# Patient Record
Sex: FEMALE | Race: WHITE | NOT HISPANIC OR LATINO | Employment: OTHER | URBAN - METROPOLITAN AREA
[De-identification: names, ages, dates, MRNs, and addresses within clinical notes are randomized per-mention and may not be internally consistent; named-entity substitution may affect disease eponyms.]

---

## 2017-01-09 ENCOUNTER — COMMUNICATION - HEALTHEAST (OUTPATIENT)
Dept: INTERNAL MEDICINE | Facility: CLINIC | Age: 67
End: 2017-01-09

## 2017-01-09 DIAGNOSIS — K21.9 GERD (GASTROESOPHAGEAL REFLUX DISEASE): ICD-10-CM

## 2017-03-24 ENCOUNTER — COMMUNICATION - HEALTHEAST (OUTPATIENT)
Dept: INTERNAL MEDICINE | Facility: CLINIC | Age: 67
End: 2017-03-24

## 2017-04-23 ENCOUNTER — COMMUNICATION - HEALTHEAST (OUTPATIENT)
Dept: INTERNAL MEDICINE | Facility: CLINIC | Age: 67
End: 2017-04-23

## 2017-04-23 DIAGNOSIS — K21.9 GERD (GASTROESOPHAGEAL REFLUX DISEASE): ICD-10-CM

## 2017-04-26 ENCOUNTER — RECORDS - HEALTHEAST (OUTPATIENT)
Dept: ADMINISTRATIVE | Facility: OTHER | Age: 67
End: 2017-04-26

## 2017-05-16 ENCOUNTER — COMMUNICATION - HEALTHEAST (OUTPATIENT)
Dept: INTERNAL MEDICINE | Facility: CLINIC | Age: 67
End: 2017-05-16

## 2017-05-16 DIAGNOSIS — R30.0 DYSURIA: ICD-10-CM

## 2017-05-17 ENCOUNTER — COMMUNICATION - HEALTHEAST (OUTPATIENT)
Dept: INTERNAL MEDICINE | Facility: CLINIC | Age: 67
End: 2017-05-17

## 2017-05-17 ENCOUNTER — AMBULATORY - HEALTHEAST (OUTPATIENT)
Dept: INTERNAL MEDICINE | Facility: CLINIC | Age: 67
End: 2017-05-17

## 2017-05-17 ENCOUNTER — AMBULATORY - HEALTHEAST (OUTPATIENT)
Dept: LAB | Facility: CLINIC | Age: 67
End: 2017-05-17

## 2017-05-17 DIAGNOSIS — R30.0 DYSURIA: ICD-10-CM

## 2017-08-11 ENCOUNTER — OFFICE VISIT - HEALTHEAST (OUTPATIENT)
Dept: INTERNAL MEDICINE | Facility: CLINIC | Age: 67
End: 2017-08-11

## 2017-08-11 DIAGNOSIS — Z00.00 HEALTH CARE MAINTENANCE: ICD-10-CM

## 2017-08-11 DIAGNOSIS — I25.10 CORONARY ATHEROSCLEROSIS: ICD-10-CM

## 2017-08-11 DIAGNOSIS — Z12.31 VISIT FOR SCREENING MAMMOGRAM: ICD-10-CM

## 2017-08-11 DIAGNOSIS — E78.5 HYPERLIPEMIA: ICD-10-CM

## 2017-08-11 DIAGNOSIS — Z12.11 COLON CANCER SCREENING: ICD-10-CM

## 2017-08-11 DIAGNOSIS — M85.80 OSTEOPENIA: ICD-10-CM

## 2017-08-11 LAB
CHOLEST SERPL-MCNC: 142 MG/DL
FASTING STATUS PATIENT QL REPORTED: YES
HDLC SERPL-MCNC: 46 MG/DL
LDLC SERPL CALC-MCNC: 76 MG/DL
TRIGL SERPL-MCNC: 99 MG/DL

## 2017-08-11 ASSESSMENT — MIFFLIN-ST. JEOR: SCORE: 1264.98

## 2017-08-14 ENCOUNTER — COMMUNICATION - HEALTHEAST (OUTPATIENT)
Dept: INTERNAL MEDICINE | Facility: CLINIC | Age: 67
End: 2017-08-14

## 2017-08-15 ENCOUNTER — RECORDS - HEALTHEAST (OUTPATIENT)
Dept: BONE DENSITY | Facility: CLINIC | Age: 67
End: 2017-08-15

## 2017-08-15 ENCOUNTER — RECORDS - HEALTHEAST (OUTPATIENT)
Dept: ADMINISTRATIVE | Facility: OTHER | Age: 67
End: 2017-08-15

## 2017-08-15 ENCOUNTER — RECORDS - HEALTHEAST (OUTPATIENT)
Dept: MAMMOGRAPHY | Facility: CLINIC | Age: 67
End: 2017-08-15

## 2017-08-15 DIAGNOSIS — Z12.31 ENCOUNTER FOR SCREENING MAMMOGRAM FOR MALIGNANT NEOPLASM OF BREAST: ICD-10-CM

## 2017-08-15 DIAGNOSIS — M85.80 OTHER SPECIFIED DISORDERS OF BONE DENSITY AND STRUCTURE, UNSPECIFIED SITE: ICD-10-CM

## 2017-08-16 ENCOUNTER — COMMUNICATION - HEALTHEAST (OUTPATIENT)
Dept: INTERNAL MEDICINE | Facility: CLINIC | Age: 67
End: 2017-08-16

## 2017-08-22 ENCOUNTER — AMBULATORY - HEALTHEAST (OUTPATIENT)
Dept: INTERNAL MEDICINE | Facility: CLINIC | Age: 67
End: 2017-08-22

## 2017-08-22 ENCOUNTER — COMMUNICATION - HEALTHEAST (OUTPATIENT)
Dept: INTERNAL MEDICINE | Facility: CLINIC | Age: 67
End: 2017-08-22

## 2017-08-22 ENCOUNTER — HOSPITAL ENCOUNTER (OUTPATIENT)
Dept: MAMMOGRAPHY | Facility: CLINIC | Age: 67
Discharge: HOME | End: 2017-08-22
Attending: INTERNAL MEDICINE

## 2017-08-22 DIAGNOSIS — R92.0 MICROCALCIFICATIONS OF THE BREAST: ICD-10-CM

## 2017-08-22 DIAGNOSIS — R92.1 BREAST CALCIFICATION SEEN ON MAMMOGRAM: ICD-10-CM

## 2017-09-07 ENCOUNTER — HOSPITAL ENCOUNTER (OUTPATIENT)
Dept: MAMMOGRAPHY | Facility: HOSPITAL | Age: 67
Discharge: HOME | End: 2017-09-07
Attending: INTERNAL MEDICINE

## 2017-09-07 DIAGNOSIS — R92.1 BREAST CALCIFICATION SEEN ON MAMMOGRAM: ICD-10-CM

## 2017-09-08 ENCOUNTER — COMMUNICATION - HEALTHEAST (OUTPATIENT)
Dept: ONCOLOGY | Facility: HOSPITAL | Age: 67
End: 2017-09-08

## 2017-09-11 ENCOUNTER — COMMUNICATION - HEALTHEAST (OUTPATIENT)
Dept: INTERNAL MEDICINE | Facility: CLINIC | Age: 67
End: 2017-09-11

## 2017-09-14 ENCOUNTER — COMMUNICATION - HEALTHEAST (OUTPATIENT)
Dept: INTERNAL MEDICINE | Facility: CLINIC | Age: 67
End: 2017-09-14

## 2017-09-14 DIAGNOSIS — I25.10 CORONARY ATHEROSCLEROSIS: ICD-10-CM

## 2017-09-20 ENCOUNTER — COMMUNICATION - HEALTHEAST (OUTPATIENT)
Dept: ONCOLOGY | Facility: HOSPITAL | Age: 67
End: 2017-09-20

## 2017-09-22 ENCOUNTER — RECORDS - HEALTHEAST (OUTPATIENT)
Dept: ADMINISTRATIVE | Facility: OTHER | Age: 67
End: 2017-09-22

## 2017-09-25 ENCOUNTER — RECORDS - HEALTHEAST (OUTPATIENT)
Dept: ADMINISTRATIVE | Facility: OTHER | Age: 67
End: 2017-09-25

## 2017-09-26 ENCOUNTER — RECORDS - HEALTHEAST (OUTPATIENT)
Dept: ADMINISTRATIVE | Facility: OTHER | Age: 67
End: 2017-09-26

## 2017-10-09 ENCOUNTER — RECORDS - HEALTHEAST (OUTPATIENT)
Dept: ADMINISTRATIVE | Facility: OTHER | Age: 67
End: 2017-10-09

## 2017-10-09 ENCOUNTER — OFFICE VISIT - HEALTHEAST (OUTPATIENT)
Dept: INTERNAL MEDICINE | Facility: CLINIC | Age: 67
End: 2017-10-09

## 2017-10-09 DIAGNOSIS — Z95.5 S/P CORONARY ARTERY STENT PLACEMENT: ICD-10-CM

## 2017-10-09 DIAGNOSIS — R92.0 MICROCALCIFICATIONS OF THE BREAST: ICD-10-CM

## 2017-10-09 DIAGNOSIS — D05.10 DCIS (DUCTAL CARCINOMA IN SITU): ICD-10-CM

## 2017-10-09 DIAGNOSIS — I25.10 CORONARY ATHEROSCLEROSIS: ICD-10-CM

## 2017-10-09 DIAGNOSIS — Z01.818 PRE-OPERATIVE EXAMINATION FOR INTERNAL MEDICINE: ICD-10-CM

## 2017-10-09 ASSESSMENT — MIFFLIN-ST. JEOR: SCORE: 1230.74

## 2017-10-10 ENCOUNTER — COMMUNICATION - HEALTHEAST (OUTPATIENT)
Dept: INTERNAL MEDICINE | Facility: CLINIC | Age: 67
End: 2017-10-10

## 2017-10-11 LAB
ATRIAL RATE - MUSE: 69 BPM
DIASTOLIC BLOOD PRESSURE - MUSE: NORMAL MMHG
INTERPRETATION ECG - MUSE: NORMAL
P AXIS - MUSE: 65 DEGREES
PR INTERVAL - MUSE: 144 MS
QRS DURATION - MUSE: 76 MS
QT - MUSE: 444 MS
QTC - MUSE: 475 MS
R AXIS - MUSE: 2 DEGREES
SYSTOLIC BLOOD PRESSURE - MUSE: NORMAL MMHG
T AXIS - MUSE: 30 DEGREES
VENTRICULAR RATE- MUSE: 69 BPM

## 2017-10-25 ENCOUNTER — RECORDS - HEALTHEAST (OUTPATIENT)
Dept: ADMINISTRATIVE | Facility: OTHER | Age: 67
End: 2017-10-25

## 2017-10-27 ENCOUNTER — RECORDS - HEALTHEAST (OUTPATIENT)
Dept: ADMINISTRATIVE | Facility: OTHER | Age: 67
End: 2017-10-27

## 2017-11-02 ENCOUNTER — RECORDS - HEALTHEAST (OUTPATIENT)
Dept: ADMINISTRATIVE | Facility: OTHER | Age: 67
End: 2017-11-02

## 2017-11-03 ENCOUNTER — RECORDS - HEALTHEAST (OUTPATIENT)
Dept: ADMINISTRATIVE | Facility: OTHER | Age: 67
End: 2017-11-03

## 2017-11-16 ENCOUNTER — COMMUNICATION - HEALTHEAST (OUTPATIENT)
Dept: INTERNAL MEDICINE | Facility: CLINIC | Age: 67
End: 2017-11-16

## 2017-11-21 LAB
LAB AP CHARGES (HE HISTORICAL CONVERSION): ABNORMAL
PATH REPORT.ADDENDUM SPEC: ABNORMAL
PATH REPORT.COMMENTS IMP SPEC: ABNORMAL
PATH REPORT.COMMENTS IMP SPEC: ABNORMAL
PATH REPORT.FINAL DX SPEC: ABNORMAL
PATH REPORT.GROSS SPEC: ABNORMAL
PATH REPORT.MICROSCOPIC SPEC OTHER STN: ABNORMAL
PATH REPORT.RELEVANT HX SPEC: ABNORMAL
RESULT FLAG (HE HISTORICAL CONVERSION): ABNORMAL

## 2017-11-27 ENCOUNTER — RECORDS - HEALTHEAST (OUTPATIENT)
Dept: ADMINISTRATIVE | Facility: OTHER | Age: 67
End: 2017-11-27

## 2017-12-27 ENCOUNTER — COMMUNICATION - HEALTHEAST (OUTPATIENT)
Dept: INTERNAL MEDICINE | Facility: CLINIC | Age: 67
End: 2017-12-27

## 2017-12-27 DIAGNOSIS — E78.5 HYPERLIPIDEMIA: ICD-10-CM

## 2018-01-02 ENCOUNTER — COMMUNICATION - HEALTHEAST (OUTPATIENT)
Dept: INTERNAL MEDICINE | Facility: CLINIC | Age: 68
End: 2018-01-02

## 2018-01-02 ENCOUNTER — OFFICE VISIT - HEALTHEAST (OUTPATIENT)
Dept: INTERNAL MEDICINE | Facility: CLINIC | Age: 68
End: 2018-01-02

## 2018-01-02 DIAGNOSIS — J32.9 SINUSITIS: ICD-10-CM

## 2018-02-09 ENCOUNTER — COMMUNICATION - HEALTHEAST (OUTPATIENT)
Dept: INTERNAL MEDICINE | Facility: CLINIC | Age: 68
End: 2018-02-09

## 2018-02-09 DIAGNOSIS — K21.9 GERD (GASTROESOPHAGEAL REFLUX DISEASE): ICD-10-CM

## 2018-03-26 ENCOUNTER — COMMUNICATION - HEALTHEAST (OUTPATIENT)
Dept: INTERNAL MEDICINE | Facility: CLINIC | Age: 68
End: 2018-03-26

## 2018-03-26 DIAGNOSIS — E78.5 HYPERLIPIDEMIA: ICD-10-CM

## 2018-05-21 ENCOUNTER — OFFICE VISIT - HEALTHEAST (OUTPATIENT)
Dept: INTERNAL MEDICINE | Facility: CLINIC | Age: 68
End: 2018-05-21

## 2018-05-21 DIAGNOSIS — G47.00 INSOMNIA: ICD-10-CM

## 2018-05-21 DIAGNOSIS — Z01.818 PRE-OP EXAM: ICD-10-CM

## 2018-05-21 DIAGNOSIS — D05.11 DUCTAL CARCINOMA IN SITU (DCIS) OF RIGHT BREAST: ICD-10-CM

## 2018-05-21 DIAGNOSIS — Z90.13 HISTORY OF BILATERAL MASTECTOMY: ICD-10-CM

## 2018-05-21 LAB
ALBUMIN SERPL-MCNC: 3.8 G/DL (ref 3.5–5)
ALP SERPL-CCNC: 134 U/L (ref 45–120)
ALT SERPL W P-5'-P-CCNC: 14 U/L (ref 0–45)
ANION GAP SERPL CALCULATED.3IONS-SCNC: 11 MMOL/L (ref 5–18)
AST SERPL W P-5'-P-CCNC: 16 U/L (ref 0–40)
BILIRUB SERPL-MCNC: 0.4 MG/DL (ref 0–1)
BUN SERPL-MCNC: 19 MG/DL (ref 8–22)
CALCIUM SERPL-MCNC: 9.4 MG/DL (ref 8.5–10.5)
CHLORIDE BLD-SCNC: 106 MMOL/L (ref 98–107)
CO2 SERPL-SCNC: 24 MMOL/L (ref 22–31)
CREAT SERPL-MCNC: 0.74 MG/DL (ref 0.6–1.1)
ERYTHROCYTE [DISTWIDTH] IN BLOOD BY AUTOMATED COUNT: 14.1 % (ref 11–14.5)
GFR SERPL CREATININE-BSD FRML MDRD: >60 ML/MIN/1.73M2
GLUCOSE BLD-MCNC: 96 MG/DL (ref 70–125)
HCT VFR BLD AUTO: 38 % (ref 35–47)
HGB BLD-MCNC: 12.5 G/DL (ref 12–16)
LDLC SERPL CALC-MCNC: 55 MG/DL
MCH RBC QN AUTO: 26.6 PG (ref 27–34)
MCHC RBC AUTO-ENTMCNC: 33 G/DL (ref 32–36)
MCV RBC AUTO: 81 FL (ref 80–100)
PLATELET # BLD AUTO: 305 THOU/UL (ref 140–440)
PMV BLD AUTO: 6.9 FL (ref 7–10)
POTASSIUM BLD-SCNC: 5.1 MMOL/L (ref 3.5–5)
PROT SERPL-MCNC: 6.8 G/DL (ref 6–8)
RBC # BLD AUTO: 4.71 MILL/UL (ref 3.8–5.4)
SODIUM SERPL-SCNC: 141 MMOL/L (ref 136–145)
WBC: 6.8 THOU/UL (ref 4–11)

## 2018-05-21 RX ORDER — BACLOFEN 20 MG
500 TABLET ORAL AT BEDTIME
Status: ON HOLD | COMMUNITY
Start: 2018-05-21 | End: 2022-02-04

## 2018-05-21 ASSESSMENT — MIFFLIN-ST. JEOR: SCORE: 1202.33

## 2018-05-22 ENCOUNTER — COMMUNICATION - HEALTHEAST (OUTPATIENT)
Dept: INTERNAL MEDICINE | Facility: CLINIC | Age: 68
End: 2018-05-22

## 2018-05-22 LAB
ATRIAL RATE - MUSE: 60 BPM
DIASTOLIC BLOOD PRESSURE - MUSE: NORMAL MMHG
INTERPRETATION ECG - MUSE: NORMAL
P AXIS - MUSE: 64 DEGREES
PR INTERVAL - MUSE: 154 MS
QRS DURATION - MUSE: 72 MS
QT - MUSE: 454 MS
QTC - MUSE: 454 MS
R AXIS - MUSE: 2 DEGREES
SYSTOLIC BLOOD PRESSURE - MUSE: NORMAL MMHG
T AXIS - MUSE: 35 DEGREES
VENTRICULAR RATE- MUSE: 60 BPM

## 2018-05-29 ENCOUNTER — COMMUNICATION - HEALTHEAST (OUTPATIENT)
Dept: INTERNAL MEDICINE | Facility: CLINIC | Age: 68
End: 2018-05-29

## 2018-05-29 DIAGNOSIS — B99.9 INFECTION: ICD-10-CM

## 2018-06-14 ENCOUNTER — RECORDS - HEALTHEAST (OUTPATIENT)
Dept: ADMINISTRATIVE | Facility: OTHER | Age: 68
End: 2018-06-14
Payer: MEDICARE

## 2018-09-24 ENCOUNTER — OFFICE VISIT - HEALTHEAST (OUTPATIENT)
Dept: INTERNAL MEDICINE | Facility: CLINIC | Age: 68
End: 2018-09-24

## 2018-09-24 DIAGNOSIS — Z01.818 PREOP GENERAL PHYSICAL EXAM: ICD-10-CM

## 2018-09-24 DIAGNOSIS — E78.5 HYPERLIPIDEMIA: ICD-10-CM

## 2018-09-24 DIAGNOSIS — D05.11 DUCTAL CARCINOMA IN SITU (DCIS) OF RIGHT BREAST: ICD-10-CM

## 2018-09-24 DIAGNOSIS — Z98.82 S/P BILATERAL BREAST IMPLANTS: ICD-10-CM

## 2018-09-24 DIAGNOSIS — Z90.13 S/P BILATERAL MASTECTOMY: ICD-10-CM

## 2018-09-24 DIAGNOSIS — G47.00 INSOMNIA: ICD-10-CM

## 2018-09-24 DIAGNOSIS — I25.10 CORONARY ATHEROSCLEROSIS: ICD-10-CM

## 2018-09-24 LAB
ANION GAP SERPL CALCULATED.3IONS-SCNC: 11 MMOL/L (ref 5–18)
BUN SERPL-MCNC: 22 MG/DL (ref 8–22)
CALCIUM SERPL-MCNC: 9.8 MG/DL (ref 8.5–10.5)
CHLORIDE BLD-SCNC: 107 MMOL/L (ref 98–107)
CO2 SERPL-SCNC: 27 MMOL/L (ref 22–31)
CREAT SERPL-MCNC: 0.76 MG/DL (ref 0.6–1.1)
ERYTHROCYTE [DISTWIDTH] IN BLOOD BY AUTOMATED COUNT: 14.8 % (ref 11–14.5)
GFR SERPL CREATININE-BSD FRML MDRD: >60 ML/MIN/1.73M2
GLUCOSE BLD-MCNC: 117 MG/DL (ref 70–125)
HCT VFR BLD AUTO: 40.4 % (ref 35–47)
HGB BLD-MCNC: 13.3 G/DL (ref 12–16)
MCH RBC QN AUTO: 26.8 PG (ref 27–34)
MCHC RBC AUTO-ENTMCNC: 32.9 G/DL (ref 32–36)
MCV RBC AUTO: 81 FL (ref 80–100)
PLATELET # BLD AUTO: 221 THOU/UL (ref 140–440)
PMV BLD AUTO: 7.3 FL (ref 7–10)
POTASSIUM BLD-SCNC: 4.9 MMOL/L (ref 3.5–5)
RBC # BLD AUTO: 4.96 MILL/UL (ref 3.8–5.4)
SODIUM SERPL-SCNC: 145 MMOL/L (ref 136–145)
WBC: 5.6 THOU/UL (ref 4–11)

## 2018-09-24 ASSESSMENT — MIFFLIN-ST. JEOR: SCORE: 1211.68

## 2018-09-25 ENCOUNTER — COMMUNICATION - HEALTHEAST (OUTPATIENT)
Dept: INTERNAL MEDICINE | Facility: CLINIC | Age: 68
End: 2018-09-25

## 2018-10-18 ENCOUNTER — RECORDS - HEALTHEAST (OUTPATIENT)
Dept: ADMINISTRATIVE | Facility: OTHER | Age: 68
End: 2018-10-18

## 2018-12-10 ENCOUNTER — COMMUNICATION - HEALTHEAST (OUTPATIENT)
Dept: INTERNAL MEDICINE | Facility: CLINIC | Age: 68
End: 2018-12-10

## 2019-01-02 ENCOUNTER — RECORDS - HEALTHEAST (OUTPATIENT)
Dept: ADMINISTRATIVE | Facility: OTHER | Age: 69
End: 2019-01-02

## 2019-01-08 ENCOUNTER — COMMUNICATION - HEALTHEAST (OUTPATIENT)
Dept: INTERNAL MEDICINE | Facility: CLINIC | Age: 69
End: 2019-01-08

## 2019-02-19 ENCOUNTER — OFFICE VISIT - HEALTHEAST (OUTPATIENT)
Dept: INTERNAL MEDICINE | Facility: CLINIC | Age: 69
End: 2019-02-19

## 2019-02-19 DIAGNOSIS — Z00.00 HEALTHCARE MAINTENANCE: ICD-10-CM

## 2019-02-19 DIAGNOSIS — I25.10 CORONARY ARTERY DISEASE INVOLVING NATIVE HEART WITHOUT ANGINA PECTORIS, UNSPECIFIED VESSEL OR LESION TYPE: ICD-10-CM

## 2019-02-19 DIAGNOSIS — G47.30 SLEEP APNEA, UNSPECIFIED TYPE: ICD-10-CM

## 2019-02-19 DIAGNOSIS — G47.00 INSOMNIA: ICD-10-CM

## 2019-02-19 DIAGNOSIS — Z23 NEED FOR VACCINE FOR TD (TETANUS-DIPHTHERIA): ICD-10-CM

## 2019-02-19 DIAGNOSIS — M77.8 SHOULDER TENDONITIS, RIGHT: ICD-10-CM

## 2019-02-19 DIAGNOSIS — N39.0 URINARY TRACT INFECTION WITHOUT HEMATURIA, SITE UNSPECIFIED: ICD-10-CM

## 2019-02-19 ASSESSMENT — MIFFLIN-ST. JEOR: SCORE: 1205.56

## 2019-02-25 ENCOUNTER — OFFICE VISIT - HEALTHEAST (OUTPATIENT)
Dept: PHYSICAL THERAPY | Facility: REHABILITATION | Age: 69
End: 2019-02-25

## 2019-02-25 DIAGNOSIS — M62.81 GENERALIZED MUSCLE WEAKNESS: ICD-10-CM

## 2019-02-25 DIAGNOSIS — R29.3 ABNORMAL POSTURE: ICD-10-CM

## 2019-02-25 DIAGNOSIS — M75.41 IMPINGEMENT SYNDROME OF RIGHT SHOULDER: ICD-10-CM

## 2019-02-27 ENCOUNTER — OFFICE VISIT - HEALTHEAST (OUTPATIENT)
Dept: PHYSICAL THERAPY | Facility: REHABILITATION | Age: 69
End: 2019-02-27

## 2019-02-27 DIAGNOSIS — R29.3 ABNORMAL POSTURE: ICD-10-CM

## 2019-02-27 DIAGNOSIS — M75.41 IMPINGEMENT SYNDROME OF RIGHT SHOULDER: ICD-10-CM

## 2019-02-27 DIAGNOSIS — M62.81 GENERALIZED MUSCLE WEAKNESS: ICD-10-CM

## 2019-03-04 ENCOUNTER — AMBULATORY - HEALTHEAST (OUTPATIENT)
Dept: CARDIOLOGY | Facility: CLINIC | Age: 69
End: 2019-03-04

## 2019-03-04 ENCOUNTER — OFFICE VISIT - HEALTHEAST (OUTPATIENT)
Dept: PHYSICAL THERAPY | Facility: REHABILITATION | Age: 69
End: 2019-03-04

## 2019-03-04 ENCOUNTER — RECORDS - HEALTHEAST (OUTPATIENT)
Dept: ADMINISTRATIVE | Facility: OTHER | Age: 69
End: 2019-03-04

## 2019-03-04 DIAGNOSIS — M62.81 GENERALIZED MUSCLE WEAKNESS: ICD-10-CM

## 2019-03-04 DIAGNOSIS — M75.41 IMPINGEMENT SYNDROME OF RIGHT SHOULDER: ICD-10-CM

## 2019-03-04 DIAGNOSIS — R29.3 ABNORMAL POSTURE: ICD-10-CM

## 2019-03-07 ENCOUNTER — OFFICE VISIT - HEALTHEAST (OUTPATIENT)
Dept: PHYSICAL THERAPY | Facility: REHABILITATION | Age: 69
End: 2019-03-07

## 2019-03-07 DIAGNOSIS — R29.3 ABNORMAL POSTURE: ICD-10-CM

## 2019-03-07 DIAGNOSIS — M62.81 GENERALIZED MUSCLE WEAKNESS: ICD-10-CM

## 2019-03-07 DIAGNOSIS — M75.41 IMPINGEMENT SYNDROME OF RIGHT SHOULDER: ICD-10-CM

## 2019-03-10 ENCOUNTER — COMMUNICATION - HEALTHEAST (OUTPATIENT)
Dept: INTERNAL MEDICINE | Facility: CLINIC | Age: 69
End: 2019-03-10

## 2019-03-10 DIAGNOSIS — K21.9 GERD (GASTROESOPHAGEAL REFLUX DISEASE): ICD-10-CM

## 2019-03-11 ENCOUNTER — OFFICE VISIT - HEALTHEAST (OUTPATIENT)
Dept: PHYSICAL THERAPY | Facility: REHABILITATION | Age: 69
End: 2019-03-11

## 2019-03-11 DIAGNOSIS — M62.81 GENERALIZED MUSCLE WEAKNESS: ICD-10-CM

## 2019-03-11 DIAGNOSIS — M75.41 IMPINGEMENT SYNDROME OF RIGHT SHOULDER: ICD-10-CM

## 2019-03-11 DIAGNOSIS — R29.3 ABNORMAL POSTURE: ICD-10-CM

## 2019-03-13 ENCOUNTER — AMBULATORY - HEALTHEAST (OUTPATIENT)
Dept: CARDIOLOGY | Facility: CLINIC | Age: 69
End: 2019-03-13

## 2019-03-13 ENCOUNTER — COMMUNICATION - HEALTHEAST (OUTPATIENT)
Dept: CARDIOLOGY | Facility: CLINIC | Age: 69
End: 2019-03-13

## 2019-03-13 ENCOUNTER — SURGERY - HEALTHEAST (OUTPATIENT)
Dept: CARDIOLOGY | Facility: CLINIC | Age: 69
End: 2019-03-13

## 2019-03-13 ENCOUNTER — OFFICE VISIT - HEALTHEAST (OUTPATIENT)
Dept: CARDIOLOGY | Facility: CLINIC | Age: 69
End: 2019-03-13

## 2019-03-13 DIAGNOSIS — E78.5 HYPERLIPIDEMIA: ICD-10-CM

## 2019-03-13 DIAGNOSIS — I20.89 STABLE ANGINA (H): ICD-10-CM

## 2019-03-13 DIAGNOSIS — I25.728 ATHEROSCLEROSIS OF AUTOLOGOUS ARTERY CORONARY ARTERY BYPASS GRAFT WITH STABLE ANGINA (H): ICD-10-CM

## 2019-03-13 ASSESSMENT — MIFFLIN-ST. JEOR: SCORE: 1209.64

## 2019-03-14 ENCOUNTER — OFFICE VISIT - HEALTHEAST (OUTPATIENT)
Dept: PHYSICAL THERAPY | Facility: REHABILITATION | Age: 69
End: 2019-03-14

## 2019-03-14 DIAGNOSIS — M75.41 IMPINGEMENT SYNDROME OF RIGHT SHOULDER: ICD-10-CM

## 2019-03-14 DIAGNOSIS — R29.3 ABNORMAL POSTURE: ICD-10-CM

## 2019-03-14 DIAGNOSIS — M62.81 GENERALIZED MUSCLE WEAKNESS: ICD-10-CM

## 2019-03-21 ENCOUNTER — OFFICE VISIT - HEALTHEAST (OUTPATIENT)
Dept: PHYSICAL THERAPY | Facility: REHABILITATION | Age: 69
End: 2019-03-21

## 2019-03-21 DIAGNOSIS — M62.81 GENERALIZED MUSCLE WEAKNESS: ICD-10-CM

## 2019-03-21 DIAGNOSIS — M75.41 IMPINGEMENT SYNDROME OF RIGHT SHOULDER: ICD-10-CM

## 2019-03-21 DIAGNOSIS — R29.3 ABNORMAL POSTURE: ICD-10-CM

## 2019-03-26 ENCOUNTER — AMBULATORY - HEALTHEAST (OUTPATIENT)
Dept: CARDIOLOGY | Facility: CLINIC | Age: 69
End: 2019-03-26

## 2019-03-26 ENCOUNTER — SURGERY - HEALTHEAST (OUTPATIENT)
Dept: CARDIOLOGY | Facility: CLINIC | Age: 69
End: 2019-03-26

## 2019-03-26 DIAGNOSIS — I25.10 ATHEROSCLEROSIS OF NATIVE CORONARY ARTERY OF NATIVE HEART, ANGINA PRESENCE UNSPECIFIED: ICD-10-CM

## 2019-03-26 ASSESSMENT — MIFFLIN-ST. JEOR: SCORE: 1231.81

## 2019-03-27 ENCOUNTER — ANESTHESIA - HEALTHEAST (OUTPATIENT)
Dept: SURGERY | Facility: CLINIC | Age: 69
End: 2019-03-27

## 2019-03-27 ENCOUNTER — HOSPITAL ENCOUNTER (OUTPATIENT)
Dept: SURGERY | Facility: CLINIC | Age: 69
Discharge: HOME | End: 2019-03-27
Attending: INTERNAL MEDICINE | Admitting: INTERNAL MEDICINE

## 2019-03-27 DIAGNOSIS — I25.10 ATHEROSCLEROSIS OF NATIVE CORONARY ARTERY OF NATIVE HEART, ANGINA PRESENCE UNSPECIFIED: ICD-10-CM

## 2019-03-27 LAB
ABO/RH(D): NORMAL
ALBUMIN UR-MCNC: NEGATIVE MG/DL
ANION GAP SERPL CALCULATED.3IONS-SCNC: 11 MMOL/L (ref 5–18)
ANTIBODY SCREEN: NEGATIVE
APPEARANCE UR: CLEAR
BILIRUB UR QL STRIP: NEGATIVE
BUN SERPL-MCNC: 18 MG/DL (ref 8–22)
CALCIUM SERPL-MCNC: 9.2 MG/DL (ref 8.5–10.5)
CHLORIDE BLD-SCNC: 107 MMOL/L (ref 98–107)
CO2 SERPL-SCNC: 23 MMOL/L (ref 22–31)
COLOR UR AUTO: YELLOW
CREAT SERPL-MCNC: 0.74 MG/DL (ref 0.6–1.1)
ERYTHROCYTE [DISTWIDTH] IN BLOOD BY AUTOMATED COUNT: 14.4 % (ref 11–14.5)
GFR SERPL CREATININE-BSD FRML MDRD: >60 ML/MIN/1.73M2
GLUCOSE BLD-MCNC: 114 MG/DL (ref 70–125)
GLUCOSE UR STRIP-MCNC: NEGATIVE MG/DL
HBA1C MFR BLD: 6.2 % (ref 4.2–6.1)
HCT VFR BLD AUTO: 39.4 % (ref 35–47)
HGB BLD-MCNC: 12.2 G/DL (ref 12–16)
HGB UR QL STRIP: NEGATIVE
INR PPP: 1.01 (ref 0.9–1.1)
KETONES UR STRIP-MCNC: NEGATIVE MG/DL
LEUKOCYTE ESTERASE UR QL STRIP: NEGATIVE
MAGNESIUM SERPL-MCNC: 2 MG/DL (ref 1.8–2.6)
MCH RBC QN AUTO: 26.3 PG (ref 27–34)
MCHC RBC AUTO-ENTMCNC: 31 G/DL (ref 32–36)
MCV RBC AUTO: 85 FL (ref 80–100)
NITRATE UR QL: NEGATIVE
PH UR STRIP: 6.5 [PH] (ref 4.5–8)
PLATELET # BLD AUTO: 230 THOU/UL (ref 140–440)
PMV BLD AUTO: 9.6 FL (ref 8.5–12.5)
POTASSIUM BLD-SCNC: 3.9 MMOL/L (ref 3.5–5)
PREALB SERPL-MCNC: 32.8 MG/DL (ref 19–38)
RBC # BLD AUTO: 4.63 MILL/UL (ref 3.8–5.4)
SODIUM SERPL-SCNC: 141 MMOL/L (ref 136–145)
SP GR UR STRIP: 1.02 (ref 1–1.03)
UROBILINOGEN UR STRIP-ACNC: NORMAL
WBC: 6.4 THOU/UL (ref 4–11)

## 2019-03-27 ASSESSMENT — MIFFLIN-ST. JEOR: SCORE: 1223.02

## 2019-03-28 ENCOUNTER — SURGERY - HEALTHEAST (OUTPATIENT)
Dept: SURGERY | Facility: CLINIC | Age: 69
End: 2019-03-28

## 2019-03-28 LAB — BACTERIA SPEC CULT: NORMAL

## 2019-03-28 ASSESSMENT — MIFFLIN-ST. JEOR: SCORE: 1204.88

## 2019-03-29 ASSESSMENT — MIFFLIN-ST. JEOR
SCORE: 1230.28
SCORE: 1230.28

## 2019-03-30 ASSESSMENT — MIFFLIN-ST. JEOR: SCORE: 1230.28

## 2019-03-31 ASSESSMENT — MIFFLIN-ST. JEOR: SCORE: 1212.14

## 2019-04-01 ASSESSMENT — MIFFLIN-ST. JEOR: SCORE: 1202.61

## 2019-04-02 ASSESSMENT — MIFFLIN-ST. JEOR: SCORE: 1196.71

## 2019-04-03 ENCOUNTER — AMBULATORY - HEALTHEAST (OUTPATIENT)
Dept: OTHER | Facility: CLINIC | Age: 69
End: 2019-04-03

## 2019-04-03 ENCOUNTER — COMMUNICATION - HEALTHEAST (OUTPATIENT)
Dept: CARDIOLOGY | Facility: CLINIC | Age: 69
End: 2019-04-03

## 2019-04-03 DIAGNOSIS — Z95.1 S/P CABG (CORONARY ARTERY BYPASS GRAFT): ICD-10-CM

## 2019-04-03 ASSESSMENT — MIFFLIN-ST. JEOR: SCORE: 1192.18

## 2019-04-04 ENCOUNTER — COMMUNICATION - HEALTHEAST (OUTPATIENT)
Dept: CARE COORDINATION | Facility: CLINIC | Age: 69
End: 2019-04-04

## 2019-04-09 ENCOUNTER — AMBULATORY - HEALTHEAST (OUTPATIENT)
Dept: CARDIAC REHAB | Facility: CLINIC | Age: 69
End: 2019-04-09

## 2019-04-09 DIAGNOSIS — Z95.1 S/P CABG (CORONARY ARTERY BYPASS GRAFT): ICD-10-CM

## 2019-04-11 ENCOUNTER — AMBULATORY - HEALTHEAST (OUTPATIENT)
Dept: CARDIAC REHAB | Facility: CLINIC | Age: 69
End: 2019-04-11

## 2019-04-11 ENCOUNTER — OFFICE VISIT - HEALTHEAST (OUTPATIENT)
Dept: INTERNAL MEDICINE | Facility: CLINIC | Age: 69
End: 2019-04-11

## 2019-04-11 DIAGNOSIS — Z95.1 S/P CABG (CORONARY ARTERY BYPASS GRAFT): ICD-10-CM

## 2019-04-11 DIAGNOSIS — R79.81 LOW OXYGEN SATURATION: ICD-10-CM

## 2019-04-11 DIAGNOSIS — F34.1 DYSTHYMIC DISORDER: ICD-10-CM

## 2019-04-11 DIAGNOSIS — G47.00 INSOMNIA: ICD-10-CM

## 2019-04-11 DIAGNOSIS — Z95.5 S/P CORONARY ARTERY STENT PLACEMENT: ICD-10-CM

## 2019-04-11 DIAGNOSIS — L71.9 ROSACEA: ICD-10-CM

## 2019-04-11 DIAGNOSIS — D05.10 DUCTAL CARCINOMA IN SITU (DCIS) OF BREAST, UNSPECIFIED LATERALITY: ICD-10-CM

## 2019-04-11 DIAGNOSIS — R73.03 PREDIABETES: ICD-10-CM

## 2019-04-11 DIAGNOSIS — Z00.00 HEALTHCARE MAINTENANCE: ICD-10-CM

## 2019-04-11 DIAGNOSIS — Z00.00 HEALTH MAINTENANCE EXAMINATION: ICD-10-CM

## 2019-04-11 LAB
ANION GAP SERPL CALCULATED.3IONS-SCNC: 9 MMOL/L (ref 5–18)
BUN SERPL-MCNC: 18 MG/DL (ref 8–22)
CALCIUM SERPL-MCNC: 9.8 MG/DL (ref 8.5–10.5)
CHLORIDE BLD-SCNC: 104 MMOL/L (ref 98–107)
CO2 SERPL-SCNC: 27 MMOL/L (ref 22–31)
CREAT SERPL-MCNC: 0.73 MG/DL (ref 0.6–1.1)
ERYTHROCYTE [DISTWIDTH] IN BLOOD BY AUTOMATED COUNT: 15.1 % (ref 11–14.5)
GFR SERPL CREATININE-BSD FRML MDRD: >60 ML/MIN/1.73M2
GLUCOSE BLD-MCNC: 94 MG/DL (ref 70–125)
HCT VFR BLD AUTO: 34.1 % (ref 35–47)
HGB BLD-MCNC: 11.4 G/DL (ref 12–16)
MAGNESIUM SERPL-MCNC: 2.1 MG/DL (ref 1.8–2.6)
MCH RBC QN AUTO: 28.2 PG (ref 27–34)
MCHC RBC AUTO-ENTMCNC: 33.3 G/DL (ref 32–36)
MCV RBC AUTO: 85 FL (ref 80–100)
PLATELET # BLD AUTO: 436 THOU/UL (ref 140–440)
PMV BLD AUTO: 7.6 FL (ref 7–10)
POTASSIUM BLD-SCNC: 4.7 MMOL/L (ref 3.5–5)
RBC # BLD AUTO: 4.02 MILL/UL (ref 3.8–5.4)
SODIUM SERPL-SCNC: 140 MMOL/L (ref 136–145)
WBC: 9.2 THOU/UL (ref 4–11)

## 2019-04-11 ASSESSMENT — MIFFLIN-ST. JEOR: SCORE: 1220.76

## 2019-04-16 ENCOUNTER — AMBULATORY - HEALTHEAST (OUTPATIENT)
Dept: CARDIAC REHAB | Facility: CLINIC | Age: 69
End: 2019-04-16

## 2019-04-16 DIAGNOSIS — Z95.1 S/P CABG (CORONARY ARTERY BYPASS GRAFT): ICD-10-CM

## 2019-04-18 ENCOUNTER — AMBULATORY - HEALTHEAST (OUTPATIENT)
Dept: CARDIAC REHAB | Facility: CLINIC | Age: 69
End: 2019-04-18

## 2019-04-18 DIAGNOSIS — Z95.1 S/P CABG (CORONARY ARTERY BYPASS GRAFT): ICD-10-CM

## 2019-04-23 ENCOUNTER — OFFICE VISIT - HEALTHEAST (OUTPATIENT)
Dept: CARDIOLOGY | Facility: CLINIC | Age: 69
End: 2019-04-23

## 2019-04-23 ENCOUNTER — AMBULATORY - HEALTHEAST (OUTPATIENT)
Dept: CARDIAC REHAB | Facility: CLINIC | Age: 69
End: 2019-04-23

## 2019-04-23 DIAGNOSIS — Z95.1 S/P CABG (CORONARY ARTERY BYPASS GRAFT): ICD-10-CM

## 2019-04-23 ASSESSMENT — MIFFLIN-ST. JEOR: SCORE: 1207.83

## 2019-04-24 ENCOUNTER — OFFICE VISIT - HEALTHEAST (OUTPATIENT)
Dept: SLEEP MEDICINE | Facility: CLINIC | Age: 69
End: 2019-04-24

## 2019-04-24 DIAGNOSIS — R06.83 SNORING: ICD-10-CM

## 2019-04-24 DIAGNOSIS — Z95.1 S/P CABG (CORONARY ARTERY BYPASS GRAFT): ICD-10-CM

## 2019-04-24 DIAGNOSIS — R09.02 HYPOXIA: ICD-10-CM

## 2019-04-24 ASSESSMENT — MIFFLIN-ST. JEOR: SCORE: 1197.85

## 2019-04-25 ENCOUNTER — AMBULATORY - HEALTHEAST (OUTPATIENT)
Dept: CARDIAC REHAB | Facility: CLINIC | Age: 69
End: 2019-04-25

## 2019-04-25 DIAGNOSIS — Z95.1 S/P CABG (CORONARY ARTERY BYPASS GRAFT): ICD-10-CM

## 2019-04-25 DIAGNOSIS — Z95.5 S/P CORONARY ARTERY STENT PLACEMENT: ICD-10-CM

## 2019-04-29 ENCOUNTER — COMMUNICATION - HEALTHEAST (OUTPATIENT)
Dept: INTERNAL MEDICINE | Facility: CLINIC | Age: 69
End: 2019-04-29

## 2019-04-29 ENCOUNTER — AMBULATORY - HEALTHEAST (OUTPATIENT)
Dept: CARDIAC REHAB | Facility: CLINIC | Age: 69
End: 2019-04-29

## 2019-04-29 DIAGNOSIS — Z95.1 S/P CABG (CORONARY ARTERY BYPASS GRAFT): ICD-10-CM

## 2019-04-29 DIAGNOSIS — K21.9 GERD (GASTROESOPHAGEAL REFLUX DISEASE): ICD-10-CM

## 2019-05-03 ENCOUNTER — AMBULATORY - HEALTHEAST (OUTPATIENT)
Dept: CARDIAC REHAB | Facility: CLINIC | Age: 69
End: 2019-05-03

## 2019-05-03 DIAGNOSIS — Z95.1 S/P CABG (CORONARY ARTERY BYPASS GRAFT): ICD-10-CM

## 2019-05-06 ENCOUNTER — AMBULATORY - HEALTHEAST (OUTPATIENT)
Dept: CARDIAC REHAB | Facility: CLINIC | Age: 69
End: 2019-05-06

## 2019-05-06 ENCOUNTER — OFFICE VISIT - HEALTHEAST (OUTPATIENT)
Dept: VASCULAR SURGERY | Facility: CLINIC | Age: 69
End: 2019-05-06

## 2019-05-06 DIAGNOSIS — I25.10 ATHEROSCLEROSIS OF NATIVE CORONARY ARTERY OF NATIVE HEART, ANGINA PRESENCE UNSPECIFIED: ICD-10-CM

## 2019-05-06 DIAGNOSIS — Z95.1 S/P CABG (CORONARY ARTERY BYPASS GRAFT): ICD-10-CM

## 2019-05-08 ENCOUNTER — AMBULATORY - HEALTHEAST (OUTPATIENT)
Dept: CARDIAC REHAB | Facility: CLINIC | Age: 69
End: 2019-05-08

## 2019-05-08 DIAGNOSIS — Z95.1 S/P CABG (CORONARY ARTERY BYPASS GRAFT): ICD-10-CM

## 2019-05-10 ENCOUNTER — AMBULATORY - HEALTHEAST (OUTPATIENT)
Dept: CARDIAC REHAB | Facility: CLINIC | Age: 69
End: 2019-05-10

## 2019-05-10 DIAGNOSIS — Z95.1 S/P CABG (CORONARY ARTERY BYPASS GRAFT): ICD-10-CM

## 2019-05-13 ENCOUNTER — AMBULATORY - HEALTHEAST (OUTPATIENT)
Dept: CARDIAC REHAB | Facility: CLINIC | Age: 69
End: 2019-05-13

## 2019-05-13 DIAGNOSIS — Z95.1 S/P CABG (CORONARY ARTERY BYPASS GRAFT): ICD-10-CM

## 2019-05-14 ENCOUNTER — OFFICE VISIT - HEALTHEAST (OUTPATIENT)
Dept: CARDIOLOGY | Facility: CLINIC | Age: 69
End: 2019-05-14

## 2019-05-14 DIAGNOSIS — I20.9 ANGINA PECTORIS (H): ICD-10-CM

## 2019-05-14 DIAGNOSIS — Z95.1 S/P CABG (CORONARY ARTERY BYPASS GRAFT): ICD-10-CM

## 2019-05-14 RX ORDER — CALCIUM CARBONATE 500 MG/1
2 TABLET, CHEWABLE ORAL DAILY
Status: SHIPPED | COMMUNITY
Start: 2019-04-10

## 2019-05-14 ASSESSMENT — MIFFLIN-ST. JEOR: SCORE: 1199.66

## 2019-05-15 ENCOUNTER — AMBULATORY - HEALTHEAST (OUTPATIENT)
Dept: CARDIAC REHAB | Facility: CLINIC | Age: 69
End: 2019-05-15

## 2019-05-15 DIAGNOSIS — Z95.1 S/P CABG (CORONARY ARTERY BYPASS GRAFT): ICD-10-CM

## 2019-05-20 ENCOUNTER — RECORDS - HEALTHEAST (OUTPATIENT)
Dept: ADMINISTRATIVE | Facility: OTHER | Age: 69
End: 2019-05-20

## 2019-05-20 ENCOUNTER — AMBULATORY - HEALTHEAST (OUTPATIENT)
Dept: CARDIAC REHAB | Facility: CLINIC | Age: 69
End: 2019-05-20

## 2019-05-20 ENCOUNTER — RECORDS - HEALTHEAST (OUTPATIENT)
Dept: SLEEP MEDICINE | Age: 69
End: 2019-05-20

## 2019-05-20 DIAGNOSIS — R09.02 HYPOXEMIA: ICD-10-CM

## 2019-05-20 DIAGNOSIS — Z95.1 S/P CABG (CORONARY ARTERY BYPASS GRAFT): ICD-10-CM

## 2019-05-20 DIAGNOSIS — Z95.1 PRESENCE OF AORTOCORONARY BYPASS GRAFT: ICD-10-CM

## 2019-05-20 DIAGNOSIS — R06.83 SNORING: ICD-10-CM

## 2019-05-22 ENCOUNTER — AMBULATORY - HEALTHEAST (OUTPATIENT)
Dept: CARDIAC REHAB | Facility: CLINIC | Age: 69
End: 2019-05-22

## 2019-05-22 DIAGNOSIS — Z95.1 S/P CABG (CORONARY ARTERY BYPASS GRAFT): ICD-10-CM

## 2019-05-23 ENCOUNTER — COMMUNICATION - HEALTHEAST (OUTPATIENT)
Dept: INTERNAL MEDICINE | Facility: CLINIC | Age: 69
End: 2019-05-23

## 2019-05-23 ENCOUNTER — AMBULATORY - HEALTHEAST (OUTPATIENT)
Dept: INTERNAL MEDICINE | Facility: CLINIC | Age: 69
End: 2019-05-23

## 2019-05-28 ENCOUNTER — COMMUNICATION - HEALTHEAST (OUTPATIENT)
Dept: CARDIOLOGY | Facility: CLINIC | Age: 69
End: 2019-05-28

## 2019-05-29 ENCOUNTER — AMBULATORY - HEALTHEAST (OUTPATIENT)
Dept: CARDIAC REHAB | Facility: CLINIC | Age: 69
End: 2019-05-29

## 2019-05-29 DIAGNOSIS — Z95.1 S/P CABG (CORONARY ARTERY BYPASS GRAFT): ICD-10-CM

## 2019-05-31 ENCOUNTER — AMBULATORY - HEALTHEAST (OUTPATIENT)
Dept: CARDIAC REHAB | Facility: CLINIC | Age: 69
End: 2019-05-31

## 2019-05-31 DIAGNOSIS — Z95.1 S/P CABG (CORONARY ARTERY BYPASS GRAFT): ICD-10-CM

## 2019-06-03 ENCOUNTER — AMBULATORY - HEALTHEAST (OUTPATIENT)
Dept: CARDIAC REHAB | Facility: CLINIC | Age: 69
End: 2019-06-03

## 2019-06-03 DIAGNOSIS — Z95.1 S/P CABG (CORONARY ARTERY BYPASS GRAFT): ICD-10-CM

## 2019-06-05 ENCOUNTER — AMBULATORY - HEALTHEAST (OUTPATIENT)
Dept: SLEEP MEDICINE | Facility: CLINIC | Age: 69
End: 2019-06-05

## 2019-06-05 ENCOUNTER — AMBULATORY - HEALTHEAST (OUTPATIENT)
Dept: CARDIAC REHAB | Facility: CLINIC | Age: 69
End: 2019-06-05

## 2019-06-05 ENCOUNTER — OFFICE VISIT - HEALTHEAST (OUTPATIENT)
Dept: SLEEP MEDICINE | Facility: CLINIC | Age: 69
End: 2019-06-05

## 2019-06-05 DIAGNOSIS — Z95.1 S/P CABG (CORONARY ARTERY BYPASS GRAFT): ICD-10-CM

## 2019-06-05 DIAGNOSIS — G47.33 OSA (OBSTRUCTIVE SLEEP APNEA): ICD-10-CM

## 2019-06-05 DIAGNOSIS — G47.61 PERIODIC LIMB MOVEMENT DISORDER: ICD-10-CM

## 2019-06-05 ASSESSMENT — MIFFLIN-ST. JEOR: SCORE: 1203.74

## 2019-06-07 ENCOUNTER — AMBULATORY - HEALTHEAST (OUTPATIENT)
Dept: CARDIAC REHAB | Facility: CLINIC | Age: 69
End: 2019-06-07

## 2019-06-07 DIAGNOSIS — Z95.1 S/P CABG (CORONARY ARTERY BYPASS GRAFT): ICD-10-CM

## 2019-06-10 ENCOUNTER — AMBULATORY - HEALTHEAST (OUTPATIENT)
Dept: CARDIAC REHAB | Facility: CLINIC | Age: 69
End: 2019-06-10

## 2019-06-10 DIAGNOSIS — Z95.1 S/P CABG (CORONARY ARTERY BYPASS GRAFT): ICD-10-CM

## 2019-06-12 ENCOUNTER — AMBULATORY - HEALTHEAST (OUTPATIENT)
Dept: CARDIAC REHAB | Facility: CLINIC | Age: 69
End: 2019-06-12

## 2019-06-12 DIAGNOSIS — Z95.1 S/P CABG (CORONARY ARTERY BYPASS GRAFT): ICD-10-CM

## 2019-06-14 ENCOUNTER — AMBULATORY - HEALTHEAST (OUTPATIENT)
Dept: CARDIAC REHAB | Facility: CLINIC | Age: 69
End: 2019-06-14

## 2019-06-14 DIAGNOSIS — Z95.1 S/P CABG (CORONARY ARTERY BYPASS GRAFT): ICD-10-CM

## 2019-06-17 ENCOUNTER — AMBULATORY - HEALTHEAST (OUTPATIENT)
Dept: CARDIAC REHAB | Facility: CLINIC | Age: 69
End: 2019-06-17

## 2019-06-17 DIAGNOSIS — Z95.1 S/P CABG (CORONARY ARTERY BYPASS GRAFT): ICD-10-CM

## 2019-06-18 ENCOUNTER — AMBULATORY - HEALTHEAST (OUTPATIENT)
Dept: SLEEP MEDICINE | Facility: CLINIC | Age: 69
End: 2019-06-18

## 2019-06-19 ENCOUNTER — AMBULATORY - HEALTHEAST (OUTPATIENT)
Dept: CARDIAC REHAB | Facility: CLINIC | Age: 69
End: 2019-06-19

## 2019-06-19 DIAGNOSIS — Z95.1 S/P CABG (CORONARY ARTERY BYPASS GRAFT): ICD-10-CM

## 2019-06-21 ENCOUNTER — AMBULATORY - HEALTHEAST (OUTPATIENT)
Dept: CARDIAC REHAB | Facility: CLINIC | Age: 69
End: 2019-06-21

## 2019-06-21 DIAGNOSIS — Z95.1 S/P CABG (CORONARY ARTERY BYPASS GRAFT): ICD-10-CM

## 2019-06-24 ENCOUNTER — AMBULATORY - HEALTHEAST (OUTPATIENT)
Dept: CARDIAC REHAB | Facility: CLINIC | Age: 69
End: 2019-06-24

## 2019-06-24 DIAGNOSIS — Z95.1 S/P CABG (CORONARY ARTERY BYPASS GRAFT): ICD-10-CM

## 2019-06-26 ENCOUNTER — AMBULATORY - HEALTHEAST (OUTPATIENT)
Dept: CARDIAC REHAB | Facility: CLINIC | Age: 69
End: 2019-06-26

## 2019-06-26 DIAGNOSIS — Z95.1 S/P CABG (CORONARY ARTERY BYPASS GRAFT): ICD-10-CM

## 2019-07-19 ENCOUNTER — AMBULATORY - HEALTHEAST (OUTPATIENT)
Dept: CARDIAC REHAB | Facility: CLINIC | Age: 69
End: 2019-07-19

## 2019-07-19 DIAGNOSIS — Z95.1 S/P CABG (CORONARY ARTERY BYPASS GRAFT): ICD-10-CM

## 2019-07-22 ENCOUNTER — AMBULATORY - HEALTHEAST (OUTPATIENT)
Dept: CARDIAC REHAB | Facility: CLINIC | Age: 69
End: 2019-07-22

## 2019-07-22 DIAGNOSIS — Z95.1 S/P CABG (CORONARY ARTERY BYPASS GRAFT): ICD-10-CM

## 2019-07-23 ENCOUNTER — COMMUNICATION - HEALTHEAST (OUTPATIENT)
Dept: CARDIOLOGY | Facility: CLINIC | Age: 69
End: 2019-07-23

## 2019-07-24 ENCOUNTER — AMBULATORY - HEALTHEAST (OUTPATIENT)
Dept: CARDIAC REHAB | Facility: CLINIC | Age: 69
End: 2019-07-24

## 2019-07-24 DIAGNOSIS — Z95.1 S/P CABG (CORONARY ARTERY BYPASS GRAFT): ICD-10-CM

## 2019-07-25 ENCOUNTER — COMMUNICATION - HEALTHEAST (OUTPATIENT)
Dept: CARDIOLOGY | Facility: CLINIC | Age: 69
End: 2019-07-25

## 2019-07-25 DIAGNOSIS — Z95.5 S/P CORONARY ARTERY STENT PLACEMENT: ICD-10-CM

## 2019-07-25 DIAGNOSIS — I25.10 CORONARY ATHEROSCLEROSIS: ICD-10-CM

## 2019-07-25 DIAGNOSIS — Z95.1 S/P CABG (CORONARY ARTERY BYPASS GRAFT): ICD-10-CM

## 2019-07-26 ENCOUNTER — AMBULATORY - HEALTHEAST (OUTPATIENT)
Dept: CARDIAC REHAB | Facility: CLINIC | Age: 69
End: 2019-07-26

## 2019-07-26 DIAGNOSIS — Z95.1 S/P CABG (CORONARY ARTERY BYPASS GRAFT): ICD-10-CM

## 2019-07-29 ENCOUNTER — AMBULATORY - HEALTHEAST (OUTPATIENT)
Dept: CARDIAC REHAB | Facility: CLINIC | Age: 69
End: 2019-07-29

## 2019-07-29 DIAGNOSIS — Z95.1 S/P CABG (CORONARY ARTERY BYPASS GRAFT): ICD-10-CM

## 2019-07-30 ENCOUNTER — HOSPITAL ENCOUNTER (OUTPATIENT)
Dept: NUCLEAR MEDICINE | Facility: CLINIC | Age: 69
Discharge: HOME | End: 2019-07-30
Attending: INTERNAL MEDICINE

## 2019-07-30 ENCOUNTER — HOSPITAL ENCOUNTER (OUTPATIENT)
Dept: CARDIOLOGY | Facility: CLINIC | Age: 69
Discharge: HOME | End: 2019-07-30
Attending: INTERNAL MEDICINE

## 2019-07-30 DIAGNOSIS — Z95.5 S/P CORONARY ARTERY STENT PLACEMENT: ICD-10-CM

## 2019-07-30 DIAGNOSIS — I25.10 CORONARY ATHEROSCLEROSIS: ICD-10-CM

## 2019-07-30 DIAGNOSIS — Z95.1 S/P CABG (CORONARY ARTERY BYPASS GRAFT): ICD-10-CM

## 2019-07-30 LAB
CV STRESS CURRENT BP HE: NORMAL
CV STRESS CURRENT HR HE: 57
CV STRESS CURRENT HR HE: 60
CV STRESS CURRENT HR HE: 61
CV STRESS CURRENT HR HE: 70
CV STRESS CURRENT HR HE: 71
CV STRESS CURRENT HR HE: 73
CV STRESS CURRENT HR HE: 74
CV STRESS CURRENT HR HE: 75
CV STRESS CURRENT HR HE: 79
CV STRESS CURRENT HR HE: 79
CV STRESS CURRENT HR HE: 81
CV STRESS CURRENT HR HE: 81
CV STRESS CURRENT HR HE: 88
CV STRESS CURRENT HR HE: 89
CV STRESS DEVIATION TIME HE: NORMAL
CV STRESS ECHO PERCENT HR HE: NORMAL
CV STRESS EXERCISE STAGE HE: NORMAL
CV STRESS FINAL RESTING BP HE: NORMAL
CV STRESS FINAL RESTING HR HE: 73
CV STRESS MAX HR HE: 91
CV STRESS MAX TREADMILL GRADE HE: 0
CV STRESS MAX TREADMILL SPEED HE: 0
CV STRESS PEAK DIA BP HE: NORMAL
CV STRESS PEAK SYS BP HE: NORMAL
CV STRESS PHASE HE: NORMAL
CV STRESS PROTOCOL HE: NORMAL
CV STRESS RESTING PT POSITION HE: NORMAL
CV STRESS ST DEVIATION AMOUNT HE: NORMAL
CV STRESS ST DEVIATION ELEVATION HE: NORMAL
CV STRESS ST EVELATION AMOUNT HE: NORMAL
CV STRESS TEST TYPE HE: NORMAL
CV STRESS TOTAL STAGE TIME MIN 1 HE: NORMAL
NUC STRESS EJECTION FRACTION: >75 %
STRESS ECHO BASELINE BP: NORMAL
STRESS ECHO BASELINE HR: 57
STRESS ECHO CALCULATED PERCENT HR: 61 %
STRESS ECHO LAST STRESS BP: NORMAL
STRESS ECHO LAST STRESS HR: 81

## 2019-07-31 ENCOUNTER — COMMUNICATION - HEALTHEAST (OUTPATIENT)
Dept: CARDIOLOGY | Facility: CLINIC | Age: 69
End: 2019-07-31

## 2019-07-31 DIAGNOSIS — R06.02 SHORTNESS OF BREATH: ICD-10-CM

## 2019-07-31 DIAGNOSIS — I20.89 STABLE ANGINA (H): ICD-10-CM

## 2019-08-01 ENCOUNTER — HOSPITAL ENCOUNTER (OUTPATIENT)
Dept: RADIOLOGY | Facility: CLINIC | Age: 69
Discharge: HOME | End: 2019-08-01
Attending: INTERNAL MEDICINE

## 2019-08-01 DIAGNOSIS — R06.02 SHORTNESS OF BREATH: ICD-10-CM

## 2019-08-01 DIAGNOSIS — I20.89 STABLE ANGINA (H): ICD-10-CM

## 2019-08-06 ENCOUNTER — AMBULATORY - HEALTHEAST (OUTPATIENT)
Dept: CARDIOLOGY | Facility: CLINIC | Age: 69
End: 2019-08-06

## 2019-08-06 ENCOUNTER — HOSPITAL ENCOUNTER (OUTPATIENT)
Dept: CARDIOLOGY | Facility: CLINIC | Age: 69
Discharge: HOME | End: 2019-08-06
Attending: INTERNAL MEDICINE

## 2019-08-06 DIAGNOSIS — I20.89 STABLE ANGINA (H): ICD-10-CM

## 2019-08-06 DIAGNOSIS — I20.9 ANGINA PECTORIS (H): ICD-10-CM

## 2019-08-06 DIAGNOSIS — Z95.1 S/P CABG (CORONARY ARTERY BYPASS GRAFT): ICD-10-CM

## 2019-08-06 LAB
BSA FOR ECHO PROCEDURE: 1.8 M2
CHOLEST SERPL-MCNC: 128 MG/DL
CV BLOOD PRESSURE: NORMAL MMHG
CV ECHO HEIGHT: 63.5 IN
CV ECHO WEIGHT: 159 LBS
EJECTION FRACTION: 79 % (ref 55–75)
FASTING STATUS PATIENT QL REPORTED: YES
HDLC SERPL-MCNC: 48 MG/DL
LDLC SERPL CALC-MCNC: 56 MG/DL
LEFT VENTRICLE DIASTOLIC VOLUME INDEX: 43.3 CM3/M2 (ref 29–61)
LEFT VENTRICLE DIASTOLIC VOLUME: 78 CM3 (ref 46–106)
LEFT VENTRICLE HEART RATE: 63 BPM
LEFT VENTRICLE SYSTOLIC VOLUME INDEX: 8.9 CM3/M2 (ref 8–24)
LEFT VENTRICLE SYSTOLIC VOLUME: 16 CM3 (ref 14–42)
NUC REST DIASTOLIC VOLUME INDEX: 2544 LBS
NUC REST SYSTOLIC VOLUME INDEX: 63.5 IN
TRICUSPID REGURGITATION PEAK PRESSURE GRADIENT: 23.6 MMHG
TRICUSPID VALVE ANULAR PLANE SYSTOLIC EXCURSION: 1.1 CM
TRICUSPID VALVE PEAK REGURGITANT VELOCITY: 243 CM/S
TRIGL SERPL-MCNC: 121 MG/DL

## 2019-08-06 ASSESSMENT — MIFFLIN-ST. JEOR: SCORE: 1208.28

## 2019-08-09 ENCOUNTER — COMMUNICATION - HEALTHEAST (OUTPATIENT)
Dept: INTERNAL MEDICINE | Facility: CLINIC | Age: 69
End: 2019-08-09

## 2019-08-14 ENCOUNTER — AMBULATORY - HEALTHEAST (OUTPATIENT)
Dept: INTERNAL MEDICINE | Facility: CLINIC | Age: 69
End: 2019-08-14

## 2019-08-14 ENCOUNTER — COMMUNICATION - HEALTHEAST (OUTPATIENT)
Dept: INTERNAL MEDICINE | Facility: CLINIC | Age: 69
End: 2019-08-14

## 2019-08-14 DIAGNOSIS — R06.02 SHORTNESS OF BREATH: ICD-10-CM

## 2019-08-20 ENCOUNTER — HOSPITAL ENCOUNTER (OUTPATIENT)
Dept: RESPIRATORY THERAPY | Facility: CLINIC | Age: 69
Discharge: HOME | End: 2019-08-20
Attending: INTERNAL MEDICINE

## 2019-08-20 DIAGNOSIS — R06.02 SHORTNESS OF BREATH: ICD-10-CM

## 2019-08-20 LAB — HGB BLD-MCNC: 12 G/DL (ref 12–16)

## 2019-08-22 ENCOUNTER — COMMUNICATION - HEALTHEAST (OUTPATIENT)
Dept: INTERNAL MEDICINE | Facility: CLINIC | Age: 69
End: 2019-08-22

## 2019-08-23 ENCOUNTER — COMMUNICATION - HEALTHEAST (OUTPATIENT)
Dept: INTERNAL MEDICINE | Facility: CLINIC | Age: 69
End: 2019-08-23

## 2019-08-23 DIAGNOSIS — J43.9 PULMONARY EMPHYSEMA, UNSPECIFIED EMPHYSEMA TYPE (H): ICD-10-CM

## 2019-08-26 ENCOUNTER — COMMUNICATION - HEALTHEAST (OUTPATIENT)
Dept: INTERNAL MEDICINE | Facility: CLINIC | Age: 69
End: 2019-08-26

## 2019-08-26 DIAGNOSIS — J43.9 PULMONARY EMPHYSEMA, UNSPECIFIED EMPHYSEMA TYPE (H): ICD-10-CM

## 2019-08-28 ENCOUNTER — COMMUNICATION - HEALTHEAST (OUTPATIENT)
Dept: SLEEP MEDICINE | Facility: CLINIC | Age: 69
End: 2019-08-28

## 2019-08-28 ENCOUNTER — OFFICE VISIT - HEALTHEAST (OUTPATIENT)
Dept: SLEEP MEDICINE | Facility: CLINIC | Age: 69
End: 2019-08-28

## 2019-08-28 DIAGNOSIS — G47.61 PERIODIC LIMB MOVEMENT DISORDER: ICD-10-CM

## 2019-08-28 DIAGNOSIS — G47.33 OSA (OBSTRUCTIVE SLEEP APNEA): ICD-10-CM

## 2019-08-28 ASSESSMENT — MIFFLIN-ST. JEOR: SCORE: 1217.35

## 2019-09-11 ENCOUNTER — COMMUNICATION - HEALTHEAST (OUTPATIENT)
Dept: SLEEP MEDICINE | Facility: CLINIC | Age: 69
End: 2019-09-11

## 2019-09-11 ENCOUNTER — OFFICE VISIT (OUTPATIENT)
Dept: PLASTIC SURGERY | Facility: CLINIC | Age: 69
End: 2019-09-11

## 2019-09-11 DIAGNOSIS — Z41.1 ENCOUNTER FOR COSMETIC PROCEDURE: Primary | ICD-10-CM

## 2019-09-11 NOTE — LETTER
September 11, 2019  Re: Lisa Ray  1950    Dear Dr. Mcnamara,    Thank you so much for referring Lisa Ray to the [facility]. I had the pleasure of visiting with Lisa today.     Attached you will find a copy of my note. Please feel free to reach out to me with any questions, [phone].     This office note has been dictated.       Your trust in our practice and care is much appreciated.    Sincerely,  SHYANN JOHNSON MD

## 2019-09-11 NOTE — LETTER
9/11/2019      RE: Lisa Ray  [address] [facility] [id number]       This office note has been dictated.     SHAYNN JOHNSON MD

## 2019-09-11 NOTE — LETTER
2019       RE: Nenita Ray  [address] [facility] [address]     Dear Colleague,    Thank you for referring your patient, Nenita Ray, to the [facility]. Please see a copy of my visit note below.    Service Date: 2019      REASON FOR VISIT:  Rosa and Johny are old friends.        PROCEDURE:  We carried out some filler augmentation to vertical lip rhytids and along the vermillion border and the lower portions of the nasal labial creases as a teaching session.  2 cc were placed with a good contour improvement.        ASSESSMENT AND PLAN:  She will let us know how that works out.         SHYANN JOHNSON MD             D: 2019   T: 2019   MT: ms      Name:     NENITA RAY   MRN:      [id number]        Account:      [id number]   :      1950           Service Date: 2019      Document: [id number]

## 2019-09-13 NOTE — PROGRESS NOTES
Service Date: 2019      REASON FOR VISIT:  Rosa and Johny are old friends.        PROCEDURE:  We carried out some filler augmentation to vertical lip rhytids and along the vermillion border and the lower portions of the nasal labial creases as a teaching session.  2 cc were placed with a good contour improvement.        ASSESSMENT AND PLAN:  She will let us know how that works out.         SHYANN JOHNSON MD             D: 2019   T: 2019   MT: ms      Name:     NENITA DUKE   MRN:      [id number]        Account:      [id number]   :      1950           Service Date: 2019      Document: [id number]

## 2019-10-11 ENCOUNTER — OFFICE VISIT - HEALTHEAST (OUTPATIENT)
Dept: INTERNAL MEDICINE | Facility: CLINIC | Age: 69
End: 2019-10-11

## 2019-10-11 ENCOUNTER — COMMUNICATION - HEALTHEAST (OUTPATIENT)
Dept: INTERNAL MEDICINE | Facility: CLINIC | Age: 69
End: 2019-10-11

## 2019-10-11 DIAGNOSIS — M62.9 DISORDER OF MUSCLE, UNSPECIFIED: ICD-10-CM

## 2019-10-11 DIAGNOSIS — F43.21 ADJUSTMENT DISORDER WITH DEPRESSED MOOD: ICD-10-CM

## 2019-10-11 DIAGNOSIS — R53.82 CHRONIC FATIGUE: ICD-10-CM

## 2019-10-11 DIAGNOSIS — Z95.1 S/P CABG (CORONARY ARTERY BYPASS GRAFT): ICD-10-CM

## 2019-10-11 DIAGNOSIS — R73.9 ELEVATED BLOOD SUGAR: ICD-10-CM

## 2019-10-11 DIAGNOSIS — M25.50 MULTIPLE JOINT PAIN: ICD-10-CM

## 2019-10-11 LAB
ERYTHROCYTE [DISTWIDTH] IN BLOOD BY AUTOMATED COUNT: 14.4 % (ref 11–14.5)
HBA1C MFR BLD: 6.1 % (ref 3.5–6)
HCT VFR BLD AUTO: 39.6 % (ref 35–47)
HGB BLD-MCNC: 12.7 G/DL (ref 12–16)
MCH RBC QN AUTO: 25.6 PG (ref 27–34)
MCHC RBC AUTO-ENTMCNC: 32 G/DL (ref 32–36)
MCV RBC AUTO: 80 FL (ref 80–100)
PLATELET # BLD AUTO: 276 THOU/UL (ref 140–440)
PMV BLD AUTO: 7.9 FL (ref 7–10)
RBC # BLD AUTO: 4.96 MILL/UL (ref 3.8–5.4)
RHEUMATOID FACT SERPL-ACNC: <15 IU/ML (ref 0–30)
TSH SERPL DL<=0.005 MIU/L-ACNC: 1.23 UIU/ML (ref 0.3–5)
WBC: 7.2 THOU/UL (ref 4–11)

## 2019-10-11 RX ORDER — ASPIRIN 81 MG/1
162 TABLET, CHEWABLE ORAL DAILY
Refills: 0 | Status: ON HOLD | COMMUNITY
Start: 2019-10-11 | End: 2022-02-05

## 2019-10-11 RX ORDER — METHOCARBAMOL 750 MG/1
750 TABLET, FILM COATED ORAL AT BEDTIME
Qty: 90 TABLET | Refills: 1 | Status: SHIPPED | OUTPATIENT
Start: 2019-10-11 | End: 2021-08-05

## 2019-10-11 ASSESSMENT — MIFFLIN-ST. JEOR: SCORE: 1230.96

## 2019-10-11 ASSESSMENT — PATIENT HEALTH QUESTIONNAIRE - PHQ9: SUM OF ALL RESPONSES TO PHQ QUESTIONS 1-9: 6

## 2019-10-15 LAB — CCP AB SER IA-ACNC: <0.5 U/ML

## 2019-10-22 ENCOUNTER — COMMUNICATION - HEALTHEAST (OUTPATIENT)
Dept: INTERNAL MEDICINE | Facility: CLINIC | Age: 69
End: 2019-10-22

## 2019-11-02 ENCOUNTER — COMMUNICATION - HEALTHEAST (OUTPATIENT)
Dept: INTERNAL MEDICINE | Facility: CLINIC | Age: 69
End: 2019-11-02

## 2019-11-21 ENCOUNTER — AMBULATORY - HEALTHEAST (OUTPATIENT)
Dept: OTHER | Facility: CLINIC | Age: 69
End: 2019-11-21

## 2019-11-21 ENCOUNTER — DOCUMENTATION ONLY (OUTPATIENT)
Dept: OTHER | Facility: CLINIC | Age: 69
End: 2019-11-21

## 2020-01-09 ENCOUNTER — COMMUNICATION - HEALTHEAST (OUTPATIENT)
Dept: CARDIOLOGY | Facility: CLINIC | Age: 70
End: 2020-01-09

## 2020-01-09 DIAGNOSIS — Z95.1 S/P CABG (CORONARY ARTERY BYPASS GRAFT): ICD-10-CM

## 2020-02-12 ENCOUNTER — OFFICE VISIT - HEALTHEAST (OUTPATIENT)
Dept: INTERNAL MEDICINE | Facility: CLINIC | Age: 70
End: 2020-02-12

## 2020-02-12 ENCOUNTER — AMBULATORY - HEALTHEAST (OUTPATIENT)
Dept: INTERNAL MEDICINE | Facility: CLINIC | Age: 70
End: 2020-02-12

## 2020-02-12 DIAGNOSIS — I89.0 ACQUIRED LYMPHEDEMA: ICD-10-CM

## 2020-02-12 DIAGNOSIS — E61.1 IRON DEFICIENCY: ICD-10-CM

## 2020-02-12 DIAGNOSIS — I10 BENIGN ESSENTIAL HYPERTENSION: ICD-10-CM

## 2020-02-12 DIAGNOSIS — M79.89 REDNESS AND SWELLING OF HAND: ICD-10-CM

## 2020-02-12 DIAGNOSIS — I25.10 ATHEROSCLEROSIS OF CORONARY ARTERY OF NATIVE HEART WITHOUT ANGINA PECTORIS, UNSPECIFIED VESSEL OR LESION TYPE: ICD-10-CM

## 2020-02-12 DIAGNOSIS — R23.8 REDNESS AND SWELLING OF HAND: ICD-10-CM

## 2020-02-12 DIAGNOSIS — F34.1 DYSTHYMIC DISORDER: ICD-10-CM

## 2020-02-12 DIAGNOSIS — L65.9 HAIR LOSS: ICD-10-CM

## 2020-02-12 DIAGNOSIS — G47.33 OSA (OBSTRUCTIVE SLEEP APNEA): ICD-10-CM

## 2020-02-12 DIAGNOSIS — M16.11 PRIMARY OSTEOARTHRITIS OF RIGHT HIP: ICD-10-CM

## 2020-02-12 DIAGNOSIS — Z00.00 HEALTH MAINTENANCE EXAMINATION: ICD-10-CM

## 2020-02-12 DIAGNOSIS — R73.03 PREDIABETES: ICD-10-CM

## 2020-02-12 DIAGNOSIS — M25.511 ACUTE PAIN OF RIGHT SHOULDER: ICD-10-CM

## 2020-02-12 LAB
ALBUMIN SERPL-MCNC: 3.8 G/DL (ref 3.5–5)
ALP SERPL-CCNC: 104 U/L (ref 45–120)
ALT SERPL W P-5'-P-CCNC: 17 U/L (ref 0–45)
ANION GAP SERPL CALCULATED.3IONS-SCNC: 10 MMOL/L (ref 5–18)
AST SERPL W P-5'-P-CCNC: 17 U/L (ref 0–40)
BASOPHILS # BLD AUTO: 0 THOU/UL (ref 0–0.2)
BASOPHILS NFR BLD AUTO: 1 % (ref 0–2)
BILIRUB SERPL-MCNC: 0.7 MG/DL (ref 0–1)
BUN SERPL-MCNC: 19 MG/DL (ref 8–28)
CALCIUM SERPL-MCNC: 9 MG/DL (ref 8.5–10.5)
CHLORIDE BLD-SCNC: 107 MMOL/L (ref 98–107)
CHOLEST SERPL-MCNC: 116 MG/DL
CO2 SERPL-SCNC: 26 MMOL/L (ref 22–31)
CREAT SERPL-MCNC: 0.75 MG/DL (ref 0.6–1.1)
EOSINOPHIL # BLD AUTO: 0.2 THOU/UL (ref 0–0.4)
EOSINOPHIL NFR BLD AUTO: 3 % (ref 0–6)
ERYTHROCYTE [DISTWIDTH] IN BLOOD BY AUTOMATED COUNT: 15.9 % (ref 11–14.5)
FASTING STATUS PATIENT QL REPORTED: YES
GFR SERPL CREATININE-BSD FRML MDRD: >60 ML/MIN/1.73M2
GLUCOSE BLD-MCNC: 97 MG/DL (ref 70–125)
HBA1C MFR BLD: 6 % (ref 3.5–6)
HCT VFR BLD AUTO: 40.6 % (ref 35–47)
HDLC SERPL-MCNC: 53 MG/DL
HGB BLD-MCNC: 12 G/DL (ref 12–16)
IRON SATN MFR SERPL: 8 % (ref 20–50)
IRON SERPL-MCNC: 34 UG/DL (ref 42–175)
LDLC SERPL CALC-MCNC: 42 MG/DL
LYMPHOCYTES # BLD AUTO: 1.7 THOU/UL (ref 0.8–4.4)
LYMPHOCYTES NFR BLD AUTO: 26 % (ref 20–40)
MCH RBC QN AUTO: 25.1 PG (ref 27–34)
MCHC RBC AUTO-ENTMCNC: 29.6 G/DL (ref 32–36)
MCV RBC AUTO: 85 FL (ref 80–100)
MONOCYTES # BLD AUTO: 0.5 THOU/UL (ref 0–0.9)
MONOCYTES NFR BLD AUTO: 7 % (ref 2–10)
NEUTROPHILS # BLD AUTO: 4.1 THOU/UL (ref 2–7.7)
NEUTROPHILS NFR BLD AUTO: 63 % (ref 50–70)
PLATELET # BLD AUTO: 228 THOU/UL (ref 140–440)
PMV BLD AUTO: 10.6 FL (ref 8.5–12.5)
POTASSIUM BLD-SCNC: 4.4 MMOL/L (ref 3.5–5)
PROT SERPL-MCNC: 6.6 G/DL (ref 6–8)
RBC # BLD AUTO: 4.79 MILL/UL (ref 3.8–5.4)
SODIUM SERPL-SCNC: 143 MMOL/L (ref 136–145)
TIBC SERPL-MCNC: 410 UG/DL (ref 313–563)
TRANSFERRIN SERPL-MCNC: 328 MG/DL (ref 212–360)
TRIGL SERPL-MCNC: 105 MG/DL
TSH SERPL DL<=0.005 MIU/L-ACNC: 1.56 UIU/ML (ref 0.3–5)
WBC: 6.5 THOU/UL (ref 4–11)

## 2020-02-12 ASSESSMENT — MIFFLIN-ST. JEOR: SCORE: 1263.28

## 2020-02-13 LAB
DHEA-S SERPL-MCNC: 20 UG/DL (ref 10–90)
HCV AB SERPL QL IA: NEGATIVE

## 2020-02-15 LAB
SHBG SERPL-SCNC: 33 NMOL/L (ref 30–135)
TESTOST FREE SERPL-MCNC: 0.05 NG/DL (ref 0.06–0.38)
TESTOST SERPL-MCNC: 3 NG/DL (ref 8–60)

## 2020-02-27 ENCOUNTER — COMMUNICATION - HEALTHEAST (OUTPATIENT)
Dept: INTERNAL MEDICINE | Facility: CLINIC | Age: 70
End: 2020-02-27

## 2020-03-12 ENCOUNTER — OFFICE VISIT - HEALTHEAST (OUTPATIENT)
Dept: INTERNAL MEDICINE | Facility: CLINIC | Age: 70
End: 2020-03-12

## 2020-03-12 DIAGNOSIS — J42 CHRONIC BRONCHITIS, UNSPECIFIED CHRONIC BRONCHITIS TYPE (H): ICD-10-CM

## 2020-03-12 DIAGNOSIS — R06.02 SOB (SHORTNESS OF BREATH): ICD-10-CM

## 2020-03-12 DIAGNOSIS — I10 ESSENTIAL HYPERTENSION, BENIGN: ICD-10-CM

## 2020-03-12 DIAGNOSIS — G89.29 OTHER CHRONIC PAIN: ICD-10-CM

## 2020-03-12 DIAGNOSIS — I20.89 STABLE ANGINA (H): ICD-10-CM

## 2020-03-12 ASSESSMENT — PATIENT HEALTH QUESTIONNAIRE - PHQ9: SUM OF ALL RESPONSES TO PHQ QUESTIONS 1-9: 6

## 2020-03-12 ASSESSMENT — MIFFLIN-ST. JEOR: SCORE: 1258.74

## 2020-04-14 ENCOUNTER — COMMUNICATION - HEALTHEAST (OUTPATIENT)
Dept: INTERNAL MEDICINE | Facility: CLINIC | Age: 70
End: 2020-04-14

## 2020-04-15 ENCOUNTER — AMBULATORY - HEALTHEAST (OUTPATIENT)
Dept: INTERNAL MEDICINE | Facility: CLINIC | Age: 70
End: 2020-04-15

## 2020-04-15 ENCOUNTER — COMMUNICATION - HEALTHEAST (OUTPATIENT)
Dept: INTERNAL MEDICINE | Facility: CLINIC | Age: 70
End: 2020-04-15

## 2020-04-15 DIAGNOSIS — G47.00 INSOMNIA, UNSPECIFIED TYPE: ICD-10-CM

## 2020-04-15 DIAGNOSIS — I10 ESSENTIAL HYPERTENSION, BENIGN: ICD-10-CM

## 2020-04-16 ENCOUNTER — AMBULATORY - HEALTHEAST (OUTPATIENT)
Dept: INTERNAL MEDICINE | Facility: CLINIC | Age: 70
End: 2020-04-16

## 2020-05-04 ENCOUNTER — RECORDS - HEALTHEAST (OUTPATIENT)
Dept: ADMINISTRATIVE | Facility: OTHER | Age: 70
End: 2020-05-04

## 2020-05-05 ENCOUNTER — OFFICE VISIT - HEALTHEAST (OUTPATIENT)
Dept: CARDIOLOGY | Facility: CLINIC | Age: 70
End: 2020-05-05

## 2020-05-05 DIAGNOSIS — Z95.1 S/P CABG (CORONARY ARTERY BYPASS GRAFT): ICD-10-CM

## 2020-05-05 DIAGNOSIS — I10 HTN (HYPERTENSION): ICD-10-CM

## 2020-05-05 RX ORDER — METOPROLOL SUCCINATE 100 MG/1
100 TABLET, EXTENDED RELEASE ORAL AT BEDTIME
Qty: 90 TABLET | Refills: 11 | Status: SHIPPED | OUTPATIENT
Start: 2020-05-05 | End: 2021-09-22

## 2020-06-10 ENCOUNTER — COMMUNICATION - HEALTHEAST (OUTPATIENT)
Dept: INTERNAL MEDICINE | Facility: CLINIC | Age: 70
End: 2020-06-10

## 2020-06-15 ENCOUNTER — COMMUNICATION - HEALTHEAST (OUTPATIENT)
Dept: INTERNAL MEDICINE | Facility: CLINIC | Age: 70
End: 2020-06-15

## 2020-06-15 DIAGNOSIS — K21.9 GERD (GASTROESOPHAGEAL REFLUX DISEASE): ICD-10-CM

## 2020-06-19 ENCOUNTER — COMMUNICATION - HEALTHEAST (OUTPATIENT)
Dept: SLEEP MEDICINE | Facility: CLINIC | Age: 70
End: 2020-06-19

## 2020-06-22 RX ORDER — TIZANIDINE 2 MG/1
2 TABLET ORAL AT BEDTIME
Status: SHIPPED | COMMUNITY
Start: 2020-05-12 | End: 2023-12-20

## 2020-06-23 ENCOUNTER — OFFICE VISIT - HEALTHEAST (OUTPATIENT)
Dept: SLEEP MEDICINE | Facility: CLINIC | Age: 70
End: 2020-06-23

## 2020-06-23 ENCOUNTER — COMMUNICATION - HEALTHEAST (OUTPATIENT)
Dept: INTERNAL MEDICINE | Facility: CLINIC | Age: 70
End: 2020-06-23

## 2020-06-23 DIAGNOSIS — J43.9 PULMONARY EMPHYSEMA, UNSPECIFIED EMPHYSEMA TYPE (H): ICD-10-CM

## 2020-06-23 DIAGNOSIS — G47.33 OBSTRUCTIVE SLEEP APNEA: ICD-10-CM

## 2020-06-23 DIAGNOSIS — G47.61 PERIODIC LIMB MOVEMENT DISORDER: ICD-10-CM

## 2020-06-23 RX ORDER — PRAMIPEXOLE DIHYDROCHLORIDE 0.25 MG/1
TABLET ORAL
Qty: 90 TABLET | Refills: 5 | Status: SHIPPED | OUTPATIENT
Start: 2020-06-23 | End: 2021-08-05

## 2020-06-25 ENCOUNTER — COMMUNICATION - HEALTHEAST (OUTPATIENT)
Dept: INTERNAL MEDICINE | Facility: CLINIC | Age: 70
End: 2020-06-25

## 2020-06-29 ENCOUNTER — AMBULATORY - HEALTHEAST (OUTPATIENT)
Dept: VASCULAR SURGERY | Facility: CLINIC | Age: 70
End: 2020-06-29

## 2020-06-29 DIAGNOSIS — I65.23 BILATERAL CAROTID ARTERY STENOSIS: ICD-10-CM

## 2020-07-02 ENCOUNTER — COMMUNICATION - HEALTHEAST (OUTPATIENT)
Dept: CARDIOLOGY | Facility: CLINIC | Age: 70
End: 2020-07-02

## 2020-07-02 DIAGNOSIS — Z95.1 S/P CABG (CORONARY ARTERY BYPASS GRAFT): ICD-10-CM

## 2020-08-06 ENCOUNTER — OFFICE VISIT - HEALTHEAST (OUTPATIENT)
Dept: VASCULAR SURGERY | Facility: CLINIC | Age: 70
End: 2020-08-06

## 2020-08-06 ENCOUNTER — RECORDS - HEALTHEAST (OUTPATIENT)
Dept: VASCULAR ULTRASOUND | Facility: CLINIC | Age: 70
End: 2020-08-06

## 2020-08-06 DIAGNOSIS — I65.23 BILATERAL CAROTID ARTERY STENOSIS: ICD-10-CM

## 2020-08-06 DIAGNOSIS — I65.23 OCCLUSION AND STENOSIS OF BILATERAL CAROTID ARTERIES: ICD-10-CM

## 2020-08-06 ASSESSMENT — MIFFLIN-ST. JEOR: SCORE: 1267.25

## 2020-08-14 ENCOUNTER — COMMUNICATION - HEALTHEAST (OUTPATIENT)
Dept: INTERNAL MEDICINE | Facility: CLINIC | Age: 70
End: 2020-08-14

## 2020-08-14 DIAGNOSIS — G89.29 OTHER CHRONIC PAIN: ICD-10-CM

## 2020-09-09 ENCOUNTER — COMMUNICATION - HEALTHEAST (OUTPATIENT)
Dept: INTERNAL MEDICINE | Facility: CLINIC | Age: 70
End: 2020-09-09

## 2020-09-09 DIAGNOSIS — F43.21 ADJUSTMENT DISORDER WITH DEPRESSED MOOD: ICD-10-CM

## 2020-10-24 ENCOUNTER — COMMUNICATION - HEALTHEAST (OUTPATIENT)
Dept: INTERNAL MEDICINE | Facility: CLINIC | Age: 70
End: 2020-10-24

## 2020-10-24 DIAGNOSIS — Z95.1 S/P CABG (CORONARY ARTERY BYPASS GRAFT): ICD-10-CM

## 2020-12-30 ENCOUNTER — COMMUNICATION - HEALTHEAST (OUTPATIENT)
Dept: INTERNAL MEDICINE | Facility: CLINIC | Age: 70
End: 2020-12-30

## 2020-12-30 DIAGNOSIS — G47.00 INSOMNIA, UNSPECIFIED TYPE: ICD-10-CM

## 2020-12-30 DIAGNOSIS — G89.29 OTHER CHRONIC PAIN: ICD-10-CM

## 2020-12-30 RX ORDER — ZOLPIDEM TARTRATE 10 MG/1
10 TABLET ORAL
Qty: 30 TABLET | Refills: 2 | Status: SHIPPED | OUTPATIENT
Start: 2020-12-30 | End: 2022-02-22

## 2021-02-15 ENCOUNTER — COMMUNICATION - HEALTHEAST (OUTPATIENT)
Dept: INTERNAL MEDICINE | Facility: CLINIC | Age: 71
End: 2021-02-15

## 2021-03-06 ENCOUNTER — COMMUNICATION - HEALTHEAST (OUTPATIENT)
Dept: INTERNAL MEDICINE | Facility: CLINIC | Age: 71
End: 2021-03-06

## 2021-03-27 ENCOUNTER — COMMUNICATION - HEALTHEAST (OUTPATIENT)
Dept: INTERNAL MEDICINE | Facility: CLINIC | Age: 71
End: 2021-03-27

## 2021-03-30 ENCOUNTER — COMMUNICATION - HEALTHEAST (OUTPATIENT)
Dept: INTERNAL MEDICINE | Facility: CLINIC | Age: 71
End: 2021-03-30

## 2021-04-10 ENCOUNTER — COMMUNICATION - HEALTHEAST (OUTPATIENT)
Dept: INTERNAL MEDICINE | Facility: CLINIC | Age: 71
End: 2021-04-10

## 2021-04-10 DIAGNOSIS — I10 ESSENTIAL HYPERTENSION, BENIGN: ICD-10-CM

## 2021-04-12 RX ORDER — LISINOPRIL 10 MG/1
TABLET ORAL
Qty: 90 TABLET | Refills: 3 | Status: SHIPPED | OUTPATIENT
Start: 2021-04-12 | End: 2022-01-31

## 2021-04-19 ENCOUNTER — COMMUNICATION - HEALTHEAST (OUTPATIENT)
Dept: INTERNAL MEDICINE | Facility: CLINIC | Age: 71
End: 2021-04-19

## 2021-04-19 DIAGNOSIS — Z95.1 S/P CABG (CORONARY ARTERY BYPASS GRAFT): ICD-10-CM

## 2021-04-19 RX ORDER — ROSUVASTATIN CALCIUM 20 MG/1
TABLET, COATED ORAL
Qty: 90 TABLET | Refills: 1 | Status: SHIPPED | OUTPATIENT
Start: 2021-04-19 | End: 2021-10-27

## 2021-04-20 ENCOUNTER — OFFICE VISIT - HEALTHEAST (OUTPATIENT)
Dept: INTERNAL MEDICINE | Facility: CLINIC | Age: 71
End: 2021-04-20

## 2021-04-20 DIAGNOSIS — M81.0 AGE-RELATED OSTEOPOROSIS WITHOUT CURRENT PATHOLOGICAL FRACTURE: ICD-10-CM

## 2021-04-20 DIAGNOSIS — Z01.818 PREOP EXAM FOR INTERNAL MEDICINE: ICD-10-CM

## 2021-04-20 DIAGNOSIS — R06.02 SOB (SHORTNESS OF BREATH): ICD-10-CM

## 2021-04-20 DIAGNOSIS — E78.2 MIXED HYPERLIPIDEMIA: ICD-10-CM

## 2021-04-20 DIAGNOSIS — Z00.00 HEALTH MAINTENANCE EXAMINATION: ICD-10-CM

## 2021-04-20 DIAGNOSIS — R73.03 PREDIABETES: ICD-10-CM

## 2021-04-20 LAB
ANION GAP SERPL CALCULATED.3IONS-SCNC: 12 MMOL/L (ref 5–18)
ATRIAL RATE - MUSE: 58 BPM
BUN SERPL-MCNC: 19 MG/DL (ref 8–28)
CALCIUM SERPL-MCNC: 9.4 MG/DL (ref 8.5–10.5)
CHLORIDE BLD-SCNC: 102 MMOL/L (ref 98–107)
CHOLEST SERPL-MCNC: 116 MG/DL
CO2 SERPL-SCNC: 27 MMOL/L (ref 22–31)
CREAT SERPL-MCNC: 0.85 MG/DL (ref 0.6–1.1)
DIASTOLIC BLOOD PRESSURE - MUSE: NORMAL
ERYTHROCYTE [DISTWIDTH] IN BLOOD BY AUTOMATED COUNT: 12.2 % (ref 11–14.5)
FASTING STATUS PATIENT QL REPORTED: YES
GFR SERPL CREATININE-BSD FRML MDRD: >60 ML/MIN/1.73M2
GLUCOSE BLD-MCNC: 111 MG/DL (ref 70–125)
HBA1C MFR BLD: 6 %
HCT VFR BLD AUTO: 49.2 % (ref 35–47)
HDLC SERPL-MCNC: 45 MG/DL
HGB BLD-MCNC: 15.7 G/DL (ref 12–16)
INTERPRETATION ECG - MUSE: NORMAL
LDLC SERPL CALC-MCNC: 43 MG/DL
MCH RBC QN AUTO: 30.8 PG (ref 27–34)
MCHC RBC AUTO-ENTMCNC: 31.9 G/DL (ref 32–36)
MCV RBC AUTO: 97 FL (ref 80–100)
P AXIS - MUSE: 36 DEGREES
PLATELET # BLD AUTO: 215 THOU/UL (ref 140–440)
PMV BLD AUTO: 9.6 FL (ref 7–10)
POTASSIUM BLD-SCNC: 4.9 MMOL/L (ref 3.5–5)
PR INTERVAL - MUSE: 132 MS
QRS DURATION - MUSE: 78 MS
QT - MUSE: 482 MS
QTC - MUSE: 473 MS
R AXIS - MUSE: 23 DEGREES
RBC # BLD AUTO: 5.1 MILL/UL (ref 3.8–5.4)
SODIUM SERPL-SCNC: 141 MMOL/L (ref 136–145)
SYSTOLIC BLOOD PRESSURE - MUSE: NORMAL
T AXIS - MUSE: 56 DEGREES
TRIGL SERPL-MCNC: 142 MG/DL
TSH SERPL DL<=0.005 MIU/L-ACNC: 2.21 UIU/ML (ref 0.3–5)
VENTRICULAR RATE- MUSE: 58 BPM
WBC: 7.6 THOU/UL (ref 4–11)

## 2021-04-20 ASSESSMENT — MIFFLIN-ST. JEOR: SCORE: 1290.5

## 2021-04-20 ASSESSMENT — PATIENT HEALTH QUESTIONNAIRE - PHQ9: SUM OF ALL RESPONSES TO PHQ QUESTIONS 1-9: 6

## 2021-04-21 ENCOUNTER — RECORDS - HEALTHEAST (OUTPATIENT)
Dept: ADMINISTRATIVE | Facility: OTHER | Age: 71
End: 2021-04-21

## 2021-04-21 ENCOUNTER — RECORDS - HEALTHEAST (OUTPATIENT)
Dept: BONE DENSITY | Facility: CLINIC | Age: 71
End: 2021-04-21

## 2021-04-21 DIAGNOSIS — M81.0 AGE-RELATED OSTEOPOROSIS WITHOUT CURRENT PATHOLOGICAL FRACTURE: ICD-10-CM

## 2021-04-23 ENCOUNTER — COMMUNICATION - HEALTHEAST (OUTPATIENT)
Dept: INTERNAL MEDICINE | Facility: CLINIC | Age: 71
End: 2021-04-23

## 2021-04-28 ENCOUNTER — COMMUNICATION - HEALTHEAST (OUTPATIENT)
Dept: INTERNAL MEDICINE | Facility: CLINIC | Age: 71
End: 2021-04-28

## 2021-04-28 DIAGNOSIS — K21.9 GERD (GASTROESOPHAGEAL REFLUX DISEASE): ICD-10-CM

## 2021-05-05 ENCOUNTER — HOSPITAL ENCOUNTER (OUTPATIENT)
Dept: CT IMAGING | Facility: CLINIC | Age: 71
Discharge: HOME | End: 2021-05-05
Attending: INTERNAL MEDICINE
Payer: MEDICARE

## 2021-05-05 DIAGNOSIS — R06.02 SOB (SHORTNESS OF BREATH): ICD-10-CM

## 2021-05-12 ENCOUNTER — COMMUNICATION - HEALTHEAST (OUTPATIENT)
Dept: INTERNAL MEDICINE | Facility: CLINIC | Age: 71
End: 2021-05-12

## 2021-05-12 DIAGNOSIS — M81.0 AGE-RELATED OSTEOPOROSIS WITHOUT CURRENT PATHOLOGICAL FRACTURE: ICD-10-CM

## 2021-05-13 ENCOUNTER — COMMUNICATION - HEALTHEAST (OUTPATIENT)
Dept: INTERNAL MEDICINE | Facility: CLINIC | Age: 71
End: 2021-05-13

## 2021-05-13 DIAGNOSIS — J47.9 BRONCHIECTASIS WITHOUT ACUTE EXACERBATION (H): ICD-10-CM

## 2021-05-13 RX ORDER — ALENDRONATE SODIUM 70 MG/1
70 TABLET ORAL
Qty: 12 TABLET | Refills: 11 | Status: SHIPPED | OUTPATIENT
Start: 2021-05-13 | End: 2022-01-31

## 2021-05-21 ENCOUNTER — COMMUNICATION - HEALTHEAST (OUTPATIENT)
Dept: INTERNAL MEDICINE | Facility: CLINIC | Age: 71
End: 2021-05-21

## 2021-05-21 DIAGNOSIS — G89.29 OTHER CHRONIC PAIN: ICD-10-CM

## 2021-05-21 RX ORDER — ACETAMINOPHEN AND CODEINE PHOSPHATE 300; 30 MG/1; MG/1
1-2 TABLET ORAL EVERY 4 HOURS PRN
Qty: 90 TABLET | Refills: 0 | Status: SHIPPED | OUTPATIENT
Start: 2021-05-21 | End: 2021-08-25

## 2021-05-25 ENCOUNTER — RECORDS - HEALTHEAST (OUTPATIENT)
Dept: ADMINISTRATIVE | Facility: CLINIC | Age: 71
End: 2021-05-25

## 2021-05-26 ENCOUNTER — RECORDS - HEALTHEAST (OUTPATIENT)
Dept: ADMINISTRATIVE | Facility: CLINIC | Age: 71
End: 2021-05-26

## 2021-05-26 ASSESSMENT — PATIENT HEALTH QUESTIONNAIRE - PHQ9
SUM OF ALL RESPONSES TO PHQ QUESTIONS 1-9: 6
SUM OF ALL RESPONSES TO PHQ QUESTIONS 1-9: 6

## 2021-05-27 ENCOUNTER — RECORDS - HEALTHEAST (OUTPATIENT)
Dept: ADMINISTRATIVE | Facility: CLINIC | Age: 71
End: 2021-05-27

## 2021-05-27 ASSESSMENT — PATIENT HEALTH QUESTIONNAIRE - PHQ9: SUM OF ALL RESPONSES TO PHQ QUESTIONS 1-9: 6

## 2021-05-27 NOTE — PATIENT INSTRUCTIONS - HE
Check with your insurance or pharmacy about the coverage of Shingrix vaccine for Shingles if you wish to get this.  They may want you to receive it at the pharmacy instead of in our clinic.  You would need to get 2 shots, the second one is 2-6 months after the first one. This is not a live vaccine and is much more potent than the prior vaccine .    The most common side effect is pain with mild redness and swelling around the shot site that should resolve in 2-3 days.  Total intake should be 1200mg of calcium including diet    TUMs two a day as a calcium supplement .   Repeat colonoscopy is 2022   repeat dexa is 2020

## 2021-05-27 NOTE — PROGRESS NOTES
Office Visit - hospital follow up/establish care    Lisa Ray   68 y.o.  female    Date of visit: 4/11/2019  Physician: Amber Alberto MD     Assessment and Plan   1. S/P CABG (coronary artery bypass graft)  Very pleasant patient of Dr. Cole here as a hospital follow-up and to establish care.  She recently was feeling tired.  When she went for physical and given her prior history of ischemic heart disease she was referred to her cardiologist.  They had noticed EKG changes when she was in the emergency room for fever.  She went straight for an angiogram after seeing her cardiologist and they showed multivessel disease and she was admitted for coronary artery bypass.  She did very well postoperatively but some overnight desaturations were noted and she was discharged on O2 at night at 2 L a minute.  But she still is a bit tired and feels a bit overwhelmed but emotionally is handling this very well is back to normal with her diet, urinary tract digestive system in's and is using a little bit more Ambien for sleep.  She has no chest pain shortness of breath on exam the thoracotomy wound is healing well .  The chest tube drain sites are still slightly open and with some granulation tissue at the base however they are not infected there is no purulent discharge or surrounding erythema.  She can cover them with Band-Aids temporarily limited amount of tramadol was refilled for her postoperative pain.  - traMADol (ULTRAM) 50 mg tablet; Take 0.5-1 tablets (25-50 mg total) by mouth every 6 (six) hours as needed.  Dispense: 15 tablet; Refill: 0    2. Insomnia  She uses Ambien sparingly for trips mostly but has needed more and is planning to go on a cruise the summer so will refill her Ambien.  - zolpidem (AMBIEN) 5 MG tablet; Take 1 tablet (5 mg total) by mouth at bedtime as needed for sleep.  Dispense: 30 tablet; Refill: 0    3. S/P coronary artery stent placement      4. Dysthymic disorder  She did score  high on her PHQ  However she said that is because she is mostly tired and because of all the things that have happened to her but she is doing fine now she is not anxious she is not depressed she is coping well    5. Ductal carcinoma in situ (DCIS) of breast, unspecified laterality  Right breast . H/o bilaterla mastectomy . Saline implnts .   No concerns  6. Rosacea      7. Healthcare maintenance  THANH/BSO   Up-to-date and colonoscopy she had an incomplete one in 2017 due to diverticulosis however they did a CT colography  feet which was normal and they recommended a 5-year surveillance  DEXA was done in 2017 and she will get repeat one in 2020.  She is up-to-date in her vaccinations except shingles and education was given  Of note her magnesium and calcium were low in the hospital and drop her hemoglobin is  as expected and was given blood transfusion.  We will recheck hemogram calcium and magnesium today  8. Low oxygen saturation  Has oxygen at home 2 ltel with concentrator. Sleep study scheduled . Used to smoke . Quit 91 , smoked for 15 years I pack a day .  She is scheduled for a sleep study  Of note A1c was elevated the hospital and she did require insulin IV however she has no preceding history will recheck her A1c at her next visit .     Total time spent over 40 minutes, half of which was spent in counseling and coordinating care      Return in about 6 months (around 10/11/2019).     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 3 grown sons Aries in - , Josse SAMUELS, -PS DEPT./[employer], ; Gregory Lu- paramedic [employer]...... Non smoker rare ETOH.         Past Medical History   Past Medical History:   Diagnosis Date     Anemia 2012    microcytic from Celebrex. GI w/u neg     Back pain       Benign Adenomatous Polyp Of The Large Intestine     Created by Conversion      CAD (coronary artery disease) 2008    RCA- stent     Cancer (H)     breast cancer     DJD (degenerative joint disease)      Dysthymia      Fatigue     recurrent/variable - no serious pathology     Fibromyalgia      GERD (gastroesophageal reflux disease)      Hypertension      Iritis     past Hx.-left eye     Raynaud's disease      Rosacea      Shortness of breath     with exertion     Shoulder tendonitis     right     Ulcer of intestine     small bowel- 10 years ago     UTI (urinary tract infection)     Jan. 2019     PROBLEM LIST  Patient Active Problem List   Diagnosis     Ptosis Of Eyelid     Rosacea     Dysthymic disorder     Coronary Artery Disease     Osteoarthritis     Raynaud disease     Sicca syndrome (H)     Paresthesias/numbness     Primary osteoarthritis of right hip     DCIS (ductal carcinoma in situ)     S/P coronary artery stent placement     History of bilateral mastectomy     S/P bilateral breast implants     Stable angina (H)     S/P CABG (coronary artery bypass graft)     On mechanically assisted ventilation (H)         Past Surgical History  She has a past surgical history that includes pr appendectomy; Hysterectomy; Oophorectomy; Coronary stent placement (Right, 2008); Total hip arthroplasty (Right, 12/7/2015); Blepharoptosis repair (Bilateral, 2013); Breast surgery (Bilateral, 11/2017); Breast surgery (2018); and Coronary Angiogram (N/A, 3/26/2019).     History of Present Illness   This 68 y.o. old very pleasant patient here to establish care and hospital follow-up after recent elective CABG.    Review of Systems: A comprehensive review of systems was negative except as noted.     Medications and Allergies   Current Outpatient Medications   Medication Sig Dispense Refill     acetaminophen (TYLENOL) 500 MG tablet Take 500 mg by mouth every 6 (six) hours as needed for pain (arthritis).       aspirin 81 mg chewable  tablet Chew 1 tablet (81 mg total) daily.  0     b complex vitamins tablet Take 1 tablet by mouth daily.       cholecalciferol, vitamin D3, 1,000 unit tablet Take 1,000 Units by mouth daily.       coenzyme Q10 400 mg cap Take 400 mg by mouth daily.       docusate sodium (COLACE) 100 MG capsule Take 1 capsule (100 mg total) by mouth 2 (two) times a day as needed for constipation.  0     isosorbide dinitrate (ISORDIL) 30 MG tablet Take 30 mg by mouth 4 (four) times a day.       magnesium oxide 500 mg Tab Take 500 mg by mouth at bedtime.              melatonin 1 mg Tab tablet Take 1 mg by mouth at bedtime.              metoprolol tartrate (LOPRESSOR) 50 MG tablet TAKE 1 TABLET(50 MG) BY MOUTH TWICE DAILY 180 tablet 1     omeprazole (PRILOSEC) 20 MG capsule TAKE 1 CAPSULE BY MOUTH TWICE DAILY 180 capsule 0     peg 400-propylene glycol PF (SYSTANE) 0.4-0.3 % Dpet Administer 1 drop to both eyes 2 (two) times a day as needed.       simvastatin (ZOCOR) 40 MG tablet Take 1 tablet (40 mg total) by mouth daily. (Patient taking differently: Take 40 mg by mouth at bedtime.       ) 90 tablet 5     traMADol (ULTRAM) 50 mg tablet Take 0.5-1 tablets (25-50 mg total) by mouth every 6 (six) hours as needed. 15 tablet 0     triamcinolone (NASACORT) 55 mcg nasal inhaler Apply 2 sprays into each nostril daily as needed.       zolpidem (AMBIEN) 5 MG tablet Take 1 tablet (5 mg total) by mouth at bedtime as needed for sleep. (Patient taking differently: Take 2.5-5 mg by mouth at bedtime as needed for sleep.       ) 30 tablet 0     No current facility-administered medications for this visit.      Allergies   Allergen Reactions     Latex      Added based on information entered during log entry, please review and add reactions, type, and severity as needed     Latex Rash and Other (See Comments)     Patient states skin sensitivity     Nsaids (Non-Steroidal Anti-Inflammatory Drug)      Other reaction(s): GI Bleeding, GI Upset  Sensitivity.   "Ulceration w/ Celebrex        Family and Social History   Family History   Problem Relation Age of Onset     BRCA 1/2 Sister      BRCA 1/2 Paternal Grandfather      BRCA 1/2 Cousin      Alcoholism Father          GI bleed age 67     Colon cancer Other      Breast cancer Sister         metastatic age 49, remission with Rx     Coronary artery disease Brother         CABG     Breast cancer Paternal Grandmother      Breast cancer Cousin         Social History     Tobacco Use     Smoking status: Former Smoker     Packs/day: 1.00     Years: 20.00     Pack years: 20.00     Last attempt to quit: 3/27/1990     Years since quittin.0     Smokeless tobacco: Never Used   Substance Use Topics     Alcohol use: Yes     Alcohol/week: 2.0 oz     Types: 4 Standard drinks or equivalent per week     Comment: 1-2 glasses of wine/week      Drug use: No     Social History     Social History Narrative    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 3 grown sons Aries in - , Josse SAMUELS, -microbrewer/beer, ; Gregory Lu- paramedic [employer]...... Non smoker rare ETOH.         Physical Exam   General Appearance:      /74 (Patient Site: Left Arm, Patient Position: Sitting, Cuff Size: Adult Regular)   Pulse 69   Ht 5' 4\" (1.626 m)   Wt 160 lb (72.6 kg)   SpO2 94%   BMI 27.46 kg/m        NECK: Neck appearance was normal. There were no neck masses and the thyroid was not enlarged.  RESPIRATORY: Breathing pattern was normal and the chest moved symmetrically.  Percussion/auscultatory percussion was normal.  Lung sounds were normal and there were no abnormal sounds.  CARDIOVASCULAR: Heart rate and rhythm were normal.  S1 and S2 were normal and there were no extra sounds or murmurs. Peripheral pulses in arms and legs were normal.  Jugular venous pressure was normal.  There was no peripheral edema.  GASTROINTESTINAL: The abdomen was normal in contour.  Bowel sounds were present.  " Percussion detected no organ enlargement or tenderness.  Palpation detected no tenderness, mass, or enlarged organs.   MUSCULOSKELETAL: Skeletal configuration was normal and muscle mass was normal for age. Joint appearance was overall normal.  LYMPHATIC: There were no enlarged nodes.  SKIN/HAIR/NAILS: Skin color was normal.  There were no skin lesions.  Hair and nails were normal.  NEUROLOGIC: The patient was alert and oriented to person, place, time, and circumstance. Speech was normal. Cranial nerves were normal. Motor strength was normal for age. The patient was normally coordinated.  PSYCHIATRIC:  Mood and affect were normal and the patient had normal recent and remote memory. The patient's judgment and insight were normal.  Chest wall shows healed thoracotomy scar with still some glue on it she has 2 drainage points that are oval-shaped that have granulation tissue at the base but still the superficial skin is open is no surrounding erythema or purulent discharge     Additional Information        Amber Alberto MD  Internal Medicine  Contact me at [phone]

## 2021-05-27 NOTE — PROGRESS NOTES
TCM DISCHARGE FOLLOW UP CALL    Discharge Date:  4/3/2019  Reason for hospital stay (discharge diagnosis)::  CABG x3  Are you feeling better, the same or worse since your discharge?:  Patient is feeling better (Denies CP, fever. Can feel her heart pounding when tired with mild dyspnea tthat resolves quickly. O2 on at 2L @HS.Rests between activities.Sternal incis is red along surgical line, C/D/I. (L) leg stab sites C/D/I)  Do you feel like you have a plan in the event of a health emergency?: Yes (She calls her doctor.)    As part of your discharge plan, were  home care services ordered for you?: No    Did you receive any new medications, or was there a change to your medications?: Yes    Are you taking those medications, or do you have any established regiment?:  Pain controlled with scheduled Tylenol and tramadol at HS  Do you have any follow up visits scheduled with your PCP or Specialist?:  Yes, with PCP (4/11)  (RN) Is PCP appt scheduled soon enough (within 14 days of discharge date)?: Yes

## 2021-05-27 NOTE — PRE-PROCEDURE INSTRUCTIONS
PRE-OP OPEN HEART EDUCATION    DIAGNOSIS: severe CAD  PROCEDURE: 3/28 CABG- Dr. Akbar  EF 67%  ASA at night  BB in am      PLANNED DISCHARGE DISPOSITION: TCU vs home    Lab Results   Component Value Date    WBC 6.4 03/27/2019    HGB 12.2 03/27/2019    HCT 39.4 03/27/2019    MCV 85 03/27/2019     03/27/2019     Lab Results   Component Value Date    CREATININE 0.74 03/27/2019    BUN 18 03/27/2019     03/27/2019    K 3.9 03/27/2019     03/27/2019    CO2 23 03/27/2019     EKG: Sinus bradycardia   Normal ECG   When compared with ECG of 07-JAN-2019 17:39,   Vent. rate has decreased BY  51 BPM   ST no longer depressed in Anterolateral leads     Saw pt and family in [facility] pre-op and discussed the following:  Procedure purpose and course, including anesthesia induction, invasive lines and use, heart-lung bypass,  intubation, sternal closing, pacing wires-use and indication- and chest tubes- use and indications    ICU course: Wake up in ICU, possibly intubated, planned extubation within 4 hours post-op, monitoring of vital signs, titration of gtts pressors and insulin. Discussed  measurement of I&Os, lao catheter, CT. Restraints- use. Discussed VS monitor and that the nurse will be with the patient as a 1:1 immediately post-operatively.  The hospital stay, uncomplicated, is an expected 5-6 days.     Importance of pain control- use of narcotics to maintain a tolerable level of pain. Instructed pt on use of pain scale of 0-10 and instructed to not let pain become a 10 and the importance of notifying RN when pain level is increasing. Discussed that there will be an increase in sternal pain with deep breaths and activity.    Pulmonary toilet: Discussed the importance of the use of IS, deep breathing, and strong coughing in the prevention of PNA and to aid in decreasing respiratory complications, such as atelectasis.      Activity: dangle at bedside night of surgery, up in chair POD#1 and subsequent days TID  for meals. Discussed increasing activity with cardiac rehab.  Discussed purpose of activity including increased lung compliance, decreased post-op complications such as atelectasis, PNA, DVT.    Also discussed post-operative home course and instructions including: No lifting >10lb for 6 wks, no driving 3-4wks, no overhead reaching, post-op appointments including f/u w/primary, surgeon, and cardiologist, and cardiac rehab 2-3 days/week x 4-6 weeks. Questions and concerns answered.                    3/27/2019 10:40 AM

## 2021-05-27 NOTE — ANESTHESIA CARE TRANSFER NOTE
Last vitals:   Vitals:    03/28/19 1732   BP: 112/68   Pulse: 90   Resp: 18   Temp:    SpO2: 99%     Patient's level of consciousness is unresponsive  Spontaneous respirations: no: on vent  Maintains airway independently: no: on vent  Dentition unchanged: yes  Oropharynx: oropharynx clear of all foreign objects and endotracheal tube in place    QCDR Measures:  ASA# 20 - Surgical Safety Checklist: WHO surgical safety checklist completed prior to induction    PQRS# 430 - Adult PONV Prevention: NA - Not adult patient, not GA or 3 or more risk factors NOT present  ASA# 8 - Peds PONV Prevention: NA - Not pediatric patient, not GA or 2 or more risk factors NOT present  PQRS# 424 - Sol-op Temp Management: 4559F - At least one body temp DOCUMENTED => 35.5C or 95.9F within required timeframe  PQRS# 426 - PACU Transfer Protocol: - Transfer of care checklist used  ASA# 14 - Acute Post-op Pain: ASA14B - Patient did NOT experience pain >= 7 out of 10 to icu with all monitors on. 100 % O2 via ambu/ETT . PLaced on vent per icu parameters by RT

## 2021-05-27 NOTE — ANESTHESIA PROCEDURE NOTES
MARCIA    Patient location during procedure: OR  Start time: 3/28/2019 1:34 PM  Staffing:  Performing  Anesthesiologist: Laureen Noguera MD  MARCIA:  Type/Reason: Monitoring MARCIA  Technique: blind insertion  Difficulty: easy  Anesthesia Monitoring: see additional note

## 2021-05-27 NOTE — ANESTHESIA POSTPROCEDURE EVALUATION
Patient: Lisa Ray  CORONARY ARTERY BYPASS X3, ENDOSCOPIC VEIN HARVEST INTERNAL MAMMARY ARTERY, ANESTHESIA TRANSESOPHAGEAL, EPIAORTIC ULTRASOUND ECHOCARDIOGRAM  Anesthesia type: general    Patient location: ICU  Last vitals:   Vitals:    03/28/19 2130   BP:    Pulse: 84   Resp:    Temp:    SpO2: 100%     Post vital signs: stable  Level of consciousness: awake and responds to simple questions  Post-anesthesia pain: pain controlled  Post-anesthesia nausea and vomiting: no  Pulmonary: unassisted  Cardiovascular: stable  Hydration: adequate  Anesthetic events: no    QCDR Measures:  ASA# 11 - Sol-op Cardiac Arrest: ASA11X - Exclusion: Planned cardiac arrest case, emergent case, or OR organ donor  ASA# 12 - Sol-op Mortality Rate: ASA12B - Patient did NOT die  ASA# 13 - PACU Re-Intubation Rate: ASA13X - Exclusion: organ donor or direct ICU transfer  ASA# 10 - Composite Anes Safety: ASA10A - No serious adverse event    Additional Notes:

## 2021-05-27 NOTE — ANESTHESIA PROCEDURE NOTES
Central line    Start time: 3/28/2019 1:08 PM  End time: 3/28/2019 1:16 PM  Patient location: OR Post-induction  Indications: central pressure monitoring and vascular access  Performing Anesthesiologist: Laureen Noguera MD  Pre-procedure Checklist  Completed: patient identified, site marked, risks, benefits, and alternatives discussed, timeout performed, consent obtained, hand hygiene performed, all elements of maximal sterile barriers used including cap, mask, gown, sterile gloves, and large sheet and skin prep agent completely dried prior to procedure    Procedure Details:  Preparation: 2% chlorhexidine  Location details: right internal jugular  Site selection rationale: access  Catheter type: Introducer with [place]  Introducer type: MAC  Lumens:double lumen  Withdrawn and locked at: 48Number of attempts: 1  Ultrasound evaluation of access site: yes  Vessel patent by US exam  Concurrent real time visualization of needle entryTransduced for venous waveform  manometry confirmation of venous access    Post-procedure:   line sutured and Antimicrobial disks with CHG applied  Assessment: blood return through all ports and free fluid flow  Complications: none

## 2021-05-27 NOTE — ANESTHESIA PREPROCEDURE EVALUATION
Anesthesia Evaluation      Patient summary reviewed   No history of anesthetic complications     Airway   Mallampati: II  Neck ROM: full   Pulmonary - negative ROS and normal exam   (-) shortness of breath                         Cardiovascular - normal exam  (+) hypertension, CAD, , hypercholesterolemia,     (-) angina  ECG reviewed        Neuro/Psych    (+) neuromuscular disease,      Endo/Other - negative ROS      GI/Hepatic/Renal    (+) GERD well controlled,        Other findings: MARCIA:   When compared to the previous study dated 3/31/2008, no significant change.    Left ventricle ejection fraction is normal. The calculated left ventricular ejection fraction is 67%.    Normal left ventricular size and systolic function.    Normal right ventricular size and systolic function.    No hemodynamically significant valvular heart abnormalities.    Cath:  1. Severe 3 vessel dz (if include small Circ) - will discuss with CT surgery re CABG given ostial Circ/LAD and  of RCA.       Dental - normal exam                        Anesthesia Plan  Planned anesthetic: general endotracheal  Methadone 20mg  MARCIA  ASA 3   Induction: intravenous   Anesthetic plan and risks discussed with: patient and spouse  Anesthesia plan special considerations: CVP line, arterial catheterization, pulmonary artery catheterization, IV therapy two IVs, dexmedetomidine  Post-op plan: extended intubation/vent support

## 2021-05-27 NOTE — PROGRESS NOTES
Received intake call for home oxygen at 1PM. Reviewed patient's chart; Patient qualifies under Medicare guidelines and all documentation is in the chart including a good order.   1:30/PM- Called to offer choice and patient is okay with [facility] setting them up. Discussed equipment with patient and informed them that we would be to bedside with oxygen in the next 2 hours.   1:25PM- Spoke with RT Real, confirmed we received the order, and let her know since patient is only needing O2 while sleeping we will deliver concentrator to patient's home.

## 2021-05-27 NOTE — ANESTHESIA PROCEDURE NOTES
Arterial Line  Reason for Procedure: hemodynamic monitoring  Patient location during procedure: OR pre-induction  Start time: 3/28/2019 12:48 PM  End time: 3/28/2019 12:50 PM  Staffing:  Performing  Anesthesiologist: Laureen Noguera MD  Sterile Precautions:  sterile barriers used during insertion: cap, mask, sterile gloves, large sheet, and hand hygiene used.  Arterial Line:     Laterality: left  Location: brachial  Prepped with: ChloroPrep    Needle gauge: 20 G  Number of Attempts: 1  Secured with: tape, transparent dressing and pressure dressing  Flushed with: saline  1% lidocaine local anesthesia used for skin prep.   See MAR for additional medications given.  Ultrasound evaluation of access site: yes  Vessel patent by US exam    Concurrent real time visualization of needle entry

## 2021-05-27 NOTE — PROGRESS NOTES
Lisa Ray has participated in 1sessions of Phase II Cardiac Rehab.    Progress Report:   Cardiac Rehab Treatment Progress Report 3/31/2019 4/1/2019 4/2/2019 4/3/2019 4/9/2019   Weight 158 lbs 2 oz 156 lbs 154 lbs 11 oz 153 lbs 11 oz 158 lbs   Pre Exercise  HR - - - - 59   Pre Exercise BP - - - - 119/64   Heart Rate - - - - 58   Post Exercise BP - - - - 133/71   ECG - - - - SB-SR   Total Exercise Minutes - - - - 6         Current Status:  Therapists Comments: Patient has started Phase 2 (Outpatient) Cardiac Rehab, and has attended 1 session.    If Physician recommends change in treatment plan, please place orders.        __________________________________________________      _____________  Signature                                                                                                  Date.

## 2021-05-27 NOTE — PROGRESS NOTES
Cardiac Rehab  Phase II Assessment    Assessment Date: 4/9/19    Diagnosis: CAD Date of Onset: 3/29/19  Procedure: CABG x 3 Date of Onset: 3/29/19  ICD/Pacemaker: No Parameters: NA  Post-op Complications:   decreased  Respiratory status  ECG History: SR EF%:67  Past Medical History:   Patient Active Problem List   Diagnosis     Ptosis Of Eyelid     Rosacea     Dysthymic disorder     Coronary Artery Disease     Osteoarthritis     Raynaud disease     Sicca syndrome (H)     Paresthesias/numbness     Primary osteoarthritis of right hip     DCIS (ductal carcinoma in situ)     S/P coronary artery stent placement     History of bilateral mastectomy     S/P bilateral breast implants     Stable angina (H)     S/P CABG (coronary artery bypass graft)     On mechanically assisted ventilation (H)     Past Medical History:   Diagnosis Date     Anemia 2012    microcytic from Celebrex. GI w/u neg     Back pain      Benign Adenomatous Polyp Of The Large Intestine     Created by Conversion      CAD (coronary artery disease) 2008    RCA- stent     Cancer (H)     breast cancer     DJD (degenerative joint disease)      Dysthymia      Fatigue     recurrent/variable - no serious pathology     Fibromyalgia      GERD (gastroesophageal reflux disease)      Hypertension      Iritis     past Hx.-left eye     Raynaud's disease      Rosacea      Shortness of breath     with exertion     Shoulder tendonitis     right     Ulcer of intestine     small bowel- 10 years ago     UTI (urinary tract infection)     Jan. 2019       Physical Assessment  Precautions/ Physical Limitations: Sternal/surgical  Oxygen: Yes(at night)  O2 Sats: 99% Lung Sounds: very slightly diminished in lower left lobe  Edema: Left ankle  Incisions: healing well  Sleeping Pattern: good   Appetite: fair   Nutrition Risk Screen: completed    Pain  Location:chest incisional area  Characteristics:soreness  Intensity: (0-10 scale) 3  Current Pain Management:  Tylenol,Tramadol  Intervention:   Response: relief    Psychosocial/ Emotional Health  1. In the past 12 months, have you been in a relationship where you have been abused physically, emotionally, sexually or financially? No  notified: NA  2. Who do you turn to for emotional support?:  and Family  3. Do you have cultural or spiritual needs? Yes   4. Have there been any major life changes in the past 12 months? Yes. Health issues.    Referral Information  Primary Physician: Amber Alberto MD  Cardiologist: Ba Foley  Surgeon: Yazmin Akbar    [place] exercise/Equipment: TM. Belongs to [facility] [employer]    Patient's long-term goal(s): [place] cruise,Travel to [place]     1. Living Accommodations: Home Steps: Yes      Support people at home: yes   2. Marital Status:   3. Family is able to assist with cares      Confucianism/Community involvement:   4. Recreation/Hobbies: Gardening, traveling.        .

## 2021-05-27 NOTE — PROGRESS NOTES
ITP ASSESSMENT   Assessment Day: Initial    Session Number: 1& 2  Precautions: sternal, surgical    Diagnosis: CAB    Risk Stratification: High    Referring Provider: Yazmin Akbar MD  EXERCISE  Exercise Assessment: Initial       6 Minute Walk Test   Pre   Pre Exercise HR: 59                    Pre Exercise BP: 119/64      Peak  Peak HR: 65                   Peak BP: 139/69    Peak feet: 1000    Peak O2 SAT: 99    Peak RPE: 11    Peak MPH: 1.89      Symptoms:  Peak Symptoms: none      5 mins. Post  5 Min Post HR: 58    5 Min Post BP: 133/71                           Exercise Plan  Goals Next 30 days  ADL'S: Resume all meal preparation    Leisure: Resume light yard work and gardening within sternal precautions/limitations.    Work: Resume driving      .  Education Goals: All goals in this section met    Education Goals Met: Patient can state cardiac s/s and appropriate emergency response.;Has system for taking medication.;Medication review.      Exercise Prescription  Exercise Mode: Nustep;Bike;Treadmill    Frequency: 2-3 x/week    Duration: 30-40 minutes    Intensity / THR: 20-30 beats above resting heart rate    RPE 11-14  Progression / Met level: 3-4    Resistive Training?: No      Current Exercise (mins/week): 140      Interventions  Home Exercise:  Mode: walking    Frequency: daily    Duration: 5-20 minutes      Education Material : Educational videos;Provide written material;Individual education and counseling;Offer educational classes      Education Completed  Exercise Education Completed: Signs and Symptoms;Medication review;RPE;Emergency Plan;Home Exercise              Exercise Follow-up/Discharge  Follow up/Discharge: Patient is motivated to increase exercise time and intensity   NUTRITION  Nutrition Assessment: Initial      Nutrition Risk Factors:  Nutrition Risk Factors: Dyslipidemia  Cholesterol: 136  LDL: 63  HDL: 48  Triglycerides: 127      Nutrition Plan  Interventions  No data recorded  Other  "Nutrition Intervention: Therapist/Pt Discussion;Diet Class;Educational Videos;Provide with Written Material        Education Completed  Nutrition Education Completed: Risk factor overview      Goals  Nutrition Goals (Next 30 days): Patient knows appropriate portion size;Patient will follow a low saturated fat diet;Review Dietitian schedule;Patient able to demonstrate carbohydrate counting;Patient will follow a low sodium diet      Goals Met  Nutrition Goals Met: Patient can identify their risk factors for CAD;Provided Rate your Plate Survey;Completed Nutritional Risk Screen      Height, Weight, and  BMI  Weight: 158 lb (71.7 kg)  Height: 5' 4\" (1.626 m)  BMI: 27.11      Nutrition Follow-up  Follow-up/Discharge: Patient will met with dietitian         Other Risk Factors  Other Risk Factor Assessment: Initial      HTN Risk Factor: Hypertension      Pre Exercise BP: 119/64  Post Exercise BP: 133/71      Hypertension Plan  Goals  HTN Goals: Patient demonstrates understanding of HTN, no goals identified for the next 30 days      Goals Met  HTN Goals Met: Follow low sodium diet;Take medication as prescribed;Exercises regularly      HTN Interventions  HTN Interventions: Diet consult;Therapist/patient discussion;Provide written material;Offer educational videos;Offer educational classes      HTN Education Completed  HTN Education Completed: Medication review;Risk factor overview      Tobacco Risk Factor: NA         PSYCHOSOCIAL  Psychosocial Assessment: Initial       [facility] Q of L Summary Score: 26      LEA-D Score: 23      Psychosocial Risk Factor: Stress      Psychosocial Plan  Interventions  If LEA-D > 15 send letter to MD  Interventions: Offer educational videos and classes;Provide written material;Individual education and counseling      Education Completed      Goals  Goals (Next 30 days): Oriented to stress management classes;Identify stressors;Practicing stress management skills      Goals Met  Goals Met: " Identified Support system      Psychosocial Follow-up  Follow-up/Discharge: Patient generally doesn't feel stressed             Patient involved in Goal setting?: Yes      Signature: _____________________________________________________________    Date: __________________    Time: __________________

## 2021-05-28 ENCOUNTER — RECORDS - HEALTHEAST (OUTPATIENT)
Dept: ADMINISTRATIVE | Facility: CLINIC | Age: 71
End: 2021-05-28

## 2021-05-28 NOTE — PROGRESS NOTES
ITP ASSESSMENT   Assessment Day: 30 Day    Session Number: 11  Precautions: Sternal, Surgical    Diagnosis: CAB    Risk Stratification: High    Referring Provider: Yazmin Akbar MD  EXERCISE  Exercise Assessment: Reassessment       6 Minute Walk Test   Pre   Pre Exercise HR: 59                    Pre Exercise BP: 119/64      Peak  Peak HR: 65                   Peak BP: 139/69    Peak feet: 1000    Peak O2 SAT: 99    Peak RPE: 11    Peak MPH: 1.89      Symptoms:  Peak Symptoms: none      5 mins. Post  5 Min Post HR: 58    5 Min Post BP: 133/71                           Exercise Plan  Goals Next 30 days  ADL'S: Patient will resume light yardwork and gardening.  Patient will resume vacuuming.    Leisure: Resume light yard work and gardening within sternal precautions/limitations.    Work: Resume driving      Education Goals: All goals in this section met    Education Goals Met: Patient can state cardiac s/s and appropriate emergency response.;Has system for taking medication.;Medication review.                          Goals Met  Initial ADL's goals met: Patient has resumed all meal preparation.    No data recorded  Intial Work goals met: Patient has resumed driving short trips.    Initial Progression: Patient has not started light yardwork due to sternal precautions.      Exercise Prescription  Exercise Mode: Treadmill;Bike;Nustep    Frequency: 3x week    Duration: 45-55 minutes    Intensity / THR: 20-30 beats above resting heart rate    RPE 11-14  Progression / Met level: 3-4    Resistive Training?: No      Current Exercise (mins/week): 210      Interventions  Home Exercise:  Mode: walking    Frequency: daily    Duration: 30-40      Education Material : Educational videos      Education Completed  Exercise Education Completed: Signs and Symptoms;Medication review;RPE;Emergency Plan;Home Exercise;Warm up/cool down;BP/HR Reponse to exercise;End point of exercise              Exercise Follow-up/Discharge  Follow  "up/Discharge: Patient has steadily increased exercise time and intensity           NUTRITION  Nutrition Assessment: Reassessment      Nutrition Risk Factors:  Nutrition Risk Factors: Dyslipidemia  Cholesterol: 136  LDL: 63  HDL: 48  Triglycerides: 127      Nutrition Plan  Interventions  Diet Consult: Completed    Other Nutrition Intervention: Diet Class;Therapist/Pt Discussion;Educational Videos;Provide with Written Material    Initial Rate Your Plate Score: 59        Education Completed  Nutrition Education Completed: Low Saturated fat diet;Low sodium diet      Goals  Nutrition Goals (Next 30 days): Patient demonstrated understanding of cardiac nutrition, no goals identified for the next 30 days      Goals Met  Nutrition Goals Met: Patient follows a low sodium diet;Patient states following a low saturated fat diet      Height, Weight, and  BMI  Weight: 156 lb 12.8 oz (71.1 kg)  Height: 5' 4\" (1.626 m)  BMI: 26.9      Nutrition Follow-up  Follow-up/Discharge: Patient follows a heart healthy diet majority of the time.        Other Risk Factors  Other Risk Factor Assessment: Reassessment      HTN Risk Factor: Hypertension      Pre Exercise BP: 126/78  Post Exercise BP: 118/62      Hypertension Plan  Goals  HTN Goals: Patient demonstrates understanding of HTN, no goals identified for the next 30 days      Goals Met  HTN Goals Met: Follow low sodium diet;Take medication as prescribed;Exercises regularly      HTN Interventions  HTN Interventions: Diet consult;Therapist/patient discussion;Provide written material;Offer educational videos;Offer educational classes      HTN Education Completed  HTN Education Completed: Medication review;Risk factor overview;Low sodium diet      Tobacco Risk Factor: NA            Risk Factor Follow-up   Follow-up/Discharge: Patient's BP is stable         PSYCHOSOCIAL  Psychosocial Assessment: Reassessment       [facility] Q of L Summary Score: 26      LEA-D Score: 23      Psychosocial " Risk Factor: Stress      Psychosocial Plan  Interventions  Interventions: Offer educational videos and classes;Provide written material;Individual education and counseling      Education Completed  Education Completed: Relaxation/Coping Techniques;Effects of stress on body      Goals  Goals (Next 30 days): Oriented to stress management classes;Identify stressors;Practicing stress management skills      Goals Met  Goals Met: Identified Support system      Psychosocial Follow-up  Follow-up/Discharge: Patient generally doesn't feel stressed           Patient involved in Goal setting?: Yes      Signature: _____________________________________________________________    Date: __________________    Time: __________________

## 2021-05-28 NOTE — PROGRESS NOTES
Dear  Gregory Doyle Md  [address] [place] [address]    Thank you for the opportunity to participate in the care of  Lisa Ray.    Lisa Ray is sent by Gregory Doyle, * for a sleep consultation regarding concern for ARSEN after CABG.     She has a history of CAD s/p 3-v CABG, OA, breast cancer s/p bilateral mastectomy (w/o radiation or chemotherapy) and dysthymia.    About 3 weeks ago had overnight oximeter which showed sustained hypoxia without oscillations, suggestive of cardiopulmonary disease.  SpO2 was <= 88% for 1 hour and 34 minutes.  Had 3-v CABG about 4 weeks ago and left the hospital on 2 LPM of supplemental oxygen due to nocturnal hypoxia.    Schedule - Retired RN.  Worked at [employer]. 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  Reports she's never been a good sleeper and was staying up until 1 - 2 AM doing stuff around the house and then up for work at 6 AM.     Takes Unisom and melatonin at around 8 PM and gets into bed around 10:30 - 11 PM and asleep by between 11 - 11:30 PM.     Takes zolpidem 5 mg about 2 nights per week and will take it around 9 PM if she doesn't think she'll be able to sleep.     Up during the night about 2 - 3 times per night - bathroom or OA pain.  If she is up during the night may take 30 - 60 minutes to fall back asleep. Will read or do crossword puzzle, or play on phone until she falls asleep.     No alarm but gets up for the day around 7 - 8 AM when she feels antsy.    Napping every day.  Will nap around 30 - 60 minutes and around 1 - 2 PM.      Sleep Disordered Breathing - She snores loudly per .  No witnessed apneas.     Has nocturia once per night. She denies morning headaches.  Does have morning dry mouth and morning sinus congestion.   Patient was counseled on the importance of driving while alert, to pull over if drowsy, or nap before getting into the vehicle if  sleepy.    Movement/Behaviors - No somniloquy.  No somnambulism.  May snack but no amnestic sleep related eating.  No dream enactment behavior.   She denies bruxism.    Patient denies typical restless legs syndrome symptoms.    Alcohol use - 1 - 2 times per week will have 1 - 2 glasses of wine.  Caffeine intake - coffee 2 /day. Last caffeine intake is usually before 10 AM.  Tobacco exposure - Quit smoking almost 30 years ago but had about 15 pack-year history before quitting.  Illicit substances - none    Lives with .  Has no pets.     No known family history of snoring or Obstructive Sleep Apnea.    Past Medical History:  Past Medical History:   Diagnosis Date     Anemia 2012    microcytic from Celebrex. GI w/u neg     Back pain      Benign Adenomatous Polyp Of The Large Intestine     Created by Conversion      CAD (coronary artery disease) 2008    RCA- stent     Cancer (H)     breast cancer     DJD (degenerative joint disease)      Dysthymia      Fatigue     recurrent/variable - no serious pathology     Fibromyalgia      GERD (gastroesophageal reflux disease)      Hypertension      Iritis     past Hx.-left eye     Raynaud's disease      Rosacea      Shortness of breath     with exertion     Shoulder tendonitis     right     Ulcer of intestine     small bowel- 10 years ago     UTI (urinary tract infection)     Jan. 2019       Problem List:  Patient Active Problem List    Diagnosis Date Noted     Health maintenance examination 04/11/2019     Overview Note:     Repeat colonoscopy is 2022   repeat dexa is 2020  No paps or pelvics THANH/BSO       S/P CABG (coronary artery bypass graft) 03/28/2019     Overview Note:     3/29/2019 with Dr. Akbar  1.  Epiaortic ultrasound of the ascending aorta.  2.  Triple vessel coronary artery bypass grafting procedures; left internal mammary  artery to left anterior descending coronary artery and separate reversed saphenous  vein grafts to the left anterior descending diagonal  branch 2 and the right  posterior descending coronary arteries.  3.  Endoscopic vein procurement from the left lower extremity.            Stable angina (H) 03/15/2019     Overview Note:     Added automatically from request for surgery [id number]       S/P bilateral breast implants 09/25/2018     History of bilateral mastectomy 05/23/2018     Overview Note:     Ductal in situ carcinoma; [facility]       S/P coronary artery stent placement 10/09/2017     Overview Note:     2008- RCA stent       DCIS (ductal carcinoma in situ) 09/08/2017     Overview Note:     Right breast/biopsy- 9/7/2017       Primary osteoarthritis of right hip 12/07/2015     History of total hip replacement, right 12/07/2015     Paresthesias/numbness 10/29/2015     Osteoarthritis 06/11/2015     Raynaud disease 06/11/2015     Ptosis Of Eyelid      Overview Note:     Created by Conversion  [facility] Annotation: Jul 25 2013  3:44PM - Gregory Doyle: compromises   vision. scheduled for repair    Replacement Utility updated for latest IMO load       Rosacea      Overview Note:     Created by Conversion         Dysthymic disorder      Overview Note:     Created by Conversion         Coronary Artery Disease      Overview Note:     2008- RCA; @[facility] (Dr. Schwab)            Past Surgical History:  Past Surgical History:   Procedure Laterality Date     BELPHAROPTOSIS REPAIR Bilateral 2013    Dr. Eder Bingham- 2013     BREAST SURGERY Bilateral 11/2017    bilateral mastectomy 2017- 11/2017     BREAST SURGERY  2018    implants and revsion 2     CORONARY STENT PLACEMENT Right 2008    Dr. Schwab     CV CORONARY ANGIOGRAM N/A 3/26/2019    Procedure: Coronary Angiogram;  Surgeon: Ba Foley MD;  Location: [facility] Cath Lab;  Service: Cardiology     HYSTERECTOMY       OOPHORECTOMY       KY APPENDECTOMY      Description: Appendectomy;  Recorded: 01/13/2009;     TOTAL HIP ARTHROPLASTY Right 12/7/2015    Procedure: RIGHT TOTAL  HIP ARTHROPLASTY;  Surgeon: Tera Yeung MD;  Location: [facility];  Service:         Meds:  Current Outpatient Medications   Medication Sig Dispense Refill     acetaminophen (TYLENOL) 500 MG tablet Take 500 mg by mouth every 6 (six) hours as needed for pain (arthritis).       aspirin 81 mg chewable tablet Chew 1 tablet (81 mg total) daily.  0     b complex vitamins tablet Take 1 tablet by mouth daily.       cholecalciferol, vitamin D3, 1,000 unit tablet Take 1,000 Units by mouth daily.       coenzyme Q10 400 mg cap Take 400 mg by mouth daily.       docusate sodium (COLACE) 100 MG capsule Take 1 capsule (100 mg total) by mouth 2 (two) times a day as needed for constipation.  0     doxylamine (UNISOM) 25 mg tablet Take 25 mg by mouth at bedtime as needed for sleep.       magnesium oxide 500 mg Tab Take 500 mg by mouth at bedtime.              melatonin 1 mg Tab tablet Take 1 mg by mouth at bedtime.              metoprolol tartrate (LOPRESSOR) 50 MG tablet TAKE 1 TABLET(50 MG) BY MOUTH TWICE DAILY 180 tablet 1     omeprazole (PRILOSEC) 20 MG capsule TAKE 1 CAPSULE BY MOUTH TWICE DAILY 180 capsule 0     peg 400-propylene glycol PF (SYSTANE) 0.4-0.3 % Dpet Administer 1 drop to both eyes 2 (two) times a day as needed.       simvastatin (ZOCOR) 40 MG tablet Take 1 tablet (40 mg total) by mouth daily. (Patient taking differently: Take 40 mg by mouth at bedtime.       ) 90 tablet 5     traMADol (ULTRAM) 50 mg tablet Take 0.5-1 tablets (25-50 mg total) by mouth every 6 (six) hours as needed. 30 tablet 0     traMADol (ULTRAM) 50 mg tablet Take 1 tablet (50 mg total) by mouth every 6 (six) hours as needed for pain. 15 tablet 0     triamcinolone (NASACORT) 55 mcg nasal inhaler Apply 2 sprays into each nostril daily as needed.       zolpidem (AMBIEN) 5 MG tablet Take 1 tablet (5 mg total) by mouth at bedtime as needed for sleep. 30 tablet 3     No current facility-administered medications for this visit.          Allergies:  Latex; Latex; and Nsaids (non-steroidal anti-inflammatory drug)     Social History:  Social History     Socioeconomic History     Marital status:      Spouse name: Johny     Number of children: 3     Years of education: Not on file     Highest education level: Not on file   Occupational History     Occupation: RN- retired ~     Employer: [employer]     Comment: retired/[employer]    Social Needs     Financial resource strain: Not on file     Food insecurity:     Worry: Not on file     Inability: Not on file     Transportation needs:     Medical: Not on file     Non-medical: Not on file   Tobacco Use     Smoking status: Former Smoker     Packs/day: 1.00     Years: 20.00     Pack years: 20.00     Last attempt to quit: 3/27/1990     Years since quittin.0     Smokeless tobacco: Never Used   Substance and Sexual Activity     Alcohol use: Yes     Alcohol/week: 2.0 oz     Types: 4 Standard drinks or equivalent per week     Comment: 1-2 glasses of wine/week      Drug use: No     Sexual activity: Not on file   Lifestyle     Physical activity:     Days per week: Not on file     Minutes per session: Not on file     Stress: Not on file   Relationships     Social connections:     Talks on phone: Not on file     Gets together: Not on file     Attends Yazdanism service: Not on file     Active member of club or organization: Not on file     Attends meetings of clubs or organizations: Not on file     Relationship status: Not on file     Intimate partner violence:     Fear of current or ex partner: Not on file     Emotionally abused: Not on file     Physically abused: Not on file     Forced sexual activity: Not on file   Other Topics Concern     Not on file   Social History Narrative    Retired lead RN at [employer] ;  Johny,... 3 grown sons Aries in - , Josse SAMUELS, -microbrewer/beer, ; Gregory Lu- paramedic [employer]...... Non smoker rare ETOH.        "  Family History:  Family History   Problem Relation Age of Onset     BRCA 1/2 Sister      BRCA 1/2 Paternal Grandfather      BRCA 1/2 Cousin      Asthma Cousin      Alcoholism Father          GI bleed age 67     Colon cancer Other      Breast cancer Sister         metastatic age 49, remission with Rx     Coronary artery disease Brother         CABG     Breast cancer Paternal Grandmother      Breast cancer Cousin      Colon cancer Mother         Review of Systems: Changes in vision; cough  Constitutional: Negative except as noted in HPI.   Eyes: Negative except as noted in HPI.   ENT: Negative except as noted in HPI.   Cardiovascular: Negative except as noted in HPI.   Respiratory: Negative except as noted in HPI.   Gastrointestinal: Negative except as noted in HPI.   Genitourinary: Negative except as noted in HPI.   Musculoskeletal: Negative except as noted in HPI.   Integumentary: Negative except as noted in HPI.   Neurological: Negative except as noted in HPI.   Psychiatric: Negative except as noted in HPI.   Endocrine: Negative except as noted in HPI.   Hematologic/Lymphatic: Negative except as noted in HPI.      Physical Exam:  /74 (Patient Site: Left Arm, Patient Position: Sitting, Cuff Size: Adult Regular)   Pulse 60   Ht 5' 3.5\" (1.613 m)   Wt 156 lb 11.2 oz (71.1 kg)   SpO2 97%   BMI 27.32 kg/m    General appearance: No apparent distress, well groomed.    HEENT:   Head: Normocephalic, atraumatic.  Eyes: PERRL  Nose: Nares patent.  No exudate.  No septal deviation noted.  Mouth: Teeth: Extensive repair   Tongue: Scalloped  Oropharynx:  Mallampati Classification: II    Tonsils: Grade 0    Uvula: Elongated    Neck: Supple without bruit.      Cardiac: Regular rate and rhythm.  No murmurs.  Radial pulses are strong and symmetric.   Midline surgical scar healing well.  Pulmonary: Symmetric air movement, lungs clear to auscultation bilaterally.  Musculoskeletal: No edema noted.  No clubbing or " cyanosis.  Skin: Warm, dry, intact.  Neurologic: Alert and oriented to person, place and time   Gait is normal.  Psychiatric: Affect pleasant.  Mood good.     Labs/Studies:  Lab Results   Component Value Date    WBC 9.2 04/11/2019    HGB 11.4 (L) 04/11/2019    HCT 34.1 (L) 04/11/2019    MCV 85 04/11/2019     04/11/2019         Chemistry        Component Value Date/Time     04/11/2019 1015    K 4.7 04/11/2019 1015     04/11/2019 1015    CO2 27 04/11/2019 1015    BUN 18 04/11/2019 1015    CREATININE 0.73 04/11/2019 1015    GLU 94 04/11/2019 1015        Component Value Date/Time    CALCIUM 9.8 04/11/2019 1015    ALKPHOS 109 01/07/2019 1737    AST 20 01/07/2019 1737    ALT 19 01/07/2019 1737    BILITOT 0.6 01/07/2019 1737            No results found for: FERRITIN  Lab Results   Component Value Date    TSH 3.65 08/11/2017     Lab Results   Component Value Date    HGBA1C 6.2 (H) 03/27/2019     3/26/2019 2D ECHO reviewed: Date:     When compared to the previous study dated 3/31/2008, no significant change.    Left ventricle ejection fraction is normal. The calculated left ventricular ejection fraction is 67%.    Normal left ventricular size and systolic function.    Normal right ventricular size and systolic function.    No hemodynamically significant valvular heart abnormalities.      Assessment and Plan:  1. Snoring  2. Hypoxia  3. S/P CABG (coronary artery bypass graft)  I have a suspicion for ARSEN based on presence of age, snoring and ESS. We discussed pathophysiology of obstructive sleep apnea, testing with overnight polysomnography, and treatment modalities (CPAP only discussed at this visit). We discussed consequences of untreated ARSEN. Patient is interested in treatment and willing to undergo overnight sleep testing.    Overnight oximetry demonstrated pattern of sustained hypoxia suggestive of cardiopulmonary disease. Extensive cardiac work-up and surgery makes cardiac disease less likely.  Home  testing would not be able to differentiate sustained hypoxia from non-obstructive disease well so I am recommending in-lab PSG with TCM for sustained hypoxia on overnight oximetry concerning for hypoventilation syndrome (possible post-surgical restrictive lung disease or phrenic nerve injury).  In the mean time she should continue on nocturnal oxygen at 2 LPM.    Study has been ordered today.      In case of insomnia during the study:  patient has been instructed to bring home medications.   - Split Night Sleep Study; Future     Patient verbalized understanding of these issues, agrees with the plan and all questions were answered today. Patient was given an opportuntity to voice any other symptoms or concerns not listed above. Patient did not have any other symptoms or concerns.      González Florian MD  [facility] Board Certified in Internal Medicine and Sleep Medicine  [facility].

## 2021-05-28 NOTE — PROGRESS NOTES
[facility] Note    Assessment/Plan:    Lisa Ray is a 69 y.o. old female with:  1 s/p CABG for severe 3 vessel dz - doing well with recovery, some chest pain still present robaxin worked well in the past will start low [id number] at night prn; change simvastatin to rosuvastatin 20mg and raise asp to 162mg daily.  2. Carotid artery disease - visit with vascular surgeon - plan yearly follow up US, cont aps and change statin to crestor as above    Follow up in 1 year    Dr. Ba Foley  [facility]  [phone]  ______________________________________________________________________    Subjective:    I had the opportunity to see Lisa Ray at the [facility]. Lisa Ray is a 69 y.o. female with a known history of PCI to RCA followed by CABG last month, doing well with increase in activity.  She takes tramadol at night.   ______________________________________________________________________      Review of Systems:   General: WNL  Eyes: WNL  Ears/Nose/Throat: WNL  Lungs: WNL  Heart: Chest Pain  Stomach: WNL  Bladder: WNL  Muscle/Joints: WNL  Skin: WNL  Nervous System: Dizziness  Mental Health: WNL     Blood: WNL    Problem List:  Patient Active Problem List   Diagnosis     Ptosis Of Eyelid     Rosacea     Dysthymic disorder     Coronary Artery Disease     Osteoarthritis     Raynaud disease     Paresthesias/numbness     Primary osteoarthritis of right hip     DCIS (ductal carcinoma in situ)     S/P coronary artery stent placement     History of bilateral mastectomy     S/P bilateral breast implants     Stable angina (H)     S/P CABG (coronary artery bypass graft)     History of total hip replacement, right     Health maintenance examination     Medical History:  Past Medical History:   Diagnosis Date     Anemia 2012    microcytic from Celebrex. GI w/u neg     Back pain      Benign Adenomatous Polyp Of The Large Intestine     Created by Conversion       CAD (coronary artery disease) 2008    RCA- stent     Cancer (H)     breast cancer     DJD (degenerative joint disease)      Dysthymia      Fatigue     recurrent/variable - no serious pathology     Fibromyalgia      GERD (gastroesophageal reflux disease)      Hypertension      Iritis     past Hx.-left eye     Raynaud's disease      Rosacea      Shortness of breath     with exertion     Shoulder tendonitis     right     Ulcer of intestine     small bowel- 10 years ago     UTI (urinary tract infection)     Jan. 2019     Surgical History:  Past Surgical History:   Procedure Laterality Date     BELPHAROPTOSIS REPAIR Bilateral 2013    Dr. Eder Bingham- 2013     BREAST SURGERY Bilateral 11/2017    bilateral mastectomy 2017- 11/2017     BREAST SURGERY  2018    implants and revsion 2     CORONARY STENT PLACEMENT Right 2008    Dr. Schwab     CV CORONARY ANGIOGRAM N/A 3/26/2019    Procedure: Coronary Angiogram;  Surgeon: Ba Foley MD;  Location: [facility] Cath Lab;  Service: Cardiology     HYSTERECTOMY       OOPHORECTOMY       DC APPENDECTOMY      Description: Appendectomy;  Recorded: 01/13/2009;     TOTAL HIP ARTHROPLASTY Right 12/7/2015    Procedure: RIGHT TOTAL HIP ARTHROPLASTY;  Surgeon: Tera Yeung MD;  Location: [facility] Main OR;  Service:      Social History:  No pertinent social hx related to patient's chief complaint other than above in subjective        Family History:  No pertinent family hx related to patient's chief complaint other than above in subjective      Allergies:  Allergies   Allergen Reactions     Latex      Added based on information entered during log entry, please review and add reactions, type, and severity as needed     Latex Rash and Other (See Comments)     Patient states skin sensitivity     Nsaids (Non-Steroidal Anti-Inflammatory Drug)      Other reaction(s): GI Bleeding, GI Upset  Sensitivity.  Ulceration w/ Celebrex       Medications:  Current Outpatient Medications    Medication Sig Dispense Refill     acetaminophen (TYLENOL) 500 MG tablet Take 500 mg by mouth every 6 (six) hours as needed for pain (arthritis).       aspirin 81 mg chewable tablet Chew 1 tablet (81 mg total) daily.  0     b complex vitamins tablet Take 1 tablet by mouth daily.       calcium, as carbonate, (TUMS) 200 mg calcium (500 mg) chewable tablet Chew 2 tablets daily.       cholecalciferol, vitamin D3, 1,000 unit tablet Take 1,000 Units by mouth daily.       coenzyme Q10 400 mg cap Take 400 mg by mouth daily.       docusate sodium (COLACE) 100 MG capsule Take 1 capsule (100 mg total) by mouth 2 (two) times a day as needed for constipation.  0     doxylamine (UNISOM) 25 mg tablet Take 25 mg by mouth at bedtime as needed for sleep.       magnesium oxide 500 mg Tab Take 500 mg by mouth at bedtime.              melatonin 1 mg Tab tablet Take 1 mg by mouth at bedtime.              metoprolol tartrate (LOPRESSOR) 50 MG tablet TAKE 1 TABLET(50 MG) BY MOUTH TWICE DAILY 180 tablet 1     omeprazole (PRILOSEC) 20 MG capsule TAKE 1 CAPSULE BY MOUTH TWICE DAILY 180 capsule 3     peg 400-propylene glycol PF (SYSTANE) 0.4-0.3 % Dpet Administer 1 drop to both eyes 2 (two) times a day as needed.       simvastatin (ZOCOR) 40 MG tablet Take 1 tablet (40 mg total) by mouth daily. (Patient taking differently: Take 40 mg by mouth at bedtime.       ) 90 tablet 5     traMADol (ULTRAM) 50 mg tablet Take 0.5-1 tablets (25-50 mg total) by mouth every 6 (six) hours as needed. 30 tablet 0     traMADol (ULTRAM) 50 mg tablet Take 1 tablet (50 mg total) by mouth every 6 (six) hours as needed for pain. 15 tablet 0     triamcinolone (NASACORT) 55 mcg nasal inhaler Apply 2 sprays into each nostril daily as needed.       zolpidem (AMBIEN) 5 MG tablet Take 1 tablet (5 mg total) by mouth at bedtime as needed for sleep. 30 tablet 3     No current facility-administered medications for this visit.        Objective:   Vital signs:  /80  "(Patient Site: Left Arm, Patient Position: Sitting, Cuff Size: Adult Regular)   Pulse 60   Resp 12   Ht 5' 3.5\" (1.613 m)   Wt 157 lb 1.6 oz (71.3 kg)   BMI 27.39 kg/m        Physical Exam:    GENERAL APPEARANCE: Alert, cooperative and in no acute distress.  HEENT: No scleral icterus. No Xanthelasma. Oral mucuos membranes pink and moist.  NECK: JVP<6cm. No Hepatojugular reflux. Thyroid not visualized  CHEST: clear to auscultation  CARDIOVASCULAR: S1, S2 without murmur ,clicks or rubs. Brachial, radial and posterior tibial pulses are intact and symetric. No carotid bruits noted.    ABDOMEN: Nontender. BS+. No bruits.  EXTREMITIES: No cyanosis, clubbing or edema.    Lab Results:  LIPIDS:  Lab Results   Component Value Date    CHOL 136 03/26/2019    CHOL 142 08/11/2017    CHOL 158 12/01/2016     Lab Results   Component Value Date    HDL 48 (L) 03/26/2019    HDL 46 (L) 08/11/2017    HDL 58 12/01/2016     Lab Results   Component Value Date    LDLCALC 63 03/26/2019    LDLCALC 76 08/11/2017    LDLCALC 75 12/01/2016     Lab Results   Component Value Date    TRIG 127 03/26/2019    TRIG 99 08/11/2017    TRIG 124 12/01/2016     No components found for: CHOLHDL    BMP:  Lab Results   Component Value Date    CREATININE 0.73 04/11/2019    BUN 18 04/11/2019     04/11/2019    K 4.7 04/11/2019     04/11/2019    CO2 27 04/11/2019         Ba Foley MD  [facility]  [phone]              "

## 2021-05-28 NOTE — PROGRESS NOTES
"Lisa Ray  1950  [id number]    Reason for visit: Lisa Ray is 68 y.o. year old female seen in clinic for routine follow-up after cardiac surgery. Hospital course was on path. Patient was discharged to home.    Procedure CABG x 3  DOS 3/28/2019  Day of discharge: 4/3/2019  Discharge to home    Readmissions: none    Followed up with Amber Alberto MD was seen on 4/11/19  Follow up with Cardiology scheduled for 5/14 with Dr. Foley   Cardiac Rehab start date 4/9/19- going well and plans to continue.    Mrs. Ray is accompanied by her , questions were addressed. She was cautioned not to be in pool-swimming, hot tubs or baths until all incision are well healed.    We discussed exercise and she will hold off on Yoga and swimminig until her sternal precautions are completed June 28, she will continue with cardiac rehab. We discussed the nature of recovery, expectations of time and giving herself time to heal.     Overall she appears well and seems to be on path for her recovery.     Assessment    Exam  Gen: Alert, oriented, pleasant, no acute distress  CV: RRR without murmur or rub, no appreciable edema  Lungs: CTAB, non-labored breathing on room air  GI: Abdomen soft, non-tender, non-distended  Sternum is stable no movement  Incisions: Chest and leg incisions well healing; C/D/I without erythema, purulence, swelling    Appetite: fair, no nausea and vomiting, not much for taste, but eating.   Bowels: regular  Weight: baseline    Plan    Lisa Ray is a 68 y.o. year old female status post CABG x 3 on 3/28/19 with Dr. Akbar who returns to clinic for post-op visit.     Reviewed with patient:  Vital signs /66 (Patient Site: Left Arm, Patient Position: Sitting, Cuff Size: Adult Regular)   Pulse 78   Ht 5' 3.5\" (1.613 m)   Wt 158 lb 14.4 oz (72.1 kg)   BMI 27.71 kg/m      Medications: Rx refill of Tramadol #15    1. Surgically doing well. Incisions are healing well " 07/19/19    Dear Matthew Nation,    We are contacting you from Dr. Ligia Hatfield office. Your health is important to us.   Therefore, we are sending this friendly reminder that you have the following overdue labs and/or tests which were ordered at your last office vi with no signs of infection. Sternum is stable.  2. Vital signs are stable.   3. Follow-up with cardiology as scheduled 5/14/19 with Dr. Foley.  4. Continue cardiac rehab until completed.  5. Continue sternal precautions 6/28/19  6. May start driving 4/28 if not taking any pain medications and feel you can be a defensive .  7. Return to work : retired   8. No need for further follow-up with CV surgery unless concerns.      Feel free to call our office with questions. [phone]

## 2021-05-28 NOTE — PATIENT INSTRUCTIONS - HE
"Reviewed with patient:  Vital signs /66 (Patient Site: Left Arm, Patient Position: Sitting, Cuff Size: Adult Regular)   Pulse 78   Ht 5' 3.5\" (1.613 m)   Wt 158 lb 14.4 oz (72.1 kg)   BMI 27.71 kg/m      Medications Rx refill of Tramadol    1. Surgically doing well. Incisions are healing well with no signs of infection. Sternum is stable.  2. Vital signs are stable.   3. Follow-up with cardiology as scheduled 5/14/19 with Dr. Foley.  4. Continue cardiac rehab until completed.  5. Continue sternal precautions 6/28/19  6. May start driving 4/28 if not taking any pain medications and feel you can be a defensive .  7. Return to work : retired   8. No need for further follow-up with CV surgery unless concerns.      Feel free to call our office with questions. [phone]          "

## 2021-05-28 NOTE — PROGRESS NOTES
Vascular Medicine Consult note    Dr. Reynaldo Santoyo MD, Vascular Medicine      HPI: I am consulted in this 69 y.o. female regarding left internal carotid artery stenosis.    I have been asked to see Lisa Ray by No att. providers found     Date of Service: 05/06/2019     PCP: Amber Alberto MD    Chief Complaint: No particular complaint patient is here because as part of her preop assessment for her CABG done in March she had a carotid ultrasound which showed moderate left internal carotid artery stenosis 50 to 69% with no significant right internal carotid artery stenosis  History of Present Illness:  This 69 y.o. female was admitted to the hospital for on (Not on file) for .  I have been asked to see the patient for left internal carotid artery stenosis 50 to 69%, patient has no significant symptoms, patient has no lateralizing signs, patient's blood pressure is within target, patient is non-smoker, patient lipid profile is almost completely normal apart from mildly decreased HDL, patient is active and no significant past or family history of vascular disease.        Past Medical History:    Past Medical History:   Diagnosis Date     Anemia 2012    microcytic from Celebrex. GI w/u neg     Back pain      Benign Adenomatous Polyp Of The Large Intestine     Created by Conversion      CAD (coronary artery disease) 2008    RCA- stent     Cancer (H)     breast cancer     DJD (degenerative joint disease)      Dysthymia      Fatigue     recurrent/variable - no serious pathology     Fibromyalgia      GERD (gastroesophageal reflux disease)      Hypertension      Iritis     past Hx.-left eye     Raynaud's disease      Rosacea      Shortness of breath     with exertion     Shoulder tendonitis     right     Ulcer of intestine     small bowel- 10 years ago     UTI (urinary tract infection)     Jan. 2019       Surgical History:   Past Surgical History:   Procedure Laterality Date     BELPHAROPTOSIS REPAIR  Bilateral 2013    Dr. Eder Bingham- 2013     BREAST SURGERY Bilateral 11/2017    bilateral mastectomy 2017- 11/2017     BREAST SURGERY  2018    implants and revsion 2     CORONARY STENT PLACEMENT Right 2008    Dr. Schwab     CV CORONARY ANGIOGRAM N/A 3/26/2019    Procedure: Coronary Angiogram;  Surgeon: Ba Foley MD;  Location: [facility];  Service: Cardiology     HYSTERECTOMY       OOPHORECTOMY       AK APPENDECTOMY      Description: Appendectomy;  Recorded: 01/13/2009;     TOTAL HIP ARTHROPLASTY Right 12/7/2015    Procedure: RIGHT TOTAL HIP ARTHROPLASTY;  Surgeon: Tera Yeung MD;  Location: [facility];  Service:        Medications:    Current Outpatient Medications:      acetaminophen (TYLENOL) 500 MG tablet, Take 500 mg by mouth every 6 (six) hours as needed for pain (arthritis)., Disp: , Rfl:      aspirin 81 mg chewable tablet, Chew 1 tablet (81 mg total) daily., Disp: , Rfl: 0     b complex vitamins tablet, Take 1 tablet by mouth daily., Disp: , Rfl:      cholecalciferol, vitamin D3, 1,000 unit tablet, Take 1,000 Units by mouth daily., Disp: , Rfl:      coenzyme Q10 400 mg cap, Take 400 mg by mouth daily., Disp: , Rfl:      docusate sodium (COLACE) 100 MG capsule, Take 1 capsule (100 mg total) by mouth 2 (two) times a day as needed for constipation., Disp: , Rfl: 0     doxylamine (UNISOM) 25 mg tablet, Take 25 mg by mouth at bedtime as needed for sleep., Disp: , Rfl:      magnesium oxide 500 mg Tab, Take 500 mg by mouth at bedtime.    , Disp: , Rfl:      melatonin 1 mg Tab tablet, Take 1 mg by mouth at bedtime.    , Disp: , Rfl:      metoprolol tartrate (LOPRESSOR) 50 MG tablet, TAKE 1 TABLET(50 MG) BY MOUTH TWICE DAILY, Disp: 180 tablet, Rfl: 1     omeprazole (PRILOSEC) 20 MG capsule, TAKE 1 CAPSULE BY MOUTH TWICE DAILY, Disp: 180 capsule, Rfl: 3     peg 400-propylene glycol PF (SYSTANE) 0.4-0.3 % Dpet, Administer 1 drop to both eyes 2 (two) times a day as needed., Disp:  , Rfl:      simvastatin (ZOCOR) 40 MG tablet, Take 1 tablet (40 mg total) by mouth daily. (Patient taking differently: Take 40 mg by mouth at bedtime.    ), Disp: 90 tablet, Rfl: 5     traMADol (ULTRAM) 50 mg tablet, Take 0.5-1 tablets (25-50 mg total) by mouth every 6 (six) hours as needed., Disp: 30 tablet, Rfl: 0     triamcinolone (NASACORT) 55 mcg nasal inhaler, Apply 2 sprays into each nostril daily as needed., Disp: , Rfl:      zolpidem (AMBIEN) 5 MG tablet, Take 1 tablet (5 mg total) by mouth at bedtime as needed for sleep., Disp: 30 tablet, Rfl: 3     traMADol (ULTRAM) 50 mg tablet, Take 1 tablet (50 mg total) by mouth every 6 (six) hours as needed for pain., Disp: 15 tablet, Rfl: 0    Allergies:   Allergies   Allergen Reactions     Latex      Added based on information entered during log entry, please review and add reactions, type, and severity as needed     Latex Rash and Other (See Comments)     Patient states skin sensitivity     Nsaids (Non-Steroidal Anti-Inflammatory Drug)      Other reaction(s): GI Bleeding, GI Upset  Sensitivity.  Ulceration w/ Celebrex       Family history:   Family History   Problem Relation Age of Onset     BRCA 1/2 Sister      BRCA 1/2 Paternal Grandfather      BRCA 1/2 Cousin      Asthma Cousin      Alcoholism Father          GI bleed age 67     Colon cancer Other      Breast cancer Sister         metastatic age 49, remission with Rx     Coronary artery disease Brother         CABG     Breast cancer Paternal Grandmother      Breast cancer Cousin      Colon cancer Mother        Social History:   Social History     Socioeconomic History     Marital status:      Spouse name: Johny     Number of children: 3     Years of education: Not on file     Highest education level: Not on file   Occupational History     Occupation: RN- retired ~     Employer: [employer]     Comment: retired/[employer]    Social Needs     Financial resource strain: Not on file     Food  insecurity:     Worry: Not on file     Inability: Not on file     Transportation needs:     Medical: Not on file     Non-medical: Not on file   Tobacco Use     Smoking status: Former Smoker     Packs/day: 1.00     Years: 20.00     Pack years: 20.00     Last attempt to quit: 3/27/1990     Years since quittin.1     Smokeless tobacco: Never Used   Substance and Sexual Activity     Alcohol use: Yes     Alcohol/week: 2.0 oz     Types: 4 Standard drinks or equivalent per week     Comment: 1-2 glasses of wine/week      Drug use: No     Sexual activity: Not on file   Lifestyle     Physical activity:     Days per week: Not on file     Minutes per session: Not on file     Stress: Not on file   Relationships     Social connections:     Talks on phone: Not on file     Gets together: Not on file     Attends Sikh service: Not on file     Active member of club or organization: Not on file     Attends meetings of clubs or organizations: Not on file     Relationship status: Not on file     Intimate partner violence:     Fear of current or ex partner: Not on file     Emotionally abused: Not on file     Physically abused: Not on file     Forced sexual activity: Not on file   Other Topics Concern     Not on file   Social History Narrative    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 3 grown sons Aries in - , Josse SAMUELS, -microbrewer/[employer], ; Gregory Lu- paramedic [employer]...... Non smoker rare ETOH.         Review of Systems -   Constitutional: awake, alert, cooperative, no apparent distress, and appears stated age  Eyes: Lids and lashes normal, pupils equal, round and reactive to light, extra ocular muscles intact, sclera clear, conjunctiva normal  ENT: normocepalic, without obvious abnormality, oropharynx pink and moist  Hematologic / Lymphatic: no lymphadenopathy  Respiratory: No increased work of breathing, good air exchange, clear to auscultation bilaterally, no  crackles or wheezing  Cardiovascular: regular rate and rhythm, normal S1 and S2 and no murmur noted  GI: Normal bowel sounds, soft, non-distended, non-tender  Skin: no redness, warmth, or swelling, no rashes and no lesions  Musculoskeletal: There is no redness, warmth, or swelling of the joints. Full range of motion noted. Motor strength is 5 out of 5 all extremities bilaterally. Tone is normal.  Neurologic: Awake, alert, oriented to name, place and time. Cranial nerves II-XII are grossly intact. Motor is 5 out of 5 bilaterally.   Neuropsychiatric: Normal affect, memory, insight.  Pulses: Palpable pulses with no carotid bruit. No carotid bruits appreciated.     Physical Exam:        Vital Signs: /72   Pulse 80   Temp 97.9  F (36.6  C) (Oral)   Wt 156 lb (70.8 kg)   BMI 27.20 kg/m           Incision 03/28/19 Surgical Leg Left (Active)       Incision 03/28/19 Surgical Chest (Active)        General: In no apparent distress.     Psych: Alert and oriented X 3. Affect normal.    HEENT: Atraumatic, normocephalic    Range of Motion:  Range of motion of shoulders, elbows, wrists is normal bilaterally without active joint synovitis, erythema, joint swelling, crepitus or joint laxity.  Range of motion of the neck is full with Spurling's maneuver negative.    Sensation: Intact to pinprick and light touch throughout the upper extremities bilaterally.      Strength:  Normal strength testing in shoulder abduction, elbow flexion, elbow extension, wrist extension, forearm supination, forearm pronation, hand intrinsics, internal rotation and external rotation of the shoulder shoulders bilaterally.      Deep Tendon Reflexes:  Normal biceps, triceps and brachioradialis bilaterally    Lymph nodes: No cervical, supraclavicular, infraclavicular, or axillary lymphadenopathy palpated.    Vascular: No unusual venous distention.  Radial arterial pulses strong and equal at the wrists bilaterally.     Skin: No unusual rubor, calor,  masses or rashes along the skin in either arm.  No significant fibrosity or scarring.  No pain to palpation.     IMAGES:   No results found.  I personally reviewed the following imaging today and those on care everywhere, if indicated.    Laboratory studies:                   Invalid input(s): LABALBU  Lab Results   Component Value Date/Time    PREALBUMIN 32.8 03/27/2019 08:39 AM         Impression: 69 y.o. female with   1. Moderate left internal carotid artery stenosis, 50 to 69%, asymptomatic  2. Status post CABG    Plan:    1. Vascular risk factors modification, including blood pressure target 130/80 or less, LDL less than 100, patient is non-smoker, patient do exercise daily and patient is nondiabetic  2. Follow-up carotid Doppler ultrasound in 1 year from now  3. Follow up with me in One year.    Total time spent 20 minutes face to face with patient with more than 50% time spent in counseling and coordination of care.     Thank you very much for this consult.  If you have any questions please feel free to contact me at [phone].  Reynaldo Santoyo MD  [facility]    ?

## 2021-05-28 NOTE — PROGRESS NOTES
Lisa Ray has participated in 14 sessions of Phase II Cardiac Rehab.    Progress Report:   Cardiac Rehab Treatment Progress Report 5/6/2019 5/8/2019 5/10/2019 5/13/2019   Weight 156 lbs 13 oz 156 lbs 13 oz 157 lbs 10 oz 157 lbs   Pre Exercise  HR 74 68 71 81   Pre Exercise /78 122/76 118/72 124/70   Treadmill Peak HR 80 85 88 92   Heart Rate 68 70 74 76   Post Exercise /62 122/78 120/62 116/70   ECG SR SR SR SR   Total Exercise Minutes 55 55 55 55         Current Status:  Currently exercising without complaints or symptoms.    If Physician recommends change in treatment plan, please place orders.        __________________________________________________      _____________  Signature                                                                                                  Date

## 2021-05-28 NOTE — TELEPHONE ENCOUNTER
Refill Approved    Rx renewed per Medication Renewal Policy. Medication was last renewed on 3/12/19.    Gricelda Correa, [facility] Triage/Med Refill 4/30/2019     Requested Prescriptions   Pending Prescriptions Disp Refills     omeprazole (PRILOSEC) 20 MG capsule [Pharmacy Med Name: OMEPRAZOLE 20MG CAPSULES] 180 capsule 0     Sig: TAKE 1 CAPSULE BY MOUTH TWICE DAILY       GI Medications Refill Protocol Passed - 4/29/2019  2:28 PM        Passed - PCP or prescribing provider visit in last 12 or next 3 months.     Last office visit with prescriber/PCP: 1/2/2018 Gregory Mccullough MD OR same dept: Visit date not found OR same specialty: 4/11/2019 Amber Alberto MD  Last physical: Visit date not found Last MTM visit: Visit date not found   Next visit within 3 mo: Visit date not found  Next physical within 3 mo: Visit date not found  Prescriber OR PCP: Gregory Mccullough MD  Last diagnosis associated with med order: 1. GERD (gastroesophageal reflux disease)  - omeprazole (PRILOSEC) 20 MG capsule [Pharmacy Med Name: OMEPRAZOLE 20MG CAPSULES]; TAKE 1 CAPSULE BY MOUTH TWICE DAILY  Dispense: 180 capsule; Refill: 0    If protocol passes may refill for 12 months if within 3 months of last provider visit (or a total of 15 months).

## 2021-05-29 ENCOUNTER — RECORDS - HEALTHEAST (OUTPATIENT)
Dept: ADMINISTRATIVE | Facility: CLINIC | Age: 71
End: 2021-05-29

## 2021-05-29 NOTE — PROGRESS NOTES
ITP ASSESSMENT   Assessment Day: 90 Day    Session Number: 29  Precautions: Sternal precautions    Diagnosis: CAB    Risk Stratification: High    Referring Provider: Yazmin Akbar MD  EXERCISE  Exercise Assessment: Reassessment  Pt is tolerating 50-55 minutes of exercise in cardiac rehab at MET level of 3.5-5.5. Walking daily at home but still experiencing some shortness of breath/fatigue with inclines.                          Exercise Plan  Goals Next 30 days  ADL'S: Patient will hike in [place] without fatigue.     Leisure: Patient will tolerate 30 minutes on TM at 6-8% grade without fatigue.     Education Goals: All goals in this section met    Education Goals Met: Patient can state cardiac s/s and appropriate emergency response.;Has system for taking medication.;Medication review.                          Goals Met  60 day ADL'S goals met: Goal met- Pt is back to doing all ADL's and lifting her grand child without fatigue.    60 day Leisure goals met: Goal met- Pt is back to doing light yoga and feeling less fatigue with exercise.     60 Day Progression: Pt is making progress. Set new goals for next 30 days.       30 day ADL'S goals met: Patient has resumed light gardening.      30 Day Progression: Patient does not have any carpet to vacuum amymore, but she has been sweeping all of her hard wood floors.      Initial ADL's goals met: Patient has resumed all meal preparation.    Intial Work goals met: Patient has resumed driving short trips.    Initial Progression: Patient has not started light yardwork due to sternal precautions.      Exercise Prescription  Exercise Mode: Treadmill;Bike;Nustep    Frequency: 2-3x/week    Duration: 45-55 minutes    Intensity / THR: 20-30 beats above resting heart rate    RPE 11-14  Progression / Met level: 5.5-6.5 METs    Resistive Training?: Yes      Current Exercise (mins/week): 380      Interventions  Home Exercise:  Mode: Walking     Frequency: Daily    Duration: 30-40  "minutes      Education Material : Educational videos;Provide written material;Individual education and counseling      Education Completed  Exercise Education Completed: Signs and Symptoms;Medication review;RPE;Emergency Plan;Home Exercise;Warm up/cool down;BP/HR Reponse to exercise;End point of exercise;Benefits of Exercise              Exercise Follow-up/Discharge  Follow up/Discharge: Patient is tolerating 50-55 minutes of exercise at MET level of 3.5-5. She would like to increase her grade on the TM to have less fatigue at home when she is walking up hills. She is getting ready for her trip to [place] and also would like to experience less fatigue while hiking with family.    NUTRITION  Nutrition Assessment: Reassessment      Nutrition Risk Factors:  Nutrition Risk Factors: Dyslipidemia  Cholesterol: 136  LDL: 63  HDL: 48  Triglycerides: 127      Nutrition Plan  Interventions  Diet Consult: Completed    Other Nutrition Intervention: Diet Class;Therapist/Pt Discussion;Educational Videos;Provide with Written Material    Initial Rate Your Plate Score: 59    Education Completed  Nutrition Education Completed: Low Saturated fat diet;Low sodium diet;Risk factor overview      Goals  Nutrition Goals (Next 30 days): Patient demonstrated understanding of cardiac nutrition, no goals identified for the next 30 days      Goals Met  Nutrition Goals Met: Patient follows a low sodium diet;Patient states following a low saturated fat diet;Completed Nutritional Risk Screen;Provided Rate your Plate Survey;Reviewed Dietitian schedule;Patient can identify their risk factors for CAD      Height, Weight, and  BMI  Weight: 159 lb (72.1 kg)  Height: 5' 4\" (1.626 m)  BMI: 27.28      Nutrition Follow-up  Follow-up/Discharge: Patient continues to follow heart healthy diet and being aware of what she should and should not be eating.          Other Risk Factors  Other Risk Factor Assessment: Reassessment      HTN Risk Factor: " Hypertension      Pre Exercise BP: 120/74  Post Exercise BP: 114/52      Hypertension Plan  Goals  HTN Goals: Patient demonstrates understanding of HTN, no goals identified for the next 30 days      Goals Met  HTN Goals Met: Follow low sodium diet;Take medication as prescribed;Exercises regularly      HTN Interventions  HTN Interventions: Diet consult;Therapist/patient discussion;Provide written material;Offer educational videos;Offer educational classes      HTN Education Completed  HTN Education Completed: Medication review;Risk factor overview;Low sodium diet      Tobacco Risk Factor: NA    Risk Factor Follow-up   Follow-up/Discharge: Continue to offer education on risk factors as needed.      PSYCHOSOCIAL  Psychosocial Assessment: Reassessment    Psychosocial Risk Factor: Stress      Psychosocial Plan  Interventions  Interventions: Offer educational videos and classes;Provide written material;Individual education and counseling      Education Completed  Education Completed: Relaxation/Coping Techniques;Effects of stress on body      Goals  Goals (Next 30 days): Oriented to stress management classes;Identify stressors;Practicing stress management skills      Goals Met  Goals Met: Identified Support system      Psychosocial Follow-up  Follow-up/Discharge: Patient reports that she has a great support system at home. No additional stress, getting ready for her trip to [place] and hoping to not get fatigued while hiking.              Patient involved in Goal setting?: Yes      Signature: _____________________________________________________________    Date: __________________    Time: __________________See Doc Flowsheet

## 2021-05-29 NOTE — PROGRESS NOTES
Patient was offered choice of vendor and chose [facility].  Patient Lisa Ray was set up at [facility] on 6/18/19. Patient received a Resmed [id number] Auto. Pressures were set at 6-10.   Patient s ramp is 6 cm H2O for off and FLEX/EPR is EPR 2.  Patient received a Resmed Mask name: Airfit P10 for her  pillow Size sm, heated tubing and heated humidifier.    Pacee Her

## 2021-05-29 NOTE — PROGRESS NOTES
ITP ASSESSMENT   Assessment Day: 60 Day    Session Number: 19  Precautions: Sternal precautions    Diagnosis: CAB    Risk Stratification: High    Referring Provider: Yazmin Akbra MD  EXERCISE  Exercise Assessment: Reassessment                                Exercise Plan  Goals Next 30 days  ADL'S: Patient will increase UE strength to allow patient to carry full loads of laundry and carry her grandchild.    Leisure: Patient will resume Yoga.  Patient will tolerate 30 minutes on treadmill without feeling SOB with elevation of 6-8%.        Education Goals: All goals in this section met                            Goals Met  30 day ADL'S goals met: Patient has resumed light gardening.      30 Day Progression: Patient does not have any carpet to vacuum amymore, but she has been sweeping all of her hard wood floors.      Initial ADL's goals met: Patient has resumed all meal preparation.      Intial Work goals met: Patient has resumed driving short trips.    Initial Progression: Patient has not started light yardwork due to sternal precautions.      Exercise Prescription  Exercise Mode: Treadmill;Bike;Nustep    Frequency: 2-3 x per week    Duration: 45-55 minutes    Intensity / THR: ()    RPE 11-14  Progression / Met level: 5-6 mets    Resistive Training?: Yes      Current Exercise (mins/week): 310      Interventions  Home Exercise:  Mode: walking    Frequency: daily    Duration: 30-40      Education Material : Educational videos;Provide written material;Individual education and counseling      Education Completed  Exercise Education Completed: Signs and Symptoms;Medication review;RPE;Emergency Plan;Home Exercise;Warm up/cool down;BP/HR Reponse to exercise;End point of exercise;Benefits of Exercise              Exercise Follow-up/Discharge  Follow up/Discharge: Patient is currently tolerating 50-55 minutes of continuous exercise up to the 4-5 met level.  She would like to increase her UE strength and increase the  "grade on the treadmill for her trip coming up in [place].   NUTRITION  Nutrition Assessment: Reassessment      Nutrition Risk Factors:  Nutrition Risk Factors: Dyslipidemia  Cholesterol: 136  LDL: 63  HDL: 48  Triglycerides: 127      Nutrition Plan  Interventions  Diet Consult: Completed    Other Nutrition Intervention: Diet Class;Therapist/Pt Discussion;Educational Videos;Provide with Written Material    Initial Rate Your Plate Score: 59        Education Completed  Nutrition Education Completed: Low Saturated fat diet;Low sodium diet;Risk factor overview      Goals  Nutrition Goals (Next 30 days): Patient demonstrated understanding of cardiac nutrition, no goals identified for the next 30 days      Goals Met  Nutrition Goals Met: Patient follows a low sodium diet;Patient states following a low saturated fat diet;Completed Nutritional Risk Screen;Provided Rate your Plate Survey;Reviewed Dietitian schedule;Patient can identify their risk factors for CAD      Height, Weight, and  BMI  Weight: 159 lb 3.2 oz (72.2 kg)  Height: 5' 4\" (1.626 m)  BMI: 27.31      Nutrition Follow-up  Follow-up/Discharge: Patient follows a heart healthy diet majority of the time.          Other Risk Factors  Other Risk Factor Assessment: Reassessment      HTN Risk Factor: Hypertension      Pre Exercise BP: 116/76  Post Exercise BP: 110/58      Hypertension Plan  Goals  HTN Goals: Patient demonstrates understanding of HTN, no goals identified for the next 30 days      Goals Met  HTN Goals Met: Follow low sodium diet;Take medication as prescribed;Exercises regularly      HTN Interventions  HTN Interventions: Diet consult;Therapist/patient discussion;Provide written material;Offer educational videos;Offer educational classes      HTN Education Completed  HTN Education Completed: Medication review;Risk factor overview;Low sodium diet      Tobacco Risk Factor: NA        Risk Factor Follow-up   Follow-up/Discharge: BP at rest and with exercise is " WNL's.       PSYCHOSOCIAL  Psychosocial Assessment: Reassessment         Psychosocial Risk Factor: Stress      Psychosocial Plan  Interventions  Interventions: Offer educational videos and classes;Provide written material;Individual education and counseling      Education Completed  Education Completed: Relaxation/Coping Techniques;Effects of stress on body      Goals  Goals (Next 30 days): Oriented to stress management classes;Identify stressors;Practicing stress management skills      Goals Met  Goals Met: Identified Support system      Psychosocial Follow-up  Follow-up/Discharge: Patient feels like she has a good handle on stress and her recovery is going well.  She receives a lot of support from her family.              Patient involved in Goal setting?: Yes      Signature: _____________________________________________________________    Date: __________________    Time: __________________

## 2021-05-29 NOTE — TELEPHONE ENCOUNTER
Received Denial of Prior Authorization for Methocarbamol. Will forward to Dr. Foley for his review. Insurance drug formulary includes chlorzoxazone 500 mg.

## 2021-05-29 NOTE — TELEPHONE ENCOUNTER
Called and spoke to patient. She will continue to self-pay for methocarbamol as she only uses it sparingly. No further needs or concerns at this time. -ejb    ----- Message -----  From: Ba Foley MD  Sent: 6/3/2019   3:56 PM  To: Deborah Iyer RN    Would defer to CT surgery and primary care for medication for pain relief post CABG

## 2021-05-29 NOTE — PROGRESS NOTES
"Sleep Study Review Visit  She is a 69 y.o. y/o female patient who comes to the sleep medicine clinic for sleep study review.    We had an extensive conversation to review the results of her sleep study.  The overnight polysomnography was completed with a digital sleep system using the international 10-20 electrode placement for recording EEG, EOG, EMG from chin, ECG, respiratory effort, oximetry, body position, airflow, nasal pressure, snoring sound, pulse rate and limb movement channels.  The study was completed as a split study.  We reviewed the oxygen saturation graph as well as the result tables from the report.    Primary Findings:  There was evidence of limb movements during sleep independent of respiratory events.  Diagnostic Portion:   Respiratory monitoring showed moderate obstructive sleep apnea (AHI=17.1/hr).  The patient spent a total of 8.2 minutes at an oxygen saturation below 88%.  Titration Portion:  A trial of nasal CPAP was initiated given the severity of sleep-disordered breathing.  CPAP pressures from 5 to 6 were sampled during the night.  Patient had trouble sleeping due to pain and PLMs.  Patient elevated head of bed to 10 degrees during second half of titration portion of study.  CPAP of 6 was effective at eliminating obstructive events.      This was an adequate titration study (titration grading criteria for optimal or good are met with the exception that supine REM sleep did not occur at the selected pressure).    Was on Lyrica a few years ago for fibromyalgia and reports she had dream enactment.      Still sore from CABG earlier this year.    Felt she didn't sleep great at test.  Has adjustable bed at home.    Physical Exam:  /78   Pulse 84   Ht 5' 3.5\" (1.613 m)   Wt 158 lb (71.7 kg)   SpO2 95%   BMI 27.55 kg/m    General appearance: No apparent distress, well groomed.  Head: Normocephalic, atraumatic.  Musculoskeletal: No edema noted.  No clubbing or cyanosis.  Skin: Warm, " dry, intact.  Neurologic: Alert and oriented to person, place and time   Gait is normal.  Psychiatric: Affect pleasant.  Mood good.     Labs/Studies:  - We reviewed the results of the overnight PSG as described on the HPI.     Assessment and Plan:  In summary Lisa Ray is a 69 y.o. year old female here for study results.    We had an extensive conversation to review the results of her sleep study and to  her on the importance of treating sleep apnea.   We discussed treatment options including CPAP, MAD, and surgery.    1. ARSEN (obstructive sleep apnea)  I reviewed the diagnosis of obstructive sleep apnea with patient.  We discussed consequences of untreated Obstructive Sleep Apnea, such as markedly elevated risk for heart attack or stroke, and benefits of treatment.  She is interested in starting treatment of ARSEN with PAP therapy.  Reviewed importance of nightly therapy for effective treatment of ARSEN, and she voiced understanding and agreement.  We also reviewed importance of using PAP during the entire sleep duration to achieve maximal benefits, but reviewed that a minimum of 4 hours per night was required.  She is confident that she can achieve these goals and is willing to start therapy as soon as possible.    She will be seen back approximately 2 months after starting PAP therapy to ensure compliance and review treatment outcomes.  However, if she develops any difficulties with treatment she is instructed to contact us or DME company immediately to troubleshoot problems.    - CPAP DME Sleep Medicine [facility]    2. Periodic limb movement disorder  Severe sleep fragmentation related to PLMD. Recommend trial of Gabapentin first and consider dopamine agonist in the future.  - gabapentin (NEURONTIN) 100 MG capsule; Take 100-400 mg by mouth at bedtime.  Dispense: 100 capsule; Refill: 1     Patient verbalized understanding of these issues, agrees with the plan and all questions were answered today. Patient  was given an opportuntity to voice any other symptoms or concerns not listed above. Patient did not have any other symptoms or concerns.      Patient told to return in 2 months. Patient instructed to stop at  to schedule appointment before leaving today.    MD CINTHIA Xie Board Certified in Internal Medicine and Sleep Medicine  [facility].    We spent a total of 25 minutes of face-to-face encounter and more than 50% of the encounter was used for counseling or coordination of care.

## 2021-05-29 NOTE — TELEPHONE ENCOUNTER
----- Message from Phu Pride sent at 5/28/2019  1:53 PM CDT -----  Contact: Pt  General phone call:    Caller: Pt    Primary cardiologist: Dr Foley    Detailed reason for call: Pt states was in recently and was prescribed - methocarbamol (ROBAXIN-750) 750 MG tablet - her insurance is denying but states Dr NIXON can override this -   Pt states she has a letter she could drop off if needed?    Please call Pt to verify whats needed exactly (possible a PA) - or contact insurance about med      New or active symptoms? na  Best phone number:  [phone]  Best time to contact: any  Ok to leave a detailed message? yes  Device? no    Additional Info: thank you

## 2021-05-29 NOTE — PROGRESS NOTES
Order for Durable Medical Equipment was processed and equipment ordered.     DME provider: [facility]    Date Faxed: 6/5/2019    Ordering Provider: González Florian MD    PAP Order Type: New Device Order    Fax Number: IN HOUSE DME: [facility]

## 2021-05-30 ENCOUNTER — RECORDS - HEALTHEAST (OUTPATIENT)
Dept: ADMINISTRATIVE | Facility: CLINIC | Age: 71
End: 2021-05-30

## 2021-05-30 NOTE — TELEPHONE ENCOUNTER
Patient contacted with the recommendations to schedule a stress nuclear imaging pharmacological stress test. She was in agreement with this plan, and was advised that she will be contacted by our schedulers this week to arrange her appointment. sk/RN

## 2021-05-30 NOTE — PATIENT INSTRUCTIONS - HE
[facility] Home Exercise Program    Exercise Goal:    Modality Duration Intensity   Warm-up 5 minutes  Light   Walk 30 minutes 11-13 RPE   Nustep 20-30 minutes  L6, 70-80 spm   Treadmill 30 minutes  2.5-3.5 mph, 3-7% grade    Cool Down 5 minutes  Light   Strength Per handout  Light   Stretching Per handout Light      Target Heart Rate:  bpm  Range of Perceived Exertion: 11-13  Perceived exertion  (RPE)  Negin Scale   6  7      Very, very light  8  9      Very light  10  11    Fairly light  12  13    Somewhat hard  14  15    Hard  16  17    Very hard  18  19    Very, very hard  20     Special Comments/ Recommendations:  -Lipid Profile with Physician  -Continue to attend follow up appointments with your doctor.  -Continue to take your medications as prescribed.  -Remember to have fun!!!  Stop Exercise!!! If any of the following occur:    Angina/chest pain    Dizziness    Excessive perspiration/cold sweats    Abnormal shortness of breath    Changes in heart rate (slow, fast, irregular)    Sudden fatigue or numbness    Nausea  Also...    Avoid extreme temperatures - exercise indoors if necessary    Wait at least 1 hour after a meal before strenuous activity    Do not exercise if you have a fever or are ill    Wear comfortable, supportive athletic clothing

## 2021-05-30 NOTE — PROGRESS NOTES
ITP ASSESSMENT   Assessment Day: 220 Day     Session Number: 35  Precautions: Surgical    Diagnosis: CAB    Risk Stratification: High    Referring Provider: Yazmin Akbar MD  EXERCISE  Exercise Assessment: Discharge       6 Minute Walk Test   Pre   Pre Exercise HR: 64                    Pre Exercise BP: 128/72      Peak  Peak HR: 83                   Peak BP: 128/72    Peak feet: 1400    Peak O2 SAT: 97    Peak RPE: 11    Peak MPH: 2.65      Symptoms:  Peak Symptoms: none      5 mins. Post  5 Min Post HR: 54    5 Min Post BP: 120/74                                            Goals Met  150+ day ADL'S goals met: Goal met. Patient is walking daily for 30 minutes in her [facility].   150+ day Leisure goals met: Goal met. Patient is attending yoga 2 times a week.    150+ day Work goals met: Patient has not resumed water aerobics yet.  150+ Day Progress: Patient has made great progress towards her stated goals.     150+ day ADL'S goals met: Goal met. Patient is walking daily for 30 minutes in her [facility].   150+ day Leisure goals met: Goal met. Patient is attending yoga 2 times a week.    150+ day Work goals met: Patient has not resumed water aerobics yet.  150+ Day Progress: Patient has made great progress towards her stated goals.       90 day ADL'S goals met: Goal met- Pt was able to tolerate hiking in [place], but still experiencing SOB    90 day Leisure goals met: Goal met- Pt was able to tolerate TM at 6-8% grade.     90 Day Progress: Pt is continuing to make progress. Set new goals to get ready for discharge in 4 sessions.       60 day ADL'S goals met: Goal met- Pt is back to doing all ADL's and lifting her grand child without fatigue.    60 day Leisure goals met: Goal met- Pt is back to doing light yoga and feeling less fatigue with exercise.     60 Day Progression: Pt is making progress. Set new goals for next 30 days.       30 day ADL'S goals met: Patient has resumed light gardening.      30 Day  Progression: Patient does not have any carpet to vacuum ashiamore, but she has been sweeping all of her hard wood floors.      Initial ADL's goals met: Patient has resumed all meal preparation.    Intial Work goals met: Patient has resumed driving short trips.    Initial Progression: Patient has not started light yardwork due to sternal precautions.      Exercise Prescription  Exercise Mode: Treadmill;Bike;Nustep    Frequency: 2-3x/week    Duration: 45-60 minutes    Intensity / THR: 20-30 beats above resting heart rate    RPE 11-14  Progression / Met level: 6-7 METs    Resistive Training?: Yes      Current Exercise (mins/week): 400      Interventions  Home Exercise:  Mode: walking/water aerobics    Frequency: daily    Duration: 30-40 minutes      Education Material : Educational videos;Provide written material;Individual education and counseling      Education Completed  Exercise Education Completed: Signs and Symptoms;Medication review;RPE;Emergency Plan;Home Exercise;Warm up/cool down;BP/HR Reponse to exercise;End point of exercise;Benefits of Exercise              Exercise Follow-up/Discharge  Follow up/Discharge: Patient plans on continuing to walk daily and attend yoga class 2 times each week.  Patient plans to eventually attend water aerobics class 3 times each week.   NUTRITION  Nutrition Assessment: Discharge      Nutrition Risk Factors:  Nutrition Risk Factors: Dyslipidemia  Cholesterol: 136  LDL: 63  HDL: 48  Triglycerides: 127      Nutrition Plan  Interventions  Diet Consult: Completed    Other Nutrition Intervention: Therapist/Pt Discussion;Diet Class;Educational Videos;Provide with Written Material    Initial Rate Your Plate Score: 58    Pre Initial Rate Your Plate Score: 58     Education Completed  Nutrition Education Completed: Low Saturated fat diet;Low sodium diet;Risk factor overview          Goals Met  Nutrition Goals Met: Patient follows a low sodium diet;Patient states following a low saturated  "fat diet;Completed Nutritional Risk Screen;Provided Rate your Plate Survey;Reviewed Dietitian schedule;Patient can identify their risk factors for CAD      Height, Weight, and  BMI  Weight: 159 lb (72.1 kg)  Height: 5' 4\" (1.626 m)  BMI: 27.28    Pre BMI: 27.28     Nutrition Follow-up  Follow-up/Discharge: Pt. continues to follow heart healthy diet.         Other Risk Factors  Other Risk Factor Assessment: Discharge      HTN Risk Factor: Hypertension      Pre Exercise BP: 120/60  Post Exercise BP: 100/70      Hypertension Plan  Goals  HTN Goals: Patient demonstrates understanding of HTN, no goals identified for the next 30 days      Goals Met  HTN Goals Met: Follow low sodium diet;Take medication as prescribed;Exercises regularly      HTN Interventions  HTN Interventions: Diet consult;Therapist/patient discussion;Provide written material;Offer educational videos;Offer educational classes      HTN Education Completed  HTN Education Completed: Medication review;Risk factor overview;Low sodium diet      Tobacco Risk Factor: NA        Tobacco Plan  Tobacco Goals    Risk Factor Follow-up   Follow-up/Discharge: Resting and exercise blood pressures WNL's     PSYCHOSOCIAL  Psychosocial Assessment: Discharge       Ngoc LOJA of L Summary Score: 18        Psychosocial Risk Factor: Stress      Psychosocial Plan  Interventions  If PHQ-9 is >9, send letter to MD  Interventions: Offer educational videos and classes;Provide written material;Individual education and counseling      Education Completed  Education Completed: Relaxation/Coping Techniques;Effects of stress on body          Goals Met  Goals Met: Identified Support system      Psychosocial Follow-up  Follow-up/Discharge: Patient reports low stress.             Patient involved in Goal setting?: Yes      Signature: _____________________________________________________________    Date: __________________    Time: __________________  "

## 2021-05-30 NOTE — PROGRESS NOTES
ITP ASSESSMENT   Assessment Day: 120 Day    Session Number: 32  Precautions: Sternal precautions    Diagnosis: CAB    Risk Stratification: High    Referring Provider: Yazmin Akbar MD  EXERCISE  Exercise Assessment: Reassessment  Pt is tolerating 50-60 minutes of exercise in cardiac rehab at MET level of 2.8-5.6.                          Exercise Plan  Goals Next 30 days  ADL'S: Pt will walk daily for 30 minutes in her neighborhood.    Leisure: Pt will attend yoga 2 times each week with friend    Work: Pt will do water aerobics class 3x each week.       Education Goals: All goals in this section met    Education Goals Met: Patient can state cardiac s/s and appropriate emergency response.;Has system for taking medication.;Medication review.                          Goals Met  90 day ADL'S goals met: Goal met- Pt was able to tolerate hiking in [place], but still experiencing SOB    90 day Leisure goals met: Goal met- Pt was able to tolerate TM at 6-8% grade.     No data recorded  90 Day Progress: Pt is continuing to make progress. Set new goals to get ready for discharge in 4 sessions.       60 day ADL'S goals met: Goal met- Pt is back to doing all ADL's and lifting her grand child without fatigue.    60 day Leisure goals met: Goal met- Pt is back to doing light yoga and feeling less fatigue with exercise.     No data recorded  60 Day Progression: Pt is making progress. Set new goals for next 30 days.       30 day ADL'S goals met: Patient has resumed light gardening.      30 Day Progression: Patient does not have any carpet to vacuum amymore, but she has been sweeping all of her hard wood floors.      Initial ADL's goals met: Patient has resumed all meal preparation.    Intial Work goals met: Patient has resumed driving short trips.    Initial Progression: Patient has not started light yardwork due to sternal precautions.      Exercise Prescription  Exercise Mode: Treadmill;Bike;Nustep    Frequency:  "2-3x/week    Duration: 45-60 minutes    Intensity / THR: 20-30 beats above resting heart rate    RPE 11-14  Progression / Met level: 6-7 METs    Resistive Training?: Yes      Current Exercise (mins/week): 400      Interventions  Home Exercise:  Mode: Walking/Water Aerobics/Yoga    Frequency: Daily    Duration: 30-40 minutes      Education Material : Educational videos;Provide written material;Individual education and counseling      Education Completed  Exercise Education Completed: Signs and Symptoms;Medication review;RPE;Emergency Plan;Home Exercise;Warm up/cool down;BP/HR Reponse to exercise;End point of exercise;Benefits of Exercise              Exercise Follow-up/Discharge  Follow up/Discharge: Pt. is tolerating 50-60 minutes of exercise in CR. We discussed plans for after CR and she plans to walk daily at home, attend yoga class 2 times each week with friend and attend water aerobics class 3 times each week.    NUTRITION  Nutrition Assessment: Reassessment      Nutrition Risk Factors:  Nutrition Risk Factors: Dyslipidemia  Cholesterol: 136  LDL: 63  HDL: 48  Triglycerides: 127      Nutrition Plan  Interventions  Diet Consult: Completed    Other Nutrition Intervention: Diet Class;Educational Videos;Therapist/Pt Discussion;Provide with Written Material    Initial Rate Your Plate Score: 58    Education Completed  Nutrition Education Completed: Low Saturated fat diet;Low sodium diet;Risk factor overview      Goals  Nutrition Goals (Next 30 days): Patient demonstrated understanding of cardiac nutrition, no goals identified for the next 30 days      Goals Met  Nutrition Goals Met: Patient follows a low sodium diet;Patient states following a low saturated fat diet;Completed Nutritional Risk Screen;Provided Rate your Plate Survey;Reviewed Dietitian schedule;Patient can identify their risk factors for CAD      Height, Weight, and  BMI  Weight: 159 lb (72.1 kg)  Height: 5' 4\" (1.626 m)  BMI: 27.28      Nutrition " Follow-up  Follow-up/Discharge: Pt. continues to follow heart healthy diet.         Other Risk Factors  Other Risk Factor Assessment: Reassessment      HTN Risk Factor: Hypertension      Pre Exercise BP: 106/60  Post Exercise BP: 104/68      Hypertension Plan  Goals  HTN Goals: Patient demonstrates understanding of HTN, no goals identified for the next 30 days      Goals Met  HTN Goals Met: Follow low sodium diet;Take medication as prescribed;Exercises regularly      HTN Interventions  HTN Interventions: Diet consult;Therapist/patient discussion;Provide written material;Offer educational videos;Offer educational classes      HTN Education Completed  HTN Education Completed: Medication review;Risk factor overview;Low sodium diet      Tobacco Risk Factor: NA    Risk Factor Follow-up   Follow-up/Discharge: Pt is ready for discharge in 4 sessions.      PSYCHOSOCIAL  Psychosocial Assessment: Reassessment       [facility] Q of L Summary Score: 26    Psychosocial Risk Factor: Stress      Psychosocial Plan  Interventions  Interventions: Offer educational videos and classes;Provide written material;Individual education and counseling      Education Completed  Education Completed: Relaxation/Coping Techniques;Effects of stress on body      Goals  Goals (Next 30 days): Oriented to stress management classes;Identify stressors;Practicing stress management skills      Goals Met  Goals Met: Identified Support system      Psychosocial Follow-up  Follow-up/Discharge: Pt. reports she has a great support system and veyr minimal stress.              Patient involved in Goal setting?: Yes      Signature: _____________________________________________________________    Date: __________________    Time: __________________See Doc Flowsheet

## 2021-05-30 NOTE — PROGRESS NOTES
ITP ASSESSMENT   Assessment Day: 90 Day    Session Number: 29  Precautions: Sternal precautions    Diagnosis: CAB    Risk Stratification: High    Referring Provider: Yazmin Akbar MD  EXERCISE                         Exercise Plan  Goals Next 30 days  ADL'S: Patient will hike in [place] without fatigue.     Leisure: Patient will tolerate 30 minutes on TM at 6-8% grade without fatigue.       Education Goals: All goals in this section met    Education Goals Met: Patient can state cardiac s/s and appropriate emergency response.;Has system for taking medication.;Medication review.                          Goals Met  60 day ADL'S goals met: Goal met- Pt is back to doing all ADL's and lifting her grand child without fatigue.    60 day Leisure goals met: Goal met- Pt is back to doing light yoga and feeling less fatigue with exercise.     60 Day Progression: Pt is making progress. Set new goals for next 30 days.       30 day ADL'S goals met: Patient has resumed light gardening.          Initial ADL's goals met: Patient has resumed all meal preparation.    Intial Work goals met: Patient has resumed driving short trips.    Initial Progression: Patient has not started light yardwork due to sternal precautions.      Exercise Prescription  Exercise Mode: Treadmill;Bike;Nustep    Frequency: 2-3x/week    Duration: 45-55 minutes    Intensity / THR: 20-30 beats above resting heart rate    RPE 11-14  Progression / Met level: 5.5-6.5 METs    Resistive Training?: Yes      Current Exercise (mins/week): 380      Interventions  Home Exercise:  Mode: Walking     Frequency: Daily    Duration: 30-40 minutes      Education Material : Educational videos;Provide written material;Individual education and counseling      Education Completed  Exercise Education Completed: Signs and Symptoms;Medication review;RPE;Emergency Plan;Home Exercise;Warm up/cool down;BP/HR Reponse to exercise;End point of exercise;Benefits of Exercise               "Exercise Follow-up/Discharge  Follow up/Discharge: Patient is tolerating 50-55 minutes of exercise at MET level of 3.5-5. She would like to increase her grade on the TM to have less fatigue at home when she is walking up hills. She is getting ready for her trip to [place] and also would like to experience less fatigue while hiking with family.    NUTRITION  Nutrition Assessment: Reassessment      Nutrition Risk Factors:  Nutrition Risk Factors: Dyslipidemia  Cholesterol: 136  LDL: 63  HDL: 48  Triglycerides: 127      Nutrition Plan  Interventions  Diet Consult: Completed    Other Nutrition Intervention: Diet Class;Therapist/Pt Discussion;Educational Videos;Provide with Written Material    Initial Rate Your Plate Score: 59      Education Completed  Nutrition Education Completed: Low Saturated fat diet;Low sodium diet;Risk factor overview      Goals  Nutrition Goals (Next 30 days): Patient demonstrated understanding of cardiac nutrition, no goals identified for the next 30 days      Goals Met  Nutrition Goals Met: Patient follows a low sodium diet;Patient states following a low saturated fat diet;Completed Nutritional Risk Screen;Provided Rate your Plate Survey;Reviewed Dietitian schedule;Patient can identify their risk factors for CAD      Height, Weight, and  BMI  Weight: 159 lb (72.1 kg)  Height: 5' 4\" (1.626 m)  BMI: 27.28      Nutrition Follow-up  Follow-up/Discharge: Patient continues to follow heart healthy diet and being aware of what she should and should not be eating.          Other Risk Factors  Other Risk Factor Assessment: Reassessment      HTN Risk Factor: Hypertension      Pre Exercise BP: 124/70  Post Exercise BP: 124/74      Hypertension Plan  Goals  HTN Goals: Patient demonstrates understanding of HTN, no goals identified for the next 30 days      Goals Met  HTN Goals Met: Follow low sodium diet;Take medication as prescribed;Exercises regularly      HTN Interventions  HTN Interventions: Diet " consult;Therapist/patient discussion;Provide written material;Offer educational videos;Offer educational classes      HTN Education Completed  HTN Education Completed: Medication review;Risk factor overview;Low sodium diet        Risk Factor Follow-up   Follow-up/Discharge: Continue to offer education on risk factors as needed.      PSYCHOSOCIAL  Psychosocial Assessment: Reassessment         Psychosocial Risk Factor: Stress      Psychosocial Plan  Interventions  Interventions: Offer educational videos and classes;Provide written material;Individual education and counseling      Education Completed  Education Completed: Relaxation/Coping Techniques;Effects of stress on body      Goals  Goals (Next 30 days): Oriented to stress management classes;Identify stressors;Practicing stress management skills      Goals Met  Goals Met: Identified Support system      Psychosocial Follow-up  Follow-up/Discharge: Patient reports that she has a great support system at home. No additional stress, getting ready for her trip to [place] and hoping to not get fatigued while hiking.              Patient involved in Goal setting?: Yes      Signature: _____________________________________________________________    Date: __________________    Time: __________________See Doc Flowsheet

## 2021-05-31 ENCOUNTER — RECORDS - HEALTHEAST (OUTPATIENT)
Dept: ADMINISTRATIVE | Facility: CLINIC | Age: 71
End: 2021-05-31

## 2021-05-31 VITALS — BODY MASS INDEX: 27.99 KG/M2 | WEIGHT: 168 LBS | HEIGHT: 65 IN

## 2021-05-31 VITALS — WEIGHT: 155.5 LBS | BODY MASS INDEX: 27.55 KG/M2

## 2021-05-31 VITALS — HEIGHT: 63 IN | WEIGHT: 165.7 LBS | BODY MASS INDEX: 29.36 KG/M2

## 2021-05-31 NOTE — TELEPHONE ENCOUNTER
Spoke with patient's spouse and she needs this medication and would prefer to have another provider approve. Locally the prescription would cost $400. We can not send to [place] pharmacies, but we can print this and they will  tomorrow and fax themselves. Ok to set up?    Hina Maldonado, [facility]

## 2021-05-31 NOTE — TELEPHONE ENCOUNTER
[phone] (home)      CA called and LMTCB on generic VM.  Should pt call please inform her of provider recommendations for testing and let provider know if pt would like to be set up for testing.  Heidi Mello CMA ([facility]) 10:33 AM

## 2021-05-31 NOTE — TELEPHONE ENCOUNTER
----- Message from Amber Alberto MD sent at 8/8/2019  1:29 PM CDT -----  I would like her to get PFTS ( lung funciton tests ) based on her normal echo to evualate her shortness of breath . Can you please let pt know if she would like to do that

## 2021-05-31 NOTE — TELEPHONE ENCOUNTER
Upcoming Appointment Question  When is the appointment: 10/11/2019  What is your appointment for?: 6 month follow up  Who is your appointment scheduled with?: PCP only  What is your question/concern?: She would like to know if she should be fasting for her appointment. Please call.  Okay to leave a detailed message?: Yes

## 2021-05-31 NOTE — TELEPHONE ENCOUNTER
----- Message from Ba Foley MD sent at 7/30/2019  3:59 PM CDT -----  Great news - normal stress imanging would obtain echo limited for EF and effusion, alogn with CXR PA/Lat

## 2021-05-31 NOTE — PROGRESS NOTES
RESPIRATORY CARE NOTE     Patient Name: Lisa Ray  Today's Date: 8/20/2019     Complete PFT done. Pt performed tests with good effort. Test results meet ATS criteria. Albuterol neb given.Results scanned into epic. Pt left in no distress.       Henna Myers, LRT    RESPIRATORY CARE NOTE     Patient Name: Lisa Ray  Today's Date: 8/20/2019     Problem List  Patient Active Problem List   Diagnosis     Ptosis Of Eyelid     Rosacea     Dysthymic disorder     Coronary Artery Disease     Osteoarthritis     Raynaud disease     Paresthesias/numbness     Primary osteoarthritis of right hip     DCIS (ductal carcinoma in situ)     S/P coronary artery stent placement     History of bilateral mastectomy     S/P bilateral breast implants     Stable angina (H)     S/P CABG (coronary artery bypass graft)     History of total hip replacement, right     Health maintenance examination     ARSEN (obstructive sleep apnea)                           Henna Myers LRT

## 2021-05-31 NOTE — PROGRESS NOTES
"She is a 69 y.o. y/o female patient who comes to the sleep medicine clinic for PAP therapy follow up.    [facility], DME: [facility]    She was diagnosed with moderate obstructive sleep apnea (AHI=17.1/hr) and has been using nPAP therapy.  Initial PAP settings: AutoCPAP 6 - 10 cmH2O with nasal pillows  Current PAP settings: same    Has been doing well since CABG.  Sleep has been reported as \"hard to get to sleep\" but can sleep through the night when she finally gets into bed.  Vicodin will wipe out trouble falling asleep.    Gabapentin at 300 mg caused anxiety.  200 mg didn't do much of anything.  Willing to try dopamine agonist (Mirapex) and we reviewed side effects.    Her symptoms are improved since she started using CPAP. She feels more refreshed when she wakes up.    She denies any PAP intolerance. She is using the device nightly and tolerates the pressure well now.  Was occasionally having issues with removing mask in sleep; has not been doing this anymore    30-Days Efficacy and Compliance Data  Residual AHI: 0.8/hr  Leak: 33.7 LPM (95%)  Days used > 4 hours: 30/30  Average usage per night: 7.2 hour  95th percentile P: 10 cmH2O  Mask Tolerance: Fine (leak noted with movement)  Skin irritation: None    Physical Exam:  /58 (Patient Site: Right Arm, Patient Position: Sitting, Cuff Size: Adult Regular)   Pulse 70   Ht 5' 3.5\" (1.613 m)   Wt 161 lb (73 kg)   SpO2 96%   BMI 28.07 kg/m    General appearance: No apparent distress, well groomed.  Head: Normocephalic, atraumatic.  Musculoskeletal: No edema noted.  No clubbing or cyanosis.  Skin: Warm, dry, intact.  Neurologic: Alert and oriented to person, place and time   Gait is normal.  Psychiatric: Affect pleasant.  Mood good.     Labs/Studies:  I reviewed the efficacy and compliance report from her device. Data summarized on the HPI.     Assessment and Plan:  1. ARSEN (obstructive sleep apnea)  Moderate Obstructive Sleep Apnea.  Residual AHI is " great  Compliance is good  Patient is benefiting from using PAP therapy.  Continue with P = same.  We counseled the patient on the importance of using her PAP device every night and the risks of not treating sleep apnea.     2. Periodic limb movement disorder  Will try Mirapex instead as per HPI.  - pramipexole (MIRAPEX) 0.25 MG tablet; Take 1 tablet (0.25 mg total) by mouth at bedtime.  Dispense: 30 tablet; Refill: 5     Patient verbalized understanding of these issues, agrees with the plan and all questions were answered today. Patient was given an opportuntity to voice any other symptoms or concerns not listed above. Patient did not have any other symptoms or concerns.      Patient told to return in 12 months.     González VICENTE Board Certified in Internal Medicine and Sleep Medicine  [facility].

## 2021-05-31 NOTE — TELEPHONE ENCOUNTER
Please call patient when prescription is ready. Set up to print. Thank you.    Hina Maldonado, CMA

## 2021-05-31 NOTE — TELEPHONE ENCOUNTER
Talked to patient . She is shortness of breath with an abnormal PFTS . I do feel it is consistent with copd due to her remote h/o smoking but she is concerned about autoimmune disease . Will try spiriva /incruse and then if no Improment she will get CT chest high resolution . Pt agreed with plan

## 2021-05-31 NOTE — TELEPHONE ENCOUNTER
Upon chart review pt recently had lipid testing, thus should not need to fast at 6 mo FU.  CA called and informed pt of this. Heidi Mello CMA ([facility]) 2:36 PM

## 2021-05-31 NOTE — TELEPHONE ENCOUNTER
Pat was contacted with results /recommendations regarding recent pharmacological stress test, Limited Echo and Chest Xray Pa/Lat orders placed and message to scheduling to contact pt and arrange. sk/RN

## 2021-05-31 NOTE — TELEPHONE ENCOUNTER
Medication Question or Clarification  Who is calling: Patient  What medication are you calling about?   (include dose and sig)   umeclidinium (INCRUSE ELLIPTA) 62.5 mcg/actuation DsDv inhaler 30 each 3 8/23/2019     Sig - Route: Inhale 1 puff daily. - Inhalation    Sent to pharmacy as: umeclidinium (INCRUSE ELLIPTA) 62.5 mcg/actuation DsDv inhaler        Who prescribed the medication?: Amber Alberto MD  What is your question/concern?: Patient stated my insurance does not cover this medication and I can get it from [facility] for a much lower cost.  Please fax this Rx for 90 days to [phone]. Please also let them know this is a high priority fill and to call when received.   Pharmacy: [facility] - License #[id number] Tel: [phone] [address] [place] [address] V5X [id number] Pharmacy Manager: Severo Lewis This pharmacy is duly licensed in the province of [place], [place] by the [employer] [place]. Fax [phone]  Okay to leave a detailed message?: Yes Please return call to patient with plan of care  Site CMT - Please call the pharmacy to obtain any additional needed information.

## 2021-05-31 NOTE — TELEPHONE ENCOUNTER
Patient Returning Call  Reason for call:  The patient is returning the call.  Information relayed to patient:  Below message has been relayed to the patient. The patient agrees with plan.  Patient has additional questions:  No  If YES, what are your questions/concerns:  NA  Okay to leave a detailed message?: No

## 2021-06-01 ENCOUNTER — RECORDS - HEALTHEAST (OUTPATIENT)
Dept: ADMINISTRATIVE | Facility: CLINIC | Age: 71
End: 2021-06-01

## 2021-06-01 VITALS — HEIGHT: 63 IN | BODY MASS INDEX: 28.25 KG/M2 | WEIGHT: 159.44 LBS

## 2021-06-02 ENCOUNTER — RECORDS - HEALTHEAST (OUTPATIENT)
Dept: ADMINISTRATIVE | Facility: CLINIC | Age: 71
End: 2021-06-02

## 2021-06-02 ENCOUNTER — COMMUNICATION - HEALTHEAST (OUTPATIENT)
Dept: INTERNAL MEDICINE | Facility: CLINIC | Age: 71
End: 2021-06-02

## 2021-06-02 VITALS — BODY MASS INDEX: 27.55 KG/M2 | WEIGHT: 158 LBS

## 2021-06-02 VITALS — HEIGHT: 64 IN | BODY MASS INDEX: 27.4 KG/M2 | WEIGHT: 160.5 LBS

## 2021-06-02 VITALS — HEIGHT: 64 IN | BODY MASS INDEX: 27.13 KG/M2 | WEIGHT: 158.9 LBS

## 2021-06-02 VITALS — WEIGHT: 158 LBS | BODY MASS INDEX: 27.12 KG/M2

## 2021-06-02 VITALS — BODY MASS INDEX: 27.12 KG/M2 | WEIGHT: 158 LBS

## 2021-06-02 VITALS — WEIGHT: 158.4 LBS | BODY MASS INDEX: 27.04 KG/M2 | HEIGHT: 64 IN

## 2021-06-02 VITALS — BODY MASS INDEX: 26.75 KG/M2 | WEIGHT: 156.7 LBS | HEIGHT: 64 IN

## 2021-06-02 VITALS — BODY MASS INDEX: 27.15 KG/M2 | WEIGHT: 158.2 LBS

## 2021-06-02 VITALS — BODY MASS INDEX: 26.98 KG/M2 | HEIGHT: 64 IN | WEIGHT: 158 LBS

## 2021-06-02 VITALS — WEIGHT: 162.44 LBS | BODY MASS INDEX: 27.73 KG/M2 | HEIGHT: 64 IN

## 2021-06-02 VITALS — HEIGHT: 64 IN | BODY MASS INDEX: 27.2 KG/M2 | WEIGHT: 159.3 LBS

## 2021-06-02 VITALS — WEIGHT: 153.7 LBS | BODY MASS INDEX: 26.24 KG/M2 | HEIGHT: 64 IN

## 2021-06-02 VITALS — BODY MASS INDEX: 27.31 KG/M2 | WEIGHT: 160 LBS | HEIGHT: 64 IN

## 2021-06-02 VITALS — BODY MASS INDEX: 27.19 KG/M2 | WEIGHT: 158.4 LBS

## 2021-06-02 DIAGNOSIS — F43.21 ADJUSTMENT DISORDER WITH DEPRESSED MOOD: ICD-10-CM

## 2021-06-02 NOTE — PROGRESS NOTES
Office Visit - Follow Up   Lisa Ray   69 y.o. female    Date of Visit: 10/11/2019         Assessment and Plan   1. Elevated blood sugar  Needs follow-up with a repeat glycosylated hemoglobin A1c  - Glycosylated Hemoglobin A1c    2. Chronic fatigue  She said the sleep study confirmed sleep apnea and she does feel better after the CPAP but still has some fatigue.    3. Multiple joint pain  Has pain in her knee and hand joints with significant morning stiffness of about an hour but no joint swelling.  She did have an episode of iritis about 3 years ago and had a work-up by a rheumatologist at that time.  We will repeat some screening tests today the exam was nonfocal for synovitis however there is typical osteoarthritic changes in her hands.  Diclofenac topical gel was suggested because she gets heartburn with oral NSAIDs  - Thyroid Stimulating Hormone (TSH)  - HM2(CBC w/o Differential)  - Rheumatoid Factor Quant  - CCP Antibodies  - diclofenac sodium (VOLTAREN) 1 % Gel; 4 g four times a day  Dispense: 1 Tube; Refill: 3    4. Disorder of muscle, unspecified   The only thing that really helps her is the Robaxin.  Even though that is not covered by her insurance she takes it once to 2 times a day.  She said she would rather take this than tramadol or narcotics so we will continue to refill  - Thyroid Stimulating Hormone (TSH)    5. Adjustment disorder with depressed mood  I do think she is exhibiting signs of depression she admits that herself.  She feels the year has been very stressful and although she is better she wants to feel a little bit more energy to do things.  We will try Zoloft and and she can let me know how she feels.  - sertraline (ZOLOFT) 50 MG tablet; Take 1 tablet (50 mg total) by mouth daily.  Dispense: 30 tablet; Refill: 2    6. S/P CABG (coronary artery bypass graft)    - rosuvastatin (CRESTOR) 20 MG tablet; Take 1 tablet (20 mg total) by mouth at bedtime.  Dispense: 90 tablet; Refill:  "5  - aspirin 81 mg chewable tablet; Chew 2 tablets (162 mg total) daily.; Refill: 0  - methocarbamol (ROBAXIN-750) 750 MG tablet; Take 1 tablet (750 mg total) by mouth at bedtime.  Dispense: 90 tablet; Refill: 1            Return for Annual physical.  She will return for her next physical in February     History of Present Illness   This 69 y.o. old   Chief Complaint   Patient presents with     Fatigue     Shortness of Breath       she also has low grade slightly pleuritic central chest pain which I think is completely unrelated to her implants but more may relate to her postsurgical site she does use Aspercreme for that and finds it helpful.  Review of Systems: A comprehensive review of systems was negative except as noted.     Medications, Allergies and Problem List   Reviewed, reconciled and updated  Patient Active Problem List   Diagnosis     Ptosis Of Eyelid     Rosacea     Dysthymic disorder     Coronary Artery Disease     Osteoarthritis     Raynaud disease     Paresthesias/numbness     Primary osteoarthritis of right hip     DCIS (ductal carcinoma in situ)     S/P coronary artery stent placement     History of bilateral mastectomy     S/P bilateral breast implants     Stable angina (H)     S/P CABG (coronary artery bypass graft)     History of total hip replacement, right     Health maintenance examination     ARSEN (obstructive sleep apnea)        Physical Exam   General Appearance:       /64 (Patient Site: Left Arm, Patient Position: Sitting)   Pulse (!) 56   Ht 5' 3.5\" (1.613 m)   Wt 164 lb (74.4 kg)   SpO2 96%   BMI 28.60 kg/m       General appearance - alert, well appearing, and in no distress  Mental status - alert, oriented to person, place, and time  Neck - supple, no significant adenopathy  Lymphatics - no palpable lymphadenopathy, no hepatosplenomegaly  Chest - clear to auscultation, no wheezes, rales or rhonchi, symmetric air entry  Breasts she has implants with well-healed surgical " incisions no evidence of a seroma or palpable lump or overlying skin changes axillary are negative for lymph nodes he does have a large chest wall incision that is longitudinal and there is some tenderness there  Heart - normal rate, regular rhythm, normal S1, S2, no murmurs, rubs, clicks or gallops  Musculoskeletal - no joint tenderness, deformity or swelling sausagelike finger deformities of the fingers with [place]ber[place] nodes  Extremities - peripheral pulses normal, no pedal edema, no clubbing or cyanosis  Skin - normal coloration and turgor, no rashes, no suspicious skin lesions noted                                                                                       Additional Information   Current Outpatient Medications   Medication Sig Dispense Refill     acetaminophen (TYLENOL) 500 MG tablet Take 500 mg by mouth every 6 (six) hours as needed for pain (arthritis).       aspirin 81 mg chewable tablet Chew 2 tablets (162 mg total) daily.  0     b complex vitamins tablet Take 1 tablet by mouth daily.       calcium, as carbonate, (TUMS) 200 mg calcium (500 mg) chewable tablet Chew 2 tablets daily.       cholecalciferol, vitamin D3, 1,000 unit tablet Take 1,000 Units by mouth daily.       coenzyme Q10 400 mg cap Take 400 mg by mouth daily.       doxylamine (UNISOM) 25 mg tablet Take 25 mg by mouth at bedtime as needed for sleep.       magnesium oxide 500 mg Tab Take 500 mg by mouth at bedtime.              melatonin 1 mg Tab tablet Take 1 mg by mouth at bedtime.              methocarbamol (ROBAXIN-750) 750 MG tablet Take 1 tablet (750 mg total) by mouth at bedtime. 90 tablet 1     metoprolol tartrate (LOPRESSOR) 50 MG tablet TAKE 1 TABLET(50 MG) BY MOUTH TWICE DAILY 180 tablet 1     omeprazole (PRILOSEC) 20 MG capsule TAKE 1 CAPSULE BY MOUTH TWICE DAILY 180 capsule 3     peg 400-propylene glycol PF (SYSTANE) 0.4-0.3 % Dpet Administer 1 drop to both eyes 2 (two) times a day as needed.       pramipexole (MIRAPEX) 0.25  MG tablet TAKE 1 TABLET(0.25 MG) BY MOUTH AT BEDTIME 90 tablet 5     rosuvastatin (CRESTOR) 20 MG tablet Take 1 tablet (20 mg total) by mouth at bedtime. 90 tablet 5     triamcinolone (NASACORT) 55 mcg nasal inhaler Apply 2 sprays into each nostril daily as needed.       umeclidinium (INCRUSE ELLIPTA) 62.5 mcg/actuation DsDv inhaler Inhale 1 puff daily. 30 each 12     zolpidem (AMBIEN) 10 mg tablet Take 10 mg by mouth at bedtime as needed for sleep.       diclofenac sodium (VOLTAREN) 1 % Gel 4 g four times a day 1 Tube 3     docusate sodium (COLACE) 100 MG capsule Take 1 capsule (100 mg total) by mouth 2 (two) times a day as needed for constipation.  0     sertraline (ZOLOFT) 50 MG tablet Take 1 tablet (50 mg total) by mouth daily. 30 tablet 2     No current facility-administered medications for this visit.      Allergies   Allergen Reactions     Latex      Added based on information entered during log entry, please review and add reactions, type, and severity as needed     Latex Rash and Other (See Comments)     Patient states skin sensitivity     Nsaids (Non-Steroidal Anti-Inflammatory Drug)      Other reaction(s): GI Bleeding, GI Upset  Sensitivity.  Ulceration w/ Celebrex     Social History     Patient does not qualify to have social determinant information on file (likely too young).   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 3 grown sons Aries in - , Josse SAMUELS, -microbrewer/[employer], ; Gregory Lu- paramedic [employer]...... Non smoker rare ETOH.       reports that she quit smoking about 29 years ago. She has a 20.00 pack-year smoking history. She has never used smokeless tobacco. She reports current alcohol use of about 3.3 standard drinks of alcohol per week. She reports that she does not use drugs.   Past Medical History:   Diagnosis Date     Anemia 2012    microcytic from Celebrex. GI w/u neg     Back pain      Benign Adenomatous  Polyp Of The Large Intestine     Created by Conversion      CAD (coronary artery disease) 2008    RCA- stent     Cancer (H)     breast cancer     DJD (degenerative joint disease)      Dysthymia      Fatigue     recurrent/variable - no serious pathology     Fibromyalgia      GERD (gastroesophageal reflux disease)      Hypertension      Iritis     past Hx.-left eye     ARSEN (obstructive sleep apnea) 6/5/2019     Raynaud's disease      Rosacea      Shortness of breath     with exertion     Shoulder tendonitis     right     Ulcer of intestine     small bowel- 10 years ago     UTI (urinary tract infection)     Jan. 2019           Time: total time spent with the patient was 25 minutes of which >50% was spent in counseling and coordination of care     Amber Alberto MD

## 2021-06-02 NOTE — TELEPHONE ENCOUNTER
Refill Approved    Rx renewed per Medication Renewal Policy. Medication was last renewed on 10/11/2019 for 30/2 - first time ordered  Last OV 10/11/2019  Mraicruz Dee, [facility] Connection Triage/Med Refill 10/13/2019     Requested Prescriptions   Pending Prescriptions Disp Refills     sertraline (ZOLOFT) 50 MG tablet [Pharmacy Med Name: SERTRALINE 50MG TABLETS] 90 tablet 2     Sig: TAKE 1 TABLET(50 MG) BY MOUTH DAILY       SSRI Refill Protocol  Passed - 10/11/2019 10:43 AM        Passed - PCP or prescribing provider visit in last year     Last office visit with prescriber/PCP: 10/11/2019 Amber Alberto MD OR same dept: 10/11/2019 Amber Alberto MD OR same specialty: 10/11/2019 Amber Alberto MD  Last physical: Visit date not found Last MTM visit: Visit date not found   Next visit within 3 mo: Visit date not found  Next physical within 3 mo: Visit date not found  Prescriber OR PCP: Amber Alberto MD  Last diagnosis associated with med order: 1. Adjustment disorder with depressed mood  - sertraline (ZOLOFT) 50 MG tablet [Pharmacy Med Name: SERTRALINE 50MG TABLETS]; TAKE 1 TABLET(50 MG) BY MOUTH DAILY  Dispense: 90 tablet; Refill: 2    If protocol passes may refill for 12 months if within 3 months of last provider visit (or a total of 15 months).

## 2021-06-03 VITALS — WEIGHT: 158 LBS | BODY MASS INDEX: 27.55 KG/M2

## 2021-06-03 VITALS — WEIGHT: 157 LBS | BODY MASS INDEX: 27.38 KG/M2

## 2021-06-03 VITALS — BODY MASS INDEX: 27.34 KG/M2 | WEIGHT: 156.8 LBS

## 2021-06-03 VITALS — BODY MASS INDEX: 27.72 KG/M2 | WEIGHT: 159 LBS

## 2021-06-03 VITALS — WEIGHT: 159.4 LBS | BODY MASS INDEX: 27.79 KG/M2

## 2021-06-03 VITALS — WEIGHT: 160 LBS | BODY MASS INDEX: 27.9 KG/M2

## 2021-06-03 VITALS — WEIGHT: 158.6 LBS | BODY MASS INDEX: 27.65 KG/M2

## 2021-06-03 VITALS — BODY MASS INDEX: 27.49 KG/M2 | WEIGHT: 161 LBS | HEIGHT: 64 IN

## 2021-06-03 VITALS — BODY MASS INDEX: 27.38 KG/M2 | WEIGHT: 157 LBS

## 2021-06-03 VITALS — HEIGHT: 64 IN | BODY MASS INDEX: 26.82 KG/M2 | WEIGHT: 157.1 LBS

## 2021-06-03 VITALS — BODY MASS INDEX: 27.9 KG/M2 | WEIGHT: 160 LBS

## 2021-06-03 VITALS — BODY MASS INDEX: 27.55 KG/M2 | WEIGHT: 158 LBS

## 2021-06-03 VITALS
HEIGHT: 64 IN | DIASTOLIC BLOOD PRESSURE: 64 MMHG | BODY MASS INDEX: 28 KG/M2 | HEART RATE: 56 BPM | SYSTOLIC BLOOD PRESSURE: 124 MMHG | WEIGHT: 164 LBS | OXYGEN SATURATION: 96 %

## 2021-06-03 VITALS — WEIGHT: 159 LBS | BODY MASS INDEX: 27.72 KG/M2

## 2021-06-03 VITALS — BODY MASS INDEX: 26.98 KG/M2 | HEIGHT: 64 IN | WEIGHT: 158 LBS

## 2021-06-03 VITALS — WEIGHT: 159.2 LBS | BODY MASS INDEX: 27.76 KG/M2

## 2021-06-03 VITALS — WEIGHT: 157.6 LBS | BODY MASS INDEX: 27.48 KG/M2

## 2021-06-03 VITALS — WEIGHT: 159 LBS | HEIGHT: 64 IN | BODY MASS INDEX: 27.14 KG/M2

## 2021-06-03 VITALS — WEIGHT: 156 LBS | BODY MASS INDEX: 27.2 KG/M2

## 2021-06-03 VITALS — BODY MASS INDEX: 27.48 KG/M2 | WEIGHT: 157.6 LBS

## 2021-06-03 VITALS — WEIGHT: 158.4 LBS | BODY MASS INDEX: 27.62 KG/M2

## 2021-06-04 VITALS
DIASTOLIC BLOOD PRESSURE: 76 MMHG | WEIGHT: 165 LBS | BODY MASS INDEX: 29 KG/M2 | SYSTOLIC BLOOD PRESSURE: 148 MMHG | HEART RATE: 60 BPM

## 2021-06-04 VITALS
OXYGEN SATURATION: 95 % | HEART RATE: 56 BPM | BODY MASS INDEX: 30.3 KG/M2 | HEIGHT: 63 IN | SYSTOLIC BLOOD PRESSURE: 174 MMHG | DIASTOLIC BLOOD PRESSURE: 92 MMHG | WEIGHT: 171 LBS

## 2021-06-04 VITALS
WEIGHT: 172 LBS | DIASTOLIC BLOOD PRESSURE: 84 MMHG | SYSTOLIC BLOOD PRESSURE: 158 MMHG | OXYGEN SATURATION: 95 % | BODY MASS INDEX: 30.48 KG/M2 | HEIGHT: 63 IN | HEART RATE: 66 BPM

## 2021-06-04 VITALS
RESPIRATION RATE: 14 BRPM | WEIGHT: 172 LBS | HEIGHT: 64 IN | SYSTOLIC BLOOD PRESSURE: 122 MMHG | BODY MASS INDEX: 29.37 KG/M2 | HEART RATE: 60 BPM | TEMPERATURE: 97.2 F | DIASTOLIC BLOOD PRESSURE: 80 MMHG

## 2021-06-05 VITALS
DIASTOLIC BLOOD PRESSURE: 72 MMHG | HEIGHT: 64 IN | SYSTOLIC BLOOD PRESSURE: 136 MMHG | OXYGEN SATURATION: 96 % | BODY MASS INDEX: 29.94 KG/M2 | HEART RATE: 60 BPM | WEIGHT: 175.38 LBS

## 2021-06-06 NOTE — PROGRESS NOTES
Assessment and Plan:   Very pleasant pt for annual wellness exam . Concerned about weight gain and hair loss but otherwise well .     1. Acquired lymphedema  Going to PMR clinic for lymphedema treatment .and doing  Better   2. Hair loss  Most likely inherited male pattern baldness  Combined with telogen effluvium due to recent stressors . Will check labs   - Iron and Transferrin Iron Binding Capacity  - HM1(CBC and Differential)  - Thyroid Stimulating Hormone (TSH)  - HM1 (CBC with Diff)  - Testosterone, Total and Free  - Dehydroepiandrosterone Sulfate, Serum (DHEAS)    3. Atherosclerosis of coronary artery of native heart without angina pectoris, unspecified vessel or lesion type  Asymptomatic  Stress test negative .   - Lipid [place] FASTING    4. ARSEN (obstructive sleep apnea)  Using cpap     5. Dysthymic disorder  Is feeling clinically depressed . Is much better with the sertraline . Low dose . Will continue this dose     6. Health maintenance examination  Up to date    will do dexa in 2 years .     7. Primary osteoarthritis of right hip      8. Acute pain of right shoulder  Mild rotator cuff tendinitis . Is getting PT with her lymhademea rx . Can see ortho if no improvements . Is using diclofenac cream topically     9. Redness and swelling of hand  Unclear cause . Is better ?     10. Prediabetes  Has a history of a slightly high white blood sugar will recheck today.  - Hepatitis C Antibody (Anti-HCV)  - Glycosylated Hemoglobin A1c  - Comprehensive Metabolic Panel      11. Benign essential hypertension  suboptimal . Is adequately beta blocked so dont want to increase the atenolol . Will have her check home readings and retrun in one month       The patient's current medical problems were reviewed.    I have had an Advance Directives discussion with the patient.  The following health maintenance schedule was reviewed with the patient and provided in printed form in the after visit summary:   Health Maintenance    Topic Date Due     HEPATITIS C SCREENING  1950     DXA SCAN  08/15/2019     MEDICARE ANNUAL WELLNESS VISIT  02/12/2021     FALL RISK ASSESSMENT  02/12/2021     LIPID  08/06/2024     ADVANCE CARE PLANNING  11/21/2024     COLONOSCOPY  10/25/2027     TD 18+ HE  02/19/2029     DEPRESSION ACTION PLAN  Completed     PNEUMOCOCCAL IMMUNIZATION 65+ LOW/MEDIUM RISK  Completed     INFLUENZA VACCINE RULE BASED  Completed     ZOSTER VACCINES  Completed     MAMMOGRAM  Discontinued        Subjective:   Chief Complaint: Lisa Ray is an 69 y.o. female here for an Annual Wellness visit.   HPI: Very pleasant patient who is here for her annual physical.  She has a history of breast DCIS both breasts and has had  bilateral mastectomies with lymph lymph node dissection on the right side.  She has some lymphedema for which she is going to the lymphedema clinic.  She also has hypertension which is always been controlled.  She has a history of CABG.  She has mild chronic pain but she does not really take any medications for it because she is intolerant to NSAIDs and does not really want to take narcotics.  She is up-to-date in her healthcare maintenance.  She is sleeping well IADLs and ADLs are doing well.  She has    Review of Systems:  Please see above.  The rest of the review of systems are negative for all systems.    Patient Care Team:  Amber Alberto MD as PCP - General (Internal Medicine)  Olayinka Torres MD as Physician (Ophthalmology)  Tera Yeung MD as Physician (Orthopedic Surgery)  Obed Khan MD (Cardiology)  Huey Bender MD (Dermatology)  Amber Alberto MD as Assigned PCP     Patient Active Problem List   Diagnosis     Ptosis Of Eyelid     Rosacea     Dysthymic disorder     Coronary Artery Disease     Osteoarthritis     Raynaud disease     Paresthesias/numbness     Primary osteoarthritis of right hip     DCIS (ductal carcinoma in situ)     S/P coronary artery stent placement      History of bilateral mastectomy     S/P bilateral breast implants     Stable angina (H)     S/P CABG (coronary artery bypass graft)     History of total hip replacement, right     Health maintenance examination     ARSEN (obstructive sleep apnea)     Acquired lymphedema     Hair loss     Right shoulder pain     Past Medical History:   Diagnosis Date     Anemia 2012    microcytic from Celebrex. GI w/u neg     Back pain      Benign Adenomatous Polyp Of The Large Intestine     Created by Conversion      CAD (coronary artery disease) 2008    RCA- stent     Cancer (H)     breast cancer     DJD (degenerative joint disease)      Dysthymia      Fatigue     recurrent/variable - no serious pathology     Fibromyalgia      GERD (gastroesophageal reflux disease)      Hypertension      Iritis     past Hx.-left eye     ARSEN (obstructive sleep apnea) 6/5/2019     Raynaud's disease      Rosacea      Shortness of breath     with exertion     Shoulder tendonitis     right     Ulcer of intestine     small bowel- 10 years ago     UTI (urinary tract infection)     Jan. 2019      Past Surgical History:   Procedure Laterality Date     BELPHAROPTOSIS REPAIR Bilateral 2013    Dr. Eder Bingham- 2013     BREAST SURGERY Bilateral 11/2017    bilateral mastectomy 2017- 11/2017     BREAST SURGERY  2018    implants and revsion 2     CORONARY STENT PLACEMENT Right 2008    Dr. Schwab     CV CORONARY ANGIOGRAM N/A 3/26/2019    Procedure: Coronary Angiogram;  Surgeon: Ba Foley MD;  Location: [facility];  Service: Cardiology     HYSTERECTOMY       OOPHORECTOMY       VA APPENDECTOMY      Description: Appendectomy;  Recorded: 01/13/2009;     TOTAL HIP ARTHROPLASTY Right 12/7/2015    Procedure: RIGHT TOTAL HIP ARTHROPLASTY;  Surgeon: Tera Yeung MD;  Location[facility] [place];  Service:       Family History   Problem Relation Age of Onset     BRCA 1/2 Sister      BRCA 1/2 Paternal Grandfather      BRCA 1/2 Cousin       Asthma Cousin      Alcoholism Father          GI bleed age 67     Colon cancer Other      Breast cancer Sister         metastatic age 49, remission with Rx     Coronary artery disease Brother         CABG     Breast cancer Paternal Grandmother      Breast cancer Cousin      Colon cancer Mother       Social History     Socioeconomic History     Marital status:      Spouse name: Johny     Number of children: 3     Years of education: Not on file     Highest education level: Not on file   Occupational History     Occupation: RN- retired ~     Employer: [employer]     Comment: retired/[employer]    Social Needs     Financial resource strain: Not on file     Food insecurity:     Worry: Not on file     Inability: Not on file     Transportation needs:     Medical: Not on file     Non-medical: Not on file   Tobacco Use     Smoking status: Former Smoker     Packs/day: 1.00     Years: 20.00     Pack years: 20.00     Last attempt to quit: 3/27/1990     Years since quittin.9     Smokeless tobacco: Never Used   Substance and Sexual Activity     Alcohol use: Yes     Alcohol/week: 3.3 standard drinks     Types: 4 Standard drinks or equivalent per week     Comment: 1-2 glasses of wine/week      Drug use: No     Sexual activity: Not on file   Lifestyle     Physical activity:     Days per week: Not on file     Minutes per session: Not on file     Stress: Not on file   Relationships     Social connections:     Talks on phone: Not on file     Gets together: Not on file     Attends Baptism service: Not on file     Active member of club or organization: Not on file     Attends meetings of clubs or organizations: Not on file     Relationship status: Not on file     Intimate partner violence:     Fear of current or ex partner: Not on file     Emotionally abused: Not on file     Physically abused: Not on file     Forced sexual activity: Not on file   Other Topics Concern     Not on file   Social History Narrative     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 Non smoker rare ETOH.       Current Outpatient Medications   Medication Sig Dispense Refill     acetaminophen (TYLENOL) 500 MG tablet Take 500 mg by mouth every 6 (six) hours as needed for pain (arthritis).       aspirin 81 mg chewable tablet Chew 2 tablets (162 mg total) daily.  0     b complex vitamins tablet Take 1 tablet by mouth daily.       calcium, as carbonate, (TUMS) 200 mg calcium (500 mg) chewable tablet Chew 2 tablets daily.       cholecalciferol, vitamin D3, 1,000 unit tablet Take 1,000 Units by mouth daily.       coenzyme Q10 400 mg cap Take 400 mg by mouth daily.       diclofenac sodium (VOLTAREN) 1 % Gel 4 g four times a day 1 Tube 3     docusate sodium (COLACE) 100 MG capsule Take 1 capsule (100 mg total) by mouth 2 (two) times a day as needed for constipation.  0     doxylamine (UNISOM) 25 mg tablet Take 25 mg by mouth at bedtime as needed for sleep.       magnesium oxide 500 mg Tab Take 500 mg by mouth at bedtime.              melatonin 1 mg Tab tablet Take 1 mg by mouth at bedtime.              methocarbamol (ROBAXIN-750) 750 MG tablet Take 1 tablet (750 mg total) by mouth at bedtime. 90 tablet 1     metoprolol tartrate (LOPRESSOR) 50 MG tablet TAKE 1 TABLET(50 MG) BY MOUTH TWICE DAILY 180 tablet 1     omeprazole (PRILOSEC) 20 MG capsule TAKE 1 CAPSULE BY MOUTH TWICE DAILY 180 capsule 3     peg 400-propylene glycol PF (SYSTANE) 0.4-0.3 % Dpet Administer 1 drop to both eyes 2 (two) times a day as needed.       pramipexole (MIRAPEX) 0.25 MG tablet TAKE 1 TABLET(0.25 MG) BY MOUTH AT BEDTIME 90 tablet 5     rosuvastatin (CRESTOR) 20 MG tablet Take 1 tablet (20 mg total) by mouth at bedtime. 90 tablet 5     sertraline (ZOLOFT) 50 MG tablet Take 1 tablet (50 mg total) by mouth daily. You will need to keep your  "2/12/20 visit for further refills 90 tablet 2     triamcinolone (NASACORT) 55 mcg nasal inhaler Apply 2 sprays into each nostril daily as needed.       umeclidinium (INCRUSE ELLIPTA) 62.5 mcg/actuation DsDv inhaler Inhale 1 puff daily. 30 each 12     zolpidem (AMBIEN) 10 mg tablet Take 10 mg by mouth at bedtime as needed for sleep.       No current facility-administered medications for this visit.       Objective:   Vital Signs:   Visit Vitals  /84 (Patient Site: Left Arm, Patient Position: Sitting, Cuff Size: Adult Large)   Pulse 66   Ht 5' 3.25\" (1.607 m)   Wt 172 lb (78 kg)   SpO2 95%   BMI 30.23 kg/m         VisionScreening:  No exam data present     PHYSICAL EXAM  Physical Examination: General appearance - alert, well appearing, and in no distress  Mental status - alert, oriented to person, place, and time  Mouth - mucous membranes moist, pharynx normal without lesions  Neck - supple, no significant adenopathy  Lymphatics - no palpable lymphadenopathy, no hepatosplenomegaly  Chest - clear to auscultation, no wheezes, rales or rhonchi, symmetric air entry  Heart - normal rate, regular rhythm, normal S1, S2, no murmurs, rubs, clicks or gallops  Abdomen - soft, nontender, nondistended, no masses or organomegaly  Neurological - alert, oriented, normal speech, no focal findings or movement disorder noted  Musculoskeletal - no joint tenderness, deformity or swelling  Extremities - peripheral pulses normal, no pedal edema, no clubbing or cyanosis  Skin - normal coloration and turgor, no rashes, no suspicious skin lesions noted  Chest scars look normal axillas are free of lymph nodes.    Assessment Results 2/12/2020   Activities of Daily Living No help needed   Instrumental Activities of Daily Living No help needed   Get Up and Go Score Less than 12 seconds   Mini Cog Total Score 5   Some recent data might be hidden     A Mini-Cog score of 0-2 suggests the possibility of dementia, score of 3-5 suggests no " dementia    Identified Health Risks:     The patient was provided with written information regarding signs of hearing loss.  Information on urinary incontinence and treatment options given to patient.  The patient's PHQ-9 score is consistent with mild depression.

## 2021-06-06 NOTE — PROGRESS NOTES
Office Visit - Follow Up   Lsia Ray   69 y.o. female    Date of Visit: 3/12/2020         Assessment and Plan   1. Essential hypertension, benign  suboptimal control . Even if pain is a factor , two readings and several home readings are high . Will add lisinopril target bp is 130/80 or less . Could increment to 10 mg if necessary    - lisinopriL (PRINIVIL,ZESTRIL) 5 MG tablet; Take 1 tablet (5 mg total) by mouth daily.  Dispense: 90 tablet; Refill: 3  She will send me home readings   2. Other chronic pain  Cannot take oral NSAIDs due to gastritis . MRI shows moderate OA . And rotator cuff tendinopathy. Will do a trial of narcotics   - acetaminophen-codeine (TYLENOL #3) 300-30 mg per tablet; Take 1-2 tablets by mouth every 4 (four) hours as needed for pain.  Dispense: 90 tablet; Refill: 0    3. SOB (shortness of breath)  Has been extensively worked up stress test , echo 7/2019  Normal PFTS normal . Reassured her . To gradually increment excercises             Three month follow up      History of Present Illness   This 69 y.o. old   Chief Complaint   Patient presents with     Hypertension     Recehck BP - Go over MRI right shoulder    noticing bp readings are higher at home . ahs no symptoms of chest pain , ankle swelling , headache . Has painful shoulders and saw her lymphedema doctor who ordered MRI shoulder and it shows rotator cuff tendinopathy , without tear and moderate OA . She is limited by ROM . He recommended ortho referral .      Review of Systems: A comprehensive review of systems was negative except as noted.     Medications, Allergies and Problem List   Reviewed, reconciled and updated  Patient Active Problem List   Diagnosis     Ptosis Of Eyelid     Rosacea     Dysthymic disorder     Coronary Artery Disease     Osteoarthritis     Raynaud disease     Paresthesias/numbness     Primary osteoarthritis of right hip     DCIS (ductal carcinoma in situ)     S/P coronary artery stent placement      "History of bilateral mastectomy     S/P bilateral breast implants     Stable angina (H)     S/P CABG (coronary artery bypass graft)     History of total hip replacement, right     Health maintenance examination     ARSEN (obstructive sleep apnea)     Acquired lymphedema     Hair loss     Right shoulder pain        Physical Exam   General Appearance:       BP (!) 174/92 (Patient Site: Left Arm, Patient Position: Sitting, Cuff Size: Adult Large)   Pulse (!) 56   Ht 5' 3.25\" (1.607 m)   Wt 171 lb (77.6 kg)   SpO2 95%   BMI 30.05 kg/m      General appearance - alert, well appearing, and in no distress  Mental status - alert, oriented to person, place, and time  Neck - supple, no significant adenopathy  Lymphatics - no palpable lymphadenopathy, no hepatosplenomegaly  Chest - clear to auscultation, no wheezes, rales or rhonchi, symmetric air entry  Heart - normal rate, regular rhythm, normal S1, S2, no murmurs, rubs, clicks or gallops                                                                                         Additional Information   Current Outpatient Medications   Medication Sig Dispense Refill     acetaminophen (TYLENOL) 500 MG tablet Take 500 mg by mouth every 6 (six) hours as needed for pain (arthritis).       aspirin 81 mg chewable tablet Chew 2 tablets (162 mg total) daily.  0     b complex vitamins tablet Take 1 tablet by mouth daily.       calcium, as carbonate, (TUMS) 200 mg calcium (500 mg) chewable tablet Chew 2 tablets daily.       cholecalciferol, vitamin D3, 1,000 unit tablet Take 1,000 Units by mouth daily.       coenzyme Q10 400 mg cap Take 400 mg by mouth daily.       diclofenac sodium (VOLTAREN) 1 % Gel 4 g four times a day 1 Tube 3     docusate sodium (COLACE) 100 MG capsule Take 1 capsule (100 mg total) by mouth 2 (two) times a day as needed for constipation.  0     doxylamine (UNISOM) 25 mg tablet Take 25 mg by mouth at bedtime as needed for sleep.       ferrous sulfate 325 (65 FE) MG " tablet Take 1 tablet (325 mg total) by mouth 2 (two) times a day. 60 tablet 3     magnesium oxide 500 mg Tab Take 500 mg by mouth at bedtime.              melatonin 1 mg Tab tablet Take 1 mg by mouth at bedtime.              methocarbamol (ROBAXIN-750) 750 MG tablet Take 1 tablet (750 mg total) by mouth at bedtime. 90 tablet 1     metoprolol tartrate (LOPRESSOR) 50 MG tablet TAKE 1 TABLET(50 MG) BY MOUTH TWICE DAILY 180 tablet 1     omeprazole (PRILOSEC) 20 MG capsule TAKE 1 CAPSULE BY MOUTH TWICE DAILY 180 capsule 3     peg 400-propylene glycol PF (SYSTANE) 0.4-0.3 % Dpet Administer 1 drop to both eyes 2 (two) times a day as needed.       pramipexole (MIRAPEX) 0.25 MG tablet TAKE 1 TABLET(0.25 MG) BY MOUTH AT BEDTIME 90 tablet 5     rosuvastatin (CRESTOR) 20 MG tablet Take 1 tablet (20 mg total) by mouth at bedtime. 90 tablet 5     sertraline (ZOLOFT) 50 MG tablet Take 1 tablet (50 mg total) by mouth daily. You will need to keep your 2/12/20 visit for further refills 90 tablet 2     triamcinolone (NASACORT) 55 mcg nasal inhaler Apply 2 sprays into each nostril daily as needed.       umeclidinium (INCRUSE ELLIPTA) 62.5 mcg/actuation DsDv inhaler Inhale 1 puff daily. 30 each 12     zolpidem (AMBIEN) 10 mg tablet Take 10 mg by mouth at bedtime as needed for sleep.       acetaminophen-codeine (TYLENOL #3) 300-30 mg per tablet Take 1-2 tablets by mouth every 4 (four) hours as needed for pain. 90 tablet 0     lisinopriL (PRINIVIL,ZESTRIL) 5 MG tablet Take 1 tablet (5 mg total) by mouth daily. 90 tablet 3     No current facility-administered medications for this visit.      Allergies   Allergen Reactions     Latex      Added based on information entered during log entry, please review and add reactions, type, and severity as needed     Nsaids (Non-Steroidal Anti-Inflammatory Drug)      Other reaction(s): GI Bleeding, GI Upset  Sensitivity.  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 Non smoker rare ETOH.       reports that she quit smoking about 29 years ago. She has a 20.00 pack-year smoking history. She has never used smokeless tobacco. She reports current alcohol use of about 3.3 standard drinks of alcohol per week. She reports that she does not use drugs.   Past Medical History:   Diagnosis Date     Anemia 2012    microcytic from Celebrex. GI w/u neg     Back pain      Benign Adenomatous Polyp Of The Large Intestine     Created by Conversion      CAD (coronary artery disease) 2008    RCA- stent     Cancer (H)     breast cancer     DJD (degenerative joint disease)      Dysthymia      Fatigue     recurrent/variable - no serious pathology     Fibromyalgia      GERD (gastroesophageal reflux disease)      Hypertension      Iritis     past Hx.-left eye     ARSEN (obstructive sleep apnea) 6/5/2019     Raynaud's disease      Rosacea      Shortness of breath     with exertion     Shoulder tendonitis     right     Ulcer of intestine     small bowel- 10 years ago     UTI (urinary tract infection)     Jan. 2019           Time:      Amber Alberto MD

## 2021-06-07 NOTE — PROGRESS NOTES
Thank you, Dr. Alberto, for asking the [facility] team to see Ms. Lisa Ray to evaluate       Assessment/Recommendations   Assessment/Plan:  1. CAD s/p CABG - on asp 162m daily, crestor 20mg, lisinopril and metoprolol  2. HTN - change metoprolol to succinate 100mg  3. Dyspnea boaderline hypoxia in setting of ARSEN, COPD, no signs of heart failure, would defer to Dr. Alberto re therapy for her lung disease.       Follow up in 1 year    Total time 10 min Phone visit        History of Present Illness/Subjective    Ms. Lisa Ray is a 70 y.o. female with s/p CABG, Carodid dz iwht dyuspena she has noted since 1/19 prior to CABG.  She does not have chest pain/pressure.  She notes her sats are in low 90's.  She uses an inhaler for mild COPD (she smoked but quit in 1990), Her dyspnea has worsened in the past 6 mo, She can go upstairs but goes slow, she can go for 30 min walk with some dyspnea and recovers quickly.  She does have extra wt and feels that does not help.  Some edema but minimal, lymphadema in her right arm from bilateral mastectomy in 2017 (mild), she uses CPAP at night for ARSEN.  Echo 7/19 with EF 79%, normal RV function, normal stress nuclear 7/19 post CABG.  She denies hx of PE.         Physical Examination Review of Systems   Vitals:    05/05/20 0830   BP: 148/76   Pulse: 60     Body mass index is 29 kg/m .  Wt Readings from Last 3 Encounters:   05/05/20 165 lb (74.8 kg)   03/12/20 171 lb (77.6 kg)   02/12/20 172 lb (78 kg)     [unfilled]  General Appearance:      ENT/Mouth:    EYES:     Neck:    Chest/Lungs:      Cardiovascular:      Abdomen:     Extremities:    Skin:    Neurologic:    Psychiatric:     Review of Systems - 12 points nega other than above      Medical History  Surgical History Family History Social History   Past Medical History:   Diagnosis Date     Anemia 2012    microcytic from Celebrex. GI w/u neg     Back pain      Benign Adenomatous Polyp Of The Large  Intestine     Created by Conversion      CAD (coronary artery disease)     RCA- stent     Cancer (H)     breast cancer     DJD (degenerative joint disease)      Dysthymia      Fatigue     recurrent/variable - no serious pathology     Fibromyalgia      GERD (gastroesophageal reflux disease)      Hypertension      Iritis     past Hx.-left eye     ARSEN (obstructive sleep apnea) 2019     Raynaud's disease      Rosacea      Shortness of breath     with exertion     Shoulder tendonitis     right     Ulcer of intestine     small bowel- 10 years ago     UTI (urinary tract infection)     2019    Past Surgical History:   Procedure Laterality Date     BELPHAROPTOSIS REPAIR Bilateral     Dr. Eder Bingham-      BREAST SURGERY Bilateral 2017    bilateral mastectomy 2017- 2017     BREAST SURGERY  2018    implants and revsion 2     CORONARY STENT PLACEMENT Right     Dr. Schwab     CV CORONARY ANGIOGRAM N/A 3/26/2019    Procedure: Coronary Angiogram;  Surgeon: Ba Foley MD;  Location: [facility] Lab;  Service: Cardiology     HYSTERECTOMY       OOPHORECTOMY       GA APPENDECTOMY      Description: Appendectomy;  Recorded: 2009;     TOTAL HIP ARTHROPLASTY Right 2015    Procedure: RIGHT TOTAL HIP ARTHROPLASTY;  Surgeon: Tera Yeung MD;  Location: [facility];  Service:     Family History   Problem Relation Age of Onset     BRCA 1/2 Sister      BRCA 1/2 Paternal Grandfather      BRCA 1/2 Cousin      Asthma Cousin      Alcoholism Father          GI bleed age 67     Colon cancer Other      Breast cancer Sister         metastatic age 49, remission with Rx     Coronary artery disease Brother         CABG     Breast cancer Paternal Grandmother      Breast cancer Cousin      Colon cancer Mother     Social History     Socioeconomic History     Marital status:      Spouse name: Johny     Number of children: 3     Years of education: Not on file     Highest  education level: Not on file   Occupational History     Occupation: RN- retired ~     Employer: [employer]     Comment: retired/[employer]    Social Needs     Financial resource strain: Not on file     Food insecurity     Worry: Not on file     Inability: Not on file     Transportation needs     Medical: Not on file     Non-medical: Not on file   Tobacco Use     Smoking status: Former Smoker     Packs/day: 1.00     Years: 20.00     Pack years: 20.00     Last attempt to quit: 3/27/1990     Years since quittin.1     Smokeless tobacco: Never Used   Substance and Sexual Activity     Alcohol use: Yes     Alcohol/week: 3.3 standard drinks     Types: 4 Standard drinks or equivalent per week     Comment: 1-2 glasses of wine/week      Drug use: No     Sexual activity: Not on file   Lifestyle     Physical activity     Days per week: Not on file     Minutes per session: Not on file     Stress: Not on file   Relationships     Social connections     Talks on phone: Not on file     Gets together: Not on file     Attends Roman Catholic service: Not on file     Active member of club or organization: Not on file     Attends meetings of clubs or organizations: Not on file     Relationship status: Not on file     Intimate partner violence     Fear of current or ex partner: Not on file     Emotionally abused: Not on file     Physically abused: Not on file     Forced sexual activity: Not on file   Other Topics Concern     Not on file   Social History Narrative    Retired lead RN at [employer] ;  Johny,... 3 grown sons Aries in [place]- , Josse SAMUELS, -microbrewer/beer, ; Gregory Lu- paramedic [place]...... Non smoker rare ETOH.           Medications  Allergies   Scheduled Meds:  Continuous Infusions:  PRN Meds:. Allergies   Allergen Reactions     Latex      Added based on information entered during log entry, please review and add reactions, type, and severity as needed     Nsaids  (Non-Steroidal Anti-Inflammatory Drug)      Other reaction(s): GI Bleeding, GI Upset  Sensitivity.  Ulceration w/ Celebrex         Lab Results    Chemistry/lipid CBC Cardiac Enzymes/BNP/TSH/INR   Lab Results   Component Value Date    CHOL 116 02/12/2020    HDL 53 02/12/2020    LDLCALC 42 02/12/2020    TRIG 105 02/12/2020    CREATININE 0.75 02/12/2020    BUN 19 02/12/2020    K 4.4 02/12/2020     02/12/2020     02/12/2020    CO2 26 02/12/2020    Lab Results   Component Value Date    WBC 6.5 02/12/2020    HGB 12.0 02/12/2020    HCT 40.6 02/12/2020    MCV 85 02/12/2020     02/12/2020    Lab Results   Component Value Date    CKTOTAL 65 12/01/2016    TSH 1.56 02/12/2020    INR 1.32 (H) 03/29/2019              Ba Foley MD  Interventional Cardiology  [facility]

## 2021-06-07 NOTE — PROGRESS NOTES
Review of Systems - History obtained from the patient  General ROS: positive for  - weight gain  Psychological ROS: positive for - anxiety and depression  Ophthalmic ROS: negative  ENT ROS: negative  Allergy and Immunology ROS: negative  Hematological and Lymphatic ROS: negative  Endocrine ROS: negative  Respiratory ROS: negative  Cardiovascular ROS: positive for - dyspnea on exertion and shortness of breath  Gastrointestinal ROS: positive for - heartburn  Genito-Urinary ROS: negative  Musculoskeletal ROS: positive for - osteoarthritis, lymphedema   Neurological ROS: negative  Dermatological ROS: negative

## 2021-06-08 NOTE — TELEPHONE ENCOUNTER
Refill Approved    Rx renewed per Medication Renewal Policy. Medication was last renewed on 04/30/2019.  Last office visit was 03/12/2020 with PCP.    Oanh Fountain, [facility] Triage/Med Refill 6/16/2020     Requested Prescriptions   Pending Prescriptions Disp Refills     omeprazole (PRILOSEC) 20 MG capsule [Pharmacy Med Name: OMEPRAZOLE DR 20 MG CAPSULE] 180 capsule 0     Sig: TAKE 1 CAPSULE BY MOUTH TWICE DAILY       GI Medications Refill Protocol Passed - 6/15/2020 12:10 AM        Passed - PCP or prescribing provider visit in last 12 or next 3 months.     Last office visit with prescriber/PCP: 1/2/2018 Gregory Mccullough MD OR same dept: Visit date not found OR same specialty: 3/12/2020 Amber Alberto MD  Last physical: Visit date not found Last MTM visit: Visit date not found   Next visit within 3 mo: Visit date not found  Next physical within 3 mo: Visit date not found  Prescriber OR PCP: Gregory Mccullough MD  Last diagnosis associated with med order: 1. GERD (gastroesophageal reflux disease)  - omeprazole (PRILOSEC) 20 MG capsule [Pharmacy Med Name: OMEPRAZOLE DR 20 MG CAPSULE]; TAKE 1 CAPSULE BY MOUTH TWICE DAILY  Dispense: 180 capsule; Refill: 0    If protocol passes may refill for 12 months if within 3 months of last provider visit (or a total of 15 months).

## 2021-06-09 ENCOUNTER — RECORDS - HEALTHEAST (OUTPATIENT)
Dept: ADMINISTRATIVE | Facility: CLINIC | Age: 71
End: 2021-06-09

## 2021-06-09 NOTE — TELEPHONE ENCOUNTER
Who is calling:  Rosa  Reason for Call:  Patient called to verify that her prescription is at the  ready for , she would like to pick it up today.  umeclidinium (INCRUSE ELLIPTA) 62.5 mcg/actuation DsDv inhaler   Medication   Date: 6/24/2020  Department: [facility]  Ordering/Authorizing: Amber Alberto MD    Order Providers     Prescribing Provider  Encounter Provider    Amber Alberto MD Kamal, Shehla Amina, MD    Outpatient Medication Detail      Disp  Refills  Start  End     umeclidinium (INCRUSE ELLIPTA) 62.5 mcg/actuation DsDv inhaler  30 each  12  6/24/2020      Sig - Route: Inhale 1 puff daily. - Inhalation     Class: Print     Notes to Pharmacy: Patient to  script to send to Raúl.     Associated Diagnoses     Pulmonary emphysema, unspecified emphysema type (H)        Pharmacy     [facility]Y #[id number] - [place], [place] -  [address]        Date of last appointment with primary care: 3/12/2020  Okay to leave a detailed message: Yes

## 2021-06-09 NOTE — TELEPHONE ENCOUNTER
The patient was last seen in 08/19 and is in need of CPAP supplies. She is scheduled for a video follow-up, but does she need the appointment, or can a new prescription just be sent in?

## 2021-06-09 NOTE — PROGRESS NOTES
"Lisa Ray is a 70 y.o. female who is being evaluated via a billable video visit.      The patient has been notified of following:     \"This video visit will be conducted via a call between you and your physician/provider. We have found that certain health care needs can be provided without the need for an in-person physical exam.  This service lets us provide the care you need with a video conversation.  If a prescription is necessary we can send it directly to your pharmacy.  If lab work is needed we can place an order for that and you can then stop by our lab to have the test done at a later time.    Video visits are billed at different rates depending on your insurance coverage. Please reach out to your insurance provider with any questions.    If during the course of the call the physician/provider feels a video visit is not appropriate, you will not be charged for this service.\"    Patient has given verbal consent to a Video visit? Yes    Will anyone else be joining your video visit? No    Video-Visit Details    Type of service:  Video Visit    Originating Location (pt. Location): Home    Distant Location (provider location):  [facility]     Platform used for Video Visit: [employer]    Dear Dr. Alberto, Amber Bishop Md  [address]  [id number]  [place] [address]    Thank you for the opportunity to participate in the care of Ms. Lisa Ray.    Assessment and Plan:    1. Obstructive sleep apnea  I congratulated the patient on her excellent CPAP usage. I will keep her on the same pressure range for now.  - CPAP DME Sleep [facility]    2. Periodic limb movement disorder  - pramipexole (MIRAPEX) 0.25 MG tablet; TAKE 1 TABLET(0.25 MG) BY MOUTH AT BEDTIME  Dispense: 90 tablet; Refill: 5      History of present illness:    She is a 70 y.o. female who comes to the virtual clinic for the transfer of care of her obstructive sleep apnea. The patient was diagnosed with obstructive " sleep apnea and periodic limb movement disorder on 05/20/19. She was started on CPAP and Mirapex. She states that she is doing well on CPAP and would like a refill of supplies. She also states that the Mirapex is helping her sleep better at night and would like to continue this medication. She denies any side effects such as frontal lobe inhibition and compulsive behavior. The patient's review of systems is otherwise negative.     Ideal Sleep-Wake Cycle(devoid of societal pressure):    Patient would try to initiate sleep at around 10:30-11PM with a sleep latency of 30 minutes. The patient would have 2-3 awakenings. Final wake up time is around 7:30-8AM.    Compliance Download data for 30 Days:  Pressure setting:APAP 6-10 cwp  Residual AHI:1.7 events per hour  Leak:Minimal  Compliance:80%  Mask Tolerance:Good  Skin irritation:None  DME: [facility]    Past Medical History  Past Medical History:   Diagnosis Date     Anemia 2012    microcytic from Celebrex. GI w/u neg     Back pain      Benign Adenomatous Polyp Of The Large Intestine     Created by Conversion      CAD (coronary artery disease) 2008    RCA- stent     Cancer (H)     breast cancer     DJD (degenerative joint disease)      Dysthymia      Fatigue     recurrent/variable - no serious pathology     Fibromyalgia      GERD (gastroesophageal reflux disease)      Hypertension      Iritis     past Hx.-left eye     ARSEN (obstructive sleep apnea) 6/5/2019     Raynaud's disease      Rosacea      Shortness of breath     with exertion     Shoulder tendonitis     right     Ulcer of intestine     small bowel- 10 years ago     UTI (urinary tract infection)     Jan. 2019        Past Surgical History  Past Surgical History:   Procedure Laterality Date     BELPHAROPTOSIS REPAIR Bilateral 2013    Dr. Eder Bingham- 2013     BREAST SURGERY Bilateral 11/2017    bilateral mastectomy 2017- 11/2017     BREAST SURGERY  2018    implants and revsion 2     CORONARY STENT PLACEMENT  Right 2008    Dr. Schwab     CV CORONARY ANGIOGRAM N/A 3/26/2019    Procedure: Coronary Angiogram;  Surgeon: Ba Foley MD;  Location: [facility] Cath Lab;  Service: Cardiology     HYSTERECTOMY       OOPHORECTOMY       MA APPENDECTOMY      Description: Appendectomy;  Recorded: 01/13/2009;     TOTAL HIP ARTHROPLASTY Right 12/7/2015    Procedure: RIGHT TOTAL HIP ARTHROPLASTY;  Surgeon: Tera Yeung MD;  Location: [facility];  Service:         Meds  Current Outpatient Medications   Medication Sig Dispense Refill     acetaminophen (TYLENOL) 500 MG tablet Take 500 mg by mouth every 6 (six) hours as needed for pain (arthritis).       acetaminophen-codeine (TYLENOL #3) 300-30 mg per tablet Take 1-2 tablets by mouth every 4 (four) hours as needed for pain. 90 tablet 0     aspirin 81 mg chewable tablet Chew 2 tablets (162 mg total) daily.  0     b complex vitamins tablet Take 1 tablet by mouth daily.       calcium, as carbonate, (TUMS) 200 mg calcium (500 mg) chewable tablet Chew 2 tablets daily.       cholecalciferol, vitamin D3, 1,000 unit tablet Take 1,000 Units by mouth daily.       coenzyme Q10 400 mg cap Take 400 mg by mouth daily.       diclofenac sodium (VOLTAREN) 1 % Gel 4 g four times a day 1 Tube 3     docusate sodium (COLACE) 100 MG capsule Take 1 capsule (100 mg total) by mouth 2 (two) times a day as needed for constipation.  0     doxylamine (UNISOM) 25 mg tablet Take 25 mg by mouth at bedtime as needed for sleep.       ferrous sulfate 325 (65 FE) MG tablet Take 1 tablet (325 mg total) by mouth 2 (two) times a day. 60 tablet 3     lisinopriL (PRINIVIL,ZESTRIL) 10 MG tablet Take 1 tablet (10 mg total) by mouth daily. 90 tablet 3     magnesium oxide 500 mg Tab Take 500 mg by mouth at bedtime.              melatonin 1 mg Tab tablet Take 1 mg by mouth at bedtime.              methocarbamol (ROBAXIN-750) 750 MG tablet Take 1 tablet (750 mg total) by mouth at bedtime. 90 tablet 1      metoprolol succinate (TOPROL-XL) 100 MG 24 hr tablet Take 1 tablet (100 mg total) by mouth at bedtime. 90 tablet 11     omeprazole (PRILOSEC) 20 MG capsule TAKE 1 CAPSULE BY MOUTH TWICE DAILY 180 capsule 3     peg 400-propylene glycol PF (SYSTANE) 0.4-0.3 % Dpet Administer 1 drop to both eyes 2 (two) times a day as needed.       pramipexole (MIRAPEX) 0.25 MG tablet TAKE 1 TABLET(0.25 MG) BY MOUTH AT BEDTIME 90 tablet 5     rosuvastatin (CRESTOR) 20 MG tablet Take 1 tablet (20 mg total) by mouth at bedtime. 90 tablet 5     sertraline (ZOLOFT) 50 MG tablet Take 1 tablet (50 mg total) by mouth daily. You will need to keep your 2/12/20 visit for further refills 90 tablet 2     tiZANidine (ZANAFLEX) 2 MG tablet TAKE 1 TABLET BY MOUTH EVERY 6 HOURS IF NEEDED FOR MUSCLE SPASM.       triamcinolone (NASACORT) 55 mcg nasal inhaler Apply 2 sprays into each nostril daily as needed.       umeclidinium (INCRUSE ELLIPTA) 62.5 mcg/actuation DsDv inhaler Inhale 1 puff daily. 30 each 12     zolpidem (AMBIEN) 10 mg tablet Take 1 tablet (10 mg total) by mouth at bedtime as needed for sleep. 30 tablet 2     No current facility-administered medications for this visit.         Allergies  Latex and Nsaids (non-steroidal anti-inflammatory drug)     Social History  Social History     Socioeconomic History     Marital status:      Spouse name: Johny     Number of children: 3     Years of education: Not on file     Highest education level: Not on file   Occupational History     Occupation: RN- retired ~2015     Employer: [employer]     Comment: retired/[employer]    Social Needs     Financial resource strain: Not on file     Food insecurity     Worry: Not on file     Inability: Not on file     Transportation needs     Medical: Not on file     Non-medical: Not on file   Tobacco Use     Smoking status: Former Smoker     Packs/day: 1.00     Years: 20.00     Pack years: 20.00     Last attempt to quit: 3/27/1990     Years since quitting:  30.2     Smokeless tobacco: Never Used   Substance and Sexual Activity     Alcohol use: Yes     Alcohol/week: 3.3 standard drinks     Types: 4 Standard drinks or equivalent per week     Comment: 1-2 glasses of wine/week      Drug use: No     Sexual activity: Not on file   Lifestyle     Physical activity     Days per week: Not on file     Minutes per session: Not on file     Stress: Not on file   Relationships     Social connections     Talks on phone: Not on file     Gets together: Not on file     Attends Confucianist service: Not on file     Active member of club or organization: Not on file     Attends meetings of clubs or organizations: Not on file     Relationship status: Not on file     Intimate partner violence     Fear of current or ex partner: Not on file     Emotionally abused: Not on file     Physically abused: Not on file     Forced sexual activity: Not on file   Other Topics Concern     Not on file   Social History Narrative    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 3 grown sons Aries in [place]- , [employer], -microbrewer[employer], ; Gregory Lu- paramedic [employer]...... Non smoker rare ETOH.         Family History  Family History   Problem Relation Age of Onset     BRCA 1/2 Sister      BRCA 1/2 Paternal Grandfather      BRCA 1/2 Cousin      Asthma Cousin      Alcoholism Father          GI bleed age 67     Colon cancer Other      Breast cancer Sister         metastatic age 49, remission with Rx     Coronary artery disease Brother         CABG     Breast cancer Paternal Grandmother      Breast cancer Cousin      Colon cancer Mother      Patient's family history was not pertinent to chief complaint.     Review of Systems:  Constitutional: Negative except as noted in HPI.   Eyes: Negative except as noted in HPI.   ENT: Negative except as noted in HPI.   Cardiovascular: Negative except as noted in HPI.   Respiratory: Negative except as noted in HPI.    Gastrointestinal: Negative except as noted in HPI.   Genitourinary: Negative except as noted in HPI.   Musculoskeletal: Negative except as noted in HPI.   Integumentary: Negative except as noted in HPI.   Neurological: Negative except as noted in HPI.   Psychiatric: Negative except as noted in HPI.   Endocrine: Negative except as noted in HPI.   Hematologic/Lymphatic: Negative except as noted in HPI.      STOP BANG 4/24/2019   Do you snore loudly (louder than talking or loud enough to be heard through closed doors)? 1   Do you often feel tired, fatigued, or sleepy during daytime? 1   Has anyone observed you stop breathing in your sleep? 0   Do you have or are you being treated for high blood pressure? 1   BMI more than 35 kg/m2 0   Age over 50 years old? 1   Neck circumference greater than 16 inches? 0   Gender male? 0   Total Score 4     Epworths Sleepiness Scale 4/24/2019 8/28/2019 6/22/2020   Sitting and reading 3 1 2   Watching TV 2 1 2   Sitting, inactive in a public place (e.g. a theatre or a meeting) 3 1 1   As a passenger in a car for an hour without a break 3 2 2   Lying down to rest in the afternoon when circumstances permit 2 1 2   Sitting and talking to someone 1 0 1   Sitting quietly after a lunch without alcohol 2 1 2   In a car, while stopped for a few minutes in traffic 0 0 0   Total score 16 7 12     Rooming 4/24/2019   Usual bedtime 11pm   Sleep Latency 1 hour   Awakenings 2-3   Wake Up Time 7:30am   Weekends 8am   Energy Drinks none   Coffee 2 cups per day   Cola none   Difficulty falling asleep Yes   Difficulty staying asleep Yes   Excessive daytime tiredness Yes   Excessive daytime sleepiness Yes   Dozing off while driving No   Shift Worker No   Sleep Walking? No   Sleep Talking? No   Kicking or punching? No   Restless legs symptoms No       Physical Exam:  There were no vitals taken for this visit.  BMI:There is no height or weight on file to calculate BMI.   GEN: NAD,   Head:  Normocephalic.  EYES: EOMI  ENT: Oropharynx is clear, Wagner class 4+ airway.   Neurological: Alert, oriented to time, place, and person.  Psych: normal mood, normal affect     Labs/Studies:     Lab Results   Component Value Date    WBC 6.5 02/12/2020    HGB 12.0 02/12/2020    HCT 40.6 02/12/2020    MCV 85 02/12/2020     02/12/2020         Chemistry        Component Value Date/Time     02/12/2020 1001    K 4.4 02/12/2020 1001     02/12/2020 1001    CO2 26 02/12/2020 1001    BUN 19 02/12/2020 1001    CREATININE 0.75 02/12/2020 1001    GLU 97 02/12/2020 1001        Component Value Date/Time    CALCIUM 9.0 02/12/2020 1001    ALKPHOS 104 02/12/2020 1001    AST 17 02/12/2020 1001    ALT 17 02/12/2020 1001    BILITOT 0.7 02/12/2020 1001            No results found for: FERRITIN  Lab Results   Component Value Date    TSH 1.56 02/12/2020         Patient verbalized understanding of these issues, agrees with the plan and all questions were answered today. Patient was given an opportuntity to voice any other symptoms or concerns not listed above. Patient did not have any other symptoms or concerns.         Abdullahi Mcmillan DO  Board Certified in Internal Medicine and Sleep Medicine    I spent a total of 17 minutes of face-to-face encounter and more than 50% of the encounter was used for counseling or coordination of care.    Audio and visual devices were used for this virtual clinic visit with permission from patient.

## 2021-06-10 ENCOUNTER — COMMUNICATION - HEALTHEAST (OUTPATIENT)
Dept: INTERNAL MEDICINE | Facility: CLINIC | Age: 71
End: 2021-06-10

## 2021-06-10 NOTE — PROGRESS NOTES
Pt is here for follow up for carotid stenosis. US done this morning. She continues to take aspirin ans rosuvastatin. Pt denied any face numbness, dizzy ness, light headache  Or hard time with eyes.

## 2021-06-10 NOTE — TELEPHONE ENCOUNTER
Controlled Substance Refill Request  Medication Name:   Requested Prescriptions     Pending Prescriptions Disp Refills     acetaminophen-codeine (TYLENOL #3) 300-30 mg per tablet 90 tablet 0     Sig: Take 1-2 tablets by mouth every 4 (four) hours as needed for pain.     Date Last Fill: 03/12/2020.  Is patient out of medication?:  Yes  Patient notified refills processed within 3 business days:  Yes  Requested Pharmacy: [facility] #[id number] [place], [place]  Submit electronically to pharmacy  Controlled Substance Agreement on file:   Encounter-Level CSA Scan Date:    There are no encounter-level csa scan date.        Last office visit:  03/12/2020 with PCP.

## 2021-06-10 NOTE — PROGRESS NOTES
Vascular Medicine Progress note    Dr Reynaldo Santoyo MD, Vascular Medicine      Lisa Ray    Medical Record #:  [id number]    Date of Service: 08/06/2020     Date last seen:  Visit date not found    PRIMARY CARE PROVIDER: Amber Alberto MD      IMPRESSION/PLAN: Patient is here for follow-up on carotid ultrasound, carotid ultrasound done today showed evidence of 50 to 69% stenosis bilaterally, no significant plaquing, patient is asymptomatic, with antegrade vertebral and subclavian arteries, patient lipid profile is within target, blood pressure is controlled, patient is nondiabetic and non-smoker    DISPOSITION: Follow-up with carotid Doppler arterial ultrasound in 1 year from now  2-continue with vascular risk factors modifications  3- encourage physical activity      ______________________________________________________________________    Subjective:    ALLERGIES:  Latex and Nsaids (non-steroidal anti-inflammatory drug)    MEDS:    Current Outpatient Medications:      acetaminophen (TYLENOL) 500 MG tablet, Take 500 mg by mouth every 6 (six) hours as needed for pain (arthritis)., Disp: , Rfl:      acetaminophen-codeine (TYLENOL #3) 300-30 mg per tablet, Take 1-2 tablets by mouth every 4 (four) hours as needed for pain., Disp: 90 tablet, Rfl: 0     aspirin 81 mg chewable tablet, Chew 2 tablets (162 mg total) daily., Disp: , Rfl: 0     b complex vitamins tablet, Take 1 tablet by mouth daily., Disp: , Rfl:      calcium, as carbonate, (TUMS) 200 mg calcium (500 mg) chewable tablet, Chew 2 tablets daily., Disp: , Rfl:      cholecalciferol, vitamin D3, 1,000 unit tablet, Take 1,000 Units by mouth daily., Disp: , Rfl:      coenzyme Q10 400 mg cap, Take 400 mg by mouth daily., Disp: , Rfl:      diclofenac sodium (VOLTAREN) 1 % Gel, 4 g four times a day, Disp: 1 Tube, Rfl: 3     docusate sodium (COLACE) 100 MG capsule, Take 1 capsule (100 mg total) by mouth 2 (two) times a day as needed for constipation.,  Disp: , Rfl: 0     doxylamine (UNISOM) 25 mg tablet, Take 25 mg by mouth at bedtime as needed for sleep., Disp: , Rfl:      ferrous sulfate 325 (65 FE) MG tablet, Take 1 tablet (325 mg total) by mouth 2 (two) times a day., Disp: 60 tablet, Rfl: 3     lisinopriL (PRINIVIL,ZESTRIL) 10 MG tablet, Take 1 tablet (10 mg total) by mouth daily., Disp: 90 tablet, Rfl: 3     magnesium oxide 500 mg Tab, Take 500 mg by mouth at bedtime.    , Disp: , Rfl:      melatonin 1 mg Tab tablet, Take 1 mg by mouth at bedtime.    , Disp: , Rfl:      methocarbamol (ROBAXIN-750) 750 MG tablet, Take 1 tablet (750 mg total) by mouth at bedtime., Disp: 90 tablet, Rfl: 1     metoprolol succinate (TOPROL-XL) 100 MG 24 hr tablet, Take 1 tablet (100 mg total) by mouth at bedtime., Disp: 90 tablet, Rfl: 11     omeprazole (PRILOSEC) 20 MG capsule, TAKE 1 CAPSULE BY MOUTH TWICE DAILY, Disp: 180 capsule, Rfl: 3     peg 400-propylene glycol PF (SYSTANE) 0.4-0.3 % Dpet, Administer 1 drop to both eyes 2 (two) times a day as needed., Disp: , Rfl:      pramipexole (MIRAPEX) 0.25 MG tablet, TAKE 1 TABLET(0.25 MG) BY MOUTH AT BEDTIME, Disp: 90 tablet, Rfl: 5     rosuvastatin (CRESTOR) 20 MG tablet, Take 1 tablet (20 mg total) by mouth at bedtime., Disp: 90 tablet, Rfl: 5     sertraline (ZOLOFT) 50 MG tablet, Take 1 tablet (50 mg total) by mouth daily. You will need to keep your 2/12/20 visit for further refills, Disp: 90 tablet, Rfl: 2     tiZANidine (ZANAFLEX) 2 MG tablet, TAKE 1 TABLET BY MOUTH EVERY 6 HOURS IF NEEDED FOR MUSCLE SPASM., Disp: , Rfl:      triamcinolone (NASACORT) 55 mcg nasal inhaler, Apply 2 sprays into each nostril daily as needed., Disp: , Rfl:      umeclidinium (INCRUSE ELLIPTA) 62.5 mcg/actuation DsDv inhaler, Inhale 1 puff daily., Disp: 30 each, Rfl: 12     zolpidem (AMBIEN) 10 mg tablet, Take 1 tablet (10 mg total) by mouth at bedtime as needed for sleep., Disp: 30 tablet, Rfl: 2    REVIEW OF SYSTEMS:    A 12 point ROS was reviewed  "and except for what is listed in the HPI above, all others are negative    Objective:    PE:  /80 (Patient Site: Left Arm, Patient Position: Sitting, Cuff Size: Adult Regular)   Pulse 60   Temp 97.2  F (36.2  C) (Tympanic)   Resp 14   Ht 5' 3.5\" (1.613 m)   Wt 172 lb (78 kg)   BMI 29.99 kg/m    Wt Readings from Last 1 Encounters:   08/06/20 172 lb (78 kg)     Body mass index is 29.99 kg/m .    EXAM:  GENERAL: This is a well-developed 70 y.o. female who appears her stated age  EYES: Grossly normal.  MOUTH: Buccal mucosa normal   CARDIAC:  Normal S1 and S2, no Murmur  CHEST/LUNG:  Clear lung fields bilaterally  GASTROINTESINAL (ABDOMEN):Soft, non-tender, B/S present, no pulsatile mass     MUSCULOSKELETAL: Grossly normal and both lower extremities are intact.  HEME/LYMPH: No lymphedema  NEUROLOGIC: Focally intact, Alert and oriented x 3.   PSYCH: appropriate affect  INTEGUMENT: No open lesions or ulcers  Circumferential measures:  No flowsheet data found.  Incision 03/28/19 Surgical Leg Left (Active)       Incision 03/28/19 Surgical Chest (Active)        DIAGNOSTIC STUDIES:     Us Carotid Bilateral    Result Date: 8/6/2020      BILATERAL CAROTID ULTRASOUND Indication: Surveillance of left ICA stenosis Endarterectomy: None Previous: 03/26/19 Symptoms: Hypertension, MI and Heart Disease TECHNIQUE: Duplex exam performed utilizing 2D gray-scale imaging, Doppler interrogation with color-flow and spectral waveform analysis. Right Velocity  Chart (cm/sec) Location Right CCA-PS 81 CCA- ED 15 ICA- ICA-ED 37 ECA- ECA-ED 19 Vertebral- Antegrade 44 Subclavian A- Triphasic 159 Ratio ICA/CCA-PS 1.74 Ratio ICA/CCA-ED 2.46 Left Velocity  Chart (cm/sec) Location Left CCA-PS 69 CCA- ED 14 ICA- ICA-ED 40 ECA- ECA-ED 14 Vertebral- Antegrade 38 Subclavian A- Biphasic 192 Ratio ICA/CCA-PS 2.01 Ratio ICA/CCA-ED 2.85 Comment: Left ICA mid/distal is tortuous. FINDINGS: RIGHT: There is uniformly echogenic  " atheromatous plaque.  LEFT: There is uniformly echogenic atheromatous plaque.  Both vertebral arteries and subclavian artery waveforms are antegrade with triphasic waveform pattern in both subclavian arteries. Impression:  1. 50-69% diameter  diameter stenosis of the right ICA relative to the distal ICA diameter based on the velocity criteria yet the ratio ICA/CCA is less than 2  2. 50-69% diameter diameter stenosis of the left ICA relative to the distal ICA diameter. Evaluation based on velocities and NASCET criteria   Category PSV (cm/s)  Plaque Imaging EDV (cm/s)  ICA/CCA Ratio Normal,  <125  None <40 <2 Mild <50% <125 < than 50% DR stenosis <40 <2 Moderate Disease 50-69% 125-230 > than 50% DR stenosis  2.0-4.0 Severe->70% but less than near occlusion >230 > than 50% DR stenosis >100 >4.0 Near Occlusion May be low or undetectable Visible, extensive Variable Variable Occlusion No Flow Visible with no detectable lumen N/A N/A Plaquetype Description Type 1 Uniformly echolucent Type 2 Predominantly echolucent >50% Type 3 Predominantly echogenic >50% Type 4 Uniformly echogenic Type 5 Unclassified due to poor visualization Ulcer Visible Ulcerative Plaque     I personally reviewed the following imaging today and those on care everywhere, if indicated    LABS:      Sodium   Date Value Ref Range Status   02/12/2020 143 136 - 145 mmol/L Final   04/11/2019 140 136 - 145 mmol/L Final   03/29/2019 144 136 - 145 mmol/L Final     Potassium   Date Value Ref Range Status   02/12/2020 4.4 3.5 - 5.0 mmol/L Final   04/11/2019 4.7 3.5 - 5.0 mmol/L Final   04/03/2019 3.8 3.5 - 5.0 mmol/L Final     Chloride   Date Value Ref Range Status   02/12/2020 107 98 - 107 mmol/L Final   04/11/2019 104 98 - 107 mmol/L Final   03/29/2019 114 (H) 98 - 107 mmol/L Final     BUN   Date Value Ref Range Status   02/12/2020 19 8 - 28 mg/dL Final   04/11/2019 18 8 - 22 mg/dL Final   03/29/2019 18 8 - 22 mg/dL Final     Creatinine   Date Value Ref  Range Status   02/12/2020 0.75 0.60 - 1.10 mg/dL Final   04/11/2019 0.73 0.60 - 1.10 mg/dL Final   04/01/2019 0.67 0.60 - 1.10 mg/dL Final     Hemoglobin   Date Value Ref Range Status   02/12/2020 12.0 12.0 - 16.0 g/dL Final   10/11/2019 12.7 12.0 - 16.0 g/dL Final   08/20/2019 12.0 12.0 - 16.0 g/dL Final     Platelets   Date Value Ref Range Status   02/12/2020 228 140 - 440 thou/uL Final   10/11/2019 276 140 - 440 thou/uL Final   04/11/2019 436 140 - 440 thou/uL Final     INR   Date Value Ref Range Status   03/29/2019 1.32 (H) 0.90 - 1.10 Final   03/28/2019 1.32 (H) 0.90 - 1.10 Final   03/27/2019 1.01 0.90 - 1.10 Final       Total time spent 20 (15,25,35) minutes face to face with patient with more than 50% time spent in counseling and coordination of care.    Reynaldo Santoyo MD  VASCULAR MEDICINE

## 2021-06-11 ENCOUNTER — OFFICE VISIT - HEALTHEAST (OUTPATIENT)
Dept: INTERNAL MEDICINE | Facility: CLINIC | Age: 71
End: 2021-06-11

## 2021-06-11 ENCOUNTER — RECORDS - HEALTHEAST (OUTPATIENT)
Dept: GENERAL RADIOLOGY | Facility: CLINIC | Age: 71
End: 2021-06-11

## 2021-06-11 DIAGNOSIS — Z96.641 PRESENCE OF RIGHT ARTIFICIAL HIP JOINT: ICD-10-CM

## 2021-06-11 DIAGNOSIS — M81.0 AGE-RELATED OSTEOPOROSIS WITHOUT CURRENT PATHOLOGICAL FRACTURE: ICD-10-CM

## 2021-06-11 DIAGNOSIS — M25.551 LATERAL PAIN OF RIGHT HIP: ICD-10-CM

## 2021-06-11 DIAGNOSIS — Z96.641 HISTORY OF TOTAL HIP REPLACEMENT, RIGHT: ICD-10-CM

## 2021-06-11 DIAGNOSIS — M25.551 PAIN IN RIGHT HIP: ICD-10-CM

## 2021-06-11 ASSESSMENT — MIFFLIN-ST. JEOR: SCORE: 1292.88

## 2021-06-11 NOTE — TELEPHONE ENCOUNTER
Refill Approved    Rx renewed per Medication Renewal Policy. Medication was last renewed on 10/13/19.    Lolly Cardenas, [facility] Triage/Med Refill 9/11/2020     Requested Prescriptions   Pending Prescriptions Disp Refills     sertraline (ZOLOFT) 50 MG tablet [Pharmacy Med Name: SERTRALINE HCL 50 MG TABLET] 90 tablet 1     Sig: TAKE 1 TABLET BY MOUTH ONCE DAILY       SSRI Refill Protocol  Passed - 9/9/2020  9:43 AM        Passed - PCP or prescribing provider visit in last year     Last office visit with prescriber/PCP: 3/12/2020 Amber Alberto MD OR same dept: 3/12/2020 Amber Alberto MD OR same specialty: 3/12/2020 Amber Alberto MD  Last physical: 2/12/2020 Last MTM visit: Visit date not found   Next visit within 3 mo: Visit date not found  Next physical within 3 mo: Visit date not found  Prescriber OR PCP: Amber Alberto MD  Last diagnosis associated with med order: 1. Adjustment disorder with depressed mood  - sertraline (ZOLOFT) 50 MG tablet [Pharmacy Med Name: SERTRALINE HCL 50 MG TABLET]; TAKE 1 TABLET BY MOUTH ONCE DAILY  Dispense: 90 tablet; Refill: 1    If protocol passes may refill for 12 months if within 3 months of last provider visit (or a total of 15 months).

## 2021-06-12 NOTE — PROGRESS NOTES
Assessment and Plan:   High functioning, doing very well overall. New grandmother for 1st time in July (son Gregory)  See orders. Same meds. RTC one year/prn if problems sooner  1. Health care maintenance  Lab ordered    2. Visit for screening mammogram    - Mammo Screening Bilateral; Future    3. Coronary atherosclerosis  Asymptomatic/monitor risk factors    4. Hyperlipemia    - Comprehensive Metabolic Panel  - HM2(CBC w/o Differential)  - Thyroid Stimulating Hormone (TSH)  - Lipid Cascade    5. Colon cancer screening  Due for colonoscopy  - Ambulatory referral for Colonoscopy    6. Osteopenia    - DXA Bone Density Scan; Future      The patient's current medical problems were reviewed.    I have had an Advance Directives discussion with the patient.  The following health maintenance schedule was reviewed with the patient and provided in printed form in the after visit summary:   Health Maintenance   Topic Date Due     DEPRESSION FOLLOW UP  1950     DXA SCAN  05/03/2014     INFLUENZA VACCINE RULE BASED (1) 08/01/2017     MAMMOGRAM  10/30/2017     TD 18+ HE  03/24/2018     FALL RISK ASSESSMENT  12/01/2017     ADVANCE DIRECTIVES DISCUSSED WITH PATIENT  11/30/2020     COLONOSCOPY  09/24/2022     PNEUMOCOCCAL POLYSACCHARIDE VACCINE AGE 65 AND OVER  Completed     PNEUMOCOCCAL CONJUGATE VACCINE FOR ADULTS (PCV13 OR PREVNAR)  Completed     ZOSTER VACCINE  Completed        Subjective:   Chief Complaint: Lisa Ray is an 67 y.o. female here for an Annual Wellness visit.   HPI:  Doing well grandson Serjio- born in July (Gregory is dad), aches pains as always but doing very well in general. No change in chronic mild dyspnea on exertion going back years/ same meds     Review of Systems:    Please see above.  The rest of the review of systems are negative for all systems.    Patient Care Team:  Gregory Doyle MD as PCP - General  Olayinka Torers MD as Physician (Ophthalmology)  Tera Yeung MD as  Physician (Orthopedic Surgery)  Obed Khan MD (Cardiology)  Huey Bender MD (Dermatology)     Patient Active Problem List   Diagnosis     Fibromyalgia     Ptosis Of Eyelid     Rosacea     Dysthymic disorder     Lower Back Pain     Coronary Artery Disease     Benign Adenomatous Polyp Of The Large Intestine     Osteoarthritis     Raynaud disease     Fibromyalgia     Sicca syndrome     Paresthesias/numbness     Primary osteoarthritis of right hip     Past Medical History:   Diagnosis Date     Anemia 2012    microcytic from Celebrex. GI w/u neg     Back pain      CAD (coronary artery disease)     RCA- stent     DJD (degenerative joint disease)      Dysthymia      Fatigue     recurrent/variable - no serious pathology     Fibromyalgia      Iritis       Past Surgical History:   Procedure Laterality Date     CARDIAC SURGERY       CORONARY STENT PLACEMENT Right     Dr. Schwab     HYSTERECTOMY       OOPHORECTOMY       VT APPENDECTOMY      Description: Appendectomy;  Recorded: 2009;     TOTAL HIP ARTHROPLASTY Right 2015    Procedure: RIGHT TOTAL HIP ARTHROPLASTY;  Surgeon: Tera Yeung MD;  Location: [facility] Main OR;  Service:       Family History   Problem Relation Age of Onset     BRCA 1/2 Sister      BRCA 1/2 Paternal Grandfather      BRCA 1/2 Cousin      Alcoholism Father       GI bleed age 67     Colon cancer       Breast cancer Sister      metastatic age 49, remission with Rx     Coronary artery disease Brother      CABG      Social History     Social History     Marital status:      Spouse name: Johny     Number of children: 3     Years of education: N/A     Occupational History     RN- retired ~ [employer]     retired/[employer]      Social History Main Topics     Smoking status: Former Smoker     Smokeless tobacco: Not on file     Alcohol use 2.0 oz/week     4 drink(s) per week      Comment: 1-2 glasses of wine/week if any     Drug use: No     Sexual activity:  Not on file     Other Topics Concern     Not on file     Social History Narrative    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 Non smoker rare ETOH.       Current Outpatient Prescriptions   Medication Sig Dispense Refill     acetaminophen (TYLENOL) 500 MG tablet Take 1,000 mg by mouth every 6 (six) hours as needed for pain.       amoxicillin (AMOXIL) 500 MG tablet Take 500 mg by mouth as needed. Take 4 tablets 1 hour prior to dental work       aspirin 325 MG EC tablet Take 325 mg by mouth daily.       aspirin 81 MG EC tablet Take 1 tablet (81 mg total) by mouth daily. 150 tablet 2     b complex vitamins tablet Take 1 tablet by mouth daily.       BIOTIN ORAL Take by mouth.       calcium carbonate-vitamin D3 600mg (1,000mg) -1,000 unit Tab Take 1 tablet by mouth daily.       cholecalciferol, vitamin D3, 1,000 unit tablet Take 1,000 Units by mouth daily.       coenzyme Q10 (CO Q-10) 100 mg capsule Take 100 mg by mouth bedtime.       docusate sodium (COLACE) 100 MG capsule Take 100 mg by mouth daily as needed for constipation.       hydroCHLOROthiazide (HYDRODIURIL) 12.5 MG tablet Take 12.5 mg by mouth as needed.       magnesium 250 mg Tab Take 250 mg by mouth bedtime.        melatonin 1 mg Tab tablet Take 1 mg by mouth bedtime.        metoprolol tartrate (LOPRESSOR) 25 MG tablet TAKE 1 TABLET BY MOUTH TWICE DAILY. 180 tablet 1     omeprazole (PRILOSEC) 20 MG capsule TAKE 1 CAPSULE BY MOUTH TWICE DAILY 180 capsule 2     polyvinyl alcohol (LIQUIFILM TEARS) 1.4 % ophthalmic solution Administer 1 drop to both eyes 4 (four) times a day as needed for dry eyes.       simvastatin (ZOCOR) 20 MG tablet TAKE 1 TABLET BY MOUTH EVERY DAY 90 tablet 3     sulfamethoxazole-trimethoprim (BACTRIM DS) 800-160 mg per tablet One twice daily for 5 days (UTI) 10 tablet 0     No current  "facility-administered medications for this visit.       Objective:   Vital Signs:   Visit Vitals     /74 (Patient Site: Left Arm, Patient Position: Sitting, Cuff Size: Adult Regular)     Pulse 62     Ht 5' 4.5\" (1.638 m)     Wt 168 lb (76.2 kg)     Breastfeeding No     BMI 28.39 kg/m2        VisionScreening:  No exam data present     PHYSICAL EXAM  Healthy appearing except overwgt somewhat.   HEENT_ neg; hx of eyelid repair noted  Neck no bruits  Lungs clear   Cor reg   Breasts negative  Abdomen- negative  Ext no edema; trace/+1 pedal pulses  Neuro negative    Assessment Results 8/11/2017   Activities of Daily Living No help needed   Instrumental Activities of Daily Living No help needed   Get Up and Go Score 12 seconds or more   Mini Cog Total Score 5   Some recent data might be hidden     A Mini-Cog score of 0-2 suggests the possibility of dementia, score of 3-5 suggests no dementia    Identified Health Risks:     The patient was provided with written information regarding signs of hearing loss.  She is at risk for falling and has been provided with information to reduce the risk of falling at home.  Patient's advanced directive was discussed and I am comfortable with the patient's wishes.    Recent Results (from the past 240 hour(s))   HM2(CBC w/o Differential)   Result Value Ref Range    WBC 5.7 4.0 - 11.0 thou/uL    RBC 5.31 3.80 - 5.40 mill/uL    Hemoglobin 13.9 12.0 - 16.0 g/dL    Hematocrit 43.0 35.0 - 47.0 %    MCV 81 80 - 100 fL    MCH 26.1 (L) 27.0 - 34.0 pg    MCHC 32.2 32.0 - 36.0 g/dL    RDW 14.4 11.0 - 14.5 %    Platelets 241 140 - 440 thou/uL    MPV 7.8 7.0 - 10.0 fL       "

## 2021-06-12 NOTE — TELEPHONE ENCOUNTER
Refill Approved    Rx renewed per Medication Renewal Policy. Medication was last renewed on 10/11/19.    Gricelda Correa, [facility] Triage/Med Refill 10/26/2020     Requested Prescriptions   Pending Prescriptions Disp Refills     rosuvastatin (CRESTOR) 20 MG tablet [Pharmacy Med Name: ROSUVASTATIN CALCIUM 20 MG TAB] 90 tablet 4     Sig: TAKE 1 TABLET BY MOUTH AT BEDTIME       Statins Refill Protocol (Hmg CoA Reductase Inhibitors) Passed - 10/24/2020  9:06 AM        Passed - PCP or prescribing provider visit in past 12 months      Last office visit with prescriber/PCP: 3/12/2020 Amber Alberto MD OR same dept: 3/12/2020 Amber Alberto MD OR same specialty: 3/12/2020 Amber Alberto MD  Last physical: 2/12/2020 Last MTM visit: Visit date not found   Next visit within 3 mo: Visit date not found  Next physical within 3 mo: Visit date not found  Prescriber OR PCP: Amber Alberto MD  Last diagnosis associated with med order: 1. S/P CABG (coronary artery bypass graft)  - rosuvastatin (CRESTOR) 20 MG tablet [Pharmacy Med Name: ROSUVASTATIN CALCIUM 20 MG TAB]; TAKE 1 TABLET BY MOUTH AT BEDTIME  Dispense: 90 tablet; Refill: 4    If protocol passes may refill for 12 months if within 3 months of last provider visit (or a total of 15 months).

## 2021-06-13 NOTE — PROGRESS NOTES
Lisa Ray  1950    Pre-op  Date of Surgery: 11-2-2017  Surgeon:Sigrid Parker MD  Surgical procedure: masectomy  Surgical location: [facility]  Fax: [phone]    Assessment and Plan:  1. Stable for planned surgery as noted above  2. Recent Dx DCIS/and + microcalcifications; (see below)  3. Asymptomatic CAD with remote RCA stent (2008)  4. Uncomplicated R THR- 2015  5 see labs/data posted below.       Chief Complaint: preop     Visit diagnoses:    1. Pre-operative examination for internal medicine  Electrocardiogram Perform - Clinic    Comprehensive Metabolic Panel    HM2(CBC w/o Differential)   2. DCIS (ductal carcinoma in situ)     3. Microcalcifications of the breast     4. Coronary atherosclerosis     5. S/P coronary artery stent placement (single vessel disease RCA      Patient Active Problem List   Diagnosis     Fibromyalgia     Ptosis Of Eyelid     Rosacea     Dysthymic disorder     Lower Back Pain     Coronary Artery Disease     Benign Adenomatous Polyp Of The Large Intestine     Osteoarthritis     Raynaud disease     Fibromyalgia     Sicca syndrome     Paresthesias/numbness     Primary osteoarthritis of right hip     Microcalcifications of the breast     DCIS (ductal carcinoma in situ)     S/P coronary artery stent placement     Past Medical History:   Diagnosis Date     Anemia 2012    microcytic from Celebrex. GI w/u neg     Back pain      CAD (coronary artery disease) 2008    RCA- stent     DJD (degenerative joint disease)      Dysthymia      Fatigue     recurrent/variable - no serious pathology     Fibromyalgia      Iritis      Past Surgical History:   Procedure Laterality Date     BELPHAROPTOSIS REPAIR Bilateral 2013    Dr. Eder Bingham- 2013     CORONARY STENT PLACEMENT Right 2008    Dr. Schwab     HYSTERECTOMY       OOPHORECTOMY       SC APPENDECTOMY      Description: Appendectomy;  Recorded: 01/13/2009;     TOTAL HIP ARTHROPLASTY Right 12/7/2015    Procedure:  RIGHT TOTAL HIP ARTHROPLASTY;  Surgeon: Tera Yeung MD;  Location: [facility] Main OR;  Service:      Social History     Social History     Marital status:      Spouse name: Johny     Number of children: 3     Years of education: N/A     Occupational History     RN- retired ~ [employer]     retired/[employer]      Social History Main Topics     Smoking status: Former Smoker     Smokeless tobacco: Not on file     Alcohol use 2.0 oz/week     4 Standard drinks or equivalent per week      Comment: 1-2 glasses of wine/week if any     Drug use: No     Sexual activity: Not on file     Other Topics Concern     Not on file     Social History Narrative    Retired lead RN at [employer] ;  Johny, 3 grown sons Aries in [place], Josse in [place], [place]-microbrewer/beer, ; Gregory Lu- [employer]illy [employer]...... Non smoker rare ETOH.      Family History   Problem Relation Age of Onset     BRCA 1/2 Sister      BRCA 1/2 Paternal Grandfather      BRCA 1/2 Cousin      Alcoholism Father       GI bleed age 67     Colon cancer       Breast cancer Sister      metastatic age 49, remission with Rx     Coronary artery disease Brother      CABG     Breast cancer Paternal Grandmother      Breast cancer Cousin        Meds:  Current Outpatient Prescriptions   Medication Sig Dispense Refill     amoxicillin (AMOXIL) 500 MG tablet Take 500 mg by mouth as needed. Take 4 tablets 1 hour prior to dental work       aspirin 325 MG EC tablet Take 325 mg by mouth daily.       calcium carbonate-vitamin D3 600mg (1,000mg) -1,000 unit Tab Take 1 tablet by mouth daily.       cholecalciferol, vitamin D3, 1,000 unit tablet Take 1,000 Units by mouth daily.       coenzyme Q10 (CO Q-10) 100 mg capsule Take 400 mg by mouth at bedtime.        hydroCHLOROthiazide (HYDRODIURIL) 12.5 MG tablet Take 12.5 mg by mouth as needed.       metoprolol tartrate (LOPRESSOR) 25 MG tablet TAKE 1 TABLET BY MOUTH TWICE DAILY.  "180 tablet 3     omeprazole (PRILOSEC) 20 MG capsule TAKE 1 CAPSULE BY MOUTH TWICE DAILY 180 capsule 2     simvastatin (ZOCOR) 20 MG tablet TAKE 1 TABLET BY MOUTH EVERY DAY 90 tablet 3     No current facility-administered medications for this visit.        Allergies   Allergen Reactions     Nsaids (Non-Steroidal Anti-Inflammatory Drug)      Other reaction(s): GI Bleeding, GI Upset  Sensitivity.  Ulceration w/ Celebrex       ROS: complete review of symptoms otherwise negative except as noted below    S: recent dx of breast Ca went to [facility]/ Genetic testing negative. Scheduled for a bilateral mastectomy and reconstruction.  Otherwise has been healthy . Remote RCA stent when she had atypical symptoms and + stress test. Single vessel CAD no recurrent problems. Had THR without problem. No new meds no other concerns.  re dx with prostate Ca.        O:   Vitals:    10/09/17 1623   BP: 122/80   Patient Site: Left Arm   Patient Position: Sitting   Cuff Size: Adult Regular   Pulse: 64   Temp: 97.3  F (36.3  C)   TempSrc: Tympanic   Weight: 165 lb 11.2 oz (75.2 kg)   Height: 5' 3\" (1.6 m)       Physical Exam:  General- pleasant heathy appearing  VS- see above  HEENT- neg   Neck- no adenopathy/thyromegaly/bruits  Chest- clear   Cor- reg no murmurs/gallops/ectopics  Abdomen- soft non tender, no masses; no organomegaly  Extremities: no edema, good distal pulses  Neuro- Cr. NN-  intact, alert,   Skin- no suspicious lesions for malignancy  Lymph- no pathologic nodes in neck/axilla/groin  Musculoskeletal-      09-OCT-2017 15:37:21 HE JOES/JOHNS/MIGUEL ANGEL/KATINA  Normal sinus rhythm  Right atrial enlargement  Borderline ECG  When compared with ECG of 05-APR-2008 07:33,  No significant change was found  Confirmed by NOVA MUHAMMAD, RODRÍGUEZ LOC:ALEX ([id number]) on 10/11/2017 7:55:45 AM    Recent Results (from the past 240 hour(s))   Electrocardiogram Perform - Clinic   Result Value Ref Range    SYSTOLIC BLOOD PRESSURE  mmHg    " DIASTOLIC BLOOD PRESSURE  mmHg    VENTRICULAR RATE 69 BPM    ATRIAL RATE 69 BPM    P-R INTERVAL 144 ms    QRS DURATION 76 ms    Q-T INTERVAL 444 ms    QTC CALCULATION (BEZET) 475 ms    P Axis 65 degrees    R AXIS 2 degrees    T AXIS 30 degrees    MUSE DIAGNOSIS       Normal sinus rhythm  Right atrial enlargement  Borderline ECG  When compared with ECG of 05-APR-2008 07:33,  No significant change was found  Confirmed by RODRÍGUEZ BHATT MD LOC:JN ([id number]) on 10/11/2017 7:55:45 AM     Comprehensive Metabolic Panel   Result Value Ref Range    Sodium 146 (H) 136 - 145 mmol/L    Potassium 4.4 3.5 - 5.0 mmol/L    Chloride 108 (H) 98 - 107 mmol/L    CO2 24 22 - 31 mmol/L    Anion Gap, Calculation 14 5 - 18 mmol/L    Glucose 112 70 - 125 mg/dL    BUN 23 (H) 8 - 22 mg/dL    Creatinine 0.86 0.60 - 1.10 mg/dL    GFR MDRD Af Amer >60 >60 mL/min/1.73m2    GFR MDRD Non Af Amer >60 >60 mL/min/1.73m2    Bilirubin, Total 0.6 0.0 - 1.0 mg/dL    Calcium 9.9 8.5 - 10.5 mg/dL    Protein, Total 7.6 6.0 - 8.0 g/dL    Albumin 4.0 3.5 - 5.0 g/dL    Alkaline Phosphatase 128 (H) 45 - 120 U/L    AST 22 0 - 40 U/L    ALT 26 0 - 45 U/L   HM2(CBC w/o Differential)   Result Value Ref Range    WBC 7.8 4.0 - 11.0 thou/uL    RBC 5.02 3.80 - 5.40 mill/uL    Hemoglobin 13.5 12.0 - 16.0 g/dL    Hematocrit 41.4 35.0 - 47.0 %    MCV 82 80 - 100 fL    MCH 26.8 (L) 27.0 - 34.0 pg    MCHC 32.6 32.0 - 36.0 g/dL    RDW 14.7 (H) 11.0 - 14.5 %    Platelets 241 140 - 440 thou/uL    MPV 7.8 7.0 - 10.0 fL     XR CHEST PA AND LATERAL  10/9/2017 5:10 PM     INDICATION: Encounter for other preprocedural examination.  COMPARISON: None.   FINDINGS: Bilateral linear subsegmental atelectasis. No consolidations. Heart size normal. ASCVD aorta.   This report was electronically interpreted by: Dr. Olayinka Salinas MD ON 10/09/2017 at 18:50    Gregory Doyle MD    Status:  Final result   Visible to patient:  Yes (MyChart)      Ref Range & Units 1mo ago     Final Diagnosis   RIGHT  BREAST, 10:30 POSITION, ZONE 1, ANTERIOR, ULTRASOUND-GUIDED CORE BIOPSY:     1) DUCTAL CARCINOMA IN-SITU (DCIS)         a) NUCLEAR GRADE: 1-2 OF 3         b) PATTERNS: SOLID AND CRIBRIFORM TYPES WITH FOCI OF NECROSIS       2) CALCIFICATIONS PRESENT IN DCIS AND IN BENIGN BREAST TISSUE     3) ADDITIONAL FINDINGS: NONPROLIFERATIVE FIBROCYSTIC CHANGES     4) ER/IA IMMUNOHISTOCHEMISTRY IS DEFERRED TO EXCISION OF THIS LESION

## 2021-06-15 ENCOUNTER — RECORDS - HEALTHEAST (OUTPATIENT)
Dept: ADMINISTRATIVE | Facility: OTHER | Age: 71
End: 2021-06-15

## 2021-06-15 NOTE — PROGRESS NOTES
ASSESSMENT/PLAN:      1. Sinusitis  Recent URI that improved and now mucopurulent nasal drainage and facial discomfort consistent with bacterial sinusitis.  Will treat with levofloxacin 500 mg po daily for 8 days, follow up if fails to improve.     Patient Instructions   Discussed that clinically sinusitis is present following an upper respiratory infection. Will treat with levofloxacin 500 mg by mouth daily for 8 days.  Follow up if fails to improve.       Medications Discontinued During This Encounter   Medication Reason     azithromycin (ZITHROMAX) 500 MG tablet Therapy completed       Return if symptoms worsen or fail to improve.    CHIEF COMPLAINT:  Chief Complaint   Patient presents with     URI     x 1 month, coughing, of and on sore throat, sneezing, runny nose, fever        HISTORY OF PRESENT ILLNESS:  Lisa Ray is a 67 y.o. female with mucopurulent nasal drainage and bilateral facial discomfort.  She had URI 2 weeks ago.  Improvement with azithromycin, but then return of congestion and the above discharge.  No teeth pain, or sanguinous discharge.  No fever, or significant coughing or dyspnea.     TOBACCO USE:  History   Smoking Status     Former Smoker   Smokeless Tobacco     Not on file       VITALS:  Vitals:    01/02/18 0930   BP: 116/70   Patient Site: Left Arm   Patient Position: Sitting   Cuff Size: Adult Regular   Pulse: 72   Temp: 97.4  F (36.3  C)   TempSrc: Oral   Weight: 155 lb 8 oz (70.5 kg)     Wt Readings from Last 3 Encounters:   01/02/18 155 lb 8 oz (70.5 kg)   10/09/17 165 lb 11.2 oz (75.2 kg)   08/11/17 168 lb (76.2 kg)       PHYSICAL EXAM:  Constitutional:  In NAD, alert and oriented.  Nasal congested voice.  Ears:  Right serous otitis no erythema  Oropharynx:  No significant erythema  Nose: bilateral mucopurulent discharge R>L  Neck:  Supple, no adenopathy  Cardiac:  Regular rate and rhythm without murmur  Lungs: Clear.      MEDICATIONS:  Current Outpatient Prescriptions    Medication Sig Dispense Refill     amoxicillin (AMOXIL) 500 MG tablet Take 500 mg by mouth as needed. Take 4 tablets 1 hour prior to dental work       aspirin 325 MG EC tablet Take 325 mg by mouth daily.       calcium carbonate-vitamin D3 600mg (1,000mg) -1,000 unit Tab Take 1 tablet by mouth daily.       cholecalciferol, vitamin D3, 1,000 unit tablet Take 1,000 Units by mouth daily.       coenzyme Q10 (CO Q-10) 100 mg capsule Take 400 mg by mouth at bedtime.        hydroCHLOROthiazide (HYDRODIURIL) 12.5 MG tablet Take 12.5 mg by mouth as needed.       levoFLOXacin (LEVAQUIN) 500 MG tablet Take 1 tablet (500 mg total) by mouth daily for 8 days. 8 tablet 0     metoprolol tartrate (LOPRESSOR) 25 MG tablet TAKE 1 TABLET BY MOUTH TWICE DAILY. 180 tablet 3     omeprazole (PRILOSEC) 20 MG capsule TAKE 1 CAPSULE BY MOUTH TWICE DAILY 180 capsule 2     simvastatin (ZOCOR) 20 MG tablet TAKE 1 TABLET BY MOUTH EVERY DAY 90 tablet 0     No current facility-administered medications for this visit.

## 2021-06-16 PROBLEM — G47.33 OSA (OBSTRUCTIVE SLEEP APNEA): Status: ACTIVE | Noted: 2019-06-05

## 2021-06-16 PROBLEM — Z00.00 HEALTH MAINTENANCE EXAMINATION: Status: ACTIVE | Noted: 2019-04-11

## 2021-06-16 PROBLEM — M81.0 AGE-RELATED OSTEOPOROSIS WITHOUT CURRENT PATHOLOGICAL FRACTURE: Status: ACTIVE | Noted: 2021-06-11

## 2021-06-16 PROBLEM — Z95.5 S/P CORONARY ARTERY STENT PLACEMENT: Chronic | Status: ACTIVE | Noted: 2017-10-09

## 2021-06-16 PROBLEM — J44.9 COPD (CHRONIC OBSTRUCTIVE PULMONARY DISEASE) (H): Status: ACTIVE | Noted: 2020-03-14

## 2021-06-16 PROBLEM — M25.511 RIGHT SHOULDER PAIN: Status: ACTIVE | Noted: 2020-02-12

## 2021-06-16 PROBLEM — I20.89 STABLE ANGINA (H): Status: ACTIVE | Noted: 2019-03-15

## 2021-06-16 PROBLEM — L65.9 HAIR LOSS: Status: ACTIVE | Noted: 2020-02-12

## 2021-06-16 PROBLEM — D05.10 DCIS (DUCTAL CARCINOMA IN SITU): Chronic | Status: ACTIVE | Noted: 2017-09-08

## 2021-06-16 PROBLEM — Z95.1 S/P CABG (CORONARY ARTERY BYPASS GRAFT): Status: ACTIVE | Noted: 2019-03-28

## 2021-06-16 PROBLEM — I10 ESSENTIAL HYPERTENSION, BENIGN: Status: ACTIVE | Noted: 2020-03-14

## 2021-06-16 PROBLEM — Z98.82 S/P BILATERAL BREAST IMPLANTS: Status: ACTIVE | Noted: 2018-09-25

## 2021-06-16 PROBLEM — Z90.13 HISTORY OF BILATERAL MASTECTOMY: Chronic | Status: ACTIVE | Noted: 2018-05-23

## 2021-06-16 PROBLEM — I89.0 ACQUIRED LYMPHEDEMA: Status: ACTIVE | Noted: 2020-02-12

## 2021-06-16 NOTE — TELEPHONE ENCOUNTER
Telephone Encounter by Deborah Iyer RN at 5/28/2019  2:55 PM     Author: Deborah Iyer RN Service: -- Author Type: Registered Nurse    Filed: 5/28/2019  3:05 PM Encounter Date: 5/28/2019 Status: Signed    : Deborah Iyer RN (Registered Nurse)

## 2021-06-16 NOTE — TELEPHONE ENCOUNTER
New Appointment Needed  What is the reason for the visit:    Pre-Op Appt Request  When is the surgery? :  4/27/21 and 2nd Eye in May  Where is the surgery?:   [facility]  Who is the surgeon? :    What type of surgery is being done?: Cataract bilateral eyes  Provider Preference: PCP only  How soon do you need to be seen?: Please if possible 4/19-4/21 please  Waitlist offered?: No  Okay to leave a detailed message:  No

## 2021-06-16 NOTE — TELEPHONE ENCOUNTER
Reason for Call:  Other call back      Detailed comments: [facility] calling stating received the Preo - op but it does not state that patient is cleared for surgery  Patient surgery is 04/27/2021 in a.m.    Please do an addedum and refax to:  [phone]    Phone Number Patient can be reached at: Other phone number:  [phone]    Best Time: anytime    Can we leave a detailed message on this number?: Yes    Call taken on 4/23/2021 at 1:22 PM by Carmen Schumacher

## 2021-06-16 NOTE — PROGRESS NOTES
[facility]  [address] [address]  Dept: [phone]  Dept Fax: [phone]  Primary Provider: Lawrence Hansen MD  Pre-op Performing Provider: LAWRENCE HANSEN      PREOPERATIVE EVALUATION:  Today's date: 4/20/2021    Lisa Ray is a 70 y.o. female who presents for a preoperative evaluation.    Surgical Information:  Surgery/Procedure: BIlateral Cataract  Surgery Location: [facility] [place]  Surgeon: Dr. Torres  Surgery Date: 4/27/21, 5/11/21  Time of Surgery: TBD  Where patient plans to recover: At home with family  Fax number for surgical facility: [phone]    Type of Anesthesia Anticipated: Local with MAC        Subjective     HPI related to upcoming procedure: blurred vision due to bilateral cataracts     Preop Questions 4/17/2021   Have you ever had a heart attack or stroke? No   Have you ever had surgery on your heart or blood vessels, such as a stent placement, a coronary artery bypass, or surgery on an artery in your head, neck, heart, or legs? YES -    Do you have chest pain with activity? No   Do you have a history of  heart failure? No   Do you currently have a cold, bronchitis or symptoms of other infection? YES -    Do you have a cough, shortness of breath, or wheezing? YES - chronic has elda evaluated with echo , tress test and PFTS . Probably COPD related is using incurse    Do you or anyone in your family have previous history of blood clots? No   Do you or does anyone in your family have a serious bleeding problem such as prolonged bleeding following surgeries or cuts? No   Have you ever had problems with anemia or been told to take iron pills? YES -    Have you had any abnormal blood loss such as black, tarry or bloody stools, or abnormal vaginal bleeding? No   Have you ever had a blood transfusion? YES -    Have you ever had a transfusion reaction? No   Are you willing to have a blood transfusion if it is  medically needed before, during, or after your surgery? Yes   Have you or any of your relatives ever had problems with anesthesia? No   Do you have sleep apnea, excessive snoring or daytime drowsiness? YES -    Do you have a CPAP machine? Yes   Do you have any artifical heart valves or other implanted medical devices like a pacemaker, defibrillator, or continuous glucose monitor? No   Do you have artificial joints? YES -    Are you allergic to latex? No     Health Care Directive:  Patient has a Health Care Directive on file.     Preoperative Review of :       See problem list for active medical problems.  Problems all longstanding and stable, except as noted/documented.  See ROS for pertinent symptoms related to these conditions.      Review of Systems  CONSTITUTIONAL: NEGATIVE for fever, chills, change in weight  ENT/MOUTH: Negative for ear, mouth, and throat problems  RESP: NEGATIVE for significant cough or SOB  CV: NEGATIVE for chest pain, palpitations or peripheral edema    Patient Active Problem List    Diagnosis Date Noted     COPD (chronic obstructive pulmonary disease) (H) 03/14/2020     Essential hypertension, benign 03/14/2020     Acquired lymphedema 02/12/2020     Hair loss 02/12/2020     Right shoulder pain 02/12/2020     ARSEN (obstructive sleep apnea) 06/05/2019     Health maintenance examination 04/11/2019     S/P CABG (coronary artery bypass graft) 03/28/2019     Stable angina (H) 03/15/2019     S/P bilateral breast implants 09/25/2018     History of bilateral mastectomy 05/23/2018     S/P coronary artery stent placement 10/09/2017     DCIS (ductal carcinoma in situ) 09/08/2017     Primary osteoarthritis of right hip 12/07/2015     History of total hip replacement, right 12/07/2015     Paresthesias/numbness 10/29/2015     Osteoarthritis 06/11/2015     Raynaud disease 06/11/2015     Ptosis Of Eyelid      Rosacea      Dysthymic disorder      Coronary Artery Disease      Past Medical History:    Diagnosis Date     Anemia 2012    microcytic from Celebrex. GI w/u neg     Back pain      Benign Adenomatous Polyp Of The Large Intestine     Created by Conversion      CAD (coronary artery disease) 2008    RCA- stent     Cancer (H)     breast cancer     DJD (degenerative joint disease)      Dysthymia      Fatigue     recurrent/variable - no serious pathology     Fibromyalgia      GERD (gastroesophageal reflux disease)      Hypertension      Iritis     past Hx.-left eye     ARSEN (obstructive sleep apnea) 6/5/2019     Raynaud's disease      Rosacea      Shortness of breath     with exertion     Shoulder tendonitis     right     Ulcer of intestine     small bowel- 10 years ago     UTI (urinary tract infection)     Jan. 2019     Past Surgical History:   Procedure Laterality Date     BELPHAROPTOSIS REPAIR Bilateral 2013    Dr. Eder Bingham- 2013     BREAST SURGERY Bilateral 11/2017    bilateral mastectomy 2017- 11/2017     BREAST SURGERY  2018    implants and revsion 2     CORONARY STENT PLACEMENT Right 2008    Dr. Schwab     CV CORONARY ANGIOGRAM N/A 3/26/2019    Procedure: Coronary Angiogram;  Surgeon: Ba Foley MD;  Location: [facility];  Service: Cardiology     HYSTERECTOMY       OOPHORECTOMY       OH APPENDECTOMY      Description: Appendectomy;  Recorded: 01/13/2009;     TOTAL HIP ARTHROPLASTY Right 12/7/2015    Procedure: RIGHT TOTAL HIP ARTHROPLASTY;  Surgeon: Tera Yeung MD;  Location: [facility];  Service:      Current Outpatient Medications   Medication Sig Dispense Refill     acetaminophen (TYLENOL) 500 MG tablet Take 500 mg by mouth every 6 (six) hours as needed for pain (arthritis).       acetaminophen-codeine (TYLENOL #3) 300-30 mg per tablet Take 1-2 tablets by mouth every 4 (four) hours as needed for pain. 90 tablet 0     aspirin 81 mg chewable tablet Chew 2 tablets (162 mg total) daily.  0     b complex vitamins tablet Take 1 tablet by mouth daily.       calcium,  as carbonate, (TUMS) 200 mg calcium (500 mg) chewable tablet Chew 2 tablets daily.       cholecalciferol, vitamin D3, 1,000 unit tablet Take 1,000 Units by mouth daily.       coenzyme Q10 400 mg cap Take 400 mg by mouth daily.       diclofenac sodium (VOLTAREN) 1 % Gel 4 g four times a day 1 Tube 3     docusate sodium (COLACE) 100 MG capsule Take 1 capsule (100 mg total) by mouth 2 (two) times a day as needed for constipation.  0     doxylamine (UNISOM) 25 mg tablet Take 25 mg by mouth at bedtime as needed for sleep.       ferrous sulfate 325 (65 FE) MG tablet Take 1 tablet (325 mg total) by mouth 2 (two) times a day. 60 tablet 3     lisinopriL (PRINIVIL,ZESTRIL) 10 MG tablet TAKE 1 TABLET BY MOUTH DAILY. 90 tablet 3     magnesium oxide 500 mg Tab Take 500 mg by mouth at bedtime.              melatonin 1 mg Tab tablet Take 1 mg by mouth at bedtime.              methocarbamol (ROBAXIN-750) 750 MG tablet Take 1 tablet (750 mg total) by mouth at bedtime. 90 tablet 1     metoprolol succinate (TOPROL-XL) 100 MG 24 hr tablet Take 1 tablet (100 mg total) by mouth at bedtime. 90 tablet 11     omeprazole (PRILOSEC) 20 MG capsule TAKE 1 CAPSULE BY MOUTH TWICE DAILY 180 capsule 3     peg 400-propylene glycol PF (SYSTANE) 0.4-0.3 % Dpet Administer 1 drop to both eyes 2 (two) times a day as needed.       pramipexole (MIRAPEX) 0.25 MG tablet TAKE 1 TABLET(0.25 MG) BY MOUTH AT BEDTIME 90 tablet 5     rosuvastatin (CRESTOR) 20 MG tablet TAKE 1 TABLET BY MOUTH AT BEDTIME 90 tablet 1     sertraline (ZOLOFT) 50 MG tablet TAKE 1 TABLET BY MOUTH ONCE DAILY 90 tablet 2     tiZANidine (ZANAFLEX) 2 MG tablet TAKE 1 TABLET BY MOUTH EVERY 6 HOURS IF NEEDED FOR MUSCLE SPASM.       triamcinolone (NASACORT) 55 mcg nasal inhaler Apply 2 sprays into each nostril daily as needed.       umeclidinium (INCRUSE ELLIPTA) 62.5 mcg/actuation DsDv inhaler Inhale 1 puff daily. 30 each 12     zolpidem (AMBIEN) 10 mg tablet Take 1 tablet (10 mg total) by  "mouth at bedtime as needed for sleep. 30 tablet 2     No current facility-administered medications for this visit.        Allergies   Allergen Reactions     Latex      Added based on information entered during log entry, please review and add reactions, type, and severity as needed     Nsaids (Non-Steroidal Anti-Inflammatory Drug)      Other reaction(s): GI Bleeding, GI Upset  Sensitivity.  Ulceration w/ Celebrex       Social History     Tobacco Use     Smoking status: Former Smoker     Packs/day: 1.00     Years: 20.00     Pack years: 20.00     Quit date: 3/27/1990     Years since quittin.0     Smokeless tobacco: Never Used   Substance Use Topics     Alcohol use: Yes     Alcohol/week: 3.3 standard drinks     Types: 4 Standard drinks or equivalent per week     Comment: 1-2 glasses of wine/week         Social History     Substance and Sexual Activity   Drug Use No        Objective     /72 (Patient Site: Left Arm, Patient Position: Sitting, Cuff Size: Adult Regular)   Pulse 60   Ht 5' 4\" (1.626 m)   Wt 175 lb 6 oz (79.5 kg)   SpO2 96%   BMI 30.10 kg/m    Physical Exam    GENERAL APPEARANCE: healthy, alert and no distress     EYES: EOMI, PERRL     HENT: ear canals and TM's normal and nose and mouth without ulcers or lesions     NECK: no adenopathy, no asymmetry, masses, or scars and thyroid normal to palpation     RESP: lungs clear to auscultation - no rales, rhonchi or wheezes     BREAST: implants ,absent nipplesno  masses, tenderness scars normal    CV: regular rates and rhythm, normal S1 S2, no S3 or S4 and no murmur, click or rub     ABDOMEN:  soft, nontender, no HSM or masses and bowel sounds normal     MS: extremities normal- no gross deformities noted, no evidence of inflammation in joints, FROM in all extremities.     SKIN: no suspicious lesions or rashes     NEURO: Normal strength and tone, sensory exam grossly normal, mentation intact and speech normal     PSYCH: mentation appears normal. and " affect normal/bright     LYMPHATICS: No cervical adenopathy    Recent Labs   Lab Test 02/12/20  1001 10/11/19  1050   HGB 12.0 12.7    276     --    K 4.4  --    CREATININE 0.75  --         PRE-OP Diagnostics:   Labs pending at this time. Results will be reviewed when available.  EKG: appears normal, NSR    REVISED CARDIAC RISK INDEX (RCRI)   The patient has the following serious cardiovascular risks for perioperative complications:   - No serious cardiac risks = 0 points    RCRI INTERPRETATION: 0 points: Class I (very low risk - 0.4% complication rate)  Assess/plan  1. Preop exam for internal medicine  Medically cleared for surgery   - Electrocardiogram Perform and Read  - Basic Metabolic Panel    2. SOB (shortness of breath)  She says she is still quite functional but continues to show some limitation of her exercise tolerance due to shortness of breath.  She has been worked up in this so far it does look like she has COPD she has a slightly reduced restrictive airway with minimal bronchodilation in the pulmonary function tests.  Stress testing has been negative.  I do not believe that we should complete the work-up by doing a CT chest to make sure she does not have any interstitial lung or bronchiectasis that was not seen in the chest x-rays done in the past.  ----------------------------------------------------  Consider 6-minute walk test as well she is not eligible for noncancerous  - CT Chest With Contrast; Future    3. Mixed hyperlipidemia    - Glycosylated Hemoglobin A1c  - Lipid Cascade FASTING  - Thyroid Stimulating Hormone (TSH)  - HM2(CBC w/o Differential)    4. Prediabetes    - Glycosylated Hemoglobin A1c    5. Health maintenance examination  She is due for all her physical annual wellness visit labs which we will do today.  She is past due for her bone density scan all other health screening was reviewed and is up-to-date.    6. Age-related osteoporosis without current pathological  fracture    - DXA Bone Density Scan; Future             Signed Electronically by: Amber Alberto MD    Copy of this evaluation report is provided to requesting physician.

## 2021-06-16 NOTE — TELEPHONE ENCOUNTER
RN cannot approve Refill Request    RN can NOT refill this medication PCP messaged that patient is overdue for Labs and Office Visit. Last office visit: 3/12/2020 Amber Alberto MD Last Physical: 2/12/2020 Last MTM visit: Visit date not found Last visit same specialty: 3/12/2020 Amber Alberto MD.  Next visit within 3 mo: Visit date not found  Next physical within 3 mo: Visit date not found      Elle Orozco, [facility] Triage/Med Refill 4/10/2021    Requested Prescriptions   Pending Prescriptions Disp Refills     lisinopriL (PRINIVIL,ZESTRIL) 10 MG tablet [Pharmacy Med Name: LISINOPRIL 10 MG TABLET] 90 tablet 3     Sig: TAKE 1 TABLET BY MOUTH DAILY.       Ace Inhibitors Refill Protocol Failed - 4/10/2021  5:03 PM        Failed - PCP or prescribing provider visit in past 12 months       Last office visit with prescriber/PCP: 3/12/2020 Amber Alberto MD OR same dept: Visit date not found OR same specialty: 3/12/2020 Amber Alberto MD  Last physical: 2/12/2020 Last MTM visit: Visit date not found   Next visit within 3 mo: Visit date not found  Next physical within 3 mo: Visit date not found  Prescriber OR PCP: Amber Alberto MD  Last diagnosis associated with med order: 1. Essential hypertension, benign  - lisinopriL (PRINIVIL,ZESTRIL) 10 MG tablet [Pharmacy Med Name: LISINOPRIL 10 MG TABLET]; TAKE 1 TABLET BY MOUTH DAILY.  Dispense: 90 tablet; Refill: 3    If protocol passes may refill for 12 months if within 3 months of last provider visit (or a total of 15 months).             Failed - Serum Potassium in past 12 months     No results found for: LN-POTASSIUM          Failed - Serum Creatinine in past 12 months     Creatinine   Date Value Ref Range Status   02/12/2020 0.75 0.60 - 1.10 mg/dL Final             Passed - Blood pressure filed in past 12 months     BP Readings from Last 1 Encounters:   08/06/20 122/80

## 2021-06-17 NOTE — PATIENT INSTRUCTIONS - HE
Patient Instructions by Felicitas Gonzalez PT at 2/25/2019 10:00 AM     Author: Felicitas Gonzalez PT Service: -- Author Type: Physical Therapist    Filed: 2/25/2019 10:50 AM Encounter Date: 2/25/2019 Status: Signed    : Felicitas Gonzalez PT (Physical Therapist)        DOORWAY STRETCH - HIGH    While standing in a doorway, place your arms up on the door frame and lean in until a stretch is felt along the front of your chest and/or shoulders. Your upper arms should be placed upward along the door frame.     NOTE: Your legs should control how much you stretch by bending or straightening your knee through the doorway. Hold 30 sec, 2-3 times      SIDELYING TRUNK ROTATION    While lying on your side with your arms out-stretched in front of your body, slowly twist your upper body to the side and rotated your spine. Your arms and head should also be rotating along with the spine as shown. Follow your hand with your eyes. Start with a 5-10 sec hold and repeat 5-10 times, on last 1-2 reps hold up to 30 sec if able; RIGHT ARM UP      ELASTIC BAND ROWS     Holding elastic band with both hands, draw back the band as you bend your elbows. Keep your elbows near the side of your body. Squeeze shoulder blades down and back together. Work up to 30-40 reps, one time      ELASTIC BAND SHOULDER EXTERNAL ROTATION    While holding an elastic band at your side with your elbow bent, start with your hand near your stomach and then pull the band away. Keep your elbow at your side the entire time. Work up to 30-40 reps, one time

## 2021-06-17 NOTE — PATIENT INSTRUCTIONS - HE
Patient Instructions by Gregory Doyle MD at 2/19/2019  2:50 PM     Author: Gregory Doyle MD Service: -- Author Type: Physician    Filed: 2/20/2019  2:12 PM Encounter Date: 2/19/2019 Status: Addendum    : Gregory Doyle MD (Physician)    Related Notes: Original Note by Gregory Doyle MD (Physician) filed at 2/20/2019  2:11 PM         Patient Education   Preventing Falls in the Home  As you get older, falls are more likely. Thats because your reaction time slows. Your muscles and joints may also get stiffer, making them less flexible. Illness, medications, and vision changes can also affect your balance. A fall could leave you unable to live on your own. To make your home safer, follow these tips:    Floors    Put nonskid pads under area rugs.    Remove throw rugs.    Replace worn floor coverings.    Tack carpets firmly to each step on carpeted stairs. Put nonskid strips on the edges of uncarpeted stairs.    Keep floors and stairs free of clutter and cords.    Arrange furniture so there are clear pathways.    Clean up any spills right away.    Bathrooms    Install grab bars in the tub or shower.    Apply nonskid strips or put a nonskid rubber mat in the tub or shower.    Sit on a bath chair to bathe.    Use bathmats with nonskid backing.    Lighting    Keep a flashlight in each room.    Put a nightlight along the pathway between the bedroom and the bathroom.    [phone] [employer]. [address] [place] [id number]. All rights reserved. This information is not intended as a substitute for professional medical care. Always follow your healthcare professional's instructions.           Advance Directive  Patients advance directive was discussed and I am comfortable with the patients wishes.  Patient Education   Personalized Prevention Plan  You are due for the preventive services outlined below.  Your care team is available to assist you in scheduling these  services.  If you have already completed any of these items, please share that information with your care team to update in your medical record.  Health Maintenance   Topic Date Due   ? ZOSTER VACCINES (2 of 3) 06/28/2010   ? DEPRESSION FOLLOW UP  03/24/2019   ? DXA SCAN  08/15/2019   ? MAMMOGRAM  08/22/2019   ? FALL RISK ASSESSMENT  02/19/2020   ? ADVANCE DIRECTIVES DISCUSSED WITH PATIENT  08/11/2022   ? COLONOSCOPY  10/25/2027   ? TD 18+ HE  02/19/2029   ? PNEUMOCOCCAL POLYSACCHARIDE VACCINE AGE 65 AND OVER  Completed   ? INFLUENZA VACCINE RULE BASED  Completed   ? PNEUMOCOCCAL CONJUGATE VACCINE FOR ADULTS (PCV13 OR PREVNAR)  Completed

## 2021-06-17 NOTE — TELEPHONE ENCOUNTER
Telephone Encounter by Zahira Batres LPN at 5/21/2021  3:19 PM     Author: Zahira Batres LPN Service: -- Author Type: Licensed Nurse    Filed: 5/21/2021  3:21 PM Encounter Date: 5/21/2021 Status: Signed    : Zahira Batres LPN (Licensed Nurse)       Medication: Tylenol #3  Last Date Filled 12/31/20   pulled: YES        Only PCP Prescribing? : YES  Taken as prescribed from physician notes? YES    CSA in last year: NO  Random Utox in last year: NO  Opioids + benzodiazepines? YES    Is patient on the Executive Review Team? NO      All responses suggest: Refilling prescription

## 2021-06-17 NOTE — PATIENT INSTRUCTIONS - HE
Patient Instructions by Felicitas Gonzalez PT at 3/4/2019 10:30 AM     Author: Felicitas Gonzalez PT Service: -- Author Type: Physical Therapist    Filed: 3/4/2019 11:03 AM Encounter Date: 3/4/2019 Status: Signed    : Felicitas Gonzalez PT (Physical Therapist)        PRONE RETRACTION EXTENSION - PRONE I    Lying face down with your arms by your side, slowly move your arms upward towards the ceiling as you squeeze your shoulder blades downwards and towards your spine.

## 2021-06-17 NOTE — TELEPHONE ENCOUNTER
Controlled Substance Refill Request  Medication Name:   Requested Prescriptions     Pending Prescriptions Disp Refills     acetaminophen-codeine (TYLENOL #3) 300-30 mg per tablet 90 tablet 0     Sig: Take 1-2 tablets by mouth every 4 (four) hours as needed for pain.     Date Last Fill: 12/30/20  Requested Pharmacy: [facility]  Submit electronically to pharmacy  Controlled Substance Agreement on file:   Encounter-Level CSA Scan Date:    There are no encounter-level csa scan date.        Last office visit:  4/20/21

## 2021-06-18 NOTE — PATIENT INSTRUCTIONS - HE
Patient Instructions by Amber Alberto MD at 2/12/2020  9:00 AM     Author: Amber Alberto MD Service: -- Author Type: Physician    Filed: 2/12/2020  9:46 AM Encounter Date: 2/12/2020 Status: Addendum    : Amber Alberto MD (Physician)    Related Notes: Original Note by Amber Alberto MD (Physician) filed at 2/12/2020  9:30 AM       Check bp every day different times once a day . Send readings in 2 weeks by [employer] .   Patient Education   Signs of Hearing Loss  Hearing loss is a problem shared by many people. In fact, it is one of the most common health conditions, particularly as people age. Most people over age 65 have some hearing loss, and by age 80, almost everyone does. Because hearing loss usually occurs slowly over the years, you may not realize your hearing ability has gotten worse.       Have your hearing checked  Contact your [facility] care provider if you:    Have to strain to hear normal conversation.    Have to watch other peoples faces very carefully to follow what theyre saying.    Need to ask people to repeat what theyve said.    Often misunderstand what people are saying.    Turn the volume of the television or radio up so high that others complain.    Feel that people are mumbling when theyre talking to you.    Find that the effort to hear leaves you feeling tired and irritated.    Notice, when using the phone, that you hear better with 1 ear than the other.    3558-2073 [employer]. [address] [place] [address], [place] [id number]. All rights reserved. This information is not intended as a substitute for professional medical care. Always follow your healthcare professional's instructions.         Patient Education   Urinary Incontinence, Female (Adult)  Urinary incontinence means loss of control of the bladder. This problem affects many women, especially as they get older. If you have incontinence, you may be embarrassed to ask for help. But know that this problem  can be treated.  Types of Incontinence  There are different types of incontinence. Two of the main types are described here. You can have more than one type.    Stress incontinence. With this type, urine leaks when pressure (stress) is put on the bladder. This may happen when you cough, sneeze, or laugh. Stress incontinence most often occurs because the pelvic floor muscles that support the bladder and urethra are weak. This can happen after pregnancy and vaginal childbirth or a hysterectomy. It can also be due to excess body weight or hormone changes.    Urge incontinence (also called overactive bladder). With this type, a sudden urge to urinate is felt often. This may happen even though there may not be much urine in the bladder. The need to urinate often during the night is common. Urge incontinence most often occurs because of bladder spasms. This may be due to bladder irritation or infection. Damage to bladder nerves or pelvic muscles, constipation, and certain medicines can also lead to urge incontinence.  Treatment of urinary incontinence depends on the cause. Further evaluation is needed to find the type you have. This will likely include an exam and certain tests. Based on the results, you and your healthcare provider can then plan treatment. Until a diagnosis is made, the home care tips below can help relieve symptoms.  Home care    Do pelvic floor muscle exercises, if they are prescribed. The pelvic floor muscles help support the bladder and urethra. Many women find that their symptoms improve when doing special exercises that strengthen these muscles. To do the exercises contract the muscles you would use to stop your stream of urine, but do this when youre not urinating. Hold for 10 seconds, then relax. Repeat 10 to 20 times in a row, at least 3 times a day. Your provider may give you other instructions for how to do the exercises and how often.    Keep a bladder diary. This helps track how often and  how much you urinate over a set period of time. Bring this diary with you to your next visit with the provider. The information can help your provider learn more about your bladder problem.    Lose weight, if advised to by your provider. Excess weight puts pressure on the bladder. Your provider can help you create a weight-loss plan thats right for you. This may include exercising more and making certain diet changes.    Don't consume foods and drinks that may irritate the bladder. These can include alcohol and caffeinated drinks.    Quit smoking. Smoking and other tobacco use can lead to chronic cough that strains the pelvic floor muscles. Smoking may also damage the bladder and urethra. Talk with your provider about treatments or methods you can use to quit smoking.    If drinking large amounts of fluid causes you to have symptoms, you may be advised to limit your fluid intake. You may also be advised to drink most of your fluids during the day and to limit fluids at night.    If youre worried about urine leakage or accidents, you may wear absorbent pads to catch urine. Change the pads often. This helps reduce discomfort. It may also reduce the risk of skin or bladder infections.  Follow-up care  Follow up with your healthcare provider, or as directed. It may take some to find the right treatment for your problem. Your treatment plan may include special therapies or medicines. Certain procedures or surgery may also be options. Be sure to discuss any questions you have with your provider.  When to seek medical advice  Call the healthcare provider right away if any of these occur:    Fever of 100.4 F (38 C) or higher, or as directed by your provider    Bladder pain or fullness    Abdominal swelling    Nausea or vomiting    Back pain    Weakness, dizziness or fainting  Date Last Reviewed: 10/1/2017    1590-1259 [employer]. [address] [place] [address]. All rights reserved. This information  is not intended as a substitute for professional medical care. Always follow your healthcare professional's instructions.     Patient Education   Depression and Suicide in Older Adults  Nearly 2 million older Americans have some type of depression. Sadly, some of them even take their own lives. Yet depression among older adults is often ignored. Learn the warning signs. You may help spare a loved one needless pain. You may also save a life.       What Is Depression?  Depression is a mood disorder that affects the way you think and feel. The most common symptom is a feeling of deep sadness. People who are depressed also may seem tired and listless. And nothing seems to give them pleasure. Its normal to grieve or be sad sometimes. But sadness lessens or passes with time. Depression rarely goes away or improves on its own. Other symptoms of depression are:    Sleeping more or less than normal    Eating more or less than normal    Having headaches, stomachaches, or other pains that dont go away    Feeling nervous, empty, or worthless    Crying a great deal    Thinking or talking about suicide or death    Feeling confused or forgetful  What Causes It?  The causes of depression arent fully known. Certain chemicals in the brain play a role. Depression does run in families. And life stresses can also trigger depression in some people. That may be the case with older adults. They often face great burdens, such as the death of friends or a spouse. They may have failing health. And they are more likely to be alone, lonely, or poor.  How You Can Help  Often, depressed people may not want to ask for help. When they do, they may be ignored. Or, they may receive the wrong treatment. You can help by showing parents and older friends love and support. If they seem depressed, help them find the right treatment. Talk to your doctor. Or contact a local mental health center, social service agency, or hospital. With modern treatment, no  one has to suffer from depression.  Resources:    National [place] of Mental Health  219.366.9757  www.nimh.nih.gov    National [place] on Mental Illness  313.259.3811  www.christie.org    [facility] [place]  549.157.9393  www.nmha.org    National Suicide Hotline  990.512.2510 (800-SUICIDE)      0672-0354 [employer]. [address] [place] [address]. All rights reserved. This information is not intended as a substitute for professional medical care. Always follow your healthcare professional's instructions.           Advance Directive  Patients advance directive was discussed and I am comfortable with the patients wishes.  Patient Education   Personalized Prevention Plan  You are due for the preventive services outlined below.  Your care team is available to assist you in scheduling these services.  If you have already completed any of these items, please share that information with your care team to update in your medical record.  Health Maintenance   Topic Date Due   ? HEPATITIS C SCREENING  1950   ? DXA SCAN  08/15/2019   ? MEDICARE ANNUAL WELLNESS VISIT  02/12/2021   ? FALL RISK ASSESSMENT  02/12/2021   ? LIPID  08/06/2024   ? ADVANCE CARE PLANNING  11/21/2024   ? COLONOSCOPY  10/25/2027   ? TD 18+ HE  02/19/2029   ? DEPRESSION ACTION PLAN  Completed   ? PNEUMOCOCCAL IMMUNIZATION 65+ LOW/MEDIUM RISK  Completed   ? INFLUENZA VACCINE RULE BASED  Completed   ? ZOSTER VACCINES  Completed   ? MAMMOGRAM  Discontinued

## 2021-06-18 NOTE — PROGRESS NOTES
Preoperative Exam    Scheduled Procedure: Breast implants (reconstruction)  Surgery Date:  6/14/18  Surgery Location: [facility]; Fax  [phone]    Surgeon:  Dr Melvin Augustine    Assessment/Plan:     Problem List Items Addressed This Visit     DCIS (ductal carcinoma in situ) (Chronic)    Relevant Orders    Electrocardiogram Perform and Read (Completed)    History of bilateral mastectomy      Other Visit Diagnoses     Pre-op exam    -  Primary    Relevant Orders    Electrocardiogram Perform and Read (Completed)    Comprehensive Metabolic Panel (Completed)    HM2(CBC w/o Differential) (Completed)    LDL Cholesterol, Direct (Completed)    Insomnia        Relevant Medications    zolpidem (AMBIEN) 5 MG tablet            Surgical Procedure Risk: Low (reported cardiac risk generally < 1%)  Have you had prior anesthesia?: Yes  Have you or any family members had a previous anesthesia reaction:  No  Do you or any family members have a history of a clotting or bleeding disorder?: No  Cardiac Risk Assessment: no increased risk for major cardiac complications    Patient approved for surgery with general or local anesthesia.         Functional Status: Independent  Patient plans to recover at home with family.     Subjective:      Lisa Ray is a 68 y.o. female who presents for a preoperative consultation.    She had a history of ductal carcinoma in situ last year treated at [facility] with bilateral mastectomy.  The expanders place and now she is scheduled for breast implants and reconstruction.  She is doing very well overall and actually looking forward to a trip to [place] before the surgery.  She is not had any cardiac respiratory neurologic or serious symptoms.  Does have some chronic rhinitis probably from allergies but this is likely seasonal.  No other issues of concern    All other systems reviewed and are negative, other than those listed in the HPI.    Pertinent History  Do you have  difficulty breathing or chest pain after walking up a flight of stairs: No  History of obstructive sleep apnea: No  Steroid use in the last 6 months: No  Frequent Aspirin/NSAID use: Yes: Aspirin 325mg daily  Prior Blood Transfusion: No  Prior Blood Transfusion Reaction: No  If for some reason prior to, during or after the procedure, if it is medically indicated, would you be willing to have a blood transfusion?:  There is no transfusion refusal.    Current Outpatient Prescriptions   Medication Sig Dispense Refill     magnesium oxide 500 mg Tab Take by mouth.       melatonin 1 mg Tab tablet Take 3 mg by mouth at bedtime as needed.       amoxicillin (AMOXIL) 500 MG tablet Take 500 mg by mouth as needed. Take 4 tablets 1 hour prior to dental work       aspirin 325 MG EC tablet Take 325 mg by mouth daily.       calcium carbonate-vitamin D3 600mg (1,000mg) -1,000 unit Tab Take 1 tablet by mouth daily.       cholecalciferol, vitamin D3, 1,000 unit tablet Take 1,000 Units by mouth daily.       coenzyme Q10 (CO Q-10) 100 mg capsule Take 400 mg by mouth at bedtime.        hydroCHLOROthiazide (HYDRODIURIL) 12.5 MG tablet Take 12.5 mg by mouth as needed.       metoprolol tartrate (LOPRESSOR) 25 MG tablet TAKE 1 TABLET BY MOUTH TWICE DAILY. 180 tablet 3     omeprazole (PRILOSEC) 20 MG capsule TAKE 1 CAPSULE BY MOUTH TWICE DAILY 180 capsule 3     simvastatin (ZOCOR) 20 MG tablet TAKE 1 TABLET BY MOUTH EVERY DAY 90 tablet 1     zolpidem (AMBIEN) 5 MG tablet Take 1 tablet (5 mg total) by mouth at bedtime as needed for sleep. 20 tablet 0     No current facility-administered medications for this visit.         Allergies   Allergen Reactions     Latex Other (See Comments)     Patient states skin sensitivity     Nsaids (Non-Steroidal Anti-Inflammatory Drug)      Other reaction(s): GI Bleeding, GI Upset  Sensitivity.  Ulceration w/ Celebrex       Patient Active Problem List   Diagnosis     Ptosis Of Eyelid     Rosacea     Dysthymic  disorder     Lower Back Pain     Coronary Artery Disease     Benign Adenomatous Polyp Of The Large Intestine     Osteoarthritis     Raynaud disease     Sicca syndrome     Paresthesias/numbness     Primary osteoarthritis of right hip     DCIS (ductal carcinoma in situ)     S/P coronary artery stent placement     History of bilateral mastectomy       Past Medical History:   Diagnosis Date     Anemia 2012    microcytic from Celebrex. GI w/u neg     Back pain      CAD (coronary artery disease) 2008    RCA- stent     DJD (degenerative joint disease)      Dysthymia      Fatigue     recurrent/variable - no serious pathology     Fibromyalgia      Iritis        Past Surgical History:   Procedure Laterality Date     BELPHAROPTOSIS REPAIR Bilateral 2013    Dr. Eder Bingham- 2013     BREAST SURGERY Bilateral 11/2017    bilateral mastectomy 2017- 11/2017     CORONARY STENT PLACEMENT Right 2008    Dr. Schwab     HYSTERECTOMY       OOPHORECTOMY       MO APPENDECTOMY      Description: Appendectomy;  Recorded: 01/13/2009;     TOTAL HIP ARTHROPLASTY Right 12/7/2015    Procedure: RIGHT TOTAL HIP ARTHROPLASTY;  Surgeon: Tera Yeung MD;  Location: [facility] Main OR;  Service:        Social History     Social History     Marital status:      Spouse name: Johny     Number of children: 3     Years of education: N/A     Occupational History     RN- retired ~2015 [employer]     retired/[employer]      Social History Main Topics     Smoking status: Former Smoker     Smokeless tobacco: Never Used     Alcohol use 2.0 oz/week     4 Standard drinks or equivalent per week      Comment: 1-2 glasses of wine/week if any     Drug use: No     Sexual activity: Not on file     Other Topics Concern     Not on file     Social History Narrative    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 Non  "smoker rare ETOH.              Objective:     Vitals:    05/21/18 0937   BP: 118/60   Pulse: 62   Resp: 16   SpO2: 94%   Weight: 159 lb 7 oz (72.3 kg)   Height: 5' 3\" (1.6 m)         Physical Exam  Physical Exam:  General-very pleasant woman looks generally healthy  VS- see above  HEENT- neg   Neck- no adenopathy/thyromegaly/bruits  Chest- clear   Cor- reg no murmurs/gallops/ectopics  Extremities: no edema, good distal pulses  Neuro- Cr. NN-  intact, alert,   Abdomen- soft non tender, no masses; no organomegaly  Skin- no suspicious lesions for malignancy  Lymph- no pathologic nodes in neck/axilla/groin  Musculoskeletal-  Breasts: -Expanders in place post bilateral mastectomy              Labs:  Recent Results (from the past 240 hour(s))   Electrocardiogram Perform and Read   Result Value Ref Range    SYSTOLIC BLOOD PRESSURE  mmHg    DIASTOLIC BLOOD PRESSURE  mmHg    VENTRICULAR RATE 60 BPM    ATRIAL RATE 60 BPM    P-R INTERVAL 154 ms    QRS DURATION 72 ms    Q-T INTERVAL 454 ms    QTC CALCULATION (BEZET) 454 ms    P Axis 64 degrees    R AXIS 2 degrees    T AXIS 35 degrees    MUSE DIAGNOSIS       Normal sinus rhythm  Possible Left atrial enlargement  Borderline ECG  When compared with ECG of 09-OCT-2017 15:37,  No significant change was found  Confirmed by RODRÍGUEZ BHATT MD LOC:[facility] ([id number]) on 5/22/2018 3:15:10 PM     Comprehensive Metabolic Panel   Result Value Ref Range    Sodium 141 136 - 145 mmol/L    Potassium 5.1 (H) 3.5 - 5.0 mmol/L    Chloride 106 98 - 107 mmol/L    CO2 24 22 - 31 mmol/L    Anion Gap, Calculation 11 5 - 18 mmol/L    Glucose 96 70 - 125 mg/dL    BUN 19 8 - 22 mg/dL    Creatinine 0.74 0.60 - 1.10 mg/dL    GFR MDRD Af Amer >60 >60 mL/min/1.73m2    GFR MDRD Non Af Amer >60 >60 mL/min/1.73m2    Bilirubin, Total 0.4 0.0 - 1.0 mg/dL    Calcium 9.4 8.5 - 10.5 mg/dL    Protein, Total 6.8 6.0 - 8.0 g/dL    Albumin 3.8 3.5 - 5.0 g/dL    Alkaline Phosphatase 134 (H) 45 - 120 U/L    AST 16 0 - 40 U/L    ALT 14 " 0 - 45 U/L   HM2(CBC w/o Differential)   Result Value Ref Range    WBC 6.8 4.0 - 11.0 thou/uL    RBC 4.71 3.80 - 5.40 mill/uL    Hemoglobin 12.5 12.0 - 16.0 g/dL    Hematocrit 38.0 35.0 - 47.0 %    MCV 81 80 - 100 fL    MCH 26.6 (L) 27.0 - 34.0 pg    MCHC 33.0 32.0 - 36.0 g/dL    RDW 14.1 11.0 - 14.5 %    Platelets 305 140 - 440 thou/uL    MPV 6.9 (L) 7.0 - 10.0 fL   LDL Cholesterol, Direct   Result Value Ref Range    Direct LDL 55 <=129 mg/dl         Immunization History   Administered Date(s) Administered     DT (pediatric) 01/01/1997     Dtap 01/01/1997     Influenza high dose, seasonal 09/01/2017     Influenza, inj, historic,unspecified 09/09/2015, 10/19/2016     Pneumo Conj 13-V (2010&after) 11/30/2015     Pneumo Polysac 23-V 04/29/2008, 12/01/2016     Tdap 03/24/2008     ZOSTER, LIVE 05/03/2010           Electronically signed by Gregory Doyle MD 05/23/18 9:47 AM

## 2021-06-19 NOTE — LETTER
Letter by González Florian MD at      Author: González Florian MD Service: -- Author Type: --    Filed:  Encounter Date: 9/11/2019 Status: (Other)         Rosa Ray                                  September 11, 2019    Patient: Lisa Ray   MR Number: [id number]   YOB: 1950   Date of Visit: 9/11/2019     To whom it may concern.    Lisa is under my care and on treatment with Continuous Positive Airway Pressure (CPAP) therapy.   If you have questions, please do not hesitate to contact our clinic.    Sincerely,        González Florian MD

## 2021-06-20 NOTE — PROGRESS NOTES
Preoperative Exam    Scheduled Procedure: Breast Implants/revision  Surgery Date:  10/18/18  Surgery Location: W fax [phone]    Surgeon:  Dr. Augustine    Assessment/Plan:     Problem List Items Addressed This Visit     DCIS (ductal carcinoma in situ) (Chronic)    Coronary Artery Disease (Chronic)    Relevant Medications    simvastatin (ZOCOR) 20 MG tablet    metoprolol tartrate (LOPRESSOR) 25 MG tablet    Other Relevant Orders    HM2(CBC w/o Differential) (Completed)    Basic Metabolic Panel (Completed)    S/P bilateral breast implants      Other Visit Diagnoses     Preop general physical exam    -  Primary    S/P bilateral mastectomy        Insomnia        Relevant Medications    zolpidem (AMBIEN) 5 MG tablet    Hyperlipidemia        Relevant Medications    simvastatin (ZOCOR) 20 MG tablet            Surgical Procedure Risk: Low (reported cardiac risk generally < 1%)  Have you had prior anesthesia?: Yes  Have you or any family members had a previous anesthesia reaction:  No  Do you or any family members have a history of a clotting or bleeding disorder?: No  Cardiac Risk Assessment: no increased risk for major cardiac complications    Patient approved for surgery with general or local anesthesia.        Functional Status: Independent  Patient plans to recover at home with family.     Subjective:      Lisa Ray is a 68 y.o. female who presents for a preoperative consultation.   Hx of right DCIS and bilateral mastectomy and subsequent breast reconstruction. Scheduled for revision  Remote cor stent ~ 2008 and no problems since. Recent health fine.   See consultation notes re DCIS and breast surgery    All other systems reviewed and are negative, other than those listed in the HPI.    Pertinent History  Do you have difficulty breathing or chest pain after walking up a flight of stairs: No  History of obstructive sleep apnea: No  Steroid use in the last 6 months: No  Frequent Aspirin/NSAID use: Yes:    Prior Blood Transfusion: Yes:   Prior Blood Transfusion Reaction: No  If for some reason prior to, during or after the procedure, if it is medically indicated, would you be willing to have a blood transfusion?:  There is no transfusion refusal.    Current Outpatient Prescriptions   Medication Sig Dispense Refill     amoxicillin (AMOXIL) 500 MG tablet Take 500 mg by mouth as needed. Take 4 tablets 1 hour prior to dental work       aspirin 325 MG EC tablet Take 325 mg by mouth daily.       calcium carbonate-vitamin D3 600mg (1,000mg) -1,000 unit Tab Take 1 tablet by mouth daily.       cholecalciferol, vitamin D3, 1,000 unit tablet Take 1,000 Units by mouth daily.       coenzyme Q10 (CO Q-10) 100 mg capsule Take 400 mg by mouth at bedtime.        hydroCHLOROthiazide (HYDRODIURIL) 12.5 MG tablet Take 12.5 mg by mouth as needed.       magnesium oxide 500 mg Tab Take by mouth.       melatonin 1 mg Tab tablet Take 3 mg by mouth at bedtime as needed.       methocarbamol (ROBAXIN) 500 MG tablet   0     metoprolol tartrate (LOPRESSOR) 25 MG tablet Take 1 tablet (25 mg total) by mouth 2 (two) times a day. 180 tablet 3     omeprazole (PRILOSEC) 20 MG capsule TAKE 1 CAPSULE BY MOUTH TWICE DAILY 180 capsule 3     simvastatin (ZOCOR) 20 MG tablet Take 1 tablet (20 mg total) by mouth daily. 90 tablet 1     zolpidem (AMBIEN) 5 MG tablet Take 1 tablet (5 mg total) by mouth at bedtime as needed for sleep. 30 tablet 0     No current facility-administered medications for this visit.         Allergies   Allergen Reactions     Latex Other (See Comments)     Patient states skin sensitivity     Nsaids (Non-Steroidal Anti-Inflammatory Drug)      Other reaction(s): GI Bleeding, GI Upset  Sensitivity.  Ulceration w/ Celebrex       Patient Active Problem List   Diagnosis     Ptosis Of Eyelid     Rosacea     Dysthymic disorder     Lower Back Pain     Coronary Artery Disease     Benign Adenomatous Polyp Of The Large Intestine     Osteoarthritis      Raynaud disease     Sicca syndrome (H)     Paresthesias/numbness     Primary osteoarthritis of right hip     DCIS (ductal carcinoma in situ)     S/P coronary artery stent placement     History of bilateral mastectomy     S/P bilateral breast implants       Past Medical History:   Diagnosis Date     Anemia 2012    microcytic from Celebrex. GI w/u neg     Back pain      CAD (coronary artery disease) 2008    RCA- stent     DJD (degenerative joint disease)      Dysthymia      Fatigue     recurrent/variable - no serious pathology     Fibromyalgia      Iritis        Past Surgical History:   Procedure Laterality Date     BELPHAROPTOSIS REPAIR Bilateral 2013    Dr. Eder Bingham- 2013     BREAST SURGERY Bilateral 11/2017    bilateral mastectomy 2017- 11/2017     BREAST SURGERY  2018    implants and revsion 2     CORONARY STENT PLACEMENT Right 2008    Dr. Schwab     HYSTERECTOMY       OOPHORECTOMY       CO APPENDECTOMY      Description: Appendectomy;  Recorded: 01/13/2009;     TOTAL HIP ARTHROPLASTY Right 12/7/2015    Procedure: RIGHT TOTAL HIP ARTHROPLASTY;  Surgeon: Tera Yeung MD;  Location: [facility] OR;  Service:        Social History     Social History     Marital status:      Spouse name: Johny     Number of children: 3     Years of education: N/A     Occupational History     RN- retired ~2015 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 3 grown sons Aries in - , Josse SAMUELS, -microbrewer/beer, ; Gregory Lu- paramedic [employer]...... Non smoker rare ETOH.   "            Objective:     Vitals:    09/24/18 0851   BP: 114/68   Pulse: 64   Temp: 97.9  F (36.6  C)   TempSrc: Tympanic   SpO2: 97%   Weight: 158 lb (71.7 kg)   Height: 5' 4\" (1.626 m)         Physical Exam:  PE:  General- healthy appearing; looks younger than stated age  VS- see above  HEENT- neg   Neck- no adenopathy/thyromegaly/bruits  Chest- clear   Cor- reg no murmurs/gallops/ectopics  Extremities: no edema, good distal pulses  Neuro- Cr. NN-  intact, alert,   Abdomen- soft non tender, no masses; no organomegaly  Skin- no suspicious lesions for malignancy  Lymph- no pathologic nodes in neck/axilla/groin  Musculoskeletal-  Breasts: - see surgeon report/implants in place            Labs/EKG:    ID:[id number] 21-MAY-2018 08:44:06 HE JOES/JOHNS/MIGUEL ANGEL/KATINA  Normal sinus rhythm  Possible Left atrial enlargement  Borderline ECG  When compared with ECG of 09-OCT-2017 15:37,  No significant change was found  Confirmed by NOVA MUHAMMAD, RODRÍGUEZ LOC:JN ([id number]) on 5/22/2018 3:15:10 PM    Recent Results (from the past 240 hour(s))   HM2(CBC w/o Differential)   Result Value Ref Range    WBC 5.6 4.0 - 11.0 thou/uL    RBC 4.96 3.80 - 5.40 mill/uL    Hemoglobin 13.3 12.0 - 16.0 g/dL    Hematocrit 40.4 35.0 - 47.0 %    MCV 81 80 - 100 fL    MCH 26.8 (L) 27.0 - 34.0 pg    MCHC 32.9 32.0 - 36.0 g/dL    RDW 14.8 (H) 11.0 - 14.5 %    Platelets 221 140 - 440 thou/uL    MPV 7.3 7.0 - 10.0 fL   Basic Metabolic Panel   Result Value Ref Range    Sodium 145 136 - 145 mmol/L    Potassium 4.9 3.5 - 5.0 mmol/L    Chloride 107 98 - 107 mmol/L    CO2 27 22 - 31 mmol/L    Anion Gap, Calculation 11 5 - 18 mmol/L    Glucose 117 70 - 125 mg/dL    Calcium 9.8 8.5 - 10.5 mg/dL    BUN 22 8 - 22 mg/dL    Creatinine 0.76 0.60 - 1.10 mg/dL    GFR MDRD Af Amer >60 >60 mL/min/1.73m2    GFR MDRD Non Af Amer >60 >60 mL/min/1.73m2           Immunization History   Administered Date(s) Administered     DT (pediatric) 01/01/1997     Dtap 01/01/1997     Influenza high " dose, seasonal 09/01/2017     Influenza, inj, historic,unspecified 09/09/2015, 10/19/2016     Pneumo Conj 13-V (2010&after) 11/30/2015     Pneumo Polysac 23-V 04/29/2008, 12/01/2016     Tdap 03/24/2008     ZOSTER, LIVE 05/03/2010           Electronically signed by Gregory Doyle MD 09/25/18 8:54 AM

## 2021-06-21 ENCOUNTER — COMMUNICATION - HEALTHEAST (OUTPATIENT)
Dept: INTERNAL MEDICINE | Facility: CLINIC | Age: 71
End: 2021-06-21

## 2021-06-21 DIAGNOSIS — K21.9 GERD (GASTROESOPHAGEAL REFLUX DISEASE): ICD-10-CM

## 2021-06-24 NOTE — PROGRESS NOTES
[facility] Daily Progress     Patient Name: Lisa Ray  Date: 2/27/2019  Visit #: 2/12  PTA visit #:  1  Referral Diagnosis: shoulder tendonitis, right  Referring provider: Gregory Doyle, *  Visit Diagnosis:     ICD-10-CM    1. Impingement syndrome of right shoulder M75.41    2. Generalized muscle weakness M62.81    3. Abnormal posture R29.3          Assessment:     Cues for HEP/posture needed  Complaint with HEP  Pt would benefit with continuing skilled physical therapy treatments    Goal Status:  Ongoing  Pt. will be independent with home exercise program in : 4 weeks  Pt. will have improved quality of sleep: waking less times/night;in 12 weeks  Patient will reach / maintain arm movement: overhead;forward;for home chores;with full ROM;with less pain;in 12 weeks (<3/10 pain)    Patient will decrease : SPADI score;for improved quality of function;for improved quality of life;in 12 weeks;by _ points  by ___ points: >9 pts      Plan / Patient Education:     Continue POC  Progress ex as able    Subjective:     Pain Rating: 3/10 constant  Sleeping is hard  tyenenol 3-4x/day  CP 1x/day  Taking care of grandchild 2-3x/week  Yoga 2x/week  1x/day  Reports relief with KT denies itching        Objective:   Cues for HEP/posture  Reviewed HEP  Added pulleys, UBE, IR stretch with towel, IR with OTB  CFM to right anterior shoulder  Reapplied KT skin clear  Tolerated treatment well      Treatment Today   2/27/2019    TREATMENT MINUTES COMMENTS   Evaluation     Self-care/ Home management     Manual therapy 5 CFM to anterior right shoulder   Neuromuscular Re-education     Therapeutic Activity     Therapeutic Exercises 23 HEP per flow sheet/KT to right shoulder   Gait training     Modality__________________                Total 28    Blank areas are intentional and mean the treatment did not include these items.       Inder Laura  2/27/2019

## 2021-06-24 NOTE — PROGRESS NOTES
"[facility] Daily Progress     Patient Name: Lisa Lee  Date: 3/11/2019  Visit #:  until 19  PTA visit #:  2    Referral Diagnosis: Shoulder tendonitis, right  Referring provider: Gregory Doyle, *  Visit Diagnosis:     ICD-10-CM    1. Impingement syndrome of right shoulder M75.41    2. Generalized muscle weakness M62.81    3. Abnormal posture R29.3          Assessment:     Complaint with HEP and improvement in posture noted.  Pt would benefit and is appropriate to continue with skilled physical therapy treatments.  Pt has periods with 0/10 pain now. Overall, good progression towards goals.    Goal Status:  Ongoing  Pt. will be independent with home exercise program in : 4 weeks;Met  Pt. will have improved quality of sleep: waking less times/night;in 12 weeks;Progressing toward  Patient will reach / maintain arm movement: overhead;forward;for home chores;with full ROM;with less pain;in 12 weeks (<3/10 pain)    Patient will decrease : SPADI score;for improved quality of function;for improved quality of life;in 12 weeks;by _ points  by ___ points: >9 pts      Plan / Patient Education:     Continue POC  Progress ex as able; serratus anterior strengthening as able (wall slides with band)    Subjective:     Pain Ratin/10 R shoulder now,  5/10 last night when trying to sleep  When she does the CFM it is less tender.  Exercises 1x/day - is \"achy\" after for a few hours  Tyenenol 3-4x/day  CP 2x/day      Objective:     Pt reports less tender with CFM  Min cueing with prone exercises     Shoulder AROM:  Flexion: WNL, pain-free yue  Abduction: WNL, pain-free yue      Treatment Today   3/11/2019  TREATMENT MINUTES COMMENTS   Evaluation     Self-care/ Home management     Manual therapy 10 CFM to R short head of biceps and supraspinatus tendon  -supine R GH joint distraction and posterior glides, grade II-III   Neuromuscular Re-education     Therapeutic Activity   "   Therapeutic Exercises 15 HEP per flow sheet   Gait training     Modality__________________                Total 25    Blank areas are intentional and mean the treatment did not include these items.       Felicitas Gonzalez, PT, DPT  3/11/2019

## 2021-06-24 NOTE — PROGRESS NOTES
[facility] Note    Assessment/Plan:    Lisa Ray is a 68 y.o. old female with :    1. Stable angina - discussed options including stress imaging vs angiography and the risks, benefits, goals and alternatives and she woul dlike to proceed. Raise simvastatin 40mg and add imdur 30mg daily cont metoprolol including day of procedure.    Dr. Ba Foley  [facility]  [phone]  ______________________________________________________________________    Subjective:    I had the opportunity to see Lisa Ray at the [facility]. Lisa Ray is a 68 y.o. female with a known history of CAD s/p PCI to RCA in 2008 with no further events since that time other than recent hospitalization for UTI with possible change in ECG and possible ARSEN.  On simvastatin 20mg with LDL 55 5/18, HDL 48.  She does have dyspnea on exertion with stairs or at higher elevation, she has a heaviness with walking or stairs, but not at rest, has been unchanged for the past 6 months and symptoms began after breast cancer surgery.  No GI/ bleeding, off of NSAIDS due to allergy but takes 325mg asp every day.  No PND, orthonpea, rare edema, no syncopal events.  Blood pressure is stable.   She had breast cancer surgery 12/17 with no complications with her heart.  No tob, 1-2 times a week EtOH.  She had a GI bleed 2 years ago related to NSAIDS on celebrix.  No plans for surgery.   ______________________________________________________________________      Review of Systems:   General: WNL  Eyes: WNL  Ears/Nose/Throat: WNL  Lungs: Shortness of Breath, Snoring  Heart: Shortness of Breath with activity  Stomach: Heartburn  Bladder: WNL  Muscle/Joints: Joint Pain, Muscle Pain  Skin: WNL  Nervous System: Dizziness  Mental Health: Depression, Anxiety     Blood: WNL    Problem List:  Patient Active Problem List   Diagnosis     Ptosis Of Eyelid     Rosacea     Dysthymic disorder     Lower Back Pain      Coronary Artery Disease     Benign Adenomatous Polyp Of The Large Intestine     Osteoarthritis     Raynaud disease     Sicca syndrome (H)     Paresthesias/numbness     Primary osteoarthritis of right hip     DCIS (ductal carcinoma in situ)     S/P coronary artery stent placement     History of bilateral mastectomy     S/P bilateral breast implants     Medical History:  Past Medical History:   Diagnosis Date     Anemia 2012    microcytic from Celebrex. GI w/u neg     Back pain      CAD (coronary artery disease) 2008    RCA- stent     DJD (degenerative joint disease)      Dysthymia      Fatigue     recurrent/variable - no serious pathology     Fibromyalgia      Hypertension      Iritis      Surgical History:  Past Surgical History:   Procedure Laterality Date     BELPHAROPTOSIS REPAIR Bilateral 2013    Dr. Eder Bingham- 2013     BREAST SURGERY Bilateral 11/2017    bilateral mastectomy 2017- 11/2017     BREAST SURGERY  2018    implants and revsion 2     CORONARY STENT PLACEMENT Right 2008    Dr. Schwab     HYSTERECTOMY       OOPHORECTOMY       AL APPENDECTOMY      Description: Appendectomy;  Recorded: 01/13/2009;     TOTAL HIP ARTHROPLASTY Right 12/7/2015    Procedure: RIGHT TOTAL HIP ARTHROPLASTY;  Surgeon: Tera Yeung MD;  Location: [facility] OR;  Service:      Social History:  No pertinent social hx related to patient's chief complaint other than above in subjective        Family History:  No pertinent family hx related to patient's chief complaint other than above in subjective      Allergies:  Allergies   Allergen Reactions     Latex Other (See Comments)     Patient states skin sensitivity     Nsaids (Non-Steroidal Anti-Inflammatory Drug)      Other reaction(s): GI Bleeding, GI Upset  Sensitivity.  Ulceration w/ Celebrex       Medications:  Current Outpatient Medications   Medication Sig Dispense Refill     aspirin 325 MG EC tablet Take 325 mg by mouth daily.       b complex vitamins tablet Take  "1 tablet by mouth daily.       cholecalciferol, vitamin D3, 1,000 unit tablet Take 1,000 Units by mouth daily.       coenzyme Q10 (CO Q-10) 100 mg capsule Take 400 mg by mouth at bedtime.        magnesium oxide 500 mg Tab Take by mouth.       melatonin 1 mg Tab tablet Take 1 mg by mouth at bedtime as needed.              metoprolol tartrate (LOPRESSOR) 25 MG tablet Take 1 tablet (25 mg total) by mouth 2 (two) times a day. 180 tablet 3     omeprazole (PRILOSEC) 20 MG capsule TAKE 1 CAPSULE BY MOUTH TWICE DAILY 180 capsule 0     simvastatin (ZOCOR) 20 MG tablet Take 1 tablet (20 mg total) by mouth daily. 90 tablet 1     zolpidem (AMBIEN) 5 MG tablet Take 1 tablet (5 mg total) by mouth at bedtime as needed for sleep. 30 tablet 0     amoxicillin (AMOXIL) 500 MG tablet Take 500 mg by mouth as needed. Take 4 tablets 1 hour prior to dental work       calcium carbonate-vitamin D3 600mg (1,000mg) -1,000 unit Tab Take 1 tablet by mouth daily.       hydroCHLOROthiazide (HYDRODIURIL) 12.5 MG tablet Take 12.5 mg by mouth as needed.       methocarbamol (ROBAXIN) 500 MG tablet   0     No current facility-administered medications for this visit.        Objective:   Vital signs:  /82 (Patient Site: Left Arm, Patient Position: Sitting, Cuff Size: Adult Regular)   Pulse 64   Resp 16   Ht 5' 3.5\" (1.613 m)   Wt 159 lb 4.8 oz (72.3 kg)   BMI 27.78 kg/m        Physical Exam:    GENERAL APPEARANCE: Alert, cooperative and in no acute distress.  HEENT: No scleral icterus. No Xanthelasma. Oral mucuos membranes pink and moist.  NECK: JVP<6cm. No Hepatojugular reflux. Thyroid not visualized  CHEST: clear to auscultation  CARDIOVASCULAR: S1, S2 without murmur ,clicks or rubs. Brachial, radial and posterior tibial pulses are intact and symetric. No carotid bruits noted.    ABDOMEN: Nontender. BS+. No bruits.  EXTREMITIES: No cyanosis, clubbing or edema.    Lab Results:  LIPIDS:  Lab Results   Component Value Date    CHOL 142 " 08/11/2017    CHOL 158 12/01/2016    CHOL 136 11/30/2015     Lab Results   Component Value Date    HDL 46 (L) 08/11/2017    HDL 58 12/01/2016    HDL 58 11/30/2015     Lab Results   Component Value Date    LDLCALC 76 08/11/2017    LDLCALC 75 12/01/2016    LDLCALC 53 11/30/2015     Lab Results   Component Value Date    TRIG 99 08/11/2017    TRIG 124 12/01/2016    TRIG 127 11/30/2015     No components found for: CHOLHDL    BMP:  Lab Results   Component Value Date    CREATININE 0.95 01/07/2019    BUN 17 01/07/2019     01/07/2019    K 3.7 01/07/2019     01/07/2019    CO2 24 01/07/2019         Ba Foley MD  [facility]  [phone]

## 2021-06-24 NOTE — PROGRESS NOTES
[facility] Certification Request    February 25, 2019      Patient: Lisa Ray  MR Number: [id number]  YOB: 1950  Date of Visit: 2/25/2019      Dear Dr. Doyle:    Thank you for this referral.   We are seeing Lisa Ray for Physical Therapy of her R shoulder pain.    Medicare and/or Medicaid requires physician review and approval of the treatment plan. Please review the plan of care and verify that you agree with the therapy plan of care by co-signing this note.      Plan of Care  Authorization / Certification Start Date: 02/25/19  Authorization / Certification End Date: 05/26/19  Authorization / Certification Number of Visits: 12  Communication with: Referral Source  Patient Related Instruction: Nature of Condition;Treatment plan and rationale;Self Care instruction;Body mechanics;Next steps;Posture;Basis of treatment;Precautions;Expected outcome  Times per Week: 1-2  Number of Weeks: 12  Number of Visits: up to 12  Discharge Planning: when goals are met or pt's progress with PT has plateaued  Precautions / Restrictions : hx of breast cancer  Therapeutic Exercise: ROM;Stretching;Strengthening  Neuromuscular Reeducation: kinesio tape;posture;balance/proprioception;TNE;core  Manual Therapy: soft tissue mobilization;myofascial release;muscle energy;joint mobilization  Modalities: electrical stimulation;TENS;cold pack;hot pack (NO US DUE TO HX OF BREAST CANCER)  Equipment: theraband      Goals:  Pt. will be independent with home exercise program in : 4 weeks  Pt. will have improved quality of sleep: waking less times/night;in 12 weeks  Patient will reach / maintain arm movement: overhead;forward;for home chores;with full ROM;with less pain;in 12 weeks (<3/10 pain)    Patient will decrease : SPADI score;for improved quality of function;for improved quality of life;in 12 weeks;by _ points  by ___ points: >9 pts        If you have any questions or concerns, please don't  hesitate to call.    Sincerely,      Felicitas Gonzalez, PT        Physician recommendation:     ___ Follow therapist's recommendation        ___ Modify therapy      *Physician co-signature indicates they certify the need for these services furnished within this plan and while under their care.        [facility]   Shoulder Initial Evaluation    Patient Name: Lisa Lee  Date of evaluation: 2/25/2019  Referral Diagnosis: Shoulder tendonitis, right  Insurance: Medicare, [employer] senior gold  Referring provider: Gregory Doyle, *  Visit Diagnosis:     ICD-10-CM    1. Impingement syndrome of right shoulder M75.41    2. Generalized muscle weakness M62.81    3. Abnormal posture R29.3        Assessment:      Impairments in  pain, posture, ROM, joint mobility, strength, ADL's  Patient's signs and symptoms are consistent with R shoulder impingement. Pt presents with R shoulder pain that started in January 2018 after having a yue mastectomy with breast reconstruction. She has pain with overhead reaching, lifting, sleeping, lying on her R side, reaching behind her back, completing her yoga/workout routine, and performing her household activities. She presents with + R shoulder impingement testing, R UE weakness, tenderness along her R biceps and supraspinatus tendon along with forward shoulders posture. .  The POC is dynamic and will be modified on an ongoing basis.  Barriers to achieving goals as noted in the assessment section may affect outcome.  Prognosis to achieve goals is  good   Pt. is appropriate for skilled PT intervention as outlined in the Plan of Care (POC).  Pt. is a good candidate for skilled PT services to improve pain levels and function.    Goals:  Pt. will be independent with home exercise program in : 4 weeks  Pt. will have improved quality of sleep: waking less times/night;in 12 weeks  Patient will reach / maintain arm movement: overhead;forward;for home chores;with full  ROM;with less pain;in 12 weeks (<3/10 pain)    Patient will decrease : SPADI score;for improved quality of function;for improved quality of life;in 12 weeks;by _ points  by ___ points: >9 pts      Patient's expectations/goals are realistic.    Barriers to Learning or Achieving Goals:  Chronicity of the problem.       Plan / Patient Instructions:        Plan of Care:   Authorization / Certification Start Date: 02/25/19  Authorization / Certification End Date: 05/26/19  Authorization / Certification Number of Visits: 12  Communication with: Referral Source  Patient Related Instruction: Nature of Condition;Treatment plan and rationale;Self Care instruction;Body mechanics;Next steps;Posture;Basis of treatment;Precautions;Expected outcome  Times per Week: 1-2  Number of Weeks: 12  Number of Visits: up to 12  Discharge Planning: when goals are met or pt's progress with PT has plateaued  Precautions / Restrictions : hx of breast cancer  Therapeutic Exercise: ROM;Stretching;Strengthening  Neuromuscular Reeducation: kinesio tape;posture;balance/proprioception;TNE;core  Manual Therapy: soft tissue mobilization;myofascial release;muscle energy;joint mobilization  Modalities: electrical stimulation;TENS;cold pack;hot pack (NO US DUE TO HX OF BREAST CANCER)  Equipment: theraband      POC and pathology of condition were reviewed with patient.  Pt. is in agreement with the Plan of Care  A Home Exercise Program (HEP) was initiated today.  Pt. was instructed in exercises by PT and patient was given a handout with detailed instructions.  Plan for next visit: progress RC and scapular strengthening, pulleys, assess response to KTape  .   Subjective:       Social information:   Occupation:retired   Work Status:NA   Equipment Available: None    History of Present Illness:    Lisa is a 68 y.o. female who presents to therapy today with complaints of R shoulder pain. Date of onset/duration of symptoms is January 2018. Pt is s/p yue  "mastectomy with breast reconstruction and implants at time of onset. It has been improving a little bit but has not fully resolved. She did have lymph nodes removed but she has not been diagnosed with lymphedema. There was achyness in her R posterior shoulder blade along with numbness that could have resulted from nerve damage. It has slightly improved. When she reaches above she has achiness and it \"locks\". It is hard to bring it back down and she has to manually assist to reduce pain.     Exercise: yoga, JCC, travel, lifting grandkid    Patient Active Problem List   Diagnosis     Ptosis Of Eyelid     Rosacea     Dysthymic disorder     Lower Back Pain     Coronary Artery Disease     Benign Adenomatous Polyp Of The Large Intestine     Osteoarthritis     Raynaud disease     Sicca syndrome (H)     Paresthesias/numbness     Primary osteoarthritis of right hip     DCIS (ductal carcinoma in situ)     S/P coronary artery stent placement     History of bilateral mastectomy     S/P bilateral breast implants       Pain Rating:3  Pain rating at best: 2  Pain rating at worst: 7  Pain description: aching and dull, intermittent sharp, intermittent n/t into her R lateral arm    Functional limitations are described as occurring with:   Sleep- wakes 1-2x/night  Lift  Yard and household work  Reach into cupboard  [place]/dress  Carry laundry/groceries  /hold objects    Patient reports benefit from:  cold       Objective:      Note: Items left blank indicates the item was not performed or not indicated at the time of the evaluation.    Patient Outcome Measures :    Shoulder Pain and Disability Index (SPADI) in %: 52     Scores range from 0-100%, where a score of 0% represents minimal pain and maximal function. The minimal clincically important difference is a score reduction of 10%.    Shoulder Examination  1. Impingement syndrome of right shoulder     2. Generalized muscle weakness     3. Abnormal posture       Involved side: " Right  Posture Observation:      General sitting posture is  fair.   Cervical Clearing: Normal    Shoulder/Elbow ROM    Date: 2/25/2019     Shoulder and Elbow ROM ( )   AROM in degrees AROM in degrees AROM in degrees    Right Left Right Left Right Left   Shoulder Flexion (0-180 )  mild restriction at end with pain WNL       Shoulder Abduction (0-180 ) WNL with crepitus WNL       Shoulder Extension (0-60 )         Shoulder ER (0-90 ) To T4 To T4       Shoulder IR (0-70 ) To R inferior angle To T8       Shoulder IR/Ext         Elbow Flexion (150 )         Elbow Extension (0 )          PROM in degrees PROM in degrees PROM in degrees    Right Left Right Left Right Left   Shoulder Flexion (0-180 )         Shoulder Abduction (0-180 )         Shoulder Extension (0-60 )         Shoulder ER (0-90 )         Shoulder IR (0-70 )         Elbow Flexion (150 )         Elbow Extension (0 )           Shoulder/Elbow Strength   Date: 2/25/2019     Shoulder/Elbow Strength (/5)  Manual Muscle Test (MMT) MMT MMT MMT    Right Left Right Left Right Left   Shoulder Flexion 4 5       Supraspinatus         Shoulder Abduction 4 5       Shoulder Extension         Shoulder External Rotation 4 5       Shoulder Internal Rotation 5 5       Elbow Flexion 5 5       Elbow Extension 4 5       Other:         Other:           Palpation: tender along R short head of biceps tendon and supraspinatus    Joint Assessment:  Glenohumeral Joint Assessment:Posterior Capsule tightness.    Shoulder Special Tests     Impingement Cluster Right (+/-) Left (+/-) Rotator Cuff Tests Right (+/-) Left (+/-)   Mauricio-Brad + - Drop Arm Sign - -   Painful Arc + - Hornblowers     Infraspinatus Test + - ERLS - -   AC Tests Right (+/-) Left (+/-) IRLS - -   Active Compression   Labral Tests Right (+/-) Left (+/-)   Crossbody Adduction   Biceps Load Test II     AC Resisted Extension   Jerk Test     GH Instability Tests Right (+/-) Left (+/-) Ayla Test     Sulcus Sign   Biceps  Tests Right (+/-) Left (+/-)   Apprehension   Speed + -   Relocation   [place] wing     Other:   Other:     Other:   Other:         Treatment Today    TREATMENT MINUTES COMMENTS   Evaluation 20 -shoulder pain   Self-care/ Home management     Manual therapy     Neuromuscular Re-education 10 -KTape to R shoulder with 2 Y strips, starting mid-lateral arm and wrapping med/lateral around GH joint, second strip starting at proximal lateral arm and pulling posteriorly along scapula with scapular retraction, skin prepped and reviewed indications/precautions   Therapeutic Activity     Therapeutic Exercises 22 -see exercise flow sheet  -educated on POC, diagnosis and HEP   Gait training     Modality__________________                Total 52    Blank areas are intentional and mean the treatment did not include these items.     PT Evaluation Code: (Please list factors)  Patient History/Comorbidities: breast cancer  Examination: R shoulder  Clinical Presentation: stable  Clinical Decision Making: low    Patient History/  Comorbidities Examination  (body structures and functions, activity limitations, and/or participation restrictions) Clinical Presentation Clinical Decision Making (Complexity)   No documented Comorbidities or personal factors 1-2 Elements Stable and/or uncomplicated Low   1-2 documented comorbidities or personal factor 3 Elements Evolving clinical presentation with changing characteristics Moderate   3-4 documented comorbidities or personal factors 4 or more Unstable and unpredictable High                Felicitas Gonzalez, PT, DPT  2/25/2019  9:57 AM

## 2021-06-24 NOTE — PROGRESS NOTES
[facility] Daily Progress     Patient Name: Lisa fu [facility]  Date: 3/14/2019  Visit #:  until 19  PTA visit #:  3    Referral Diagnosis: Shoulder tendonitis, right  Referring provider: Gregory Doyle, *  Visit Diagnosis:     ICD-10-CM    1. Impingement syndrome of right shoulder M75.41    2. Generalized muscle weakness M62.81    3. Abnormal posture R29.3          Assessment:   Reports 50% improvement overall  Complaint with HEP and improvement in posture noted.  Pt would benefit and is appropriate to continue with skilled physical therapy treatments.  Pt has periods with 0/10 pain now. Overall, good progression towards goals.    Goal Status:  Ongoing  Pt. will be independent with home exercise program in : 4 weeks;Met  Pt. will have improved quality of sleep: waking less times/night;in 12 weeks;Progressing toward  Patient will reach / maintain arm movement: overhead;forward;for home chores;with full ROM;with less pain;in 12 weeks (<3/10 pain)    Patient will decrease : SPADI score;for improved quality of function;for improved quality of life;in 12 weeks;by _ points  by ___ points: >9 pts      Plan / Patient Education:     Continue POC  Progress ex as able; serratus anterior strengthening as able (wall slides with band)  Review prone ex    Subjective:     Pain Ratin/10 R shoulder now,    Sleeping is better  Exercises 1x/day -  Reports 50% improvement  Tyenenol 3-4x/day  CP 2x/day      Objective:     Pt reports less tender with CFM  Min cueing with prone exercises   Added prone shoulder ext, flex,horizional ABD and SL on left right arm stretch over head  Continued MT  Reapplied KT skin clear to right shoulder      Treatment Today   3/14/2019  TREATMENT MINUTES COMMENTS   Evaluation     Self-care/ Home management     Manual therapy 10 CFM to R short head of biceps and supraspinatus tendon  -supine R GH joint distraction and posterior glides, grade II-III  SL MFR axillary  area   Neuromuscular Re-education     Therapeutic Activity     Therapeutic Exercises 15 HEP per flow sheet   Gait training     Modality__________________                Total 25    Blank areas are intentional and mean the treatment did not include these items.       Inder Laura, PT, DPT  3/14/2019

## 2021-06-24 NOTE — PROGRESS NOTES
[facility] Daily Progress     Patient Name: Lisa Lee  Date: 3/4/2019  Visit #: 3/12 until 19  PTA visit #:  1  Referral Diagnosis: Shoulder tendonitis, right  Insurance: Medicare, [employer] senior gold  Referring provider: Gregory Doyle, *  Visit Diagnosis:     ICD-10-CM    1. Impingement syndrome of right shoulder M75.41    2. Generalized muscle weakness M62.81    3. Abnormal posture R29.3          Assessment:       Complaint with HEP with cues to decrease upper trapezius activation.  Pt tolerates HEP progression with prone Is with continued weakness in her middle and lower trapezius muscles as demonstrated by fatigue after 3-5 reps.  Pt would benefit with continuing skilled physical therapy treatments    Goal Status:  Ongoing  Pt. will be independent with home exercise program in : 4 weeks  Pt. will have improved quality of sleep: waking less times/night;in 12 weeks  Patient will reach / maintain arm movement: overhead;forward;for home chores;with full ROM;with less pain;in 12 weeks (<3/10 pain)    Patient will decrease : SPADI score;for improved quality of function;for improved quality of life;in 12 weeks;by _ points  by ___ points: >9 pts      Plan / Patient Education:     Continue POC  Progress ex as able; prone Ts/Ws, serratus anterior strengthening    Subjective:     Pain Ratin/10 constant, R shoulder    Slight improvement since last session. KTape continues to provide relief. It lasts about 3 days. Exercises are going well.  Tyenenol 3-4x/day  CP 2x/day          Objective:     Cues to decreased UT activation during rows and band shoulder ER/IR  Education on correct seated posture and role in pain    Exercises:  Exercise #1: pec stretch in doorway 2x 30 sec hold- able to do in doorway with less pain now  Comment #1: reach and roll in L SL- verbal review  Exercise #2: rows with L2 band 15x  Comment #2: shoulder ER/IR with L2 band 10x  B  Exercise #3: prone Is 10x  5 sec hold, work up to 10 sec  Comment #3: UBE 4 mins F/B 4.5 level  Exercise #4: shoulder IR stretch with towel- verbal review          Treatment Today   3/4/2019  TREATMENT MINUTES COMMENTS   Evaluation     Self-care/ Home management     Manual therapy 8 CFM to R short head of biceps and supraspinatus tendon, with review of education for home to complete at home. Pt able to demonstrate correct performance at end   Neuromuscular Re-education 5 -KTape to R shoulder with 2 Y strips, starting mid-lateral arm and wrapping med/lateral around GH joint, second strip starting at proximal lateral arm and pulling posteriorly along scapula with scapular retraction, skin prepped and reviewed indications/precautions   Therapeutic Activity     Therapeutic Exercises 12 HEP per flow sheet   Gait training     Modality__________________                Total 25    Blank areas are intentional and mean the treatment did not include these items.       Felicitas Gonzalez, PT, DPT  3/4/2019

## 2021-06-24 NOTE — TELEPHONE ENCOUNTER
Former patient of Dr. Doyle & has not established care with another provider.  Please assign refill request to covering provider per Clinic standard process.

## 2021-06-24 NOTE — PROGRESS NOTES
[facility] Daily Progress     Patient Name: Lisa Lee  Date: 3/7/2019  Visit #:  until 19  PTA visit #:  2    Referral Diagnosis: Shoulder tendonitis, right  Insurance: Medicare, [employer] senior gold  Referring provider: Gregory Doyle, *  Visit Diagnosis:     ICD-10-CM    1. Impingement syndrome of right shoulder M75.41    2. Generalized muscle weakness M62.81    3. Abnormal posture R29.3          Assessment:     Reports feeling better, decreased pain in right shoulder  Complaint with HEP with cues to decrease upper trapezius activation.  Pt tolerates HEP progression with prone Is with continued weakness in her middle and lower trapezius muscles as demonstrated by fatigue after 3-5 reps.  Pt would benefit with continuing skilled physical therapy treatments    Goal Status:  Ongoing  Pt. will be independent with home exercise program in : 4 weeks  Pt. will have improved quality of sleep: waking less times/night;in 12 weeks  Patient will reach / maintain arm movement: overhead;forward;for home chores;with full ROM;with less pain;in 12 weeks (<3/10 pain)    Patient will decrease : SPADI score;for improved quality of function;for improved quality of life;in 12 weeks;by _ points  by ___ points: >9 pts      Plan / Patient Education:     Continue POC  Progress ex as able; prone Ts, serratus anterior strengthening  Review Is and Ws  Subjective:     Pain Ratin/10 constant, R shoulder,  0/10 at rest  Sleeping still uncomfortable to lie on right  Slight improvement since last session. KTape continues to provide relief. It lasts about 3 days. Exercises are going well.  Tyenenol 3-4x/day  CP 2x/day          Objective:   Reviewed HEP added prone Ws 6x  Continued MT  Reapplied KT skin clear        Treatment Today   3/7/2019  TREATMENT MINUTES COMMENTS   Evaluation     Self-care/ Home management     Manual therapy 8 CFM to R short head of biceps and supraspinatus tendon, with  review of education for home to complete at home. Pt able to demonstrate correct performance at end   Neuromuscular Re-education 5 -KTape to R shoulder with 2 Y strips, starting mid-lateral arm and wrapping med/lateral around GH joint, second strip starting at proximal lateral arm and pulling posteriorly along scapula with scapular retraction, skin prepped and reviewed indications/precautions   Therapeutic Activity     Therapeutic Exercises 12 HEP per flow sheet   Gait training     Modality__________________                Total 25    Blank areas are intentional and mean the treatment did not include these items.       Inder Laura, PT, DPT  3/7/2019

## 2021-06-24 NOTE — PROGRESS NOTES
Assessment and Plan:   1. AWV no major changes in dx/meds  2. F/u cards - hx of stents; recent ER visit for fever/UTI- EKG with tachycardia and some ST depression, see below  3. Sleep consult  4. PT consult shoulder pain  5. Prophylactic meds for travel cipro and ambien    Problem List Items Addressed This Visit     None      Visit Diagnoses     Healthcare maintenance    -  Primary    Coronary artery disease involving native heart without angina pectoris, unspecified vessel or lesion type        Relevant Orders    Ambulatory referral to Cardiology    Insomnia        Relevant Medications    zolpidem (AMBIEN) 5 MG tablet    Sleep apnea, unspecified type        Relevant Orders    Ambulatory referral to Sleep Medicine    Need for vaccine for Td (tetanus-diphtheria)        Relevant Orders    Td, Preservative Free (green label) (Completed)    Shoulder tendonitis, right        Relevant Orders    Ambulatory referral to Adult PT- Internal    Urinary tract infection without hematuria, site unspecified        Relevant Medications    ciprofloxacin HCl (CIPRO) 500 MG tablet            The patient's current medical problems were reviewed.    I have had an Advance Directives discussion with the patient.  The following health maintenance schedule was reviewed with the patient and provided in printed form in the after visit summary:   Health Maintenance   Topic Date Due     ZOSTER VACCINES (2 of 3) 06/28/2010     DEPRESSION FOLLOW UP  03/24/2019     DXA SCAN  08/15/2019     MAMMOGRAM  08/22/2019     FALL RISK ASSESSMENT  02/19/2020     ADVANCE DIRECTIVES DISCUSSED WITH PATIENT  08/11/2022     COLONOSCOPY  10/25/2027     TD 18+ HE  02/19/2029     PNEUMOCOCCAL POLYSACCHARIDE VACCINE AGE 65 AND OVER  Completed     INFLUENZA VACCINE RULE BASED  Completed     PNEUMOCOCCAL CONJUGATE VACCINE FOR ADULTS (PCV13 OR PREVNAR)  Completed        Subjective:   Chief Complaint: Lisa Ray is an 68 y.o. female here for an Annual Wellness  visit.   HPI: Overall doing very well she is here for a wellness visit she was seen in the emergency room in January for fever and urinary tract infection EKG at that time showed a sinus tach with some ST segment depression is not had any cardiac symptoms she has a history of stents almost 10 years ago otherwise she wants some medication for a trip that is coming up for Cipro and Ambien she wants to see physical therapy for her shoulder problems things are quite stable in general she is recovered from her breast surgery mastectomy and then implants he has some chronic right sided breast tenderness and irritation she follows up with the plastic surgeon    Review of Systems:    Please see above.  The rest of the review of systems are negative for all systems.    Patient Care Team:  Gregory Doyle MD as PCP - General  Olayinka Torres MD as Physician (Ophthalmology)  Tera Yeung MD as Physician (Orthopedic Surgery)  Obed Khan MD (Cardiology)  Huey Bender MD (Dermatology)     Patient Active Problem List   Diagnosis     Ptosis Of Eyelid     Rosacea     Dysthymic disorder     Lower Back Pain     Coronary Artery Disease     Benign Adenomatous Polyp Of The Large Intestine     Osteoarthritis     Raynaud disease     Sicca syndrome (H)     Paresthesias/numbness     Primary osteoarthritis of right hip     DCIS (ductal carcinoma in situ)     S/P coronary artery stent placement     History of bilateral mastectomy     S/P bilateral breast implants     Past Medical History:   Diagnosis Date     Anemia 2012    microcytic from Celebrex. GI w/u neg     Back pain      CAD (coronary artery disease) 2008    RCA- stent     DJD (degenerative joint disease)      Dysthymia      Fatigue     recurrent/variable - no serious pathology     Fibromyalgia      Hypertension      Iritis       Past Surgical History:   Procedure Laterality Date     BELPHAROPTOSIS REPAIR Bilateral 2013    Dr. Eder Bingham- 2013     BREAST  SURGERY Bilateral 2017    bilateral mastectomy 2017- 2017     BREAST SURGERY  2018    implants and revsion 2     CORONARY STENT PLACEMENT Right     Dr. Schwab     HYSTERECTOMY       OOPHORECTOMY       NH APPENDECTOMY      Description: Appendectomy;  Recorded: 2009;     TOTAL HIP ARTHROPLASTY Right 2015    Procedure: RIGHT TOTAL HIP ARTHROPLASTY;  Surgeon: Tera Yeung MD;  Location: [facility] Main OR;  Service:       Family History   Problem Relation Age of Onset     BRCA 1/2 Sister      BRCA 1/2 Paternal Grandfather      BRCA 1/2 Cousin      Alcoholism Father          GI bleed age 67     Colon cancer Other      Breast cancer Sister         metastatic age 49, remission with Rx     Coronary artery disease Brother         CABG     Breast cancer Paternal Grandmother      Breast cancer Cousin       Social History     Socioeconomic History     Marital status:      Spouse name: Johny     Number of children: 3     Years of education: Not on file     Highest education level: Not on file   Occupational History     Occupation: RN- retired ~     Employer: [employer]     Comment: retired/[employer]    Social Needs     Financial resource strain: Not on file     Food insecurity:     Worry: Not on file     Inability: Not on file     Transportation needs:     Medical: Not on file     Non-medical: Not on file   Tobacco Use     Smoking status: Former Smoker     Smokeless tobacco: Never Used   Substance and Sexual Activity     Alcohol use: Yes     Alcohol/week: 2.0 oz     Types: 4 Standard drinks or equivalent per week     Comment: 1-2 glasses of wine/week if any     Drug use: No     Sexual activity: Not on file   Lifestyle     Physical activity:     Days per week: Not on file     Minutes per session: Not on file     Stress: Not on file   Relationships     Social connections:     Talks on phone: Not on file     Gets together: Not on file     Attends Worship service: Not on file      Active member of club or organization: Not on file     Attends meetings of clubs or organizations: Not on file     Relationship status: Not on file     Intimate partner violence:     Fear of current or ex partner: Not on file     Emotionally abused: Not on file     Physically abused: Not on file     Forced sexual activity: Not on file   Other Topics Concern     Not on file   Social History Narrative    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 3 grown sons Aries in - , Josse SAMUELS, -microbrewer/beer, ; Gregory Lu- paramedic [employer]...... Non smoker rare ETOH.       Current Outpatient Medications   Medication Sig Dispense Refill     aspirin 325 MG EC tablet Take 325 mg by mouth daily.       cholecalciferol, vitamin D3, 1,000 unit tablet Take 1,000 Units by mouth daily.       coenzyme Q10 (CO Q-10) 100 mg capsule Take 400 mg by mouth at bedtime.        magnesium oxide 500 mg Tab Take by mouth.       melatonin 1 mg Tab tablet Take 3 mg by mouth at bedtime as needed.       metoprolol tartrate (LOPRESSOR) 25 MG tablet Take 1 tablet (25 mg total) by mouth 2 (two) times a day. 180 tablet 3     omeprazole (PRILOSEC) 20 MG capsule TAKE 1 CAPSULE BY MOUTH TWICE DAILY 180 capsule 3     simvastatin (ZOCOR) 20 MG tablet Take 1 tablet (20 mg total) by mouth daily. 90 tablet 1     zolpidem (AMBIEN) 5 MG tablet Take 1 tablet (5 mg total) by mouth at bedtime as needed for sleep. 30 tablet 0     amoxicillin (AMOXIL) 500 MG tablet Take 500 mg by mouth as needed. Take 4 tablets 1 hour prior to dental work       calcium carbonate-vitamin D3 600mg (1,000mg) -1,000 unit Tab Take 1 tablet by mouth daily.       ciprofloxacin HCl (CIPRO) 500 MG tablet Take 1 tablet (500 mg total) by mouth 2 (two) times a day for 10 days. 20 tablet 0     hydroCHLOROthiazide (HYDRODIURIL) 12.5 MG tablet Take 12.5 mg by mouth as needed.       methocarbamol (ROBAXIN) 500 MG tablet   0     No current  "facility-administered medications for this visit.       Objective:   Vital Signs:   Visit Vitals  /82 (Patient Site: Left Arm, Patient Position: Sitting, Cuff Size: Adult Regular)   Pulse 60   Ht 5' 3.5\" (1.613 m)   Wt 158 lb 6.4 oz (71.8 kg)   SpO2 97%   BMI 27.62 kg/m         VisionScreening:  No exam data present     PHYSICAL EXAM  PE:  General-healthy appearing  VS- see above  HEENT- neg   Neck- no adenopathy/thyromegaly/bruits  Chest- clear   Cor- reg no murmurs/gallops/ectopics  Extremities: no edema, good distal pulses  Neuro- Cr. NN-  intact, alert,   Abdomen- soft non tender, no masses; no organomegaly  Skin- no suspicious lesions for malignancy  Lymph- no pathologic nodes in neck/axilla/groin  Musculoskeletal-  Breasts: -Not examined    Assessment Results 2/19/2019   Activities of Daily Living No help needed   Instrumental Activities of Daily Living No help needed   Get Up and Go Score Less than 12 seconds   Mini Cog Total Score 5   Some recent data might be hidden     A Mini-Cog score of 0-2 suggests the possibility of dementia, score of 3-5 suggests no dementia    Identified Health Risks:     She is at risk for falling and has been provided with information to reduce the risk of falling at home.  Patient's advanced directive was discussed and I am comfortable with the patient's wishes.  No results found for this or any previous visit (from the past 240 hour(s)).       Study Result     XR CHEST 2 VIEWS  1/7/2019 6:05 PM     INDICATION: Fever, recent cough, hypoxia with sleep  COMPARISON: 10/09/2017     FINDINGS: Heart size and pulmonary vascularity normal. A few linear strands of fibrosis or atelectasis left base. Lungs otherwise clear. Surgical clips right lateral chest wall.         07-JAN-2019 17:39:51 HE JOES/JOHNS/MIGUEL ANGEL/KATINA  Sinus tachycardia  Right atrial enlargement  Nonspecific ST and T wave abnormality  Abnormal ECG  When compared with ECG of 21-MAY-2018 08:44,  Vent. rate has increased BY 49 " BPM  ST now depressed in Lateral leads  Confirmed by RODRÍGUEZ BHATT MD LOC:ALEX ([id number]) on 1/8/2019 2:53:01 PM  25mm/s 10mm/mV 100Hz 8.0 SP2 12SL 237 RUI: 1  Referred by: Confirmed By: RODRÍGUEZ LOC:ALEX BHATT MD    Lab Results   Component Value Date    CHOL 142 08/11/2017    CHOL 158 12/01/2016    CHOL 136 11/30/2015     Lab Results   Component Value Date    HDL 46 (L) 08/11/2017    HDL 58 12/01/2016    HDL 58 11/30/2015     Lab Results   Component Value Date    LDLCALC 76 08/11/2017    LDLCALC 75 12/01/2016    LDLCALC 53 11/30/2015     Lab Results   Component Value Date    TRIG 99 08/11/2017    TRIG 124 12/01/2016    TRIG 127 11/30/2015     No components found for: CHOLHDL  Results for orders placed or performed during the hospital encounter of 01/07/19   Comprehensive metabolic panel   Result Value Ref Range    Sodium 141 136 - 145 mmol/L    Potassium 3.7 3.5 - 5.0 mmol/L    Chloride 106 98 - 107 mmol/L    CO2 24 22 - 31 mmol/L    Anion Gap, Calculation 11 5 - 18 mmol/L    Glucose 107 70 - 125 mg/dL    BUN 17 8 - 22 mg/dL    Creatinine 0.95 0.60 - 1.10 mg/dL    GFR MDRD Af Amer >60 >60 mL/min/1.73m2    GFR MDRD Non Af Amer 58 (L) >60 mL/min/1.73m2    Bilirubin, Total 0.6 0.0 - 1.0 mg/dL    Calcium 9.3 8.5 - 10.5 mg/dL    Protein, Total 7.7 6.0 - 8.0 g/dL    Albumin 3.9 3.5 - 5.0 g/dL    Alkaline Phosphatase 109 45 - 120 U/L    AST 20 0 - 40 U/L    ALT 19 0 - 45 U/L     Lab Results   Component Value Date    WBC 9.3 01/07/2019    HGB 13.0 01/07/2019    HCT 42.6 01/07/2019    MCV 85 01/07/2019     01/07/2019

## 2021-06-24 NOTE — PROGRESS NOTES
Lisa Ray  [address] [place] [address]  [phone] (home)     Procedure cardiologist:  Dr. Foley  PCP:  Gregory Doyle MD  H&P completed by:  Dr. Foley 03/13/19  Admit date 03/26/19  Arrival time:  0600  Anticoagulation: None  CPAP: No. Pt has been told she should have a sleep study. Snores.  Previous PCI: Yes. 2008 by femoral approach.  Bypass Grafts: No  Renal Issues: No  Diabetic?: No  Device?: No    Pt verbalized bilateral breast mastectomy, lymph nodes removed on right side.Pt verbalized concerns of RA on her right side due to possibility of lymph edema. Left side without lymph node removal.     Angiogram Teaching    Reason for Visit:  Patient seen for pre-procedure education in preparation for: Coronary Angiogram with Possible Percutaneous Coronary Intervention    Procedure Prep:  Primary Cardiologist note dated: 3/13/19  EKG results obtained, dated: Morning of procedure  Hemogram results obtained: Morning of procedure   Basic Metabolic Panel results obtained: Morning of procedure   Lipid Profile results obtained: Morning of procedure    Patient Education  Patient states understanding of procedure and risks and agrees to proceed. Declined review of risks by writer.    Pre-procedure instructions  Patient instructed to be NPO after midnight.  Patient instructed to shower the evening before or the morning of the procedure.  Patient instructed to arrange for transportation home following procedure from a responsible family member of friend. No driving for at least 24 hours.  Patient instructed to have a responsible adult with them for 24 hours post-procedure.  Post-procedure follow up process.  Conscious sedation discussed.    Pre-procedure medication instructions  Patient instructed on antiplatelet medication.  Continue medications as scheduled, with a small amount of water on the day of the procedure unless indicated.  Patient instructed to take 325 mg of Aspirin am of  procedure: Yes  Other medication:Morning of procedure please HOLD all vitamins, minerals, and supplements. HOLD Hydrodiuril morning of procedure (pt states she no longer takes this medication). Please TAKE (1) full size 325 mg Aspirin morning of procedure.     *PATIENTS RECORDS AVAILABLE IN [facility] UNLESS OTHERWISE INDICATED*      Patient Active Problem List   Diagnosis     Ptosis Of Eyelid     Rosacea     Dysthymic disorder     Lower Back Pain     Coronary Artery Disease     Benign Adenomatous Polyp Of The Large Intestine     Osteoarthritis     Raynaud disease     Sicca syndrome (H)     Paresthesias/numbness     Primary osteoarthritis of right hip     DCIS (ductal carcinoma in situ)     S/P coronary artery stent placement     History of bilateral mastectomy     S/P bilateral breast implants       Current Outpatient Medications   Medication Sig Dispense Refill     amoxicillin (AMOXIL) 500 MG tablet Take 500 mg by mouth as needed. Take 4 tablets 1 hour prior to dental work       aspirin 325 MG EC tablet Take 325 mg by mouth daily.       b complex vitamins tablet Take 1 tablet by mouth daily.       calcium carbonate-vitamin D3 600mg (1,000mg) -1,000 unit Tab Take 1 tablet by mouth daily.       cholecalciferol, vitamin D3, 1,000 unit tablet Take 1,000 Units by mouth daily.       coenzyme Q10 (CO Q-10) 100 mg capsule Take 400 mg by mouth at bedtime.        hydroCHLOROthiazide (HYDRODIURIL) 12.5 MG tablet Take 12.5 mg by mouth as needed.       isosorbide mononitrate (IMDUR) 30 MG 24 hr tablet TAKE 1 TABLET(30 MG) BY MOUTH DAILY 90 tablet 1     magnesium oxide 500 mg Tab Take by mouth.       melatonin 1 mg Tab tablet Take 1 mg by mouth at bedtime as needed.              methocarbamol (ROBAXIN) 500 MG tablet   0     metoprolol tartrate (LOPRESSOR) 25 MG tablet Take 1 tablet (25 mg total) by mouth 2 (two) times a day. 180 tablet 3     omeprazole (PRILOSEC) 20 MG capsule TAKE 1 CAPSULE BY MOUTH TWICE DAILY 180 capsule 0      simvastatin (ZOCOR) 40 MG tablet Take 1 tablet (40 mg total) by mouth daily. 90 tablet 5     zolpidem (AMBIEN) 5 MG tablet Take 1 tablet (5 mg total) by mouth at bedtime as needed for sleep. 30 tablet 0     No current facility-administered medications for this visit.        Allergies   Allergen Reactions     Latex Other (See Comments)     Patient states skin sensitivity     Nsaids (Non-Steroidal Anti-Inflammatory Drug)      Other reaction(s): GI Bleeding, GI Upset  Sensitivity.  Ulceration w/ Celebrex       Plan: Met with patient in office for scheduling and education for angiogram. Education completed with patient and . Pt had opportunity to ask questions and have them answered.  will be  and available to stay with patient should she go home same day. Pt ready for procedure.      NOMAN Lim

## 2021-06-25 NOTE — TELEPHONE ENCOUNTER
Please call pt.  I am not sure what the question is here.  If she cannot walk, she should be seen.  If she is concerned that the Fosamax is causing her symptoms then have her stop it and follow-up with Dr. Alberto on her return.

## 2021-06-25 NOTE — TELEPHONE ENCOUNTER
Spoke with pt, relayed recommendation from Dr. Francois.  Scheduled pt an appt with Dr. Earl tomorrow.  Pt understanding and appreciative.

## 2021-06-25 NOTE — TELEPHONE ENCOUNTER
Refill Approved    Rx renewed per Medication Renewal Policy. Medication was last renewed on 9/11/20.    Melvin Lofton, [facility] Triage/Med Refill 6/3/2021     Requested Prescriptions   Pending Prescriptions Disp Refills     sertraline (ZOLOFT) 50 MG tablet [Pharmacy Med Name: SERTRALINE HCL 50 MG TABLET] 90 tablet 2     Sig: TAKE 1 TABLET BY MOUTH ONCE DAILY       [facility] Refill Protocol  Passed - 6/2/2021 12:20 AM        Passed - PCP or prescribing provider visit in last year     Last office visit with prescriber/PCP: 3/12/2020 Amber Alberto MD OR same dept: Visit date not found OR same specialty: 3/12/2020 Amber Alberto MD  Last physical: 4/20/2021 Last MTM visit: Visit date not found   Next visit within 3 mo: Visit date not found  Next physical within 3 mo: Visit date not found  Prescriber OR PCP: Amber Alberto MD  Last diagnosis associated with med order: 1. Adjustment disorder with depressed mood  - sertraline (ZOLOFT) 50 MG tablet [Pharmacy Med Name: SERTRALINE HCL 50 MG TABLET]; TAKE 1 TABLET BY MOUTH ONCE DAILY  Dispense: 90 tablet; Refill: 2    If protocol passes may refill for 12 months if within 3 months of last provider visit (or a total of 15 months).

## 2021-06-25 NOTE — PROGRESS NOTES
Staff message sent to [facility] to inform of patient having lymph node removal on right side, not left and concerns for RA approach. CARLOS,RN

## 2021-06-25 NOTE — PROGRESS NOTES
[facility] Daily Progress     Patient Name: Lisa Lee  Date: 3/21/2019  Visit #:  until 19  PTA visit #:  3    Referral Diagnosis: Shoulder tendonitis, right  Referring provider: Gregory Doyle, *  Visit Diagnosis:     ICD-10-CM    1. Impingement syndrome of right shoulder M75.41    2. Generalized muscle weakness M62.81    3. Abnormal posture R29.3      Precautions:  Patient Active Problem List   Diagnosis     Ptosis Of Eyelid     Rosacea     Dysthymic disorder     Lower Back Pain     Coronary Artery Disease     Benign Adenomatous Polyp Of The Large Intestine     Osteoarthritis     Raynaud disease     Sicca syndrome (H)     Paresthesias/numbness     Primary osteoarthritis of right hip     DCIS (ductal carcinoma in situ)     S/P coronary artery stent placement     History of bilateral mastectomy     S/P bilateral breast implants     Stable angina (H)         Assessment:   Reports > 50% improvement .  Complaint with HEP and able to demonstrate prone exercises independently without assist today.   Pt would benefit and is appropriate to continue with skilled physical therapy treatments.  Continues to tolerate increased activity with less pain.    Goal Status:  Ongoing  Pt. will be independent with home exercise program in : 4 weeks;Met  Pt. will have improved quality of sleep: waking less times/night;in 12 weeks;Met  Patient will reach / maintain arm movement: overhead;forward;for home chores;with full ROM;with less pain;in 12 weeks (<3/10 pain)    Patient will decrease : SPADI score;for improved quality of function;for improved quality of life;in 12 weeks;by _ points  by ___ points: >9 pts      Plan / Patient Education:     Continue POC  Progress ex as able; serratus anterior strengthening as able (wall slides with band),    Subjective:     Pain Ratin-3/10 R shoulder   Exercises 1x/day -  Reports over 50% improvement since starting PT. She was able to do a lot of  "household chores with just slight pain today. Is not waking up from pain at night. Does not feel like she needs to use the KTape this time. Does not feel the \"pain\" as much as previously.  Tyenenol 3-4x/day- was taking twice per day prior to shoulder pain starting  CP 2x/day      Objective:     Pt reports less tender with CFM  Independent with HEP    Shoulder AROM:  Flexion: WNL yue  Abd: WNL yue  Functional ER: to T4 yue  Functional IR: to T8 yue      Treatment Today   3/21/2019  TREATMENT MINUTES COMMENTS   Evaluation     Self-care/ Home management     Manual therapy 10 CFM to R short head of biceps and supraspinatus tendon  -supine GH joint distraction, grade II on R   Neuromuscular Re-education     Therapeutic Activity     Therapeutic Exercises 18 HEP per flow sheet  Educated on decreasing strengthening exercises to every other day   Gait training     Modality__________________                Total 28    Blank areas are intentional and mean the treatment did not include these items.       Felicitas Gonzalez, PT, DPT  3/21/2019    [facility] Discharge Summary    Patient was seen for 7 visits from 2/25/19 to 3/21/19 with 0 missed appointments.  Patient received a home program .  Pt cancelled remaining PT appointments due to other health concerns,    Therapy will be discontinued at this time.  The patient will need a new referral to resume.    Thank you for your referral.  Felicitas Gonzalez, PT, DPT  4/4/2019  10:50 AM  "

## 2021-06-26 NOTE — PROGRESS NOTES
Office Visit - Follow Up   Lisa Ray   71 y.o. female    Date of Visit: 6/11/2021    Chief Complaint   Patient presents with     Hip Pain     right hip pain x 6 weeks        Assessment and Plan   1. History of total hip replacement, right  I suspect she has trochanteric bursitis in the right hip.  I recommended she follow-up with orthopedic surgery consideration of a steroid injection.  She is in quite a bit of pain now and wonders if there is any additional medication she could try.  She has been using acetaminophen is intolerant of NSAIDs because of GI bleeding.  We will try a short course of methylprednisolone to see if this helps.  - Ambulatory referral to Orthopedics  - XR Hip Right 2 or More VWS; Future    2. Lateral pain of right hip  - methylPREDNISolone (MEDROL DOSEPACK) 4 mg tablet; follow package directions  Dispense: 21 tablet; Refill: 0  - Ambulatory referral to Orthopedics  - XR Hip Right 2 or More VWS; Future    3. Age-related osteoporosis without current pathological fracture  I do not believe that her pain is related to Fosamax.  That said she would like to stop this and I think it would be reasonable to hold until her lateral hip pain is improved.    Regarding her hypertension, she is surprised by this and checks her blood pressure at home.  Generally her blood pressures have been well controlled and she will keep a close eye and at home and let us know if they are elevated    Return in about 4 weeks (around 7/9/2021) for follow up with PCP.     History of Present Illness   This 71 y.o. old woman comes in for evaluation of right lateral hip pain.  About 6 weeks ago after cataract surgery she fell and face planted.  She hurt her left ankle but did not notice any other pain.  2 weeks later she started Fosamax.  Since that time she has had a lot of lateral right hip pain.  It hurts to walk and it hurts to lie on that side.  She has some chronic left groin pain.  She has a history of right  "total hip arthroplasty and does have degenerative changes in the left hip.  No fevers or chills.  No specific injury.    Review of Systems: A comprehensive review of systems was negative except as noted.     Medications, Allergies and Problem List   Reviewed, reconciled and updated  Post Discharge Medication Reconciliation Status:      Physical Exam   General Appearance:   No acute distress    BP (!) 160/92 (Patient Site: Left Arm, Patient Position: Sitting, Cuff Size: Adult Regular)   Pulse 60   Ht 5' 4\" (1.626 m)   Wt 175 lb 14.4 oz (79.8 kg)   SpO2 94%   BMI 30.19 kg/m      She has tenderness over the right greater trochanteric bursa.  Internal range of motion worsens this pain.  She has pain with internal rotation of the left hip in the groin.  Normal strength sensation reflexes in the lower extremities.  She ambulates with an antalgic gait.  Cardiovascular regular rate and rhythm no murmur gallop or rub lungs are clear to auscultation bilaterally     Additional Information   Current Outpatient Medications   Medication Sig Dispense Refill     acetaminophen (TYLENOL) 500 MG tablet Take 500 mg by mouth every 6 (six) hours as needed for pain (arthritis).       acetaminophen-codeine (TYLENOL #3) 300-30 mg per tablet Take 1-2 tablets by mouth every 4 (four) hours as needed for pain. 90 tablet 0     alendronate (FOSAMAX) 70 MG tablet Take 1 tablet (70 mg total) by mouth every 7 days. Take in the morning on an empty stomach with a full glass of water 30 minutes before food 12 tablet 11     aspirin 81 mg chewable tablet Chew 2 tablets (162 mg total) daily.  0     b complex vitamins tablet Take 1 tablet by mouth daily.       calcium, as carbonate, (TUMS) 200 mg calcium (500 mg) chewable tablet Chew 2 tablets daily.       cholecalciferol, vitamin D3, 1,000 unit tablet Take 1,000 Units by mouth daily.       coenzyme Q10 400 mg cap Take 400 mg by mouth daily.       diclofenac sodium (VOLTAREN) 1 % Gel 4 g four times a " day 1 Tube 3     docusate sodium (COLACE) 100 MG capsule Take 1 capsule (100 mg total) by mouth 2 (two) times a day as needed for constipation.  0     doxylamine (UNISOM) 25 mg tablet Take 25 mg by mouth at bedtime as needed for sleep.       lisinopriL (PRINIVIL,ZESTRIL) 10 MG tablet TAKE 1 TABLET BY MOUTH DAILY. 90 tablet 3     magnesium oxide 500 mg Tab Take 500 mg by mouth at bedtime.              melatonin 1 mg Tab tablet Take 1 mg by mouth at bedtime.              methocarbamol (ROBAXIN-750) 750 MG tablet Take 1 tablet (750 mg total) by mouth at bedtime. 90 tablet 1     metoprolol succinate (TOPROL-XL) 100 MG 24 hr tablet Take 1 tablet (100 mg total) by mouth at bedtime. 90 tablet 11     omeprazole (PRILOSEC) 20 MG capsule TAKE 1 CAPSULE BY MOUTH TWICE DAILY 180 capsule 3     peg 400-propylene glycol PF (SYSTANE) 0.4-0.3 % Dpet Administer 1 drop to both eyes 2 (two) times a day as needed.       pramipexole (MIRAPEX) 0.25 MG tablet TAKE 1 TABLET(0.25 MG) BY MOUTH AT BEDTIME 90 tablet 5     rosuvastatin (CRESTOR) 20 MG tablet TAKE 1 TABLET BY MOUTH AT BEDTIME 90 tablet 1     sertraline (ZOLOFT) 50 MG tablet TAKE 1 TABLET BY MOUTH ONCE DAILY 90 tablet 3     tiZANidine (ZANAFLEX) 2 MG tablet TAKE 1 TABLET BY MOUTH EVERY 6 HOURS IF NEEDED FOR MUSCLE SPASM.       triamcinolone (NASACORT) 55 mcg nasal inhaler Apply 2 sprays into each nostril daily as needed.       umeclidinium (INCRUSE ELLIPTA) 62.5 mcg/actuation DsDv inhaler Inhale 1 puff daily. 30 each 12     zolpidem (AMBIEN) 10 mg tablet Take 1 tablet (10 mg total) by mouth at bedtime as needed for sleep. 30 tablet 2     methylPREDNISolone (MEDROL DOSEPACK) 4 mg tablet follow package directions 21 tablet 0     No current facility-administered medications for this visit.      Allergies   Allergen Reactions     Latex      Added based on information entered during log entry, please review and add reactions, type, and severity as needed     Nsaids (Non-Steroidal  Anti-Inflammatory Drug)      Other reaction(s): GI Bleeding, GI Upset  Sensitivity.  Ulceration w/ Celebrex     Social History     Tobacco Use     Smoking status: Former Smoker     Packs/day: 1.00     Years: 20.00     Pack years: 20.00     Quit date: 3/27/1990     Years since quittin.2     Smokeless tobacco: Never Used   Substance Use Topics     Alcohol use: Yes     Alcohol/week: 3.3 standard drinks     Types: 4 Standard drinks or equivalent per week     Comment: 1-2 glasses of wine/week      Drug use: No       Review and/or order of clinical lab tests:  Review and/or order of radiology tests:  Review and/or order of medicine tests:  Discussion of test results with performing physician:  Decision to obtain old records and/or obtain history from someone other than the patient:  Review and summarization of old records and/or obtaining history from someone other than the patient and.or discussion of case with another health care provider:  Independent visualization of image, tracing or specimen itself:    Time:      Obed Earl MD

## 2021-06-26 NOTE — TELEPHONE ENCOUNTER
Refill Approved    Rx renewed per Medication Renewal Policy. Medication was last renewed on 6/16/2020.    Elle Orozco, [facility] Triage/Med Refill 6/21/2021     Requested Prescriptions   Pending Prescriptions Disp Refills     omeprazole (PRILOSEC) 20 MG capsule [Pharmacy Med Name: OMEPRAZOLE DR 20 MG CAPSULE] 180 capsule 3     Sig: TAKE 1 CAPSULE BY MOUTH TWICE A DAY       GI Medications Refill Protocol Passed - 6/20/2021  6:40 AM        Passed - PCP or prescribing provider visit in last 12 or next 3 months.     Last office visit with prescriber/PCP: 1/2/2018 Gregory Mccullough MD OR same dept: 6/11/2021 Obed Earl MD OR same specialty: 6/11/2021 Obed Earl MD  Last physical: Visit date not found Last MTM visit: Visit date not found   Next visit within 3 mo: Visit date not found  Next physical within 3 mo: Visit date not found  Prescriber OR PCP: Gregory Mccullough MD  Last diagnosis associated with med order: 1. GERD (gastroesophageal reflux disease)  - omeprazole (PRILOSEC) 20 MG capsule [Pharmacy Med Name: OMEPRAZOLE DR 20 MG CAPSULE]; TAKE 1 CAPSULE BY MOUTH TWICE A DAY  Dispense: 180 capsule; Refill: 3    If protocol passes may refill for 12 months if within 3 months of last provider visit (or a total of 15 months).

## 2021-06-30 ENCOUNTER — RECORDS - HEALTHEAST (OUTPATIENT)
Dept: ADMINISTRATIVE | Facility: OTHER | Age: 71
End: 2021-06-30

## 2021-06-30 DIAGNOSIS — I10 HTN (HYPERTENSION): ICD-10-CM

## 2021-06-30 DIAGNOSIS — Z95.1 S/P CABG (CORONARY ARTERY BYPASS GRAFT): ICD-10-CM

## 2021-06-30 RX ORDER — METOPROLOL SUCCINATE 100 MG/1
TABLET, EXTENDED RELEASE ORAL
Qty: 90 TABLET | Refills: 0 | Status: SHIPPED | OUTPATIENT
Start: 2021-06-30 | End: 2021-08-05

## 2021-07-03 NOTE — ADDENDUM NOTE
Addendum Note by Adeline Garza LPN at 8/6/2020  9:30 AM     Author: Adeline Garza LPN Service: -- Author Type: Licensed Nurse    Filed: 8/6/2020 10:32 AM Encounter Date: 8/6/2020 Status: Signed    : Adeline Garza LPN (Licensed Nurse)    Addended by: ADELINE GARZA on: 8/6/2020 10:32 AM        Modules accepted: Orders

## 2021-07-03 NOTE — ADDENDUM NOTE
Addendum Note by Romario Doyle MD at 1/2/2018  8:51 AM     Author: Romario Doyle MD Service: -- Author Type: Physician    Filed: 1/2/2018  8:51 AM Encounter Date: 1/2/2018 Status: Signed    : Romario Doyle MD (Physician)    Addended by: ROMARIO DOYLE on: 1/2/2018 08:51 AM        Modules accepted: Orders

## 2021-07-06 ENCOUNTER — COMMUNICATION - HEALTHEAST (OUTPATIENT)
Dept: ADMINISTRATIVE | Facility: CLINIC | Age: 71
End: 2021-07-06

## 2021-07-06 VITALS
DIASTOLIC BLOOD PRESSURE: 92 MMHG | SYSTOLIC BLOOD PRESSURE: 160 MMHG | HEART RATE: 60 BPM | OXYGEN SATURATION: 94 % | HEIGHT: 64 IN | BODY MASS INDEX: 30.03 KG/M2 | WEIGHT: 175.9 LBS

## 2021-07-09 ENCOUNTER — COMMUNICATION - HEALTHEAST (OUTPATIENT)
Dept: INTERNAL MEDICINE | Facility: CLINIC | Age: 71
End: 2021-07-09

## 2021-07-09 DIAGNOSIS — J43.9 PULMONARY EMPHYSEMA, UNSPECIFIED EMPHYSEMA TYPE (H): ICD-10-CM

## 2021-07-11 PROBLEM — J47.9 BRONCHIECTASIS WITHOUT ACUTE EXACERBATION (H): Status: ACTIVE | Noted: 2021-07-09

## 2021-07-11 PROBLEM — J44.89 CHRONIC OBSTRUCTIVE AIRWAY DISEASE WITH ASTHMA (H): Status: ACTIVE | Noted: 2020-03-14

## 2021-07-21 ENCOUNTER — RECORDS - HEALTHEAST (OUTPATIENT)
Dept: ADMINISTRATIVE | Facility: CLINIC | Age: 71
End: 2021-07-21

## 2021-07-22 ENCOUNTER — RECORDS - HEALTHEAST (OUTPATIENT)
Dept: MAMMOGRAPHY | Facility: CLINIC | Age: 71
End: 2021-07-22

## 2021-07-22 DIAGNOSIS — Z12.31 OTHER SCREENING MAMMOGRAM: ICD-10-CM

## 2021-07-22 NOTE — LETTER
Letter by Ba Foley MD at      Author: Ba Foley MD Service: -- Author Type: --    Filed:  Encounter Date: 7/6/2021 Status: (Other)         Lisa Ray  [address] [place] [address]      July 6, 2021      Dear Lisa,    This letter is to remind you that you will be due for your follow up appointment with Dr. Ba Foley in July, 2021 . To help ensure you are in the best health possible, a regular follow-up with your cardiologist is essential.     Please call our Patient Scheduling Line at [phone] to schedule your appointment at your earliest convenience.  If you have recently scheduled an appointment, please disregard this letter.    We look forward to seeing you again. As always, we are available at the number  above for any questions or concerns you may have.      Sincerely,     The Physicians and Staff of [facility]

## 2021-08-05 ENCOUNTER — ANCILLARY PROCEDURE (OUTPATIENT)
Dept: VASCULAR ULTRASOUND | Facility: CLINIC | Age: 71
End: 2021-08-05
Attending: INTERNAL MEDICINE
Payer: MEDICARE

## 2021-08-05 ENCOUNTER — OFFICE VISIT (OUTPATIENT)
Dept: VASCULAR SURGERY | Facility: CLINIC | Age: 71
End: 2021-08-05
Attending: INTERNAL MEDICINE
Payer: MEDICARE

## 2021-08-05 VITALS — HEART RATE: 56 BPM | SYSTOLIC BLOOD PRESSURE: 144 MMHG | DIASTOLIC BLOOD PRESSURE: 78 MMHG

## 2021-08-05 DIAGNOSIS — I65.23 BILATERAL CAROTID ARTERY STENOSIS: ICD-10-CM

## 2021-08-05 DIAGNOSIS — I65.23 BILATERAL CAROTID ARTERY STENOSIS: Primary | ICD-10-CM

## 2021-08-05 PROCEDURE — 93880 EXTRACRANIAL BILAT STUDY: CPT

## 2021-08-05 PROCEDURE — G0463 HOSPITAL OUTPT CLINIC VISIT: HCPCS

## 2021-08-05 PROCEDURE — 99214 OFFICE O/P EST MOD 30 MIN: CPT | Performed by: INTERNAL MEDICINE

## 2021-08-05 PROCEDURE — 93880 EXTRACRANIAL BILAT STUDY: CPT | Mod: 26 | Performed by: INTERNAL MEDICINE

## 2021-08-05 RX ORDER — METOPROLOL TARTRATE 25 MG/1
25 TABLET, FILM COATED ORAL
COMMUNITY
End: 2021-12-23

## 2021-08-05 RX ORDER — SIMVASTATIN 20 MG
20 TABLET ORAL
COMMUNITY
End: 2021-08-05

## 2021-08-05 ASSESSMENT — PAIN SCALES - GENERAL: PAINLEVEL: SEVERE PAIN (7)

## 2021-08-05 NOTE — PROGRESS NOTES
Vascular Medicine Progress Note     Lisa Ray is a 71 year old female who seen here today for follow-up on carotid Doppler arterial ultrasound    Interval History   Patient is completely asymptomatic, carotid Doppler arterial ultrasound showed evidence of less than 50% stenosis bilaterally    Patient vascular risk factors are controlled    We will see the patient in 1 year from now and we will check carotid Doppler arterial ultrasound with lipid profile    Physical Exam       BP: (!) 144/78 (left arm) Pulse: 56            There were no vitals filed for this visit.  Vital Signs with Ranges  Pulse:  [56] 56  BP: (144)/(78) 144/78  [unfilled]    Constitutional: awake, alert, cooperative, no apparent distress, and appears stated age  Eyes: Lids and lashes normal, pupils equal, round and reactive to light, extra ocular muscles intact, sclera clear, conjunctiva normal  ENT: normocepalic, without obvious abnormality, oropharynx pink and moist  Hematologic / Lymphatic: no lymphadenopathy  Respiratory: No increased work of breathing, good air exchange, clear to auscultation bilaterally, no crackles or wheezing  Cardiovascular: regular rate and rhythm, normal S1 and S2 and no murmur noted  GI: Normal bowel sounds, soft, non-distended, non-tender  Skin: no redness, warmth, or swelling, no rashes and no lesions  Musculoskeletal: There is no redness, warmth, or swelling of the joints.  Full range of motion noted.  Motor strength is 5 out of 5 all extremities bilaterally.  Tone is normal.  Neurologic: Awake, alert, oriented to name, place and time.  Cranial nerves II-XII are grossly intact.  Motor is 5 out of 5 bilaterally.    Neuropsychiatric:  Normal affect, memory, insight.  Pulses: . No carotid bruits appreciated.     Medications         Data   Recent Results (from the past 24 hour(s))   US Carotid Bilateral    Narrative    BILATERAL CAROTID ULTRASOUND (Date: 08/05/21)      Indication: SURVEILLANCE CAROTID  STENOSIS.     Previous: 8/6/2020    Symptoms: Hypertension, MI and Heart Disease      TECHNIQUE: Duplex exam performed utilizing 2D gray-scale imaging, Doppler   interrogation with color-flow and spectral waveform analysis.    Right Velocity  Chart (cm/sec)  Location Right   CCA-PS 70   CCA- ED 11   ICA-PS 96   ICA-ED 29   ECA-   ECA-ED 13   Vertebral- Antegrade 62   Subclavian A- Biphasic 102   Ratio ICA/CCA-PS 1.38   Ratio ICA/CCA-ED 2.63     Left Velocity  Chart (cm/sec)  Location Left   CCA-PS 72   CCA- ED 14   ICA-PS 90   ICA-ED 24   ECA-PS 95   ECA-ED 13   Vertebral- Antegrade 47   Subclavian A- Biphasic 126   Ratio ICA/CCA-PS 1.26   Ratio ICA/CCA-ED 1.71     Comment:     FINDINGS:    RIGHT: There is uniformly echogenic  atheromatous plaque.     LEFT: There is uniformly echogenic atheromatous plaque.     RIGHT: Vertebral artery and subclavian artery waveforms are antegrade with   biphasic waveform pattern of the subclavian artery.    LEFT: Vertebral artery and subclavian artery waveforms are antegrade with   biphasic waveform pattern of the subclavian artery.    Impression:      1. Less than 50% diameter stenosis of the right ICA relative to the distal   ICA diameter.  2. Less than 50% diameter stenosis of the left ICA relative to the distal   ICA diameter.    Evaluation based on velocities and NASCET criteria    Category PSV (cm/s)  Plaque Imaging EDV (cm/s)  ICA/CCA Ratio   Normal,  <125  None <40 <2   Mild <50% <125 < than 50% DR stenosis <40 <2   Moderate Disease 50-69% 125-230 > than 50% DR stenosis  2.0-4.0   Severe->70% but less than near occlusion >230 > than 50% DR stenosis >100   >4.0   Near Occlusion May be low or undetectable Visible, extensive Variable   Variable   Occlusion No Flow Visible with no detectable lumen N/A N/A     Plaquetype Description   Type 1 Uniformly echolucent   Type 2 Predominantly echolucent >50%   Type 3 Predominantly echogenic >50%   Type 4 Uniformly echogenic    Type 5 Unclassified due to poor visualization   Ulcer Visible Ulcerative Plaque          Assessment & Plan   No diagnosis found.      Summary: Less than 50% stenosis with uniformly echogenic atheromatous plaque bilaterally    Follow-up in 1 year with carotid Doppler arterial ultrasound and lipid profile    Reynaldo Santoyo MD

## 2021-08-09 ENCOUNTER — OFFICE VISIT (OUTPATIENT)
Dept: CARDIOLOGY | Facility: CLINIC | Age: 71
End: 2021-08-09
Payer: MEDICARE

## 2021-08-09 VITALS
SYSTOLIC BLOOD PRESSURE: 108 MMHG | DIASTOLIC BLOOD PRESSURE: 60 MMHG | RESPIRATION RATE: 16 BRPM | BODY MASS INDEX: 31.01 KG/M2 | WEIGHT: 175 LBS | HEIGHT: 63 IN | HEART RATE: 56 BPM

## 2021-08-09 DIAGNOSIS — Z95.1 S/P CABG (CORONARY ARTERY BYPASS GRAFT): Primary | ICD-10-CM

## 2021-08-09 PROCEDURE — 99213 OFFICE O/P EST LOW 20 MIN: CPT | Performed by: INTERNAL MEDICINE

## 2021-08-09 ASSESSMENT — MIFFLIN-ST. JEOR: SCORE: 1272.92

## 2021-08-09 NOTE — PROGRESS NOTES
"  Thank you, Dr. Pa ref. provider found, for asking the [facility] team to see Ms. Lisa Ray to evaluate       Assessment/Recommendations   Assessment/Plan:  1. CAD s/p CABG - doing well, cont asp/rosuvastatin/metoprolol doing very well, cont exercise/diet and medications, repeat lipids next year, goal LDL< 70mg/dL  Follow up one year     History of Present Illness/Subjective    Ms. Lisa Ray is a 71 year old female with CAD s/p CABG, ARSEN on CPAP, bronchiectasis, HTN, carotid dz, last carotids < 50% on US, last LDL 53, HDL 45, Cr 0.85, no chest pain other than brochiectasis, dyspnea stable, no PND/orthopnea, some edema she attributes to wt gain, [facility], on rosuvasastin 20mg, metoprololand lisionpril.           Physical Examination Review of Systems   /60 (BP Location: Left arm, Patient Position: Sitting, Cuff Size: Adult Regular)   Pulse 56   Resp 16   Ht 1.6 m (5' 3\")   Wt 79.4 kg (175 lb)   BMI 31.00 kg/m    Body mass index is 31 kg/m .  Wt Readings from Last 3 Encounters:   08/09/21 79.4 kg (175 lb)   07/09/21 79.8 kg (176 lb)   06/11/21 79.8 kg (175 lb 14.4 oz)     [unfilled]  General Appearance:   no distress, normal body habitus   ENT/Mouth: membranes moist, no oral lesions or bleeding gums.      EYES:  no scleral icterus, normal conjunctivae   Neck: no carotid bruits or thyromegaly   Chest/Lungs:   lungs are clear to auscultation, no rales or wheezing,  sternal scar, equal chest wall expansion    Cardiovascular:   Regular. Normal first and second heart sounds with no murmurs, rubs, or gallops; the carotid, radial and posterior tibial pulses are intact, Jugular venous pressure , edema bilaterally    Abdomen:  no organomegaly, masses, bruits, or tenderness; bowel sounds are present   Extremities: no cyanosis or clubbing   Skin: no xanthelasma, warm.    Neurologic: normal  bilateral, no tremors     Psychiatric: alert and oriented x3, calm     " Review of Systems - 12 points nega other than above      Medical History  Surgical History Family History Social History   Past Medical History:   Diagnosis Date     Anemia 2012    microcytic from Celebrex. GI w/u neg     Back pain      Benign neoplasm of colon     Created by Conversion      CAD (coronary artery disease)     RCA- stent     Cancer (H)     breast cancer     DJD (degenerative joint disease)      Dysthymia      Fatigue     recurrent/variable - no serious pathology     Fibromyalgia      GERD (gastroesophageal reflux disease)      Hypertension      Iritis     past Hx.-left eye     ARSEN (obstructive sleep apnea) 2019     Raynaud's disease      Rosacea      Shortness of breath     with exertion     Shoulder tendonitis     right     Ulcer of intestine     small bowel- 10 years ago     UTI (urinary tract infection)     2019    Past Surgical History:   Procedure Laterality Date     BELPHAROPTOSIS REPAIR Bilateral     Dr. Eder Bingham-      BREAST SURGERY Bilateral 2017    bilateral mastectomy 2017- 2017     BREAST SURGERY  2018    implants and revsion 2     C APPENDECTOMY      Description: Appendectomy;  Recorded: 2009;     CORONARY STENT PLACEMENT Right     Dr. Schwab     CV CORONARY ANGIOGRAM N/A 3/26/2019    Procedure: Coronary Angiogram;  Surgeon: Ba Foley MD;  Location: [facility];  Service: Cardiology     HYSTERECTOMY       OOPHORECTOMY       TOTAL HIP ARTHROPLASTY Right 2015    Procedure: RIGHT TOTAL HIP ARTHROPLASTY;  Surgeon: Tera Yeung MD;  Location: [facility];  Service:     Family History   Problem Relation Age of Onset     Hereditary Breast and Ovarian Cancer Syndrome Sister      Hereditary Breast and Ovarian Cancer Syndrome Paternal Grandfather      Hereditary Breast and Ovarian Cancer Syndrome Cousin      Asthma Cousin      Alcoholism Father          GI bleed age 67     Colon Cancer Other      Breast Cancer  Sister         metastatic age 49, remission with Rx     Coronary Artery Disease Brother         CABG     Breast Cancer Paternal Grandmother      Breast Cancer Cousin      Colon Cancer Mother     Social History     Socioeconomic History     Marital status:      Spouse name: Not on file     Number of children: 3     Years of education: Not on file     Highest education level: Not on file   Occupational History     Not on file   Tobacco Use     Smoking status: Former Smoker     Packs/day: 1.00     Years: 20.00     Pack years: 20.00     Quit date: 3/27/1990     Years since quittin.3     Smokeless tobacco: Never Used   Substance and Sexual Activity     Alcohol use: Yes     Alcohol/week: 3.3 standard drinks     Comment: Alcoholic Drinks/day: 1-2 glasses of wine/week      Drug use: No     Sexual activity: Not on file   Other Topics Concern     Not on file   Social History Narrative    Retired lead RN at [employer] ;  Johny,... 3 grown sons Aries in [place]- , Josse SAMUELS, -microbrewer/beer, ; Gregory Lu- paramedic [employer]...... Non smoker rare ETOH.        Social Determinants of Health     Financial Resource Strain:      Difficulty of Paying Living Expenses:    Food Insecurity:      Worried About Running Out of Food in the Last Year:      Ran Out of Food in the Last Year:    Transportation Needs:      Lack of Transportation (Medical):      Lack of Transportation (Non-Medical):    Physical Activity:      Days of Exercise per Week:      Minutes of Exercise per Session:    Stress:      Feeling of Stress :    Social Connections:      Frequency of Communication with Friends and Family:      Frequency of Social Gatherings with Friends and Family:      Attends [employer] Services:      Active Member of Clubs or Organizations:      Attends Club or Organization Meetings:      Marital Status:    Intimate Partner Violence:      Fear of Current or Ex-Partner:      Emotionally  Abused:      Physically Abused:      Sexually Abused:           Medications  Allergies   Scheduled Meds:  Continuous Infusions:  PRN Meds:. Allergies   Allergen Reactions     Latex Unknown     Added based on information entered during log entry, please review and add reactions, type, and severity as needed     Nsaids (Non-Steroidal Anti-Inflammatory Drug) [Nsaids] Unknown     Other reaction(s): GI Bleeding, GI Upset, Sensitivity.  Ulceration w/ Celebrex         Lab Results    Chemistry/lipid CBC Cardiac Enzymes/BNP/TSH/INR   Lab Results   Component Value Date    CHOL 116 04/20/2021    HDL 45 (L) 04/20/2021    TRIG 142 04/20/2021    BUN 19 04/20/2021     04/20/2021    CO2 27 04/20/2021    Lab Results   Component Value Date    WBC 7.6 04/20/2021    HGB 15.7 04/20/2021    HCT 49.2 (H) 04/20/2021    MCV 97 04/20/2021     04/20/2021    Lab Results   Component Value Date    TSH 2.21 04/20/2021    INR 1.32 (H) 03/29/2019              Ba Foley MD  Interventional Cardiology  [facility]

## 2021-08-09 NOTE — LETTER
"8/9/2021    Amber Alberto MD  [facility] [place] [address] [facility] [address]    RE: Lisa Ray       Dear Colleague,    I had the pleasure of seeing Lisa Ray in the [facility].      Thank you, Dr. Pa ref. provider found, for asking the [facility] team to see Ms. Lisa Ray to evaluate       Assessment/Recommendations   Assessment/Plan:  1. CAD s/p CABG - doing well, cont asp/rosuvastatin/metoprolol doing very well, cont exercise/diet and medications, repeat lipids next year, goal LDL< 70mg/dL  Follow up one year     History of Present Illness/Subjective    Ms. Lisa Ray is a 71 year old female with CAD s/p CABG, ARSEN on CPAP, bronchiectasis, HTN, carotid dz, last carotids < 50% on US, last LDL 53, HDL 45, Cr 0.85, no chest pain other than brochiectasis, dyspnea stable, no PND/orthopnea, some edema she attributes to wt gain, started swimming at [facility], on rosuvasastin 20mg, metoprololand lisionpril.           Physical Examination Review of Systems   /60 (BP Location: Left arm, Patient Position: Sitting, Cuff Size: Adult Regular)   Pulse 56   Resp 16   Ht 1.6 m (5' 3\")   Wt 79.4 kg (175 lb)   BMI 31.00 kg/m    Body mass index is 31 kg/m .  Wt Readings from Last 3 Encounters:   08/09/21 79.4 kg (175 lb)   07/09/21 79.8 kg (176 lb)   06/11/21 79.8 kg (175 lb 14.4 oz)     [unfilled]  General Appearance:   no distress, normal body habitus   ENT/Mouth: membranes moist, no oral lesions or bleeding gums.      EYES:  no scleral icterus, normal conjunctivae   Neck: no carotid bruits or thyromegaly   Chest/Lungs:   lungs are clear to auscultation, no rales or wheezing,  sternal scar, equal chest wall expansion    Cardiovascular:   Regular. Normal first and second heart sounds with no murmurs, rubs, or gallops; the carotid, radial and posterior tibial pulses are intact, Jugular venous " pressure , edema bilaterally    Abdomen:  no organomegaly, masses, bruits, or tenderness; bowel sounds are present   Extremities: no cyanosis or clubbing   Skin: no xanthelasma, warm.    Neurologic: normal  bilateral, no tremors     Psychiatric: alert and oriented x3, calm     Review of Systems - 12 points nega other than above      Medical History  Surgical History Family History Social History   Past Medical History:   Diagnosis Date     Anemia 2012    microcytic from Celebrex. GI w/u neg     Back pain      Benign neoplasm of colon     Created by Conversion      CAD (coronary artery disease) 2008    RCA- stent     Cancer (H)     breast cancer     DJD (degenerative joint disease)      Dysthymia      Fatigue     recurrent/variable - no serious pathology     Fibromyalgia      GERD (gastroesophageal reflux disease)      Hypertension      Iritis     past Hx.-left eye     ARSEN (obstructive sleep apnea) 6/5/2019     Raynaud's disease      Rosacea      Shortness of breath     with exertion     Shoulder tendonitis     right     Ulcer of intestine     small bowel- 10 years ago     UTI (urinary tract infection)     Jan. 2019    Past Surgical History:   Procedure Laterality Date     BELPHAROPTOSIS REPAIR Bilateral 2013    Dr. Eder Bingham- 2013     BREAST SURGERY Bilateral 11/2017    bilateral mastectomy 2017- 11/2017     BREAST SURGERY  2018    implants and revsion 2     C APPENDECTOMY      Description: Appendectomy;  Recorded: 01/13/2009;     CORONARY STENT PLACEMENT Right 2008    Dr. Schwab     CV CORONARY ANGIOGRAM N/A 3/26/2019    Procedure: Coronary Angiogram;  Surgeon: Ba Foley MD;  Location: [facility];  Service: Cardiology     HYSTERECTOMY       OOPHORECTOMY       TOTAL HIP ARTHROPLASTY Right 12/7/2015    Procedure: RIGHT TOTAL HIP ARTHROPLASTY;  Surgeon: Tera Yeung MD;  Location: [facility];  Service:     Family History   Problem Relation Age of Onset     Hereditary  Breast and Ovarian Cancer Syndrome Sister      Hereditary Breast and Ovarian Cancer Syndrome Paternal Grandfather      Hereditary Breast and Ovarian Cancer Syndrome Cousin      Asthma Cousin      Alcoholism Father          GI bleed age 67     Colon Cancer Other      Breast Cancer Sister         metastatic age 49, remission with Rx     Coronary Artery Disease Brother         CABG     Breast Cancer Paternal Grandmother      Breast Cancer Cousin      Colon Cancer Mother     Social History     Socioeconomic History     Marital status:      Spouse name: Not on file     Number of children: 3     Years of education: Not on file     Highest education level: Not on file   Occupational History     Not on file   Tobacco Use     Smoking status: Former Smoker     Packs/day: 1.00     Years: 20.00     Pack years: 20.00     Quit date: 3/27/1990     Years since quittin.3     Smokeless tobacco: Never Used   Substance and Sexual Activity     Alcohol use: Yes     Alcohol/week: 3.3 standard drinks     Comment: Alcoholic Drinks/day: 1-2 glasses of wine/week      Drug use: No     Sexual activity: Not on file   Other Topics Concern     Not on file   Social History Narrative    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 3 grown sons Aries in - , Josse SAMUELS, -[employer], ; Gregory Lu- paramedic [employer]...... Non smoker rare ETOH.        Social Determinants of Health     Financial Resource Strain:      Difficulty of Paying Living Expenses:    Food Insecurity:      Worried About Running Out of Food in the Last Year:      Ran Out of Food in the Last Year:    Transportation Needs:      Lack of Transportation (Medical):      Lack of Transportation (Non-Medical):    Physical Activity:      Days of Exercise per Week:      Minutes of Exercise per Session:    Stress:      Feeling of Stress :    Social Connections:      Frequency of Communication with Friends and Family:       Frequency of Social Gatherings with Friends and Family:      Attends Buddhism Services:      Active Member of Clubs or Organizations:      Attends Club or Organization Meetings:      Marital Status:    Intimate Partner Violence:      Fear of Current or Ex-Partner:      Emotionally Abused:      Physically Abused:      Sexually Abused:           Medications  Allergies   Scheduled Meds:  Continuous Infusions:  PRN Meds:. Allergies   Allergen Reactions     Latex Unknown     Added based on information entered during log entry, please review and add reactions, type, and severity as needed     Nsaids (Non-Steroidal Anti-Inflammatory Drug) [Nsaids] Unknown     Other reaction(s): GI Bleeding, GI Upset, Sensitivity.  Ulceration w/ Celebrex         Lab Results    Chemistry/lipid CBC Cardiac Enzymes/BNP/TSH/INR   Lab Results   Component Value Date    CHOL 116 04/20/2021    HDL 45 (L) 04/20/2021    TRIG 142 04/20/2021    BUN 19 04/20/2021     04/20/2021    CO2 27 04/20/2021    Lab Results   Component Value Date    WBC 7.6 04/20/2021    HGB 15.7 04/20/2021    HCT 49.2 (H) 04/20/2021    MCV 97 04/20/2021     04/20/2021    Lab Results   Component Value Date    TSH 2.21 04/20/2021    INR 1.32 (H) 03/29/2019              Ba Foley MD  Interventional Cardiology  [facility]                Thank you for allowing me to participate in the care of your patient.      Sincerely,     Ba Foley MD     [facility]  cc:   No referring provider defined for this encounter.

## 2021-08-10 ENCOUNTER — TRANSFERRED RECORDS (OUTPATIENT)
Dept: HEALTH INFORMATION MANAGEMENT | Facility: CLINIC | Age: 71
End: 2021-08-10

## 2021-08-20 ENCOUNTER — MYC MEDICAL ADVICE (OUTPATIENT)
Dept: INTERNAL MEDICINE | Facility: CLINIC | Age: 71
End: 2021-08-20

## 2021-08-23 DIAGNOSIS — G47.33 OBSTRUCTIVE SLEEP APNEA: Primary | ICD-10-CM

## 2021-08-25 ENCOUNTER — MYC REFILL (OUTPATIENT)
Dept: INTERNAL MEDICINE | Facility: CLINIC | Age: 71
End: 2021-08-25

## 2021-08-25 DIAGNOSIS — G89.29 OTHER CHRONIC PAIN: ICD-10-CM

## 2021-08-27 RX ORDER — ACETAMINOPHEN AND CODEINE PHOSPHATE 300; 30 MG/1; MG/1
1-2 TABLET ORAL EVERY 4 HOURS PRN
Qty: 90 TABLET | Refills: 3 | Status: SHIPPED | OUTPATIENT
Start: 2021-08-27 | End: 2022-03-03

## 2021-08-31 ENCOUNTER — TRANSFERRED RECORDS (OUTPATIENT)
Dept: HEALTH INFORMATION MANAGEMENT | Facility: CLINIC | Age: 71
End: 2021-08-31

## 2021-09-05 ENCOUNTER — HEALTH MAINTENANCE LETTER (OUTPATIENT)
Age: 71
End: 2021-09-05

## 2021-09-21 ENCOUNTER — TRANSFERRED RECORDS (OUTPATIENT)
Dept: HEALTH INFORMATION MANAGEMENT | Facility: CLINIC | Age: 71
End: 2021-09-21

## 2021-10-26 DIAGNOSIS — Z95.1 S/P CABG (CORONARY ARTERY BYPASS GRAFT): Primary | ICD-10-CM

## 2021-10-27 RX ORDER — ROSUVASTATIN CALCIUM 20 MG/1
TABLET, COATED ORAL
Qty: 90 TABLET | Refills: 1 | Status: SHIPPED | OUTPATIENT
Start: 2021-10-27 | End: 2022-01-31

## 2021-10-28 NOTE — TELEPHONE ENCOUNTER
"Last Written Prescription Date:  4/19/2021  Last Fill Quantity: 90,  # refills: 1   Last office visit provider:  7/9/2021- Dr Alberto    rosuvastatin (CRESTOR) 20 MG tablet 90 tablet 1 4/19/2021  No   Sig: TAKE 1 TABLET BY MOUTH AT BEDTIME   Sent to pharmacy as: rosuvastatin 20 mg tablet (CRESTOR)   E-Prescribing Status: Receipt confirmed by pharmacy (4/19/2021  8:41 AM CDT     Requested Prescriptions   Pending Prescriptions Disp Refills     rosuvastatin (CRESTOR) 20 MG tablet [Pharmacy Med Name: ROSUVASTATIN CALCIUM 20 MG TAB] 90 tablet 1     Sig: TAKE 1 TABLET BY MOUTH AT BEDTIME       Statins Protocol Passed - 10/26/2021 12:29 PM        Passed - LDL on file in past 12 months     Recent Labs   Lab Test 04/20/21  0955   LDL 43             Passed - No abnormal creatine kinase in past 12 months     No lab results found.             Passed - Recent (12 mo) or future (30 days) visit within the authorizing provider's specialty     Patient has had an office visit with the authorizing provider or a provider within the authorizing providers department within the previous 12 mos or has a future within next 30 days. See \"Patient Info\" tab in inbasket, or \"Choose Columns\" in Meds & Orders section of the refill encounter.              Passed - Medication is active on med list        Passed - Patient is age 18 or older        Passed - No active pregnancy on record        Passed - No positive pregnancy test in past 12 months             Diana Norris RN 10/27/21 11:38 PM  "

## 2021-11-02 ENCOUNTER — TRANSFERRED RECORDS (OUTPATIENT)
Dept: HEALTH INFORMATION MANAGEMENT | Facility: CLINIC | Age: 71
End: 2021-11-02
Payer: MEDICARE

## 2021-11-10 ENCOUNTER — TRANSFERRED RECORDS (OUTPATIENT)
Dept: HEALTH INFORMATION MANAGEMENT | Facility: CLINIC | Age: 71
End: 2021-11-10
Payer: MEDICARE

## 2021-12-07 ENCOUNTER — TRANSFERRED RECORDS (OUTPATIENT)
Dept: HEALTH INFORMATION MANAGEMENT | Facility: CLINIC | Age: 71
End: 2021-12-07
Payer: MEDICARE

## 2021-12-21 ENCOUNTER — TELEPHONE (OUTPATIENT)
Dept: INTERNAL MEDICINE | Facility: CLINIC | Age: 71
End: 2021-12-21

## 2021-12-21 ENCOUNTER — E-VISIT (OUTPATIENT)
Dept: URGENT CARE | Facility: URGENT CARE | Age: 71
End: 2021-12-21
Payer: MEDICARE

## 2021-12-21 ENCOUNTER — TRANSFERRED RECORDS (OUTPATIENT)
Dept: HEALTH INFORMATION MANAGEMENT | Facility: CLINIC | Age: 71
End: 2021-12-21

## 2021-12-21 DIAGNOSIS — Z20.822 SUSPECTED COVID-19 VIRUS INFECTION: Primary | ICD-10-CM

## 2021-12-21 PROCEDURE — 99421 OL DIG E/M SVC 5-10 MIN: CPT | Performed by: PHYSICIAN ASSISTANT

## 2021-12-21 NOTE — TELEPHONE ENCOUNTER
Reason for Call:  Other call back    Detailed comments: pt did a home test for covid and the results are in her chart under Media -  Results came back negative    What is next step    Please advise    Phone Number Patient can be reached at: Cell number on file:    Telephone Information:   Mobile [phone]       Best Time: anytime    Can we leave a detailed message on this number? YES    Call taken on 12/21/2021 at 2:10 PM by Carmen Schumacher

## 2021-12-21 NOTE — PATIENT INSTRUCTIONS
Dear Lisa Ray,    Your symptoms show that you may have coronavirus (COVID-19). This illness can cause fever, cough and trouble breathing. Many people get a mild case and get better on their own. Some people can get very sick.    Will I be tested for COVID-19?  We would like to test you for Covid-19 virus. I have placed orders for this test.     To schedule: go to your [facility] home page and scroll down to the section that says  You have an appointment that needs to be scheduled  and click the large green button that says  Schedule Now  and follow the steps to find the next available openings.    If you are unable to complete these [facility] scheduling steps, please call [phone] to schedule your testing.     Return to work/school/ guidance:  Please let your workplace manager and staffing office know when your quarantine ends     We can t give you an exact date as it depends on the above. You can calculate this on your own or work with your manager/staffing office to calculate this. (For example if you were exposed on 10/4, you would have to quarantine for 14 full days. That would be through 10/18. You could return on 10/19.)      If you receive a positive COVID-19 test result, follow the guidance of the those who are giving you the results. Usually the return to work is 10 (or in some cases 20 days from symptom onset.) If you work at [facility], you must also be cleared by Employee Occupational Health and Safety to return to work.        If you receive a negative COVID-19 test result and did not have a high risk exposure to someone with a known positive COVID-19 test, you can return to work once you're free of fever for 24 hours without fever-reducing medication and your symptoms are improving or resolved.      If you receive a negative COVID-19 test and If you had a high risk exposure to someone who has tested positive for COVID-19 then you can return to work 14 days after your last  contact with the positive individual    Note: If you have ongoing exposure to the covid positive person, this quarantine period may be more than 14 days. (For example, if you are continued to be exposed to your child who tested positive and cannot isolate from them, then the quarantine of 7-14 days can't start until your child is no longer contagious. This is typically 10 days from onset of the child's symptoms. So the total duration may be 17-24 days in this case.)    Sign up for [employer].   We know it's scary to hear that you might have COVID-19. We want to track your symptoms to make sure you're okay over the next 2 weeks. Please look for an email from [employer]--this is a free, online program that we'll use to keep in touch. To sign up, follow the link in the email you will receive. Learn more at [email or web address]    How can I take care of myself?    Get lots of rest. Drink extra fluids (unless a doctor has told you not to)    Take Tylenol (acetaminophen) or ibuprofen for fever or pain. If you have liver or kidney problems, ask your family doctor if it's okay to take Tylenol o ibuprofen    If you have other health problems (like cancer, heart failure, an organ transplant or severe kidney disease): Call your specialty clinic if you don't feel better in the next 2 days.    Know when to call 911. Emergency warning signs include:  o Trouble breathing or shortness of breath  o Pain or pressure in the chest that doesn't go away  o Feeling confused like you haven't felt before, or not being able to wake up  o Bluish-colored lips or face    Where can I get more information?  [facility] - About COVID-19:   [email or web address]    CDC - What to Do If You're Sick:   www.cdc.gov/coronavirus/2019-ncov/about/steps-when-sick.html    December 21, 2021  RE:  Lisa Ray                                                                                                                   [address] [place] [address]      To whom it may concern:    I evaluated Lisa Ray on December 21, 2021. Lisa Ray should be excused from work/school.     They should let their workplace manager and staffing office know when their quarantine ends.    We can not give an exact date as it depends on the information below. They can calculate this on their own or work with their manager/staffing office to calculate this. (For example if they were exposed on 10/04, they would have to quarantine for 14 full days. That would be through 10/18. They could return on 10/19.)    Quarantine Guidelines:      If patient receives a positive COVID-19 test result, they should follow the guidance of those who are giving the results. Usually the return to work is 10 (or in some cases 20 days from symptom onset.) If they work at [employer], they must be cleared by Employee Occupational Health and Safety to return to work.        If patient receives a negative COVID-19 test result and did not have a high risk exposure to someone with a known positive COVID-19 test, they can return to work once they're free of fever for 24 hours without fever-reducing medication and their symptoms are improving or resolved.      If patient receives a negative COVID-19 test and if they had a high risk exposure to someone who has tested positive for COVID-19 then they can return to work 14 days after their last contact with the positive individual    Note: If there is ongoing exposure to the covid positive person, this quarantine period may be longer than 14 days. (For example, if they are continually exposed to their child, who tested positive and cannot isolate from them, then the quarantine of 7-14 days can't start until their child is no longer contagious. This is typically 10 days from onset to the child's symptoms. So the total duration may be 17-24 days in this case.)     Sincerely,  Cl Melendez,  [facility]

## 2021-12-22 NOTE — TELEPHONE ENCOUNTER
Covid test negative, HA, green mucous from nose and productive cough. Sinus pain and pressure. Nasal irrigation not working. Symptoms have stayed the same. Patient taking Tylenol 3 times per day and mucinex.  Patient concerned about the symptoms not improving.  Patient has an appointment with Dr. Saucedo tomorrow 12/23/2021.

## 2021-12-23 ENCOUNTER — VIRTUAL VISIT (OUTPATIENT)
Dept: INTERNAL MEDICINE | Facility: CLINIC | Age: 71
End: 2021-12-23
Payer: MEDICARE

## 2021-12-23 DIAGNOSIS — J01.01 ACUTE RECURRENT MAXILLARY SINUSITIS: Primary | ICD-10-CM

## 2021-12-23 DIAGNOSIS — I10 ESSENTIAL HYPERTENSION, BENIGN: ICD-10-CM

## 2021-12-23 DIAGNOSIS — K21.9 GASTROESOPHAGEAL REFLUX DISEASE WITHOUT ESOPHAGITIS: ICD-10-CM

## 2021-12-23 PROBLEM — I20.89 STABLE ANGINA (H): Status: RESOLVED | Noted: 2019-03-15 | Resolved: 2021-12-23

## 2021-12-23 PROCEDURE — 99214 OFFICE O/P EST MOD 30 MIN: CPT | Mod: 95 | Performed by: INTERNAL MEDICINE

## 2021-12-23 RX ORDER — PREDNISONE 20 MG/1
20 TABLET ORAL DAILY
Qty: 7 TABLET | Refills: 0 | Status: SHIPPED | OUTPATIENT
Start: 2021-12-23 | End: 2021-12-30

## 2021-12-23 RX ORDER — AMOXICILLIN AND CLAVULANATE POTASSIUM 500; 125 MG/1; MG/1
1 TABLET, FILM COATED ORAL 2 TIMES DAILY
Qty: 20 TABLET | Refills: 0 | Status: SHIPPED | OUTPATIENT
Start: 2021-12-23 | End: 2022-01-02

## 2021-12-23 NOTE — PATIENT INSTRUCTIONS
Augmentin 500 mg twice daily for 10 days    Prednisone 20 mg daily for 1 week    Consider changing lisinopril to hydrochlorothiazide 25 mg daily

## 2021-12-23 NOTE — PROGRESS NOTES
Lisa is a 71 year old female being evaluated via a billable phone visit, and would like to be contacted via the following  Cell number on file:    Telephone Information:   Mobile [phone]        ASSESSMENT:  DX: 1. Acute recurrent maxillary sinusitis  Possible combined with hypersensitivity to lisinopril.  Would treat with antibiotic due to duration and prednisone due to lisinopril  - predniSONE (DELTASONE) 20 MG tablet; Take 1 tablet (20 mg) by mouth daily for 7 days  Dispense: 7 tablet; Refill: 0  - amoxicillin-clavulanate (AUGMENTIN) 500-125 MG tablet; Take 1 tablet by mouth 2 times daily for 10 days  Dispense: 20 tablet; Refill: 0    2. Gastroesophageal reflux disease without esophagitis  Confirm dose and refill for request  - omeprazole (PRILOSEC) 20 MG DR capsule; Take 1 capsule (20 mg) by mouth daily  Dispense: 90 capsule; Refill: 3    3. Essential hypertension, benign  Discussed possible side effect of lisinopril.  If she wishes would stop lisinopril and change to hydrochlorothiazide 25 mg daily        PLAN:  Patient Instructions   Augmentin 500 mg twice daily for 10 days    Prednisone 20 mg daily for 1 week    Consider changing lisinopril to hydrochlorothiazide 25 mg daily  Send me an update via [email or web address] in a few days     Return in about 6 months (around 6/23/2022) for using a video visit.    CHIEF COMPLAINT:  Chief Complaint   Patient presents with     URI     x9-10 days       HISTORY OF PRESENT ILLNESS:  Lisa is a 71 year old female contacting the clinic today via phone for complaints of sinus infection.  Symptoms have been ongoing 9 to 10 days.  Began with viral upper respiratory infection.  She now has purulent nasal secretions, frequent cough, posterior nasal drainage.  No fever.  No wheezing.  Takes lisinopril but does not cough every day.  Has had such infections approximately once every year or 2.  Does have component of bronchiectasis    Blood pressure stable on metoprolol and  "lisinopril.  Some component of edema persisting.  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 3 grown sons Aries in - , Josse SAMUELS, -microbrewer/beer, ; Gregory Lu- paramedic [employer]...... Non smoker rare ETOH.          TOBACCO USE:  History   Smoking Status     Former Smoker     Packs/day: 1.00     Years: 20.00     Quit date: 3/27/1990   Smokeless Tobacco     Never Used       VITALS:  There were no vitals filed for this visit.  There were no vitals taken for this visit. Estimated body mass index is 31 kg/m  as calculated from the following:    Height as of 8/9/21: 1.6 m (5' 3\").    Weight as of 8/9/21: 79.4 kg (175 lb).    PHYSICAL EXAM:  (observations via Phone)  Alert and oriented.  Throat slightly raspy.  Frequent cough    MEDICATIONS  Current Outpatient Medications   Medication Sig Dispense Refill     amoxicillin-clavulanate (AUGMENTIN) 500-125 MG tablet Take 1 tablet by mouth 2 times daily for 10 days 20 tablet 0     omeprazole (PRILOSEC) 20 MG DR capsule Take 1 capsule (20 mg) by mouth daily 90 capsule 3     predniSONE (DELTASONE) 20 MG tablet Take 1 tablet (20 mg) by mouth daily for 7 days 7 tablet 0     acetaminophen (TYLENOL) 500 MG tablet [ACETAMINOPHEN (TYLENOL) 500 MG TABLET] Take 500 mg by mouth every 6 (six) hours as needed for pain (arthritis).       acetaminophen-codeine (TYLENOL W/CODEINE #3) 300-30 MG per tablet Take 1-2 tablets by mouth every 4 hours as needed 90 tablet 3     alendronate (FOSAMAX) 70 MG tablet [ALENDRONATE (FOSAMAX) 70 MG TABLET] Take 1 tablet (70 mg total) by mouth every 7 days. Take in the morning on an empty stomach with a full glass of water 30 minutes before food 12 tablet 11     aspirin 81 mg chewable tablet [ASPIRIN 81 MG CHEWABLE TABLET] Chew 2 " tablets (162 mg total) daily.  0     b complex vitamins tablet [B COMPLEX VITAMINS TABLET] Take 1 tablet by mouth daily.       calcium, as carbonate, (TUMS) 200 mg calcium (500 mg) chewable tablet [CALCIUM, AS CARBONATE, (TUMS) 200 MG CALCIUM (500 MG) CHEWABLE TABLET] Chew 2 tablets daily.       cholecalciferol, vitamin D3, 1,000 unit tablet [CHOLECALCIFEROL, VITAMIN D3, 1,000 UNIT TABLET] Take 1,000 Units by mouth daily.       diclofenac sodium (VOLTAREN) 1 % Gel [DICLOFENAC SODIUM (VOLTAREN) 1 % GEL] 4 g four times a day 1 Tube 3     docusate sodium (COLACE) 100 MG capsule [DOCUSATE SODIUM (COLACE) 100 MG CAPSULE] Take 1 capsule (100 mg total) by mouth 2 (two) times a day as needed for constipation.  0     doxylamine (UNISOM) 25 mg tablet [DOXYLAMINE (UNISOM) 25 MG TABLET] Take 25 mg by mouth at bedtime as needed for sleep.       lisinopriL (PRINIVIL,ZESTRIL) 10 MG tablet [LISINOPRIL (PRINIVIL,ZESTRIL) 10 MG TABLET] TAKE 1 TABLET BY MOUTH DAILY. 90 tablet 3     magnesium oxide 500 mg Tab [MAGNESIUM OXIDE 500 MG TAB] Take 500 mg by mouth at bedtime.              melatonin 1 mg Tab tablet [MELATONIN 1 MG TAB TABLET] Take 1 mg by mouth at bedtime.              metoprolol succinate ER (TOPROL-XL) 100 MG 24 hr tablet TAKE 1 TABLET BY MOUTH AT BEDTIME 90 tablet 3     peg 400-propylene glycol PF (SYSTANE) 0.4-0.3 % Dpet [-PROPYLENE GLYCOL PF (SYSTANE) 0.4-0.3 % DPET] Administer 1 drop to both eyes 2 (two) times a day as needed.       rosuvastatin (CRESTOR) 20 MG tablet TAKE 1 TABLET BY MOUTH AT BEDTIME 90 tablet 1     sertraline (ZOLOFT) 50 MG tablet [SERTRALINE (ZOLOFT) 50 MG TABLET] TAKE 1 TABLET BY MOUTH ONCE DAILY 90 tablet 3     tiZANidine (ZANAFLEX) 2 MG tablet [TIZANIDINE (ZANAFLEX) 2 MG TABLET] TAKE 1 TABLET BY MOUTH EVERY 6 HOURS IF NEEDED FOR MUSCLE SPASM.       triamcinolone (NASACORT) 55 mcg nasal inhaler [TRIAMCINOLONE (NASACORT) 55 MCG NASAL INHALER] Apply 2 sprays into each nostril daily as needed.        umeclidinium (INCRUSE ELLIPTA) 62.5 mcg/actuation DsDv inhaler [UMECLIDINIUM (INCRUSE ELLIPTA) 62.5 MCG/ACTUATION DSDV INHALER] Inhale 1 puff daily. 30 each 12     zolpidem (AMBIEN) 10 mg tablet [ZOLPIDEM (AMBIEN) 10 MG TABLET] Take 1 tablet (10 mg total) by mouth at bedtime as needed for sleep. 30 tablet 2       Notes summarized:   Labs, x-rays, cardiology, GI tests reviewed:   Recent Labs   Lab Test 04/20/21  0955 02/12/20  1001   HGB 15.7 12.0    143   POTASSIUM 4.9 4.4   CR 0.85 0.75   A1C 6.0* 6.0   TSH 2.21 1.56     No results found for: [id number]  No components found for: VITD  No results found for: VITDT  Lab Results   Component Value Date    CHOL 116 04/20/2021     New orders: No orders of the defined types were placed in this encounter.      Independent review of:  Supplemental history by:      Patient would like to receive their AVS by Eleazar Saucedo MD       [facility]    Phone Start Time: 10:48 AM  Phone End time:  11:17 AM  Conversation plus orders: 29 minutes  Dictation time:  3 minutes    The visit lasted a total of 30 minutes

## 2022-01-06 ENCOUNTER — TRANSFERRED RECORDS (OUTPATIENT)
Dept: HEALTH INFORMATION MANAGEMENT | Facility: CLINIC | Age: 72
End: 2022-01-06
Payer: MEDICARE

## 2022-01-14 ENCOUNTER — MYC MEDICAL ADVICE (OUTPATIENT)
Dept: INTERNAL MEDICINE | Facility: CLINIC | Age: 72
End: 2022-01-14
Payer: COMMERCIAL

## 2022-01-17 ENCOUNTER — MYC MEDICAL ADVICE (OUTPATIENT)
Dept: INTERNAL MEDICINE | Facility: CLINIC | Age: 72
End: 2022-01-17
Payer: COMMERCIAL

## 2022-01-17 DIAGNOSIS — I10 ESSENTIAL HYPERTENSION, BENIGN: ICD-10-CM

## 2022-01-17 DIAGNOSIS — J44.9 CHRONIC OBSTRUCTIVE PULMONARY DISEASE, UNSPECIFIED COPD TYPE (H): Primary | ICD-10-CM

## 2022-01-18 RX ORDER — CODEINE PHOSPHATE/GUAIFENESIN 10-100MG/5
5 LIQUID (ML) ORAL EVERY 4 HOURS PRN
Qty: 200 ML | Refills: 1 | Status: SHIPPED | OUTPATIENT
Start: 2022-01-18 | End: 2022-01-31

## 2022-01-18 RX ORDER — AZITHROMYCIN 250 MG/1
TABLET, FILM COATED ORAL
Qty: 6 TABLET | Refills: 0 | Status: SHIPPED | OUTPATIENT
Start: 2022-01-18 | End: 2022-01-23

## 2022-01-19 ENCOUNTER — TELEPHONE (OUTPATIENT)
Dept: CARDIOLOGY | Facility: CLINIC | Age: 72
End: 2022-01-19
Payer: COMMERCIAL

## 2022-01-19 RX ORDER — LISINOPRIL 10 MG/1
TABLET ORAL
Qty: 90 TABLET | Refills: 3 | OUTPATIENT
Start: 2022-01-19

## 2022-01-19 NOTE — TELEPHONE ENCOUNTER
----- Message from Caro Hartman sent at 1/19/2022 11:32 AM CST -----  Regarding: CJP PATIENT  General phone call:    Caller: PATIENT    Primary cardiologist: ADRIANA    Detailed reason for call: PATIENT HAVING TOTAL HIP ON 2/4/2022, WANTING TO KNOW IF ADRIANA NEEDS TO SEE HER PRIOR. PLEASE RANDA HUYNH.    Best phone number: [phone]    Best time to contact: ANY    Ok to leave a detailedmessage? YES    Device? NO    Additional Info:

## 2022-01-19 NOTE — TELEPHONE ENCOUNTER
Spoke with pt and she will see her PCP. From there will determine if pt needs Cardiology needs to be seen. Pt agreed with plan.

## 2022-01-21 ASSESSMENT — MIFFLIN-ST. JEOR: SCORE: 1279.72

## 2022-01-26 DIAGNOSIS — Z11.59 ENCOUNTER FOR SCREENING FOR OTHER VIRAL DISEASES: Primary | ICD-10-CM

## 2022-01-26 NOTE — PROGRESS NOTES
Pt was able to email her positive PCR covid results from January 13, 2022 from [facility]. The email was forwarded to Ziggy on 1/26/22.

## 2022-01-31 ENCOUNTER — ANCILLARY PROCEDURE (OUTPATIENT)
Dept: GENERAL RADIOLOGY | Facility: CLINIC | Age: 72
End: 2022-01-31
Attending: INTERNAL MEDICINE
Payer: COMMERCIAL

## 2022-01-31 ENCOUNTER — OFFICE VISIT (OUTPATIENT)
Dept: INTERNAL MEDICINE | Facility: CLINIC | Age: 72
End: 2022-01-31
Payer: COMMERCIAL

## 2022-01-31 VITALS
DIASTOLIC BLOOD PRESSURE: 70 MMHG | OXYGEN SATURATION: 96 % | WEIGHT: 172.5 LBS | HEART RATE: 58 BPM | BODY MASS INDEX: 29.61 KG/M2 | SYSTOLIC BLOOD PRESSURE: 148 MMHG

## 2022-01-31 DIAGNOSIS — U07.1 INFECTION DUE TO 2019 NOVEL CORONAVIRUS: ICD-10-CM

## 2022-01-31 DIAGNOSIS — N39.0 RECURRENT UTI: Primary | ICD-10-CM

## 2022-01-31 DIAGNOSIS — I89.0 ACQUIRED LYMPHEDEMA: ICD-10-CM

## 2022-01-31 DIAGNOSIS — I10 ESSENTIAL HYPERTENSION, BENIGN: ICD-10-CM

## 2022-01-31 DIAGNOSIS — Z01.818 PREOP EXAM FOR INTERNAL MEDICINE: Primary | ICD-10-CM

## 2022-01-31 DIAGNOSIS — Z95.1 S/P CABG (CORONARY ARTERY BYPASS GRAFT): ICD-10-CM

## 2022-01-31 DIAGNOSIS — J43.9 PULMONARY EMPHYSEMA, UNSPECIFIED EMPHYSEMA TYPE (H): ICD-10-CM

## 2022-01-31 DIAGNOSIS — M81.0 AGE-RELATED OSTEOPOROSIS WITHOUT CURRENT PATHOLOGICAL FRACTURE: ICD-10-CM

## 2022-01-31 DIAGNOSIS — Z01.818 PREOP EXAM FOR INTERNAL MEDICINE: ICD-10-CM

## 2022-01-31 LAB
ABO/RH(D): NORMAL
ALBUMIN UR-MCNC: NEGATIVE MG/DL
ANION GAP SERPL CALCULATED.3IONS-SCNC: 9 MMOL/L (ref 5–18)
ANTIBODY SCREEN: NEGATIVE
APPEARANCE UR: ABNORMAL
BACTERIA #/AREA URNS HPF: ABNORMAL /HPF
BILIRUB UR QL STRIP: NEGATIVE
BUN SERPL-MCNC: 19 MG/DL (ref 8–28)
CALCIUM SERPL-MCNC: 10.2 MG/DL (ref 8.5–10.5)
CHLORIDE BLD-SCNC: 107 MMOL/L (ref 98–107)
CO2 SERPL-SCNC: 26 MMOL/L (ref 22–31)
COLOR UR AUTO: YELLOW
CREAT SERPL-MCNC: 0.81 MG/DL (ref 0.6–1.1)
ERYTHROCYTE [DISTWIDTH] IN BLOOD BY AUTOMATED COUNT: 12.7 % (ref 10–15)
GFR SERPL CREATININE-BSD FRML MDRD: 77 ML/MIN/1.73M2
GLUCOSE BLD-MCNC: 97 MG/DL (ref 70–125)
GLUCOSE UR STRIP-MCNC: NEGATIVE MG/DL
HCT VFR BLD AUTO: 46.3 % (ref 35–47)
HGB BLD-MCNC: 14.8 G/DL (ref 11.7–15.7)
HGB UR QL STRIP: ABNORMAL
KETONES UR STRIP-MCNC: ABNORMAL MG/DL
LEUKOCYTE ESTERASE UR QL STRIP: ABNORMAL
MCH RBC QN AUTO: 31.2 PG (ref 26.5–33)
MCHC RBC AUTO-ENTMCNC: 32 G/DL (ref 31.5–36.5)
MCV RBC AUTO: 98 FL (ref 78–100)
NITRATE UR QL: NEGATIVE
PH UR STRIP: 5.5 [PH] (ref 5–8)
PLATELET # BLD AUTO: 237 10E3/UL (ref 150–450)
POTASSIUM BLD-SCNC: 5.4 MMOL/L (ref 3.5–5)
RBC # BLD AUTO: 4.75 10E6/UL (ref 3.8–5.2)
RBC #/AREA URNS AUTO: ABNORMAL /HPF
SARS-COV-2 RNA RESP QL NAA+PROBE: NEGATIVE
SODIUM SERPL-SCNC: 142 MMOL/L (ref 136–145)
SP GR UR STRIP: 1.02 (ref 1–1.03)
SPECIMEN EXPIRATION DATE: NORMAL
SQUAMOUS #/AREA URNS AUTO: ABNORMAL /LPF
UROBILINOGEN UR STRIP-ACNC: 0.2 E.U./DL
WBC # BLD AUTO: 9.6 10E3/UL (ref 4–11)
WBC #/AREA URNS AUTO: ABNORMAL /HPF
WBC CLUMPS #/AREA URNS HPF: PRESENT /HPF

## 2022-01-31 PROCEDURE — 36415 COLL VENOUS BLD VENIPUNCTURE: CPT | Performed by: INTERNAL MEDICINE

## 2022-01-31 PROCEDURE — 93005 ELECTROCARDIOGRAM TRACING: CPT | Performed by: INTERNAL MEDICINE

## 2022-01-31 PROCEDURE — 86850 RBC ANTIBODY SCREEN: CPT | Performed by: INTERNAL MEDICINE

## 2022-01-31 PROCEDURE — 99214 OFFICE O/P EST MOD 30 MIN: CPT | Performed by: INTERNAL MEDICINE

## 2022-01-31 PROCEDURE — 93010 ELECTROCARDIOGRAM REPORT: CPT | Performed by: INTERNAL MEDICINE

## 2022-01-31 PROCEDURE — 80048 BASIC METABOLIC PNL TOTAL CA: CPT | Performed by: INTERNAL MEDICINE

## 2022-01-31 PROCEDURE — 81001 URINALYSIS AUTO W/SCOPE: CPT | Performed by: INTERNAL MEDICINE

## 2022-01-31 PROCEDURE — 87086 URINE CULTURE/COLONY COUNT: CPT | Performed by: INTERNAL MEDICINE

## 2022-01-31 PROCEDURE — U0003 INFECTIOUS AGENT DETECTION BY NUCLEIC ACID (DNA OR RNA); SEVERE ACUTE RESPIRATORY SYNDROME CORONAVIRUS 2 (SARS-COV-2) (CORONAVIRUS DISEASE [COVID-19]), AMPLIFIED PROBE TECHNIQUE, MAKING USE OF HIGH THROUGHPUT TECHNOLOGIES AS DESCRIBED BY CMS-2020-01-R: HCPCS | Performed by: INTERNAL MEDICINE

## 2022-01-31 PROCEDURE — 71046 X-RAY EXAM CHEST 2 VIEWS: CPT | Mod: TC | Performed by: RADIOLOGY

## 2022-01-31 PROCEDURE — 86901 BLOOD TYPING SEROLOGIC RH(D): CPT | Performed by: INTERNAL MEDICINE

## 2022-01-31 PROCEDURE — 85027 COMPLETE CBC AUTOMATED: CPT | Performed by: INTERNAL MEDICINE

## 2022-01-31 PROCEDURE — 87186 SC STD MICRODIL/AGAR DIL: CPT | Performed by: INTERNAL MEDICINE

## 2022-01-31 PROCEDURE — 86900 BLOOD TYPING SEROLOGIC ABO: CPT | Performed by: INTERNAL MEDICINE

## 2022-01-31 PROCEDURE — U0005 INFEC AGEN DETEC AMPLI PROBE: HCPCS | Performed by: INTERNAL MEDICINE

## 2022-01-31 RX ORDER — SULFAMETHOXAZOLE/TRIMETHOPRIM 800-160 MG
1 TABLET ORAL 2 TIMES DAILY
Qty: 10 TABLET | Refills: 0 | Status: ON HOLD | OUTPATIENT
Start: 2022-01-31 | End: 2022-02-04

## 2022-01-31 RX ORDER — LISINOPRIL 10 MG/1
TABLET ORAL
Qty: 90 TABLET | Refills: 3 | Status: SHIPPED | OUTPATIENT
Start: 2022-01-31 | End: 2022-06-20

## 2022-01-31 RX ORDER — ROSUVASTATIN CALCIUM 20 MG/1
20 TABLET, COATED ORAL AT BEDTIME
Qty: 90 TABLET | Refills: 3 | Status: SHIPPED | OUTPATIENT
Start: 2022-01-31 | End: 2022-12-29

## 2022-01-31 RX ORDER — ALENDRONATE SODIUM 70 MG/1
70 TABLET ORAL
Qty: 12 TABLET | Refills: 3 | Status: SHIPPED | OUTPATIENT
Start: 2022-01-31 | End: 2023-02-09

## 2022-01-31 ASSESSMENT — PATIENT HEALTH QUESTIONNAIRE - PHQ9
5. POOR APPETITE OR OVEREATING: SEVERAL DAYS
SUM OF ALL RESPONSES TO PHQ QUESTIONS 1-9: 5

## 2022-01-31 ASSESSMENT — ANXIETY QUESTIONNAIRES
1. FEELING NERVOUS, ANXIOUS, OR ON EDGE: NOT AT ALL
IF YOU CHECKED OFF ANY PROBLEMS ON THIS QUESTIONNAIRE, HOW DIFFICULT HAVE THESE PROBLEMS MADE IT FOR YOU TO DO YOUR WORK, TAKE CARE OF THINGS AT HOME, OR GET ALONG WITH OTHER PEOPLE: SOMEWHAT DIFFICULT
7. FEELING AFRAID AS IF SOMETHING AWFUL MIGHT HAPPEN: NOT AT ALL
3. WORRYING TOO MUCH ABOUT DIFFERENT THINGS: NOT AT ALL
5. BEING SO RESTLESS THAT IT IS HARD TO SIT STILL: NOT AT ALL
6. BECOMING EASILY ANNOYED OR IRRITABLE: SEVERAL DAYS
GAD7 TOTAL SCORE: 2
2. NOT BEING ABLE TO STOP OR CONTROL WORRYING: NOT AT ALL

## 2022-01-31 NOTE — PROGRESS NOTES
[facility]  [address] [place] [address]  Phone: [phone]  Fax: [phone]  Primary Provider: Lawrence Hansen  Pre-op Performing Provider: LAWRENCE HANSEN      PREOPERATIVE EVALUATION:  Today's date: 1/31/2022    Lisa Ray is a 71 year old female who presents for a preoperative evaluation.    Surgical Information:  Surgery/Procedure: Left total hip replacement   Surgery Location: [facility]  Surgeon: Dr. Bryson  Surgery Date: 2/4/22  Time of Surgery: TBD  Where patient plans to recover: At home with family  Fax number for surgical facility: Note does not need to be faxed, will be available electronically in Epic.    Type of Anesthesia Anticipated: to be determined  covid 1/13/2022   Assessment & Plan   Medically approved for surgery   The proposed surgical procedure is considered LOW risk.    Essential hypertension, benign  suboptimal today but has been in excellent control . Home readings have been in the 120's by 130's and diastolic has been   - lisinopril (ZESTRIL) 10 MG tablet  Dispense: 90 tablet; Refill: 3    S/P CABG (coronary artery bypass graft)  Is asymptomatic . Had successful CABG in 2019 . EKG is essentially unchanged   - rosuvastatin (CRESTOR) 20 MG tablet  Dispense: 90 tablet; Refill: 3    Age-related osteoporosis without current pathological fracture  Can hold fosmax for a month given implant and surgery . She should not need antibiotic prophylaxis for dental procedures that are clean   - alendronate (FOSAMAX) 70 MG tablet  Dispense: 12 tablet; Refill: 3    Pulmonary emphysema, unspecified emphysema type (H)  Stable  Not on oxygen   - umeclidinium (INCRUSE ELLIPTA) 62.5 MCG/INH inhaler  Dispense: 30 each; Refill: 12    Preop exam for internal medicine    - XR Chest 2 Views  - EKG 12-lead, tracing only  - Asymptomatic COVID-19 Virus (Coronavirus) by PCR Nose  - CBC with platelets  - ABO/Rh type and screen  - Basic  metabolic panel  (Ca, Cl, CO2, Creat, Gluc, K, Na, BUN)  - UA Macro with Reflex to Micro and Culture - lab collect  - CBC with platelets  - ABO/Rh type and screen  - Basic metabolic panel  (Ca, Cl, CO2, Creat, Gluc, K, Na, BUN)  - UA Macro with Reflex to Micro and Culture - lab collect  - Urine Microscopic Exam  - Urine Culture    Infection due to 2019 novel coronavirus  Recovered uneventfully without treatment . Is fully vaccinated . Will do preop covid test today . Has already tested negative post infection   Will check chest xray to see if there are any changes of covid in lungs . oxyengantion is normal   Acquired lymphedema                 Medication Instructions:  Patient is to take all scheduled medications on the day of surgery  AFTER surgery . NPO in the morning   Stop aspirin . Will be given aspirin postoperatively for DVT prophylaxis  RECOMMENDATION:  APPROVAL GIVEN to proceed with proposed procedure, without further diagnostic evaluation.                      Subjective     HPI related to upcoming procedure:  Sever OA of hip impairing gait     Preop Questions 1/27/2022   1. Have you ever had a heart attack or stroke? No   2. Have you ever had surgery on your heart or blood vessels, such as a stent placement, a coronary artery bypass, or surgery on an artery in your head, neck, heart, or legs? YES -    3. Do you have chest pain with activity? No   4. Do you have a history of  heart failure? No   5. Do you currently have a cold, bronchitis or symptoms of other infection? YES -    6. Do you have a cough, shortness of breath, or wheezing? YES -    7. Do you or anyone in your family have previous history of blood clots? No   8. Do you or does anyone in your family have a serious bleeding problem such as prolonged bleeding following surgeries or cuts? No   9. Have you ever had problems with anemia or been told to take iron pills? YES -    10. Have you had any abnormal blood loss such as black, tarry or bloody  stools, or abnormal vaginal bleeding? No   11. Have you ever had a blood transfusion? YES -    11a. Have you ever had a transfusion reaction? No   12. Are you willing to have a blood transfusion if it is medically needed before, during, or after your surgery? Yes   13. Have you or any of your relatives ever had problems with anesthesia? No   14. Do you have sleep apnea, excessive snoring or daytime drowsiness? YES -    14a. Do you have a CPAP machine? Yes   15. Do you have any artifical heart valves or other implanted medical devices like a pacemaker, defibrillator, or continuous glucose monitor? No   16. Do you have artificial joints? YES -    17. Are you allergic to latex? No       Health Care Directive:  Patient has a Health Care Directive on file      Preoperative Review of :        Patient Active Problem List   Diagnosis     Ptosis Of Eyelid     Rosacea     Dysthymic disorder     Coronary Artery Disease     Osteoarthritis     Raynaud disease     Paresthesias/numbness     Primary osteoarthritis of right hip     DCIS (ductal carcinoma in situ)     S/P coronary artery stent placement     History of bilateral mastectomy     S/P bilateral breast implants     S/P CABG (coronary artery bypass graft)     History of total hip replacement, right     Health maintenance examination     ARSEN (obstructive sleep apnea)     Acquired lymphedema     Hair loss     Right shoulder pain     COPD (chronic obstructive pulmonary disease) (H)     Essential hypertension, benign     Age-related osteoporosis without current pathological fracture     Chronic obstructive airway disease with asthma (H)     Bronchiectasis without acute exacerbation (H)     Infection due to 2019 novel coronavirus     Current Outpatient Medications   Medication     acetaminophen (TYLENOL) 500 MG tablet     acetaminophen-codeine (TYLENOL W/CODEINE #3) 300-30 MG per tablet     alendronate (FOSAMAX) 70 MG tablet     aspirin 81 mg chewable tablet     b complex  vitamins tablet     calcium, as carbonate, (TUMS) 200 mg calcium (500 mg) chewable tablet     cholecalciferol, vitamin D3, 1,000 unit tablet     diclofenac sodium (VOLTAREN) 1 % Gel     docusate sodium (COLACE) 100 MG capsule     doxylamine (UNISOM) 25 mg tablet     Ferrous Sulfate (IRON) 28 MG TABS     lisinopril (ZESTRIL) 10 MG tablet     magnesium oxide 500 mg Tab     melatonin 1 mg Tab tablet     metoprolol succinate ER (TOPROL-XL) 100 MG 24 hr tablet     omeprazole (PRILOSEC) 20 MG DR capsule     peg 400-propylene glycol PF (SYSTANE) 0.4-0.3 % Dpet     rosuvastatin (CRESTOR) 20 MG tablet     sertraline (ZOLOFT) 50 MG tablet     tiZANidine (ZANAFLEX) 2 MG tablet     triamcinolone (NASACORT) 55 mcg nasal inhaler     TURMERIC PO     umeclidinium (INCRUSE ELLIPTA) 62.5 MCG/INH inhaler     zolpidem (AMBIEN) 10 mg tablet     No current facility-administered medications for this visit.         Review of Systems  CONSTITUTIONAL: NEGATIVE for fever, chills, change in weight  INTEGUMENTARY/SKIN: NEGATIVE for worrisome rashes, moles or lesions  EYES: NEGATIVE for vision changes or irritation  ENT/MOUTH: NEGATIVE for ear, mouth and throat problems  RESP: NEGATIVE for significant cough or SOB  CV: NEGATIVE for chest pain, palpitations or peripheral edema  GI: NEGATIVE for nausea, abdominal pain, heartburn, or change in bowel habits  : NEGATIVE for frequency, dysuria, or hematuria  MUSCULOSKELETAL: NEGATIVE for significant arthralgias or myalgia  NEURO: NEGATIVE for weakness, dizziness or paresthesias  ENDOCRINE: NEGATIVE for temperature intolerance, skin/hair changes  HEME: NEGATIVE for bleeding problems  PSYCHIATRIC: NEGATIVE for changes in mood or affect    Patient Active Problem List    Diagnosis Date Noted     Bronchiectasis without acute exacerbation (H) 07/09/2021     Priority: Medium     Age-related osteoporosis without current pathological fracture 06/11/2021     Priority: Medium     COPD (chronic obstructive  pulmonary disease) (H) 03/14/2020     Priority: Medium     Essential hypertension, benign 03/14/2020     Priority: Medium     Chronic obstructive airway disease with asthma (H) 03/14/2020     Priority: Medium     Acquired lymphedema 02/12/2020     Priority: Medium     Hair loss 02/12/2020     Priority: Medium     Right shoulder pain 02/12/2020     Priority: Medium     ARSEN (obstructive sleep apnea) 06/05/2019     Priority: Medium     5/20/2019 Polysomnography Split (wt 159 lbs).  There was evidence of limb movements during sleep independent of   respiratory events.  Respiratory monitoring showed moderate obstructive sleep apnea   (AHI=17.1/hr).  The patient spent a total of 8.2 minutes at an oxygen saturation below   88%.  A trial of nasal CPAP was initiated given the severity of sleep-disordered   breathing.  CPAP pressures from 5 to 6 were sampled during the night.  Patient had trouble sleeping due to pain and PLMs.  Patient elevated head   of bed to 10 degrees during second half of titration portion of study.  CPAP of 6 was effective at eliminating obstructive events. This was an   adequate titration study (titration grading criteria for optimal or good   are met with the exception that supine REM sleep did not occur at the   selected pressure).         Health maintenance examination 04/11/2019     Priority: Medium     Repeat colonoscopy is 2022   repeat dexa is 2020  No paps or pelvics THANH/BSO         S/P CABG (coronary artery bypass graft) 03/28/2019     Priority: Medium     3/29/2019 with Dr. Akbar  1. Epiaortic ultrasound of the ascending aorta.  2. Triple vessel coronary artery bypass grafting procedures; left   internal mammary  artery to left anterior descending coronary artery and separate   reversedsaphenous  vein grafts to the left anterior descending diagonal branch 2 and the   right  posterior descending coronary arteries.  3. Endoscopic vein procurement from the left lower extremity.         S/P  bilateral breast implants 09/25/2018     Priority: Medium     History of bilateral mastectomy 05/23/2018     Priority: Medium     Ductal in situ carcinoma; [facility]         Dysthymic disorder      Priority: Medium     Created by Conversion         Coronary Artery Disease      Priority: Medium     2008- RCA; @[facility] (Dr. Schwab)         S/P coronary artery stent placement 10/09/2017     Priority: Medium     2008- RCA stent         DCIS (ductal carcinoma in situ) 09/08/2017     Priority: Medium     Right breast/biopsy- 9/7/2017         Ptosis Of Eyelid      Priority: Medium     Created by Conversion  [facility] Annotation: Jul 25 2013  3:44PM - Gregory Doyle: compromises   vision. scheduled for repair  Replacement Utility updated for latest IMO load         Primary osteoarthritis of right hip 12/07/2015     Priority: Medium     History of total hip replacement, right 12/07/2015     Priority: Medium     Paresthesias/numbness 10/29/2015     Priority: Medium     IMO SNOMED LOAD SPRING 2020 [.6/.18/.2020 9:04 PM]  Inocencia Xiongirban:           Osteoarthritis 06/11/2015     Priority: Medium     Raynaud disease 06/11/2015     Priority: Medium     Rosacea      Priority: Medium     Created by Conversion          Past Medical History:   Diagnosis Date     Anemia 2012    microcytic from Celebrex. GI w/u neg     Back pain      Benign neoplasm of colon     Created by Conversion      Bronchiectasis (H)      CAD (coronary artery disease) 2008    RCA- stent     Cancer (H)     breast cancer     DJD (degenerative joint disease)      Dysthymia      Fatigue     recurrent/variable - no serious pathology     Fibromyalgia      GERD (gastroesophageal reflux disease)      History of gastric ulcer      Hx of CABG      Hypertension      Iritis     past Hx.-left eye     ARSEN (obstructive sleep apnea) 6/5/2019     Raynaud's disease      Rosacea      Shortness of breath     with exertion     Shoulder tendonitis     right      Stented coronary artery      Ulcer of intestine     small bowel- 10 years ago     UTI (urinary tract infection)     Jan. 2019     Past Surgical History:   Procedure Laterality Date     BELPHAROPTOSIS REPAIR Bilateral 2013    Dr. Eder Bingham- 2013     BREAST SURGERY Bilateral 11/2017    bilateral mastectomy 2017- 11/2017     BREAST SURGERY  2018    implants and revsion 2     CORONARY STENT PLACEMENT Right 2008    Dr. Schwab     CV CORONARY ANGIOGRAM N/A 3/26/2019    Procedure: Coronary Angiogram;  Surgeon: Ba Foley MD;  Location: [facility];  Service: Cardiology     HYSTERECTOMY       OOPHORECTOMY       TOTAL HIP ARTHROPLASTY Right 12/7/2015    Procedure: RIGHT TOTAL HIP ARTHROPLASTY;  Surgeon: Tera Yeung MD;  Location: [facility];  Service:      [facility] APPENDECTOMY      Description: Appendectomy;  Recorded: 01/13/2009;     Current Outpatient Medications   Medication Sig Dispense Refill     acetaminophen (TYLENOL) 500 MG tablet [ACETAMINOPHEN (TYLENOL) 500 MG TABLET] Take 500 mg by mouth every 6 (six) hours as needed for pain (arthritis).       acetaminophen-codeine (TYLENOL W/CODEINE #3) 300-30 MG per tablet Take 1-2 tablets by mouth every 4 hours as needed 90 tablet 3     alendronate (FOSAMAX) 70 MG tablet [ALENDRONATE (FOSAMAX) 70 MG TABLET] Take 1 tablet (70 mg total) by mouth every 7 days. Take in the morning on an empty stomach with a full glass of water 30 minutes before food 12 tablet 11     aspirin 81 mg chewable tablet [ASPIRIN 81 MG CHEWABLE TABLET] Chew 2 tablets (162 mg total) daily.  0     b complex vitamins tablet [B COMPLEX VITAMINS TABLET] Take 1 tablet by mouth daily.       budesonide (PULMICORT FLEXHALER) 180 MCG/ACT inhaler Inhale 1 puff into the lungs 2 times daily 1 each 1     calcium, as carbonate, (TUMS) 200 mg calcium (500 mg) chewable tablet [CALCIUM, AS CARBONATE, (TUMS) 200 MG CALCIUM (500 MG) CHEWABLE TABLET] Chew 2 tablets daily.        cholecalciferol, vitamin D3, 1,000 unit tablet [CHOLECALCIFEROL, VITAMIN D3, 1,000 UNIT TABLET] Take 1,000 Units by mouth daily.       diclofenac sodium (VOLTAREN) 1 % Gel [DICLOFENAC SODIUM (VOLTAREN) 1 % GEL] 4 g four times a day 1 Tube 3     docusate sodium (COLACE) 100 MG capsule [DOCUSATE SODIUM (COLACE) 100 MG CAPSULE] Take 1 capsule (100 mg total) by mouth 2 (two) times a day as needed for constipation.  0     doxylamine (UNISOM) 25 mg tablet [DOXYLAMINE (UNISOM) 25 MG TABLET] Take 25 mg by mouth at bedtime as needed for sleep.       guaiFENesin-codeine (GUAIFENESIN AC) 100-10 MG/5ML syrup Take 5 mLs by mouth every 4 hours as needed for congestion 200 mL 1     lisinopriL (PRINIVIL,ZESTRIL) 10 MG tablet [LISINOPRIL (PRINIVIL,ZESTRIL) 10 MG TABLET] TAKE 1 TABLET BY MOUTH DAILY. 90 tablet 3     magnesium oxide 500 mg Tab [MAGNESIUM OXIDE 500 MG TAB] Take 500 mg by mouth at bedtime.              melatonin 1 mg Tab tablet [MELATONIN 1 MG TAB TABLET] Take 1 mg by mouth at bedtime.              metoprolol succinate ER (TOPROL-XL) 100 MG 24 hr tablet TAKE 1 TABLET BY MOUTH AT BEDTIME 90 tablet 3     omeprazole (PRILOSEC) 20 MG DR capsule Take 1 capsule (20 mg) by mouth daily 90 capsule 3     peg 400-propylene glycol PF (SYSTANE) 0.4-0.3 % Dpet [-PROPYLENE GLYCOL PF (SYSTANE) 0.4-0.3 % DPET] Administer 1 drop to both eyes 2 (two) times a day as needed.       rosuvastatin (CRESTOR) 20 MG tablet TAKE 1 TABLET BY MOUTH AT BEDTIME 90 tablet 1     sertraline (ZOLOFT) 50 MG tablet [SERTRALINE (ZOLOFT) 50 MG TABLET] TAKE 1 TABLET BY MOUTH ONCE DAILY 90 tablet 3     tiZANidine (ZANAFLEX) 2 MG tablet [TIZANIDINE (ZANAFLEX) 2 MG TABLET] TAKE 1 TABLET BY MOUTH EVERY 6 HOURS IF NEEDED FOR MUSCLE SPASM.       triamcinolone (NASACORT) 55 mcg nasal inhaler [TRIAMCINOLONE (NASACORT) 55 MCG NASAL INHALER] Apply 2 sprays into each nostril daily as needed.       umeclidinium (INCRUSE ELLIPTA) 62.5 mcg/actuation DsDv inhaler  [UMECLIDINIUM (INCRUSE ELLIPTA) 62.5 MCG/ACTUATION DSDV INHALER] Inhale 1 puff daily. 30 each 12     zolpidem (AMBIEN) 10 mg tablet [ZOLPIDEM (AMBIEN) 10 MG TABLET] Take 1 tablet (10 mg total) by mouth at bedtime as needed for sleep. 30 tablet 2       Allergies   Allergen Reactions     Latex Unknown     Added based on information entered during log entry, please review and add reactions, type, and severity as needed     Nsaids (Non-Steroidal Anti-Inflammatory Drug) [Nsaids] Unknown     Other reaction(s): GI Bleeding, GI Upset, Sensitivity.  Ulceration w/ Celebrex        Social History     Tobacco Use     Smoking status: Former Smoker     Packs/day: 1.00     Years: 20.00     Pack years: 20.00     Quit date: 3/27/1990     Years since quittin.8     Smokeless tobacco: Never Used   Substance Use Topics     Alcohol use: Yes     Alcohol/week: 3.3 standard drinks     Comment: Alcoholic Drinks/day: 1-2 glasses of wine/week        History   Drug Use No         Objective     There were no vitals taken for this visit.    Physical Exam    GENERAL APPEARANCE: healthy, alert and no distress     EYES: EOMI, PERRL     HENT: ear canals and TM's normal and nose and mouth without ulcers or lesions     NECK: no adenopathy, no asymmetry, masses, or scars and thyroid normal to palpation     RESP: lungs clear to auscultation - no rales, rhonchi or wheezes     CV: regular rates and rhythm, normal S1 S2, no S3 or S4 and no murmur, click or rub     ABDOMEN:  soft, nontender, no HSM or masses and bowel sounds normal     MS: extremities normal- no gross deformities noted, no evidence of inflammation in joints, FROM in all extremities.     SKIN: no suspicious lesions or rashes     NEURO: Normal strength and tone, sensory exam grossly normal, mentation intact and speech normal     PSYCH: mentation appears normal. and affect normal/bright     LYMPHATICS: No cervical adenopathy    Recent Labs   Lab Test 21  0955 20  1001   HGB 15.7  12.0    228    143   POTASSIUM 4.9 4.4   CR 0.85 0.75   A1C 6.0* 6.0        Diagnostics:  Labs pending at this time.  Results will be reviewed when available.   EKG: appears normal, NSR, normal axis, normal intervals, no acute ST/T changes c/w ischemia, no LVH by voltage criteria, unchanged from previous tracings    Revised Cardiac Risk Index (RCRI):  The patient has the following serious cardiovascular risks for perioperative complications:   - No serious cardiac risks = 0 points     RCRI Interpretation: 1 point: Class II (low risk - 0.9% complication rate)           Signed Electronically by: Amber Alberto MD  Copy of this evaluation report is provided to requesting physician.

## 2022-01-31 NOTE — PATIENT INSTRUCTIONS
No medications by mouth on morning of surgery    take them  after you are allowed water   Stop aspirin    hold fosamax for a month

## 2022-01-31 NOTE — H&P (VIEW-ONLY)
[facility]  [address] [place] [address]  Phone: [phone]  Fax: [phone]  Primary Provider: Lawrence Hansen  Pre-op Performing Provider: LAWRENCE HANSEN      PREOPERATIVE EVALUATION:  Today's date: 1/31/2022    Lisa Ray is a 71 year old female who presents for a preoperative evaluation.    Surgical Information:  Surgery/Procedure: Left total hip replacement   Surgery Location: [facility]  Surgeon: Dr. Bryson  Surgery Date: 2/4/22  Time of Surgery: TBD  Where patient plans to recover: At home with family  Fax number for surgical facility: Note does not need to be faxed, will be available electronically in Epic.    Type of Anesthesia Anticipated: to be determined  covid 1/13/2022   Assessment & Plan   Medically approved for surgery   The proposed surgical procedure is considered LOW risk.    Essential hypertension, benign  suboptimal today but has been in excellent control . Home readings have been in the 120's by 130's and diastolic has been   - lisinopril (ZESTRIL) 10 MG tablet  Dispense: 90 tablet; Refill: 3    S/P CABG (coronary artery bypass graft)  Is asymptomatic . Had successful CABG in 2019 . EKG is essentially unchanged   - rosuvastatin (CRESTOR) 20 MG tablet  Dispense: 90 tablet; Refill: 3    Age-related osteoporosis without current pathological fracture  Can hold fosmax for a month given implant and surgery . She should not need antibiotic prophylaxis for dental procedures that are clean   - alendronate (FOSAMAX) 70 MG tablet  Dispense: 12 tablet; Refill: 3    Pulmonary emphysema, unspecified emphysema type (H)  Stable  Not on oxygen   - umeclidinium (INCRUSE ELLIPTA) 62.5 MCG/INH inhaler  Dispense: 30 each; Refill: 12    Preop exam for internal medicine    - XR Chest 2 Views  - EKG 12-lead, tracing only  - Asymptomatic COVID-19 Virus (Coronavirus) by PCR Nose  - CBC with platelets  - ABO/Rh type and screen  - Basic  metabolic panel  (Ca, Cl, CO2, Creat, Gluc, K, Na, BUN)  - UA Macro with Reflex to Micro and Culture - lab collect  - CBC with platelets  - ABO/Rh type and screen  - Basic metabolic panel  (Ca, Cl, CO2, Creat, Gluc, K, Na, BUN)  - UA Macro with Reflex to Micro and Culture - lab collect  - Urine Microscopic Exam  - Urine Culture    Infection due to 2019 novel coronavirus  Recovered uneventfully without treatment . Is fully vaccinated . Will do preop covid test today . Has already tested negative post infection   Will check chest xray to see if there are any changes of covid in lungs . oxyengantion is normal   Acquired lymphedema                 Medication Instructions:  Patient is to take all scheduled medications on the day of surgery  AFTER surgery . NPO in the morning   Stop aspirin . Will be given aspirin postoperatively for DVT prophylaxis  RECOMMENDATION:  APPROVAL GIVEN to proceed with proposed procedure, without further diagnostic evaluation.                      Subjective     HPI related to upcoming procedure:  Sever OA of hip impairing gait     Preop Questions 1/27/2022   1. Have you ever had a heart attack or stroke? No   2. Have you ever had surgery on your heart or blood vessels, such as a stent placement, a coronary artery bypass, or surgery on an artery in your head, neck, heart, or legs? YES -    3. Do you have chest pain with activity? No   4. Do you have a history of  heart failure? No   5. Do you currently have a cold, bronchitis or symptoms of other infection? YES -    6. Do you have a cough, shortness of breath, or wheezing? YES -    7. Do you or anyone in your family have previous history of blood clots? No   8. Do you or does anyone in your family have a serious bleeding problem such as prolonged bleeding following surgeries or cuts? No   9. Have you ever had problems with anemia or been told to take iron pills? YES -    10. Have you had any abnormal blood loss such as black, tarry or bloody  stools, or abnormal vaginal bleeding? No   11. Have you ever had a blood transfusion? YES -    11a. Have you ever had a transfusion reaction? No   12. Are you willing to have a blood transfusion if it is medically needed before, during, or after your surgery? Yes   13. Have you or any of your relatives ever had problems with anesthesia? No   14. Do you have sleep apnea, excessive snoring or daytime drowsiness? YES -    14a. Do you have a CPAP machine? Yes   15. Do you have any artifical heart valves or other implanted medical devices like a pacemaker, defibrillator, or continuous glucose monitor? No   16. Do you have artificial joints? YES -    17. Are you allergic to latex? No       Health Care Directive:  Patient has a Health Care Directive on file      Preoperative Review of :        Patient Active Problem List   Diagnosis     Ptosis Of Eyelid     Rosacea     Dysthymic disorder     Coronary Artery Disease     Osteoarthritis     Raynaud disease     Paresthesias/numbness     Primary osteoarthritis of right hip     DCIS (ductal carcinoma in situ)     S/P coronary artery stent placement     History of bilateral mastectomy     S/P bilateral breast implants     S/P CABG (coronary artery bypass graft)     History of total hip replacement, right     Health maintenance examination     ARSEN (obstructive sleep apnea)     Acquired lymphedema     Hair loss     Right shoulder pain     COPD (chronic obstructive pulmonary disease) (H)     Essential hypertension, benign     Age-related osteoporosis without current pathological fracture     Chronic obstructive airway disease with asthma (H)     Bronchiectasis without acute exacerbation (H)     Infection due to 2019 novel coronavirus     Current Outpatient Medications   Medication     acetaminophen (TYLENOL) 500 MG tablet     acetaminophen-codeine (TYLENOL W/CODEINE #3) 300-30 MG per tablet     alendronate (FOSAMAX) 70 MG tablet     aspirin 81 mg chewable tablet     b complex  vitamins tablet     calcium, as carbonate, (TUMS) 200 mg calcium (500 mg) chewable tablet     cholecalciferol, vitamin D3, 1,000 unit tablet     diclofenac sodium (VOLTAREN) 1 % Gel     docusate sodium (COLACE) 100 MG capsule     doxylamine (UNISOM) 25 mg tablet     Ferrous Sulfate (IRON) 28 MG TABS     lisinopril (ZESTRIL) 10 MG tablet     magnesium oxide 500 mg Tab     melatonin 1 mg Tab tablet     metoprolol succinate ER (TOPROL-XL) 100 MG 24 hr tablet     omeprazole (PRILOSEC) 20 MG DR capsule     peg 400-propylene glycol PF (SYSTANE) 0.4-0.3 % Dpet     rosuvastatin (CRESTOR) 20 MG tablet     sertraline (ZOLOFT) 50 MG tablet     tiZANidine (ZANAFLEX) 2 MG tablet     triamcinolone (NASACORT) 55 mcg nasal inhaler     TURMERIC PO     umeclidinium (INCRUSE ELLIPTA) 62.5 MCG/INH inhaler     zolpidem (AMBIEN) 10 mg tablet     No current facility-administered medications for this visit.         Review of Systems  CONSTITUTIONAL: NEGATIVE for fever, chills, change in weight  INTEGUMENTARY/SKIN: NEGATIVE for worrisome rashes, moles or lesions  EYES: NEGATIVE for vision changes or irritation  ENT/MOUTH: NEGATIVE for ear, mouth and throat problems  RESP: NEGATIVE for significant cough or SOB  CV: NEGATIVE for chest pain, palpitations or peripheral edema  GI: NEGATIVE for nausea, abdominal pain, heartburn, or change in bowel habits  : NEGATIVE for frequency, dysuria, or hematuria  MUSCULOSKELETAL: NEGATIVE for significant arthralgias or myalgia  NEURO: NEGATIVE for weakness, dizziness or paresthesias  ENDOCRINE: NEGATIVE for temperature intolerance, skin/hair changes  HEME: NEGATIVE for bleeding problems  PSYCHIATRIC: NEGATIVE for changes in mood or affect    Patient Active Problem List    Diagnosis Date Noted     Bronchiectasis without acute exacerbation (H) 07/09/2021     Priority: Medium     Age-related osteoporosis without current pathological fracture 06/11/2021     Priority: Medium     COPD (chronic obstructive  pulmonary disease) (H) 03/14/2020     Priority: Medium     Essential hypertension, benign 03/14/2020     Priority: Medium     Chronic obstructive airway disease with asthma (H) 03/14/2020     Priority: Medium     Acquired lymphedema 02/12/2020     Priority: Medium     Hair loss 02/12/2020     Priority: Medium     Right shoulder pain 02/12/2020     Priority: Medium     ARSEN (obstructive sleep apnea) 06/05/2019     Priority: Medium     5/20/2019 Polysomnography Split (wt 159 lbs).  There was evidence of limb movements during sleep independent of   respiratory events.  Respiratory monitoring showed moderate obstructive sleep apnea   (AHI=17.1/hr).  The patient spent a total of 8.2 minutes at an oxygen saturation below   88%.  A trial of nasal CPAP was initiated given the severity of sleep-disordered   breathing.  CPAP pressures from 5 to 6 were sampled during the night.  Patient had trouble sleeping due to pain and PLMs.  Patient elevated head   of bed to 10 degrees during second half of titration portion of study.  CPAP of 6 was effective at eliminating obstructive events. This was an   adequate titration study (titration grading criteria for optimal or good   are met with the exception that supine REM sleep did not occur at the   selected pressure).         Health maintenance examination 04/11/2019     Priority: Medium     Repeat colonoscopy is 2022   repeat dexa is 2020  No paps or pelvics THANH/BSO         S/P CABG (coronary artery bypass graft) 03/28/2019     Priority: Medium     3/29/2019 with Dr. Akbar  1. Epiaortic ultrasound of the ascending aorta.  2. Triple vessel coronary artery bypass grafting procedures; left   internal mammary  artery to left anterior descending coronary artery and separate   reversedsaphenous  vein grafts to the left anterior descending diagonal branch 2 and the   right  posterior descending coronary arteries.  3. Endoscopic vein procurement from the left lower extremity.         S/P  bilateral breast implants 09/25/2018     Priority: Medium     History of bilateral mastectomy 05/23/2018     Priority: Medium     Ductal in situ carcinoma; [facility]         Dysthymic disorder      Priority: Medium     Created by Conversion         Coronary Artery Disease      Priority: Medium     2008- RCA; @[facility] (Dr. Schwab)         S/P coronary artery stent placement 10/09/2017     Priority: Medium     2008- RCA stent         DCIS (ductal carcinoma in situ) 09/08/2017     Priority: Medium     Right breast/biopsy- 9/7/2017         Ptosis Of Eyelid      Priority: Medium     Created by Conversion  [facility] Annotation: Jul 25 2013  3:44PM - Gregory Doyle: compromises   vision. scheduled for repair  Replacement Utility updated for latest IMO load         Primary osteoarthritis of right hip 12/07/2015     Priority: Medium     History of total hip replacement, right 12/07/2015     Priority: Medium     Paresthesias/numbness 10/29/2015     Priority: Medium     IMO SNOMED LOAD SPRING 2020 [.6/.18/.2020 9:04 PM]  Inocencia Xiongirban:           Osteoarthritis 06/11/2015     Priority: Medium     Raynaud disease 06/11/2015     Priority: Medium     Rosacea      Priority: Medium     Created by Conversion          Past Medical History:   Diagnosis Date     Anemia 2012    microcytic from Celebrex. GI w/u neg     Back pain      Benign neoplasm of colon     Created by Conversion      Bronchiectasis (H)      CAD (coronary artery disease) 2008    RCA- stent     Cancer (H)     breast cancer     DJD (degenerative joint disease)      Dysthymia      Fatigue     recurrent/variable - no serious pathology     Fibromyalgia      GERD (gastroesophageal reflux disease)      History of gastric ulcer      Hx of CABG      Hypertension      Iritis     past Hx.-left eye     ARSEN (obstructive sleep apnea) 6/5/2019     Raynaud's disease      Rosacea      Shortness of breath     with exertion     Shoulder tendonitis     right      Stented coronary artery      Ulcer of intestine     small bowel- 10 years ago     UTI (urinary tract infection)     Jan. 2019     Past Surgical History:   Procedure Laterality Date     BELPHAROPTOSIS REPAIR Bilateral 2013    Dr. Eder Bingham- 2013     BREAST SURGERY Bilateral 11/2017    bilateral mastectomy 2017- 11/2017     BREAST SURGERY  2018    implants and revsion 2     CORONARY STENT PLACEMENT Right 2008    Dr. Schwab     CV CORONARY ANGIOGRAM N/A 3/26/2019    Procedure: Coronary Angiogram;  Surgeon: Ba Foley MD;  Location: [facility];  Service: Cardiology     HYSTERECTOMY       OOPHORECTOMY       TOTAL HIP ARTHROPLASTY Right 12/7/2015    Procedure: RIGHT TOTAL HIP ARTHROPLASTY;  Surgeon: Tera Yeung MD;  Location: [facility];  Service:      [facility] APPENDECTOMY      Description: Appendectomy;  Recorded: 01/13/2009;     Current Outpatient Medications   Medication Sig Dispense Refill     acetaminophen (TYLENOL) 500 MG tablet [ACETAMINOPHEN (TYLENOL) 500 MG TABLET] Take 500 mg by mouth every 6 (six) hours as needed for pain (arthritis).       acetaminophen-codeine (TYLENOL W/CODEINE #3) 300-30 MG per tablet Take 1-2 tablets by mouth every 4 hours as needed 90 tablet 3     alendronate (FOSAMAX) 70 MG tablet [ALENDRONATE (FOSAMAX) 70 MG TABLET] Take 1 tablet (70 mg total) by mouth every 7 days. Take in the morning on an empty stomach with a full glass of water 30 minutes before food 12 tablet 11     aspirin 81 mg chewable tablet [ASPIRIN 81 MG CHEWABLE TABLET] Chew 2 tablets (162 mg total) daily.  0     b complex vitamins tablet [B COMPLEX VITAMINS TABLET] Take 1 tablet by mouth daily.       budesonide (PULMICORT FLEXHALER) 180 MCG/ACT inhaler Inhale 1 puff into the lungs 2 times daily 1 each 1     calcium, as carbonate, (TUMS) 200 mg calcium (500 mg) chewable tablet [CALCIUM, AS CARBONATE, (TUMS) 200 MG CALCIUM (500 MG) CHEWABLE TABLET] Chew 2 tablets daily.        cholecalciferol, vitamin D3, 1,000 unit tablet [CHOLECALCIFEROL, VITAMIN D3, 1,000 UNIT TABLET] Take 1,000 Units by mouth daily.       diclofenac sodium (VOLTAREN) 1 % Gel [DICLOFENAC SODIUM (VOLTAREN) 1 % GEL] 4 g four times a day 1 Tube 3     docusate sodium (COLACE) 100 MG capsule [DOCUSATE SODIUM (COLACE) 100 MG CAPSULE] Take 1 capsule (100 mg total) by mouth 2 (two) times a day as needed for constipation.  0     doxylamine (UNISOM) 25 mg tablet [DOXYLAMINE (UNISOM) 25 MG TABLET] Take 25 mg by mouth at bedtime as needed for sleep.       guaiFENesin-codeine (GUAIFENESIN AC) 100-10 MG/5ML syrup Take 5 mLs by mouth every 4 hours as needed for congestion 200 mL 1     lisinopriL (PRINIVIL,ZESTRIL) 10 MG tablet [LISINOPRIL (PRINIVIL,ZESTRIL) 10 MG TABLET] TAKE 1 TABLET BY MOUTH DAILY. 90 tablet 3     magnesium oxide 500 mg Tab [MAGNESIUM OXIDE 500 MG TAB] Take 500 mg by mouth at bedtime.              melatonin 1 mg Tab tablet [MELATONIN 1 MG TAB TABLET] Take 1 mg by mouth at bedtime.              metoprolol succinate ER (TOPROL-XL) 100 MG 24 hr tablet TAKE 1 TABLET BY MOUTH AT BEDTIME 90 tablet 3     omeprazole (PRILOSEC) 20 MG DR capsule Take 1 capsule (20 mg) by mouth daily 90 capsule 3     peg 400-propylene glycol PF (SYSTANE) 0.4-0.3 % Dpet [-PROPYLENE GLYCOL PF (SYSTANE) 0.4-0.3 % DPET] Administer 1 drop to both eyes 2 (two) times a day as needed.       rosuvastatin (CRESTOR) 20 MG tablet TAKE 1 TABLET BY MOUTH AT BEDTIME 90 tablet 1     sertraline (ZOLOFT) 50 MG tablet [SERTRALINE (ZOLOFT) 50 MG TABLET] TAKE 1 TABLET BY MOUTH ONCE DAILY 90 tablet 3     tiZANidine (ZANAFLEX) 2 MG tablet [TIZANIDINE (ZANAFLEX) 2 MG TABLET] TAKE 1 TABLET BY MOUTH EVERY 6 HOURS IF NEEDED FOR MUSCLE SPASM.       triamcinolone (NASACORT) 55 mcg nasal inhaler [TRIAMCINOLONE (NASACORT) 55 MCG NASAL INHALER] Apply 2 sprays into each nostril daily as needed.       umeclidinium (INCRUSE ELLIPTA) 62.5 mcg/actuation DsDv inhaler  [UMECLIDINIUM (INCRUSE ELLIPTA) 62.5 MCG/ACTUATION DSDV INHALER] Inhale 1 puff daily. 30 each 12     zolpidem (AMBIEN) 10 mg tablet [ZOLPIDEM (AMBIEN) 10 MG TABLET] Take 1 tablet (10 mg total) by mouth at bedtime as needed for sleep. 30 tablet 2       Allergies   Allergen Reactions     Latex Unknown     Added based on information entered during log entry, please review and add reactions, type, and severity as needed     Nsaids (Non-Steroidal Anti-Inflammatory Drug) [Nsaids] Unknown     Other reaction(s): GI Bleeding, GI Upset, Sensitivity.  Ulceration w/ Celebrex        Social History     Tobacco Use     Smoking status: Former Smoker     Packs/day: 1.00     Years: 20.00     Pack years: 20.00     Quit date: 3/27/1990     Years since quittin.8     Smokeless tobacco: Never Used   Substance Use Topics     Alcohol use: Yes     Alcohol/week: 3.3 standard drinks     Comment: Alcoholic Drinks/day: 1-2 glasses of wine/week        History   Drug Use No         Objective     There were no vitals taken for this visit.    Physical Exam    GENERAL APPEARANCE: healthy, alert and no distress     EYES: EOMI, PERRL     HENT: ear canals and TM's normal and nose and mouth without ulcers or lesions     NECK: no adenopathy, no asymmetry, masses, or scars and thyroid normal to palpation     RESP: lungs clear to auscultation - no rales, rhonchi or wheezes     CV: regular rates and rhythm, normal S1 S2, no S3 or S4 and no murmur, click or rub     ABDOMEN:  soft, nontender, no HSM or masses and bowel sounds normal     MS: extremities normal- no gross deformities noted, no evidence of inflammation in joints, FROM in all extremities.     SKIN: no suspicious lesions or rashes     NEURO: Normal strength and tone, sensory exam grossly normal, mentation intact and speech normal     PSYCH: mentation appears normal. and affect normal/bright     LYMPHATICS: No cervical adenopathy    Recent Labs   Lab Test 21  0955 20  1001   HGB 15.7  12.0    228    143   POTASSIUM 4.9 4.4   CR 0.85 0.75   A1C 6.0* 6.0        Diagnostics:  Labs pending at this time.  Results will be reviewed when available.   EKG: appears normal, NSR, normal axis, normal intervals, no acute ST/T changes c/w ischemia, no LVH by voltage criteria, unchanged from previous tracings    Revised Cardiac Risk Index (RCRI):  The patient has the following serious cardiovascular risks for perioperative complications:   - No serious cardiac risks = 0 points     RCRI Interpretation: 1 point: Class II (low risk - 0.9% complication rate)           Signed Electronically by: Amber Alberto MD  Copy of this evaluation report is provided to requesting physician.

## 2022-02-01 ENCOUNTER — TELEPHONE (OUTPATIENT)
Dept: INTERNAL MEDICINE | Facility: CLINIC | Age: 72
End: 2022-02-01
Payer: COMMERCIAL

## 2022-02-01 LAB
ATRIAL RATE - MUSE: 50 BPM
DIASTOLIC BLOOD PRESSURE - MUSE: NORMAL MMHG
INTERPRETATION ECG - MUSE: NORMAL
P AXIS - MUSE: 29 DEGREES
PR INTERVAL - MUSE: 144 MS
QRS DURATION - MUSE: 76 MS
QT - MUSE: 508 MS
QTC - MUSE: 463 MS
R AXIS - MUSE: 14 DEGREES
SYSTOLIC BLOOD PRESSURE - MUSE: NORMAL MMHG
T AXIS - MUSE: 58 DEGREES
VENTRICULAR RATE- MUSE: 50 BPM

## 2022-02-01 ASSESSMENT — ANXIETY QUESTIONNAIRES: GAD7 TOTAL SCORE: 2

## 2022-02-01 NOTE — TELEPHONE ENCOUNTER
Patient returned call to clinic. Relayed Dr. Guardado message below.             ----- Message from Amber Alberto MD sent at 1/31/2022 11:42 PM CST -----  I am sending in an antibiotic as urine test showed mild infection to take it straight away

## 2022-02-02 ENCOUNTER — TELEPHONE (OUTPATIENT)
Dept: INTERNAL MEDICINE | Facility: CLINIC | Age: 72
End: 2022-02-02

## 2022-02-02 DIAGNOSIS — R06.09 DYSPNEA ON EXERTION: Primary | ICD-10-CM

## 2022-02-02 LAB — BACTERIA UR CULT: ABNORMAL

## 2022-02-02 NOTE — TELEPHONE ENCOUNTER
Reason for Call:  Request for results:    Name of test or procedure: EKG    Date of test of procedure: 1/31/2022    Location of the test or procedure: [place]    OK to leave the result message on voice mail or with a family member? YES    Phone number Patient can be reached at:  Home number on file [phone] (home)    Additional comments: looking for results    Call taken on 2/2/2022 at 9:03 AM by Pam J. Behr

## 2022-02-03 ENCOUNTER — TELEPHONE (OUTPATIENT)
Dept: INTERNAL MEDICINE | Facility: CLINIC | Age: 72
End: 2022-02-03
Payer: COMMERCIAL

## 2022-02-03 ENCOUNTER — HOSPITAL ENCOUNTER (OUTPATIENT)
Dept: CARDIOLOGY | Facility: CLINIC | Age: 72
Discharge: HOME | End: 2022-02-03
Attending: INTERNAL MEDICINE | Admitting: INTERNAL MEDICINE
Payer: COMMERCIAL

## 2022-02-03 ENCOUNTER — ANESTHESIA EVENT (OUTPATIENT)
Dept: SURGERY | Facility: CLINIC | Age: 72
End: 2022-02-03
Payer: COMMERCIAL

## 2022-02-03 DIAGNOSIS — N39.0 RECURRENT UTI: Primary | ICD-10-CM

## 2022-02-03 LAB — LVEF ECHO: NORMAL

## 2022-02-03 PROCEDURE — 93306 TTE W/DOPPLER COMPLETE: CPT | Mod: 26 | Performed by: INTERNAL MEDICINE

## 2022-02-03 PROCEDURE — 93306 TTE W/DOPPLER COMPLETE: CPT

## 2022-02-03 RX ORDER — NITROFURANTOIN 25; 75 MG/1; MG/1
100 CAPSULE ORAL 2 TIMES DAILY
Qty: 10 CAPSULE | Refills: 0 | Status: ON HOLD | OUTPATIENT
Start: 2022-02-03 | End: 2022-02-05

## 2022-02-03 NOTE — OR NURSING
"According to the notes from her pre op/PCP, she has a \"mild urinary infection\" and was started on bactrim x5 days 1/31-2/4. PCP stated this med should take the infection \"straight away\". A message was left on Dr. Bryson' team alerting them of this information, and also to see if there was any  Additional follow up needed prior to the OR tomorrow. Awaiting a callback from Dr. Bryson' team for further directions/instructions.  "

## 2022-02-03 NOTE — TELEPHONE ENCOUNTER
calld patient and left message . Wanted to reassure her EKG is essentially unchanged bt rine culture grew out resistant e coli so antibiotic will have to be changed . Should not delay surgery if she takes antibiotic right away

## 2022-02-03 NOTE — PROGRESS NOTES
I am evaluating this patient for upcoming Left Direct Anterior Total Hip Arthroplasty with Dr. Bryson at [facility] on 2/4/22:    - UA on 1/31/22 at pre-op exam: showed likely UTI. PCP started patient on 5 day course of Bactrim, however urine culture and sensitivity now came back as resistant to Bactrim. Patient's PCP, Dr. Alberto, has prescribed a new antibiotic, nitrofurantoin (Macrobid) and wants patient to start on this right away. Discussed resistant UTI with Dr. Bryson. Ok to proceed as long as patient starts taking the new antibiotic today. I also checked and patient's UTI is also sensitive to cefazolin. Plan is to give IV cefazolin pre-op tomorrow per normal protocol for total joint arthroplasty patients. Both patient's PCP and I left messages reminding her to please make sure to  the new antibiotic today and start taking it. In addition, patient had an echocardiogram today for an abnormal EKG on 1/31/22. It appears patient's cardiologist and PCP believe the EKG abnormality may just be the result of lead placement but we will review results of Echo from today once they are back. Patient should be ok to proceed with surgery tomorrow unless there are significant abnormalities on echocardiogram. Call me if any questions.     - Update 2/3/22 at 4:10 pm: Reviewed results of Echocardiogram below. No alarming findings on Echocardiogram. Will plan to proceed with left total hip arthroplasty surgery tomorrow as planned. Full Treatment Plan note to follow.     Procedure  Complete Echo Adult.  ______________________________________________________________________________  Interpretation Summary     Left ventricular size, wall motion and function are normal. The ejection  fraction is 55-60%.  There is mild concentric left ventricular hypertrophy.  Normal right ventricle size and systolic function.  No hemodynamically significant valvular abnormalities on 2D or color flow  imaging.      Ziggy Terry, APRN, CNP    Advanced Practice Nurse Navigator- Orthopedics  [facility]   Office Phone: [phone]  Direct Fax: [phone]

## 2022-02-04 ENCOUNTER — APPOINTMENT (OUTPATIENT)
Dept: RADIOLOGY | Facility: CLINIC | Age: 72
End: 2022-02-04
Attending: ORTHOPAEDIC SURGERY
Payer: COMMERCIAL

## 2022-02-04 ENCOUNTER — ANESTHESIA (OUTPATIENT)
Dept: SURGERY | Facility: CLINIC | Age: 72
End: 2022-02-04
Payer: COMMERCIAL

## 2022-02-04 ENCOUNTER — HOSPITAL ENCOUNTER (OUTPATIENT)
Facility: CLINIC | Age: 72
Discharge: HOME | End: 2022-02-05
Attending: ORTHOPAEDIC SURGERY | Admitting: ORTHOPAEDIC SURGERY
Payer: COMMERCIAL

## 2022-02-04 DIAGNOSIS — Z95.1 S/P CABG (CORONARY ARTERY BYPASS GRAFT): ICD-10-CM

## 2022-02-04 DIAGNOSIS — Z96.649 STATUS POST TOTAL REPLACEMENT OF HIP, UNSPECIFIED LATERALITY: ICD-10-CM

## 2022-02-04 DIAGNOSIS — M16.10 HIP ARTHRITIS: Primary | ICD-10-CM

## 2022-02-04 DIAGNOSIS — Z96.642 STATUS POST TOTAL REPLACEMENT OF LEFT HIP: ICD-10-CM

## 2022-02-04 LAB
ABO/RH(D): NORMAL
ANTIBODY SCREEN: NEGATIVE
SPECIMEN EXPIRATION DATE: NORMAL

## 2022-02-04 PROCEDURE — 86901 BLOOD TYPING SEROLOGIC RH(D): CPT | Performed by: ORTHOPAEDIC SURGERY

## 2022-02-04 PROCEDURE — 258N000003 HC RX IP 258 OP 636: Performed by: ANESTHESIOLOGY

## 2022-02-04 PROCEDURE — 999N000141 HC STATISTIC PRE-PROCEDURE NURSING ASSESSMENT: Performed by: ORTHOPAEDIC SURGERY

## 2022-02-04 PROCEDURE — C1776 JOINT DEVICE (IMPLANTABLE): HCPCS | Performed by: ORTHOPAEDIC SURGERY

## 2022-02-04 PROCEDURE — 250N000011 HC RX IP 250 OP 636: Performed by: ORTHOPAEDIC SURGERY

## 2022-02-04 PROCEDURE — 258N000003 HC RX IP 258 OP 636: Performed by: ORTHOPAEDIC SURGERY

## 2022-02-04 PROCEDURE — 250N000013 HC RX MED GY IP 250 OP 250 PS 637: Performed by: FAMILY MEDICINE

## 2022-02-04 PROCEDURE — 250N000011 HC RX IP 250 OP 636: Performed by: NURSE ANESTHETIST, CERTIFIED REGISTERED

## 2022-02-04 PROCEDURE — 99207 PR CDG-CODE CATEGORY CHANGED: CPT | Performed by: FAMILY MEDICINE

## 2022-02-04 PROCEDURE — 250N000013 HC RX MED GY IP 250 OP 250 PS 637: Performed by: ORTHOPAEDIC SURGERY

## 2022-02-04 PROCEDURE — 250N000011 HC RX IP 250 OP 636: Performed by: ANESTHESIOLOGY

## 2022-02-04 PROCEDURE — 999N000182 XR SURGERY CARM FLUORO GREATER THAN 5 MIN: Mod: TC

## 2022-02-04 PROCEDURE — 250N000009 HC RX 250: Performed by: PHYSICIAN ASSISTANT

## 2022-02-04 PROCEDURE — 710N000010 HC RECOVERY PHASE 1, LEVEL 2, PER MIN: Performed by: ORTHOPAEDIC SURGERY

## 2022-02-04 PROCEDURE — 86850 RBC ANTIBODY SCREEN: CPT | Performed by: ORTHOPAEDIC SURGERY

## 2022-02-04 PROCEDURE — 250N000013 HC RX MED GY IP 250 OP 250 PS 637: Performed by: PHYSICIAN ASSISTANT

## 2022-02-04 PROCEDURE — 99203 OFFICE O/P NEW LOW 30 MIN: CPT | Performed by: FAMILY MEDICINE

## 2022-02-04 PROCEDURE — 360N000084 HC SURGERY LEVEL 4 W/ FLUORO, PER MIN: Performed by: ORTHOPAEDIC SURGERY

## 2022-02-04 PROCEDURE — 250N000009 HC RX 250: Performed by: NURSE ANESTHETIST, CERTIFIED REGISTERED

## 2022-02-04 PROCEDURE — 272N000001 HC OR GENERAL SUPPLY STERILE: Performed by: ORTHOPAEDIC SURGERY

## 2022-02-04 PROCEDURE — 258N000003 HC RX IP 258 OP 636: Performed by: NURSE ANESTHETIST, CERTIFIED REGISTERED

## 2022-02-04 PROCEDURE — 36415 COLL VENOUS BLD VENIPUNCTURE: CPT | Performed by: ANESTHESIOLOGY

## 2022-02-04 PROCEDURE — 370N000017 HC ANESTHESIA TECHNICAL FEE, PER MIN: Performed by: ORTHOPAEDIC SURGERY

## 2022-02-04 PROCEDURE — 250N000011 HC RX IP 250 OP 636: Performed by: PHYSICIAN ASSISTANT

## 2022-02-04 DEVICE — PINNACLE GRIPTION ACETABULAR SHELL SECTOR 48MM OD
Type: IMPLANTABLE DEVICE | Site: HIP | Status: FUNCTIONAL
Brand: PINNACLE GRIPTION

## 2022-02-04 DEVICE — PINNACLE HIP SOLUTIONS ALTRX POLYETHYLENE ACETABULAR LINER +4 NEUTRAL 32MM ID 48MM OD
Type: IMPLANTABLE DEVICE | Site: HIP | Status: FUNCTIONAL
Brand: PINNACLE ALTRX

## 2022-02-04 DEVICE — BIOLOX DELTA CERAMIC FEMORAL HEAD 32MM DIA +5.0 12/14 TAPER
Type: IMPLANTABLE DEVICE | Site: HIP | Status: FUNCTIONAL
Brand: BIOLOX DELTA

## 2022-02-04 DEVICE — ACTIS DUOFIX HIP PROSTHESIS (FEMORAL STEM 12/14 TAPER CEMENTLESS SIZE 4 STD COLLAR)  CE
Type: IMPLANTABLE DEVICE | Site: HIP | Status: FUNCTIONAL
Brand: ACTIS

## 2022-02-04 RX ORDER — ROSUVASTATIN CALCIUM 10 MG/1
20 TABLET, COATED ORAL AT BEDTIME
Status: DISCONTINUED | OUTPATIENT
Start: 2022-02-04 | End: 2022-02-05 | Stop reason: HOSPADM

## 2022-02-04 RX ORDER — AMOXICILLIN 250 MG
1 CAPSULE ORAL 2 TIMES DAILY
Status: DISCONTINUED | OUTPATIENT
Start: 2022-02-04 | End: 2022-02-05 | Stop reason: HOSPADM

## 2022-02-04 RX ORDER — CEFAZOLIN SODIUM 1 G/3ML
1 INJECTION, POWDER, FOR SOLUTION INTRAMUSCULAR; INTRAVENOUS EVERY 8 HOURS
Status: COMPLETED | OUTPATIENT
Start: 2022-02-04 | End: 2022-02-05

## 2022-02-04 RX ORDER — LANOLIN ALCOHOL/MO/W.PET/CERES
400 CREAM (GRAM) TOPICAL AT BEDTIME
COMMUNITY

## 2022-02-04 RX ORDER — OXYCODONE HYDROCHLORIDE 5 MG/1
10 TABLET ORAL EVERY 4 HOURS PRN
Status: DISCONTINUED | OUTPATIENT
Start: 2022-02-04 | End: 2022-02-05 | Stop reason: HOSPADM

## 2022-02-04 RX ORDER — LIDOCAINE 40 MG/G
CREAM TOPICAL
Status: DISCONTINUED | OUTPATIENT
Start: 2022-02-04 | End: 2022-02-04 | Stop reason: HOSPADM

## 2022-02-04 RX ORDER — NALOXONE HYDROCHLORIDE 0.4 MG/ML
0.4 INJECTION, SOLUTION INTRAMUSCULAR; INTRAVENOUS; SUBCUTANEOUS
Status: DISCONTINUED | OUTPATIENT
Start: 2022-02-04 | End: 2022-02-05 | Stop reason: HOSPADM

## 2022-02-04 RX ORDER — CEFADROXIL 500 MG/1
500 CAPSULE ORAL 2 TIMES DAILY
Qty: 28 CAPSULE | Refills: 0 | Status: SHIPPED | OUTPATIENT
Start: 2022-02-04 | End: 2022-04-14

## 2022-02-04 RX ORDER — CEFAZOLIN SODIUM 2 G/100ML
2 INJECTION, SOLUTION INTRAVENOUS SEE ADMIN INSTRUCTIONS
Status: DISCONTINUED | OUTPATIENT
Start: 2022-02-04 | End: 2022-02-04 | Stop reason: HOSPADM

## 2022-02-04 RX ORDER — OXYCODONE HYDROCHLORIDE 5 MG/1
5 TABLET ORAL EVERY 4 HOURS PRN
Status: DISCONTINUED | OUTPATIENT
Start: 2022-02-04 | End: 2022-02-05 | Stop reason: HOSPADM

## 2022-02-04 RX ORDER — ACETAMINOPHEN 325 MG/1
650 TABLET ORAL EVERY 4 HOURS PRN
Status: DISCONTINUED | OUTPATIENT
Start: 2022-02-07 | End: 2022-02-05 | Stop reason: HOSPADM

## 2022-02-04 RX ORDER — PROPOFOL 10 MG/ML
INJECTION, EMULSION INTRAVENOUS CONTINUOUS PRN
Status: DISCONTINUED | OUTPATIENT
Start: 2022-02-04 | End: 2022-02-04

## 2022-02-04 RX ORDER — LISINOPRIL 10 MG/1
10 TABLET ORAL DAILY
Status: DISCONTINUED | OUTPATIENT
Start: 2022-02-05 | End: 2022-02-05 | Stop reason: HOSPADM

## 2022-02-04 RX ORDER — NALOXONE HYDROCHLORIDE 0.4 MG/ML
0.2 INJECTION, SOLUTION INTRAMUSCULAR; INTRAVENOUS; SUBCUTANEOUS
Status: DISCONTINUED | OUTPATIENT
Start: 2022-02-04 | End: 2022-02-05 | Stop reason: HOSPADM

## 2022-02-04 RX ORDER — CEFAZOLIN SODIUM 2 G/100ML
2 INJECTION, SOLUTION INTRAVENOUS
Status: COMPLETED | OUTPATIENT
Start: 2022-02-04 | End: 2022-02-04

## 2022-02-04 RX ORDER — ONDANSETRON 4 MG/1
4 TABLET, ORALLY DISINTEGRATING ORAL EVERY 30 MIN PRN
Status: DISCONTINUED | OUTPATIENT
Start: 2022-02-04 | End: 2022-02-04 | Stop reason: HOSPADM

## 2022-02-04 RX ORDER — ONDANSETRON 2 MG/ML
INJECTION INTRAMUSCULAR; INTRAVENOUS PRN
Status: DISCONTINUED | OUTPATIENT
Start: 2022-02-04 | End: 2022-02-04

## 2022-02-04 RX ORDER — PROCHLORPERAZINE MALEATE 5 MG
5 TABLET ORAL EVERY 6 HOURS PRN
Status: DISCONTINUED | OUTPATIENT
Start: 2022-02-04 | End: 2022-02-05 | Stop reason: HOSPADM

## 2022-02-04 RX ORDER — TRANEXAMIC ACID 650 MG/1
1950 TABLET ORAL ONCE
Status: COMPLETED | OUTPATIENT
Start: 2022-02-04 | End: 2022-02-04

## 2022-02-04 RX ORDER — OXYCODONE HYDROCHLORIDE 5 MG/1
5 TABLET ORAL EVERY 4 HOURS PRN
Qty: 28 TABLET | Refills: 0 | Status: SHIPPED | OUTPATIENT
Start: 2022-02-04 | End: 2022-04-14

## 2022-02-04 RX ORDER — SODIUM CHLORIDE, SODIUM LACTATE, POTASSIUM CHLORIDE, CALCIUM CHLORIDE 600; 310; 30; 20 MG/100ML; MG/100ML; MG/100ML; MG/100ML
INJECTION, SOLUTION INTRAVENOUS CONTINUOUS
Status: DISCONTINUED | OUTPATIENT
Start: 2022-02-04 | End: 2022-02-04 | Stop reason: HOSPADM

## 2022-02-04 RX ORDER — ASPIRIN 81 MG/1
81 TABLET ORAL 2 TIMES DAILY
Status: DISCONTINUED | OUTPATIENT
Start: 2022-02-04 | End: 2022-02-05 | Stop reason: HOSPADM

## 2022-02-04 RX ORDER — MAGNESIUM SULFATE 4 G/50ML
4 INJECTION INTRAVENOUS ONCE
Status: COMPLETED | OUTPATIENT
Start: 2022-02-04 | End: 2022-02-04

## 2022-02-04 RX ORDER — SODIUM CHLORIDE, SODIUM LACTATE, POTASSIUM CHLORIDE, CALCIUM CHLORIDE 600; 310; 30; 20 MG/100ML; MG/100ML; MG/100ML; MG/100ML
INJECTION, SOLUTION INTRAVENOUS CONTINUOUS
Status: DISCONTINUED | OUTPATIENT
Start: 2022-02-04 | End: 2022-02-05

## 2022-02-04 RX ORDER — OXYCODONE HYDROCHLORIDE 5 MG/1
5 TABLET ORAL EVERY 4 HOURS PRN
Status: DISCONTINUED | OUTPATIENT
Start: 2022-02-04 | End: 2022-02-04 | Stop reason: HOSPADM

## 2022-02-04 RX ORDER — BISACODYL 10 MG
10 SUPPOSITORY, RECTAL RECTAL DAILY PRN
Status: DISCONTINUED | OUTPATIENT
Start: 2022-02-04 | End: 2022-02-05 | Stop reason: HOSPADM

## 2022-02-04 RX ORDER — PANTOPRAZOLE SODIUM 20 MG/1
40 TABLET, DELAYED RELEASE ORAL DAILY
Status: DISCONTINUED | OUTPATIENT
Start: 2022-02-04 | End: 2022-02-05 | Stop reason: HOSPADM

## 2022-02-04 RX ORDER — METOPROLOL SUCCINATE 100 MG/1
100 TABLET, EXTENDED RELEASE ORAL AT BEDTIME
Status: DISCONTINUED | OUTPATIENT
Start: 2022-02-04 | End: 2022-02-05 | Stop reason: HOSPADM

## 2022-02-04 RX ORDER — FENTANYL CITRATE 50 UG/ML
25 INJECTION, SOLUTION INTRAMUSCULAR; INTRAVENOUS EVERY 5 MIN PRN
Status: DISCONTINUED | OUTPATIENT
Start: 2022-02-04 | End: 2022-02-04 | Stop reason: HOSPADM

## 2022-02-04 RX ORDER — ONDANSETRON 2 MG/ML
4 INJECTION INTRAMUSCULAR; INTRAVENOUS EVERY 30 MIN PRN
Status: DISCONTINUED | OUTPATIENT
Start: 2022-02-04 | End: 2022-02-04 | Stop reason: HOSPADM

## 2022-02-04 RX ORDER — ONDANSETRON 4 MG/1
4 TABLET, ORALLY DISINTEGRATING ORAL EVERY 6 HOURS PRN
Status: DISCONTINUED | OUTPATIENT
Start: 2022-02-04 | End: 2022-02-05 | Stop reason: HOSPADM

## 2022-02-04 RX ORDER — LIDOCAINE 40 MG/G
CREAM TOPICAL
Status: DISCONTINUED | OUTPATIENT
Start: 2022-02-04 | End: 2022-02-05 | Stop reason: HOSPADM

## 2022-02-04 RX ORDER — ACETAMINOPHEN 325 MG/1
975 TABLET ORAL EVERY 8 HOURS
Status: DISCONTINUED | OUTPATIENT
Start: 2022-02-04 | End: 2022-02-05 | Stop reason: HOSPADM

## 2022-02-04 RX ORDER — ONDANSETRON 2 MG/ML
4 INJECTION INTRAMUSCULAR; INTRAVENOUS EVERY 6 HOURS PRN
Status: DISCONTINUED | OUTPATIENT
Start: 2022-02-04 | End: 2022-02-05 | Stop reason: HOSPADM

## 2022-02-04 RX ORDER — POLYETHYLENE GLYCOL 3350 17 G/17G
17 POWDER, FOR SOLUTION ORAL DAILY
Status: DISCONTINUED | OUTPATIENT
Start: 2022-02-05 | End: 2022-02-05 | Stop reason: HOSPADM

## 2022-02-04 RX ORDER — FENTANYL CITRATE 50 UG/ML
INJECTION, SOLUTION INTRAMUSCULAR; INTRAVENOUS PRN
Status: DISCONTINUED | OUTPATIENT
Start: 2022-02-04 | End: 2022-02-04

## 2022-02-04 RX ORDER — DEXAMETHASONE SODIUM PHOSPHATE 4 MG/ML
INJECTION, SOLUTION INTRA-ARTICULAR; INTRALESIONAL; INTRAMUSCULAR; INTRAVENOUS; SOFT TISSUE PRN
Status: DISCONTINUED | OUTPATIENT
Start: 2022-02-04 | End: 2022-02-04

## 2022-02-04 RX ORDER — HYDROMORPHONE HCL IN WATER/PF 6 MG/30 ML
0.4 PATIENT CONTROLLED ANALGESIA SYRINGE INTRAVENOUS EVERY 5 MIN PRN
Status: DISCONTINUED | OUTPATIENT
Start: 2022-02-04 | End: 2022-02-04 | Stop reason: HOSPADM

## 2022-02-04 RX ORDER — LIDOCAINE HYDROCHLORIDE 10 MG/ML
INJECTION, SOLUTION INFILTRATION; PERINEURAL PRN
Status: DISCONTINUED | OUTPATIENT
Start: 2022-02-04 | End: 2022-02-04

## 2022-02-04 RX ADMIN — ONDANSETRON 4 MG: 2 INJECTION INTRAMUSCULAR; INTRAVENOUS at 08:46

## 2022-02-04 RX ADMIN — LIDOCAINE HYDROCHLORIDE 3 ML: 10 INJECTION, SOLUTION INFILTRATION; PERINEURAL at 07:33

## 2022-02-04 RX ADMIN — FENTANYL CITRATE 50 MCG: 50 INJECTION, SOLUTION INTRAMUSCULAR; INTRAVENOUS at 07:25

## 2022-02-04 RX ADMIN — MAGNESIUM SULFATE HEPTAHYDRATE 4 G: 80 INJECTION, SOLUTION INTRAVENOUS at 06:53

## 2022-02-04 RX ADMIN — TRANEXAMIC ACID 1950 MG: 650 TABLET ORAL at 06:48

## 2022-02-04 RX ADMIN — OXYCODONE HYDROCHLORIDE 5 MG: 5 TABLET ORAL at 20:27

## 2022-02-04 RX ADMIN — SENNOSIDES AND DOCUSATE SODIUM 1 TABLET: 50; 8.6 TABLET ORAL at 20:27

## 2022-02-04 RX ADMIN — SODIUM CHLORIDE, POTASSIUM CHLORIDE, SODIUM LACTATE AND CALCIUM CHLORIDE: 600; 310; 30; 20 INJECTION, SOLUTION INTRAVENOUS at 06:41

## 2022-02-04 RX ADMIN — METOPROLOL SUCCINATE 100 MG: 100 TABLET, EXTENDED RELEASE ORAL at 20:28

## 2022-02-04 RX ADMIN — PHENYLEPHRINE HYDROCHLORIDE 0.3 MCG/KG/MIN: 10 INJECTION INTRAVENOUS at 07:37

## 2022-02-04 RX ADMIN — MIDAZOLAM 1 MG: 1 INJECTION INTRAMUSCULAR; INTRAVENOUS at 07:20

## 2022-02-04 RX ADMIN — PANTOPRAZOLE SODIUM 40 MG: 20 TABLET, DELAYED RELEASE ORAL at 16:43

## 2022-02-04 RX ADMIN — MEPIVACAINE HYDROCHLORIDE 3 ML: 15 INJECTION, SOLUTION EPIDURAL; INFILTRATION at 07:30

## 2022-02-04 RX ADMIN — PROPOFOL 75 MCG/KG/MIN: 10 INJECTION, EMULSION INTRAVENOUS at 07:33

## 2022-02-04 RX ADMIN — SENNOSIDES AND DOCUSATE SODIUM 1 TABLET: 50; 8.6 TABLET ORAL at 13:22

## 2022-02-04 RX ADMIN — ONDANSETRON 4 MG: 2 INJECTION INTRAMUSCULAR; INTRAVENOUS at 23:23

## 2022-02-04 RX ADMIN — ROSUVASTATIN CALCIUM 20 MG: 10 TABLET, FILM COATED ORAL at 20:28

## 2022-02-04 RX ADMIN — FENTANYL CITRATE 25 MCG: 50 INJECTION INTRAMUSCULAR; INTRAVENOUS at 09:35

## 2022-02-04 RX ADMIN — ASPIRIN 81 MG: 81 TABLET, COATED ORAL at 13:20

## 2022-02-04 RX ADMIN — CEFAZOLIN SODIUM 2 G: 2 INJECTION, SOLUTION INTRAVENOUS at 07:34

## 2022-02-04 RX ADMIN — ASPIRIN 81 MG: 81 TABLET, COATED ORAL at 20:29

## 2022-02-04 RX ADMIN — ACETAMINOPHEN 975 MG: 325 TABLET, FILM COATED ORAL at 13:20

## 2022-02-04 RX ADMIN — ACETAMINOPHEN 975 MG: 325 TABLET, FILM COATED ORAL at 21:18

## 2022-02-04 RX ADMIN — CEFAZOLIN 1 G: 1 INJECTION, POWDER, FOR SOLUTION INTRAMUSCULAR; INTRAVENOUS at 16:43

## 2022-02-04 RX ADMIN — DEXAMETHASONE SODIUM PHOSPHATE 4 MG: 4 INJECTION, SOLUTION INTRA-ARTICULAR; INTRALESIONAL; INTRAMUSCULAR; INTRAVENOUS; SOFT TISSUE at 07:39

## 2022-02-04 RX ADMIN — CEFAZOLIN 1 G: 1 INJECTION, POWDER, FOR SOLUTION INTRAMUSCULAR; INTRAVENOUS at 23:32

## 2022-02-04 RX ADMIN — SODIUM CHLORIDE, POTASSIUM CHLORIDE, SODIUM LACTATE AND CALCIUM CHLORIDE: 600; 310; 30; 20 INJECTION, SOLUTION INTRAVENOUS at 14:21

## 2022-02-04 ASSESSMENT — MIFFLIN-ST. JEOR: SCORE: 1252.51

## 2022-02-04 ASSESSMENT — COPD QUESTIONNAIRES: COPD: 1

## 2022-02-04 NOTE — ANESTHESIA POSTPROCEDURE EVALUATION
Patient: Lisa Ray    Procedure: Procedure(s):  LEFT DIRECT ANTERIOR TOTAL HIP ARTHROPLASTY       Diagnosis:Primary osteoarthritis of left hip [M16.12]  Diagnosis Additional Information: No value filed.    Anesthesia Type:  Spinal    Note:     Postop Pain Control: Uneventful            Sign Out: Well controlled pain   PONV: No   Neuro/Psych: Uneventful            Sign Out: Acceptable/Baseline neuro status   Airway/Respiratory: Uneventful            Sign Out: Acceptable/Baseline resp. status   CV/Hemodynamics: Uneventful            Sign Out: Acceptable CV status; No obvious hypovolemia; No obvious fluid overload   Other NRE: NONE   DID A NON-ROUTINE EVENT OCCUR? No           Last vitals:  Vitals Value Taken Time   /57 02/04/22 1030   Temp 36.3  C (97.3  F) 02/04/22 0913   Pulse 52 02/04/22 1035   Resp 16 02/04/22 1030   SpO2 96 % 02/04/22 1035   Vitals shown include unvalidated device data.    Electronically Signed By: Shanta Salas MD  February 4, 2022  1:36 PM

## 2022-02-04 NOTE — PLAN OF CARE
"  Problem: Adult Inpatient Plan of Care  Goal: Optimal Comfort and Wellbeing  2/4/2022 1549 by Tierra Mckeon RN  Outcome: Improving  Pt describes her left hip pain as \"minimal, just aching a little at my hip and down the thigh,\" and rates that description 6/10. She does appear to be in no distress. Oxycodone was offered, but pt declined. Given her scheduled Acetaminophen with good relief.   Problem: Postoperative Urinary Retention (Hip Arthroplasty)  Goal: Effective Urinary Elimination  Outcome: Improving  Voiding spontaneously, large amount into toilet.     Problem: Joint Function Impaired (Hip Arthroplasty)  Goal: Optimal Functional Ability  Outcome: Improving   Ambulated to chair with one assist, walker and gait belt. Did very well, and has been sitting up in chair for 2 hours since then. Awaiting PT now to ambulate with pt in halls.      "

## 2022-02-04 NOTE — CONSULTS
[facility] MEDICINE CONSULT NOTE   Physician requesting consult: Deny Bryson MD    Reason for consult: Postoperative medical management of medical co-morbidities as below    Identification/Summary:   Lisa Ray is a 71 year old female with a PMH of CAD s/p CABG 2019, hypertension, emphysema/bronchiectasis (not on O2), fibromyalgia, Raynaud's disease, ARSEN, GERD, dysthymia and osteoarthritis. Underwent left hip total arthroplasty 2/4/2022.  Postoperatively, patient is alert, interactive with vitals stable.  Pain well controlled with current regimen,  at bedside.    Assessment and Plan:  Active Problems:    S/P total hip arthroplasty      S/P total hip arthroplasty  -Postoperative management per Orthopedics    CAD s/p CABG 2019  Hypertension  Hyperlipidemia  PTA meds: Lisinopril, Toprol-XL, rosuvastatin, aspirin  -Continue home meds    Emphysema/bronchiectasis  No acute exacerbation clinically.  Not on home O2 at baseline.  -Continue Incruse Ellipta or formulary equivalent    Chronic stable medical issues:  Polymyalgia  Dysthymia  GERD  -Continue home meds: Tizanidine, sertraline, omeprazole    COVID-19 PCR: Negative    Anticoagulation.  Ordered per surgery:   Fluids: LR for now until eating and drinking well.  Pain meds: Per Orthopedics  Therapy: Ordered per orthopedics  Gomez:Not present  Current Diet  Orders Placed This Encounter      Discharge Instruction - Regular Diet Adult      Regular Diet Adult    Supplements  None      -Disposition   per primary service, orthopedic    Code status:Full Code   [facility] [place], [place] service was asked to evaluate patient for postoperative medical management as follows below. Please resume the home medications as reconciled and further noted with ordered hold parameters.  Thank you for this consult; we will continue to follow this patient until discharge.    Procedure(s):  LEFT DIRECT ANTERIOR TOTAL HIP ARTHROPLASTY  Day of Surgery  Estimated Blood  Loss:  300 mL  Hospital Problem List   No problem-specific Assessment & Plan notes found for this encounter.    Active Problems:    S/P total hip arthroplasty      -Reviewed the patient's preoperative H and P and updated missing elements.  -Home medication reconciliation has been reviewed.  Medications have been ordered as noted from the home list and changes are documented above     HISTORY     Lisa Ray is a 71 year old female with PMH of CAD s/p CABG 2019, hypertension, emphysema/bronchiectasis (not on O2), fibromyalgia, Raynaud's disease, ARSEN, GERD, dysthymia and osteoarthritis. Underwent left hip total arthroplasty 2/4/2022.. COVID-19 PCR negative.  Postoperative course uncomplicated.  Questions answered to verbalized satisfaction.    Past Medical History     Past Medical History:  2012: Anemia      Comment:  microcytic from Celebrex. GI w/u neg  No date: Antiplatelet or antithrombotic long-term use  No date: Back pain  No date: Benign neoplasm of colon      Comment:  Created by Conversion   No date: Bronchiectasis (H)  2008: CAD (coronary artery disease)      Comment:  RCA- stent  No date: Cancer (H)      Comment:  breast cancer  No date: DJD (degenerative joint disease)  No date: Dysthymia  No date: Fatigue      Comment:  recurrent/variable - no serious pathology  No date: Fibromyalgia  No date: GERD (gastroesophageal reflux disease)  No date: History of gastric ulcer  No date: Hx of CABG  No date: Hypertension  No date: Iritis      Comment:  past Hx.-left eye  6/5/2019: ARSEN (obstructive sleep apnea)  No date: Raynaud's disease  No date: Rosacea  No date: Shortness of breath      Comment:  with exertion  No date: Shoulder tendonitis      Comment:  right  No date: Stented coronary artery  No date: Ulcer of intestine      Comment:  small bowel- 10 years ago  No date: UTI (urinary tract infection)      Comment:  Jan. 2019     Patient Active Problem List    Diagnosis Date Noted     S/P total hip  arthroplasty 02/04/2022     Priority: Medium     Infection due to 2019 novel coronavirus 01/31/2022     Priority: Medium     Bronchiectasis without acute exacerbation (H) 07/09/2021     Priority: Medium     Age-related osteoporosis without current pathological fracture 06/11/2021     Priority: Medium     COPD (chronic obstructive pulmonary disease) (H) 03/14/2020     Priority: Medium     Essential hypertension, benign 03/14/2020     Priority: Medium     Chronic obstructive airway disease with asthma (H) 03/14/2020     Priority: Medium     Acquired lymphedema 02/12/2020     Priority: Medium     Hair loss 02/12/2020     Priority: Medium     Right shoulder pain 02/12/2020     Priority: Medium     ARSEN (obstructive sleep apnea) 06/05/2019     Priority: Medium     5/20/2019 Polysomnography Split (wt 159 lbs).  There was evidence of limb movements during sleep independent of   respiratory events.  Respiratory monitoring showed moderate obstructive sleep apnea   (AHI=17.1/hr).  The patient spent a total of 8.2 minutes at an oxygen saturation below   88%.  A trial of nasal CPAP was initiated given the severity of sleep-disordered   breathing.  CPAP pressures from 5 to 6 were sampled during the night.  Patient had trouble sleeping due to pain and PLMs.  Patient elevated head   of bed to 10 degrees during second half of titration portion of study.  CPAP of 6 was effective at eliminating obstructive events. This was an   adequate titration study (titration grading criteria for optimal or good   are met with the exception that supine REM sleep did not occur at the   selected pressure).         Health maintenance examination 04/11/2019     Priority: Medium     Repeat colonoscopy is 2022   repeat dexa is 2020  No paps or pelvics THANH/BSO         S/P CABG (coronary artery bypass graft) 03/28/2019     Priority: Medium     3/29/2019 with Dr. Akbar  1. Epiaortic ultrasound of the ascending aorta.  2. Triple vessel coronary artery  bypass grafting procedures; left   internal mammary  artery to left anterior descending coronary artery and separate   reversedsaphenous  vein grafts to the left anterior descending diagonal branch 2 and the   right  posterior descending coronary arteries.  3. Endoscopic vein procurement from the left lower extremity.         S/P bilateral breast implants 09/25/2018     Priority: Medium     History of bilateral mastectomy 05/23/2018     Priority: Medium     Ductal in situ carcinoma; [facility]         Dysthymic disorder      Priority: Medium     Created by Conversion         Coronary Artery Disease      Priority: Medium     2008- RCA; @[facility] (Dr. Schwab)         S/P coronary artery stent placement 10/09/2017     Priority: Medium     2008- RCA stent         DCIS (ductal carcinoma in situ) 09/08/2017     Priority: Medium     Right breast/biopsy- 9/7/2017         Ptosis Of Eyelid      Priority: Medium     Created by Conversion  [facility] Annotation: Jul 25 2013  3:44PM - Gregory Doyle: compromises   vision. scheduled for repair  Replacement Utility updated for latest IMO load         Primary osteoarthritis of right hip 12/07/2015     Priority: Medium     History of total hip replacement, right 12/07/2015     Priority: Medium     Paresthesias/numbness 10/29/2015     Priority: Medium     IMO SNOMED LOAD SPRING 2020 [.6/.18/.2020 9:04 PM]  Tyrese, Piero:           Osteoarthritis 06/11/2015     Priority: Medium     Raynaud disease 06/11/2015     Priority: Medium     Rosacea      Priority: Medium     Created by Conversion         Surgical History     Past Surgical History:   Procedure Laterality Date     BELPHAROPTOSIS REPAIR Bilateral 2013    Dr. Eder Bingham- 2013     BREAST SURGERY Bilateral 11/2017    bilateral mastectomy 2017- 11/2017     BREAST SURGERY  2018    implants and revsion 2     CORONARY STENT PLACEMENT Right 2008    Dr. Schwab     CV CORONARY ANGIOGRAM N/A 3/26/2019    Procedure:  Coronary Angiogram;  Surgeon: Ba Foley MD;  Location: [facility] Cath Lab;  Service: Cardiology     HYSTERECTOMY       OOPHORECTOMY       TOTAL HIP ARTHROPLASTY Right 2015    Procedure: RIGHT TOTAL HIP ARTHROPLASTY;  Surgeon: Tera Yeung MD;  Location: [facility];  Service:      [facility] APPENDECTOMY      Description: Appendectomy;  Recorded: 2009;     Family History      Family History   Problem Relation Age of Onset     Hereditary Breast and Ovarian Cancer Syndrome Sister      Hereditary Breast and Ovarian Cancer Syndrome Paternal Grandfather      Hereditary Breast and Ovarian Cancer Syndrome Cousin      Asthma Cousin      Alcoholism Father          GI bleed age 67     Colon Cancer Other      Breast Cancer Sister         metastatic age 49, remission with Rx     Coronary Artery Disease Brother         CABG     Breast Cancer Paternal Grandmother      Breast Cancer Cousin      Colon Cancer Mother       Social History      Social History     Tobacco Use     Smoking status: Former Smoker     Packs/day: 1.00     Years: 20.00     Pack years: 20.00     Quit date: 3/27/1990     Years since quittin.8     Smokeless tobacco: Never Used   Substance Use Topics     Alcohol use: Yes     Alcohol/week: 3.3 standard drinks     Comment: Alcoholic Drinks/day: 1-2 glasses of wine/week      Drug use: No      Allergies     Allergies   Allergen Reactions     Latex Unknown     Added based on information entered during log entry, please review and add reactions, type, and severity as needed     Nsaids (Non-Steroidal Anti-Inflammatory Drug) [Nsaids] Unknown     Other reaction(s): GI Bleeding, GI Upset, Sensitivity.  Ulceration w/ Celebrex       Prior to Admission Medications      Prior to Admission Medications   Prescriptions Last Dose Informant Patient Reported? Taking?   Ferrous Sulfate (IRON) 28 MG TABS 2/3/2022 at AM  Yes Yes   Sig: Take 1 tablet by mouth daily    TURMERIC PO 2/3/2022 at AM   Yes Yes   Sig: Take 1 capsule by mouth daily    acetaminophen (TYLENOL) 500 MG tablet Unknown at Unknown time  Yes Yes   Sig: [ACETAMINOPHEN (TYLENOL) 500 MG TABLET] Take 500 mg by mouth every 6 (six) hours as needed for pain (arthritis).   acetaminophen-codeine (TYLENOL W/CODEINE #3) 300-30 MG per tablet Unknown at Unknown time  No Yes   Sig: Take 1-2 tablets by mouth every 4 hours as needed   alendronate (FOSAMAX) 70 MG tablet 1/22/2022  No Yes   Sig: Take 1 tablet (70 mg) by mouth every 7 days   aspirin 81 mg chewable tablet 1/30/2022 at AM  No Yes   Sig: [ASPIRIN 81 MG CHEWABLE TABLET] Chew 2 tablets (162 mg total) daily.   b complex vitamins tablet 2/3/2022 at AM  Yes Yes   Sig: [B COMPLEX VITAMINS TABLET] Take 1 tablet by mouth daily.   calcium, as carbonate, (TUMS) 200 mg calcium (500 mg) chewable tablet 2/3/2022 at AM  Yes Yes   Sig: [CALCIUM, AS CARBONATE, (TUMS) 200 MG CALCIUM (500 MG) CHEWABLE TABLET] Chew 2 tablets daily.   cholecalciferol, vitamin D3, 1,000 unit tablet 2/3/2022 at AM  Yes Yes   Sig: Take 2,000 Units by mouth daily    diclofenac sodium (VOLTAREN) 1 % Gel Unknown at Unknown time  No Yes   Sig: [DICLOFENAC SODIUM (VOLTAREN) 1 % GEL] 4 g four times a day   Patient taking differently: Apply 4 g topically 4 times daily as needed for moderate pain    docusate sodium (COLACE) 100 MG capsule Unknown at Unknown time  No Yes   Sig: [DOCUSATE SODIUM (COLACE) 100 MG CAPSULE] Take 1 capsule (100 mg total) by mouth 2 (two) times a day as needed for constipation.   doxylamine (UNISOM) 25 mg tablet Unknown at Unknown time  Yes Yes   Sig: Take 25 mg by mouth nightly as needed for sleep    lisinopril (ZESTRIL) 10 MG tablet 2/3/2022 at AM  No Yes   Sig: TAKE 1 TABLET BY MOUTH DAILY.   magnesium oxide (MAG-OX) 400 (240 Mg) MG tablet 2/3/2022 at PM  Yes Yes   Sig: Take 400 mg by mouth At Bedtime   melatonin 1 mg Tab tablet 2/3/2022 at PM  Yes Yes   Sig: [MELATONIN 1 MG TAB TABLET] Take 1 mg by mouth at  bedtime.          metoprolol succinate ER (TOPROL-XL) 100 MG 24 hr tablet 2/3/2022 at PM  No Yes   Sig: TAKE 1 TABLET BY MOUTH AT BEDTIME   nitroFURantoin macrocrystal-monohydrate (MACROBID) 100 MG capsule 2/3/2022 at PM  No Yes   Sig: Take 1 capsule (100 mg) by mouth 2 times daily   omeprazole (PRILOSEC) 20 MG DR capsule 2/3/2022 at AM  No Yes   Sig: Take 1 capsule (20 mg) by mouth daily   peg 400-propylene glycol PF (SYSTANE) 0.4-0.3 % Dpet Unknown at Unknown time  Yes Yes   Sig: [-PROPYLENE GLYCOL PF (SYSTANE) 0.4-0.3 % DPET] Administer 1 drop to both eyes 2 (two) times a day as needed.   rosuvastatin (CRESTOR) 20 MG tablet 2/3/2022 at PM  No Yes   Sig: Take 1 tablet (20 mg) by mouth At Bedtime   sertraline (ZOLOFT) 50 MG tablet 2/3/2022 at AM  No Yes   Sig: [SERTRALINE (ZOLOFT) 50 MG TABLET] TAKE 1 TABLET BY MOUTH ONCE DAILY   tiZANidine (ZANAFLEX) 2 MG tablet 2/3/2022 at PM  Yes Yes   Sig: Take 2 mg by mouth At Bedtime    triamcinolone (NASACORT) 55 mcg nasal inhaler Unknown at Unknown time  Yes Yes   Sig: [TRIAMCINOLONE (NASACORT) 55 MCG NASAL INHALER] Apply 2 sprays into each nostril daily as needed.   umeclidinium (INCRUSE ELLIPTA) 62.5 MCG/INH inhaler 2/4/2022 at AM, has with  No Yes   Sig: Inhale 1 puff into the lungs daily   zolpidem (AMBIEN) 10 mg tablet Unknown at Unknown time  No Yes   Sig: [ZOLPIDEM (AMBIEN) 10 MG TABLET] Take 1 tablet (10 mg total) by mouth at bedtime as needed for sleep.      Facility-Administered Medications: None      Review of Systems     A 12 point comprehensive review of systems was negative except as noted above in HPI.    OBJECTIVE         Physical Exam   Temp:  [97.3  F (36.3  C)-98.1  F (36.7  C)] 98  F (36.7  C)  Pulse:  [52-68] 61  Resp:  [14-23] 18  BP: (112-162)/(53-74) 148/69  SpO2:  [93 %-100 %] 96 %  GENERAL: Alert, oriented, conversant, in no distress.   EYES: Normal conjunctiva. Sclera anicteric.   NECK: Supple, no lymph adenopathy. JVP is not distended.    LUNGS: Clear to auscultation. No ronchi or crackles. Equal air entry bilaterally.   HEART: S1 S2, Rate and rhythm is regular. No murmurs  ABDOMEN: Soft, nontender, no distension. Bowel sounds are positive. No guarding or rebound.  EXTREMITIES: No pitting edema. No posterior calftenderness or swelling.  SKIN: No rash or ulcers.   NEUROLOGIC: Alert and oriented x3. Speech fluent and normal. Clear mentation. Motor, sensory and cranial exam is grossly intact andsymmetric.       Cardiographics Reviewed Personally By Myself     EKG Results: not reviewed.       Imaging Reviewed Personally By Myself      Radiology Results:   Recent Results (from the past 24 hour(s))   Echocardiogram Complete   Result Value    LVEF  55-60%    Narrative    [id number]  [id number]  [id number]  [id number]^BRICE^MANUELA^[place] [address]     Name: NENITA DUKE  MRN: [id number]  : 1950  Study Date: 2022 12:49 PM  Age: 71 yrs  Gender: Female  Patient Location: [facility]  Reason For Study: Dyspnea on exertion  Ordering Physician: MANUELA NARVAEZ  Referring Physician: MANUELA NARVAEZ  Performed By: SOBEIDA     BSA: 1.8 m2  Height: 64 in  Weight: 172 lb  HR: 55  BP: 148/70 mmHg  ______________________________________________________________________________  Procedure  Complete Echo Adult.  ______________________________________________________________________________  Interpretation Summary     Left ventricular size, wall motion and function are normal. The ejection  fraction is 55-60%.  There is mild concentric left ventricular hypertrophy.  Normal right ventricle size and systolic function.  No hemodynamically significant valvular abnormalities on 2D or color flow  imaging.  ______________________________________________________________________________  Left Ventricle  Left ventricular size, wall motion and function are normal. The ejection  fraction is 55-60%. There is mild  concentric left ventricular hypertrophy.  Left ventricular diastolic function is abnormal. No regional wall motion  abnormalities noted.     Right Ventricle  Normal right ventricle size and systolic function. TAPSE is abnormal, which is  consistent with abnormal right ventricular systolic function.     Atria  The left atrium is moderately dilated. Right atrial size is normal. There is  no color Doppler evidence of an atrial shunt.     Mitral Valve  There is mild mitral annular calcification. There is mild (1+) mitral  regurgitation.     Tricuspid Valve  The tricuspid valve is not well visualized. Right ventricle systolic pressure  estimate normal. There is mild (1+) tricuspid regurgitation.     Aortic Valve  The aortic valve is not well visualized. No aortic stenosis is present.     Pulmonic Valve  The pulmonic valve is not well seen, but is grossly normal. There is trace  pulmonic valvular regurgitation.     Vessels  The aorta root is normal. There is effacement of the sinotubular ridge. Normal  size ascending aorta. IVC diameter <2.1 cm collapsing >50% with sniff suggests  a normal RA pressure of 3 mmHg.     Pericardium  There is no pericardial effusion.     ______________________________________________________________________________  MMode/2D Measurements & Calculations  IVSd: 1.4 cm  LVIDd: 3.3 cm  LVIDs: 2.4 cm  LVPWd: 1.1 cm  FS: 27.1 %  LV mass(C)d: 136.7 grams  LV mass(C)dI: 74.5 grams/m2     Ao root diam: 3.0 cm  LA dimension: 2.9 cm  asc Aorta Diam: 3.3 cm  LA/Ao: 0.97  LVOT diam: 2.0 cm  LVOT area: 3.1 cm2  LA Volume (BP): 55.0 ml  LA Volume Index (BP): 30.1 ml/m2     LA Volume Indexed (AL/bp): 31.1 ml/m2  RWT: 0.68     Doppler Measurements & Calculations  MV E max chucho: 104.0 cm/sec  MV A max chucho: 125.0 cm/sec  MV E/A: 0.83  MV dec time: 0.31 sec  Ao V2 max: 147.0 cm/sec  Ao max P.0 mmHg  Ao V2 mean: 100.0 cm/sec  Ao mean P.0 mmHg  Ao V2 VTI: 34.9 cm  CARRIE(I,D): 2.4 cm2  CARRIE(V,D): 2.5 cm2  LV V1  max P.4 mmHg  LV V1 max: 116.0 cm/sec  LV V1 VTI: 27.2 cm  SV(LVOT): 85.5 ml  SI(LVOT): 46.6 ml/m2  PA acc time: 0.13 sec  PI end-d efrain: 90.9 cm/sec  TR max efrain: 243.0 cm/sec  TR max P.6 mmHg  AV Efrain Ratio (DI): 0.79  CARRIE Index (cm2/m2): 1.3     E/E' av.2  Lateral E/e': 16.9  Medial E/e': 31.4     ______________________________________________________________________________  Report approved by: Bridgette Menchaca 2022 03:56 PM         XR Surgery KHUSHI  Fluoro G/T 5 Min    Narrative    This exam was marked as non-reportable because it will not be read by a   radiologist or a [place] non-radiologist provider.             Labs Reviewed Personally By Myself     Results for orders placed or performed during the hospital encounter of 22 (from the past 24 hour(s))   ABO/Rh type and screen - Pre-Op    Narrative    The following orders were created for panel order ABO/Rh type and screen - Pre-Op.  Procedure                               Abnormality         Status                     ---------                               -----------         ------                     Adult Type and Screen[[id number]]                            Final result                 Please view results for these tests on the individual orders.   Adult Type and Screen   Result Value Ref Range    ABO/RH(D) O POS     Antibody Screen Negative Negative    SPECIMEN EXPIRATION DATE [id number]    XR Surgery KHUSHI  Fluoro G/T 5 Min    Narrative    This exam was marked as non-reportable because it will not be read by a   radiologist or a [place] non-radiologist provider.             Preoperative Labs Reviewed Personally By Myself     COVID-19 PCR negative      Thank you for this consultation.  Appreciate the opportunity to participate in the care of Lisa Ray, please feel free to contact us for any questions or concerns.    MADELINE GUERRA MD  [facility] Medicine  [facility]  Phone:  #[phone]

## 2022-02-04 NOTE — ANESTHESIA PREPROCEDURE EVALUATION
Anesthesia Pre-Procedure Evaluation    Patient: Lisa Ray   MRN: [id number] : 1950        Preoperative Diagnosis: Primary osteoarthritis of left hip [M16.12]    Procedure : Procedure(s):  LEFT DIRECT ANTERIOR TOTAL HIP ARTHROPLASTY          Past Medical History:   Diagnosis Date     Anemia     microcytic from Celebrex. GI w/u neg     Antiplatelet or antithrombotic long-term use      Back pain      Benign neoplasm of colon     Created by Conversion      Bronchiectasis (H)      CAD (coronary artery disease)     RCA- stent     Cancer (H)     breast cancer     DJD (degenerative joint disease)      Dysthymia      Fatigue     recurrent/variable - no serious pathology     Fibromyalgia      GERD (gastroesophageal reflux disease)      History of gastric ulcer      Hx of CABG      Hypertension      Iritis     past Hx.-left eye     ARSEN (obstructive sleep apnea) 2019     Raynaud's disease      Rosacea      Shortness of breath     with exertion     Shoulder tendonitis     right     Stented coronary artery      Ulcer of intestine     small bowel- 10 years ago     UTI (urinary tract infection)     2019      Past Surgical History:   Procedure Laterality Date     BELPHAROPTOSIS REPAIR Bilateral     Dr. Eder Bingham-      BREAST SURGERY Bilateral 2017    bilateral mastectomy 2017- 2017     BREAST SURGERY  2018    implants and revsion 2     CORONARY STENT PLACEMENT Right     Dr. Schwab     CV CORONARY ANGIOGRAM N/A 3/26/2019    Procedure: Coronary Angiogram;  Surgeon: Ba Foley MD;  Location: [facility] Lab;  Service: Cardiology     HYSTERECTOMY       OOPHORECTOMY       TOTAL HIP ARTHROPLASTY Right 2015    Procedure: RIGHT TOTAL HIP ARTHROPLASTY;  Surgeon: Tera Yeung MD;  Location: [facility];  Service:      [facility] APPENDECTOMY      Description: Appendectomy;  Recorded: 2009;      Allergies   Allergen Reactions     Latex Unknown     Added  based on information entered during log entry, please review and add reactions, type, and severity as needed     Nsaids (Non-Steroidal Anti-Inflammatory Drug) [Nsaids] Unknown     Other reaction(s): GI Bleeding, GI Upset, Sensitivity.  Ulceration w/ Celebrex      Social History     Tobacco Use     Smoking status: Former Smoker     Packs/day: 1.00     Years: 20.00     Pack years: 20.00     Quit date: 3/27/1990     Years since quittin.8     Smokeless tobacco: Never Used   Substance Use Topics     Alcohol use: Yes     Alcohol/week: 3.3 standard drinks     Comment: Alcoholic Drinks/day: 1-2 glasses of wine/week       Wt Readings from Last 1 Encounters:   22 75.8 kg (167 lb)        Anesthesia Evaluation            ROS/MED HX  ENT/Pulmonary:     (+) sleep apnea, uses CPAP, COPD,     Neurologic:     (+) peripheral neuropathy,     Cardiovascular:     (+) hypertension--CAD -CABG--Taking blood thinners Pt has received instructions:     METS/Exercise Tolerance: 4 - Raking leaves, gardening    Hematologic:  - neg hematologic  ROS     Musculoskeletal:   (+) arthritis,     GI/Hepatic:     (+) GERD,     Renal/Genitourinary:  - neg Renal ROS     Endo:  - neg endo ROS     Psychiatric/Substance Use:  - neg psychiatric ROS     Infectious Disease: Comment: Hx COVID - recovered - neg infectious disease ROS     Malignancy:       Other:  - neg other ROS             OUTSIDE LABS:  CBC:   Lab Results   Component Value Date    WBC 9.6 2022    WBC 7.6 2021    HGB 14.8 2022    HGB 15.7 2021    HCT 46.3 2022    HCT 49.2 (H) 2021     2022     2021     BMP:   Lab Results   Component Value Date     2022     2021    POTASSIUM 5.4 (H) 2022    POTASSIUM 4.9 2021    CHLORIDE 107 2022    CHLORIDE 102 2021    CO2 26 2022    CO2 27 2021    BUN 19 2022    BUN 19 2021    CR 0.81 2022    CR 0.85 2021     GLC 97 01/31/2022     04/20/2021     COAGS:   Lab Results   Component Value Date    INR 1.32 (H) 03/29/2019     POC: No results found for: BGM, HCG, HCGS  HEPATIC:   Lab Results   Component Value Date    ALBUMIN 3.8 02/12/2020    PROTTOTAL 6.6 02/12/2020    ALT 17 02/12/2020    AST 17 02/12/2020    ALKPHOS 104 02/12/2020    BILITOTAL 0.7 02/12/2020     OTHER:   Lab Results   Component Value Date    PH 7.34 (L) 03/29/2019    A1C 6.0 (H) 04/20/2021    RANDA 10.2 01/31/2022    MAG 2.1 04/11/2019    TSH 2.21 04/20/2021       Anesthesia Plan    ASA Status:  3   NPO Status:  NPO Appropriate    Anesthesia Type: Spinal.              Consents    Anesthesia Plan(s) and associated risks, benefits, and realistic alternatives discussed. Questions answered and patient/representative(s) expressed understanding.     - Discussed: Risks, Benefits and Alternatives for the PROCEDURE were discussed     - Discussed with:  Patient      - Patient is DNR/DNI Status: No    Use of blood products discussed: Yes.     - Discussed with: Patient.     - Consented: consented to blood products            Reason for refusal: other.     Postoperative Care    Pain management: Multi-modal analgesia, Neuraxial analgesia.   PONV prophylaxis: Dexamethasone or Solumedrol, Ondansetron (or other 5HT-3)     Comments:                Shanta Salas MD

## 2022-02-04 NOTE — PHARMACY-ADMISSION MEDICATION HISTORY
Pharmacy Note - Admission Medication History    Pertinent Provider Information: N/A   ______________________________________________________________________    Prior To Admission (PTA) med list completed and updated in EMR.       PTA Med List   Medication Sig Note Last Dose     acetaminophen (TYLENOL) 500 MG tablet [ACETAMINOPHEN (TYLENOL) 500 MG TABLET] Take 500 mg by mouth every 6 (six) hours as needed for pain (arthritis).  Unknown at Unknown time     acetaminophen-codeine (TYLENOL W/CODEINE #3) 300-30 MG per tablet Take 1-2 tablets by mouth every 4 hours as needed  Unknown at Unknown time     alendronate (FOSAMAX) 70 MG tablet Take 1 tablet (70 mg) by mouth every 7 days 2/4/2022: Saturdays 1/22/2022     aspirin 81 mg chewable tablet [ASPIRIN 81 MG CHEWABLE TABLET] Chew 2 tablets (162 mg total) daily.  1/30/2022 at AM     b complex vitamins tablet [B COMPLEX VITAMINS TABLET] Take 1 tablet by mouth daily.  2/3/2022 at AM     calcium, as carbonate, (TUMS) 200 mg calcium (500 mg) chewable tablet [CALCIUM, AS CARBONATE, (TUMS) 200 MG CALCIUM (500 MG) CHEWABLE TABLET] Chew 2 tablets daily.  2/3/2022 at AM     cholecalciferol, vitamin D3, 1,000 unit tablet Take 2,000 Units by mouth daily   2/3/2022 at AM     diclofenac sodium (VOLTAREN) 1 % Gel [DICLOFENAC SODIUM (VOLTAREN) 1 % GEL] 4 g four times a day (Patient taking differently: Apply 4 g topically 4 times daily as needed for moderate pain )  Unknown at Unknown time     docusate sodium (COLACE) 100 MG capsule [DOCUSATE SODIUM (COLACE) 100 MG CAPSULE] Take 1 capsule (100 mg total) by mouth 2 (two) times a day as needed for constipation.  Unknown at Unknown time     doxylamine (UNISOM) 25 mg tablet Take 25 mg by mouth nightly as needed for sleep   Unknown at Unknown time     Ferrous Sulfate (IRON) 28 MG TABS Take 1 tablet by mouth daily   2/3/2022 at AM     lisinopril (ZESTRIL) 10 MG tablet TAKE 1 TABLET BY MOUTH DAILY.  2/3/2022 at AM     magnesium oxide (MAG-OX) 400  (240 Mg) MG tablet Take 400 mg by mouth At Bedtime  2/3/2022 at PM     melatonin 1 mg Tab tablet [MELATONIN 1 MG TAB TABLET] Take 1 mg by mouth at bedtime.         2/3/2022 at PM     metoprolol succinate ER (TOPROL-XL) 100 MG 24 hr tablet TAKE 1 TABLET BY MOUTH AT BEDTIME  2/3/2022 at PM     nitroFURantoin macrocrystal-monohydrate (MACROBID) 100 MG capsule Take 1 capsule (100 mg) by mouth 2 times daily  2/3/2022 at PM     omeprazole (PRILOSEC) 20 MG DR capsule Take 1 capsule (20 mg) by mouth daily  2/3/2022 at AM     peg 400-propylene glycol PF (SYSTANE) 0.4-0.3 % Dpet [-PROPYLENE GLYCOL PF (SYSTANE) 0.4-0.3 % DPET] Administer 1 drop to both eyes 2 (two) times a day as needed.  Unknown at Unknown time     rosuvastatin (CRESTOR) 20 MG tablet Take 1 tablet (20 mg) by mouth At Bedtime  2/3/2022 at PM     sertraline (ZOLOFT) 50 MG tablet [SERTRALINE (ZOLOFT) 50 MG TABLET] TAKE 1 TABLET BY MOUTH ONCE DAILY  2/3/2022 at AM     tiZANidine (ZANAFLEX) 2 MG tablet Take 2 mg by mouth At Bedtime   2/3/2022 at PM     triamcinolone (NASACORT) 55 mcg nasal inhaler [TRIAMCINOLONE (NASACORT) 55 MCG NASAL INHALER] Apply 2 sprays into each nostril daily as needed.  Unknown at Unknown time     TURMERIC PO Take 1 capsule by mouth daily   2/3/2022 at AM     umeclidinium (INCRUSE ELLIPTA) 62.5 MCG/INH inhaler Inhale 1 puff into the lungs daily  2/4/2022 at AM, has with     zolpidem (AMBIEN) 10 mg tablet [ZOLPIDEM (AMBIEN) 10 MG TABLET] Take 1 tablet (10 mg total) by mouth at bedtime as needed for sleep.  Unknown at Unknown time       Information source(s): Patient and Clinic records    Method of interview communication: in-person    Patient was asked about OTC/herbal products specifically.  PTA med list reflects this.    Based on the pharmacist's assessment, the PTA med list information appears reliable    Allergies were reviewed, assessed, and updated with the patient.      Medications available for use during hospital stay:  Incruse Ellipta.      Thank you for the opportunity to participate in the care of this patient.      Abdias Zapata, [facility]     2/4/2022     6:45 AM

## 2022-02-04 NOTE — OP NOTE
Operative Report    PATIENT Lisa Ray   DATE OF SURGERY:  2/4/2022      PREOPERATIVE DIAGNOSIS   Primary osteoarthritis of left hip [M16.12].    POSTOPERATIVE DIAGNOSIS   Primary osteoarthritis of left hip [M16.12].    PROCEDURE PERFORMED   LEFT direct anterior total hip arthroplasty    IMPLANTS  Implant Name Type Inv. Item Serial No.  Lot No. LRB No. Used Action   IMP LINER HIP DEPUY PINNACLE ALTRX [id number] [id number] - [id number] Total Joint Component/Insert IMP LINER HIP DEPUY PINNACLE ALTRX [id number]  [employer] [facility]- [id number] Left 1 Implanted   IMP SHELL ACET DEPUY PINNACLE GRIPTION 48MM [id number] - [id number] Total Joint Component/Insert IMP SHELL ACET DEPUY PINNACLE GRIPTION 48MM [id number]  &[employer] [facility]- [id number] Left 1 Implanted   HEAD CERAMIC DELTA 12/14 TAPER 32 - [id number] Total Joint Component/Insert HEAD CERAMIC DELTA 12/14 TAPER 32  [employer]- [id number] Left 1 Implanted   IMP STEM FEM DEPUY ACTIS STD COLLAR TPR SZ 4MM 1010- - [id number] Total Joint Component/Insert IMP STEM FEM DEPUY ACTIS STD COLLAR TPR SZ 4MM 1010-  [employer]- [id number] Left 1 Implanted       SURGEON  Deny Bryson MD MD    ASSISTANT   Pham Sanchez PA-C; assistant was required for patient positioning, surgical assistance, wound closure and monitoring patient's safety throughout the case.    ANESTHESIA  Combined General with Spinal      FINDINGS:  Full thickness OA.     SPECIMENS:  none    ESTIMATED BLOOD LOSS:  300cc    COMPLICATIONS   None.      INDICATION FOR PROCEDURE  Lisa Ray is a 71 year old female with a history of ongoing increasing left hip and groin pain.  X-rays have shown end-stage osteoarthritis of the that hip.  The patient has tried and failed all conservative measures.  The pain in the hip is severe to the point where it's dramatically affecting their quality of life and their ability to exercise and even perform some simple activities  of daily living.  Consequently, after discussion regarding the risks and benefits as well as the pre, post, and perioperative course associated with hip replacement they elected to proceed..    PREOPERATIVE EXAMINATION:   Skin overlying the left hip intact.     INFORMED CONSENT  Lisa Ray was identified in the preoperative holding area and was identified using medical record number, name, and date of birth, all of which were confirmed. The operative extremity was marked using an indelible marker. Once again, all risks and benefits as well as alternatives to surgical intervention were discussed with the patient in detail and all their questions were answered. Risks discussed included but were not limited to: instability, leg length discrepancy, infection, wound healing issues, persistent pain, bleeding, scarring, stiffness, thromboembolic events, fracture, malalignment/malrotation, implant complications, severe limb dysfunction, loss of limb, and loss of life. The patient signed informed consent and wished to proceed with surgery as scheduled.     DESCRIPTION OF PROCEDURE   Lisa Ray was brought back to the operating room.  Spinal Anesthesia was achieved without difficulty.  The patient was then transferred to the Rome table with both feet padded appropriately and placed in the traction boots.  All bony prominences were well-padded. The LEFT hip was then prepped and draped in the usual sterile fashion.    A timeout was performed prior to the procedure.  Three separate staff members confirmed the patient's name, correct site and side of surgery and procedure being performed.  Antibiotics and TXA were confirmed to be given prior to incision.     A longitudinal incision was then made over the tensor fascia sherlyn muscle.  Dissection carried down to the muscle and fascia investing.  The fascia was opened and the TFL was retracted posteriorly. Rectus fascia was then divided and the body of the rectus was  retracted medially.  The floor of the rectus investing fascia was then divided and the perforating branch of the circumflex vessels were identified and cauterized.  Cobra retractors were then placed around the femoral neck.  The hip capsule was opened in an H-fashion.  Femoral neck cut was then made using  C-arm guidance according to preoperative template.  The head was removed from the acetabulum using the corkscrew.     Acetabulum was then exposed and reamed sequentially under C-arm guidance up to a 47 and then touched with a 48mm reamer.  The acetabular shell was then opened and impacted in appropriate inclination and version again using C-arm guidance. Excellent purchase with the cup and as such screw fixation not felt necessary. The polyethylene liner was snapped into place and the locking mechanism ensured.  Attention was then turned to the femur.     The femur was brought up into the field using standard technique and then the usual releases and exposure performed.  The femur was then sounded and then broached sequentially up to a size 4 broach which gave good fit and fill.  I monitored the calcar closely for fracture throughout the broaching. Trial neck and head were applied and the hip was trialed.  After successful trial and leg length measurement were confirmed with the x-ray overlay technique, the final femoral stem was impacted and seated nicely.  No fractures were appreciated.  The final head was then impacted on the trunnion and the hip again reduced.  Final C-arm images confirmed appropriate position of all components without complication . The wound was then copiously irrigated.  The local injection was then placed into the all of the pericapsular tissues as well as the border of the tensor fascia sherlyn and sartorius for postoperative pain control.  The fascia was then closed #2 Vicryl.  Deep dermis closed with 2-0 interrupted inverted Vicryl suture followed by monocryl and skin glue.  Dressings were  applied.  Patient tolerated the procedure well and was returned to postoperative recovery in stable condition.    POSTOPERATIVE PLAN:  -Pain control  -PT/OT, WBAT  -24 hours antibiotics   -On Macrobid - continue to completion   -2 weeks PO cefadroxil  -ASA for DVT ppx    Deny Bryson MD  [facility]

## 2022-02-04 NOTE — ANESTHESIA PROCEDURE NOTES
Intrathecal injection Procedure Note    Pre-Procedure   Staff -        Anesthesiologist:  Shanta Salas MD       Performed By: anesthesiologist       Location: OR       Procedure Start/Stop Times: 2/4/2022 7:24 AM and 2/4/2022 7:30 AM       Pre-Anesthestic Checklist: patient identified, IV checked, risks and benefits discussed, informed consent, monitors and equipment checked, pre-op evaluation, at physician/surgeon's request and post-op pain management  Timeout:       Correct Patient: Yes        Correct Procedure: Yes        Correct Site: Yes        Correct Position: Yes   Procedure Documentation  Procedure: intrathecal injection       Patient Position: sitting       Patient Prep/Sterile Barriers: sterile gloves, mask, patient draped       Skin prep: Chloraprep       Insertion Site: L2-3. (midline approach).       Needle Gauge: 24.        Needle Length (Inches): 4        Spinal Needle Type: Pencan       Introducer used      # of attempts: 2 and  # of redirects:     Assessment/Narrative         Paresthesias: No.       CSF fluid: clear.      Opening pressure was cmH2O while  Sitting.

## 2022-02-04 NOTE — ANESTHESIA CARE TRANSFER NOTE
Patient: Lisa Ray    Procedure: Procedure(s):  LEFT DIRECT ANTERIOR TOTAL HIP ARTHROPLASTY       Diagnosis: Primary osteoarthritis of left hip [M16.12]  Diagnosis Additional Information: No value filed.    Anesthesia Type:   General     Note:    Oropharynx: oropharynx clear of all foreign objects  Level of Consciousness: awake  Oxygen Supplementation: face mask      Dentition: dentition unchanged  Vital Signs Stable: post-procedure vital signs reviewed and stable  Report to RN Given: handoff report given  Patient transferred to: PACU    Handoff Report: Identifed the Patient, Identified the Reponsible Provider, Reviewed the pertinent medical history, Discussed the surgical course, Reviewed Intra-OP anesthesia mangement and issues during anesthesia, Set expectations for post-procedure period and Allowed opportunity for questions and acknowledgement of understanding      Vitals:  Vitals Value Taken Time   /64 02/04/22 0914   Temp 36.3  C (97.3  F) 02/04/22 0913   Pulse 66 02/04/22 0915   Resp 18 02/04/22 0915   SpO2 99 % 02/04/22 0915   Vitals shown include unvalidated device data.    Electronically Signed By: ALLYSON Altamirano CRNA  February 4, 2022  9:16 AM

## 2022-02-05 ENCOUNTER — APPOINTMENT (OUTPATIENT)
Dept: OCCUPATIONAL THERAPY | Facility: CLINIC | Age: 72
End: 2022-02-05
Attending: ORTHOPAEDIC SURGERY
Payer: COMMERCIAL

## 2022-02-05 ENCOUNTER — APPOINTMENT (OUTPATIENT)
Dept: PHYSICAL THERAPY | Facility: CLINIC | Age: 72
End: 2022-02-05
Attending: ORTHOPAEDIC SURGERY
Payer: COMMERCIAL

## 2022-02-05 VITALS
SYSTOLIC BLOOD PRESSURE: 136 MMHG | OXYGEN SATURATION: 94 % | HEART RATE: 64 BPM | HEIGHT: 64 IN | WEIGHT: 167 LBS | BODY MASS INDEX: 28.51 KG/M2 | DIASTOLIC BLOOD PRESSURE: 64 MMHG | RESPIRATION RATE: 18 BRPM | TEMPERATURE: 97.9 F

## 2022-02-05 LAB
FASTING STATUS PATIENT QL REPORTED: YES
GLUCOSE BLD-MCNC: 115 MG/DL (ref 70–125)
HGB BLD-MCNC: 11.1 G/DL (ref 11.7–15.7)

## 2022-02-05 PROCEDURE — 36415 COLL VENOUS BLD VENIPUNCTURE: CPT | Performed by: ORTHOPAEDIC SURGERY

## 2022-02-05 PROCEDURE — 97535 SELF CARE MNGMENT TRAINING: CPT | Mod: GO

## 2022-02-05 PROCEDURE — 99214 OFFICE O/P EST MOD 30 MIN: CPT | Performed by: INTERNAL MEDICINE

## 2022-02-05 PROCEDURE — 250N000013 HC RX MED GY IP 250 OP 250 PS 637: Performed by: FAMILY MEDICINE

## 2022-02-05 PROCEDURE — 97166 OT EVAL MOD COMPLEX 45 MIN: CPT | Mod: GO

## 2022-02-05 PROCEDURE — 250N000013 HC RX MED GY IP 250 OP 250 PS 637: Performed by: ORTHOPAEDIC SURGERY

## 2022-02-05 PROCEDURE — 97162 PT EVAL MOD COMPLEX 30 MIN: CPT | Mod: GP

## 2022-02-05 PROCEDURE — 82947 ASSAY GLUCOSE BLOOD QUANT: CPT | Performed by: ORTHOPAEDIC SURGERY

## 2022-02-05 PROCEDURE — 97116 GAIT TRAINING THERAPY: CPT | Mod: GP

## 2022-02-05 PROCEDURE — 85018 HEMOGLOBIN: CPT | Performed by: ORTHOPAEDIC SURGERY

## 2022-02-05 PROCEDURE — 97110 THERAPEUTIC EXERCISES: CPT | Mod: GP

## 2022-02-05 PROCEDURE — 99207 PR CDG-CODE CATEGORY CHANGED: CPT | Performed by: INTERNAL MEDICINE

## 2022-02-05 RX ORDER — AMOXICILLIN 250 MG
1 CAPSULE ORAL 2 TIMES DAILY
COMMUNITY
Start: 2022-02-05 | End: 2022-06-13

## 2022-02-05 RX ADMIN — OXYCODONE HYDROCHLORIDE 10 MG: 5 TABLET ORAL at 06:43

## 2022-02-05 RX ADMIN — SERTRALINE HYDROCHLORIDE 50 MG: 50 TABLET, FILM COATED ORAL at 09:08

## 2022-02-05 RX ADMIN — ACETAMINOPHEN 975 MG: 325 TABLET, FILM COATED ORAL at 09:51

## 2022-02-05 RX ADMIN — ASPIRIN 81 MG: 81 TABLET, COATED ORAL at 09:06

## 2022-02-05 RX ADMIN — OXYCODONE HYDROCHLORIDE 5 MG: 5 TABLET ORAL at 10:47

## 2022-02-05 RX ADMIN — SENNOSIDES AND DOCUSATE SODIUM 1 TABLET: 50; 8.6 TABLET ORAL at 09:06

## 2022-02-05 RX ADMIN — PANTOPRAZOLE SODIUM 40 MG: 20 TABLET, DELAYED RELEASE ORAL at 06:43

## 2022-02-05 RX ADMIN — LISINOPRIL 10 MG: 10 TABLET ORAL at 09:06

## 2022-02-05 RX ADMIN — OXYCODONE HYDROCHLORIDE 10 MG: 5 TABLET ORAL at 00:36

## 2022-02-05 RX ADMIN — ACETAMINOPHEN 975 MG: 325 TABLET, FILM COATED ORAL at 03:32

## 2022-02-05 RX ADMIN — POLYETHYLENE GLYCOL 3350 17 G: 17 POWDER, FOR SOLUTION ORAL at 09:07

## 2022-02-05 NOTE — PLAN OF CARE
Physical Therapy Discharge Summary    Reason for therapy discharge:    All goals and outcomes met, no further needs identified.    Progress towards therapy goal(s). See goals on Care Plan in [facility] electronic health record for goal details.  Goals met    Therapy recommendation(s):    Continue home exercise program.

## 2022-02-05 NOTE — PLAN OF CARE
Patient vital signs are at baseline: Yes  Patient able to ambulate as they were prior to admission or with assist devices provided by therapies during their stay:  Yes  Patient MUST void prior to discharge:  Yes  Patient able to tolerate oral intake:  Yes  Pain has adequate pain control using Oral analgesics:  Yes     Pt. Stated pain in L. Hip. Pain is managed with PRN Oxycodone and scheduled Tylenol. Pt. Complained of nausea when walked to the bathroom. PRN Zofran was given and was effective.

## 2022-02-05 NOTE — PROGRESS NOTES
"[facility] Medicine PROGRESS NOTE      Identification/Summary:      Lisa Ray is a 71 year old female with a past medical history of CAD status post CABG 2019, hypertension, COPD/emphysema, bronchiectasis, fibromyalgia, Raynaud's disease, ARSEN, GERD, osteoarthritis who was admitted on 2/4/2022 for left hip arthroplasty.     Assessment & Plan      Status post left hip arthroplasty  Acute cystitis secondary to E. coli, symptoms resolved  CAD status post CABG 2019  Hypertension  Hyperlipidemia  Emphysema/bronchiectasis not on home oxygen  Polymyalgia rheumatica  Dysthymia  GERD  Acute blood loss anemia, hemoglobin dropped from 14.8-11.1, no need for intervention    Plan  Continue home medications, no changes  Discontinue Macrobid as patient prescribed Duricef (cefadroxil), organism sensitive  Pain medication and DVT prophylaxis per Ortho.    Clinically Significant Risk Factors Present on Admission               # Platelet Defect: home medication list includes an antiplatelet medication   # Overweight: Estimated body mass index is 28.67 kg/m  as calculated from the following:    Height as of this encounter: 1.626 m (5' 4\").    Weight as of this encounter: 75.8 kg (167 lb).         Diet: Discharge Instruction - Regular Diet Adult  Regular Diet Adult  DVT Prophylaxis: DVT Prophylaxis per Primary Service   Gomez Catheter: Not present  Central Lines: None    Code Status: Full Code    Discharge Planning   Anticipated Discharge in :  Expected Discharge Destination: Home   Milestones/Criteria For Discharge:    Disposition Plan   Expected Discharge: 02/05/2022        The patient's care was discussed with the Patient.      Interval History/Subjective:     Patient reports she is doing well, pain is controlled, no dysuria frequency urgency of urination, looking forward to going home this    Physical Exam/Objective:     Vitals I/O   Vital signs:  Temp: 97.9  F (36.6  C) Temp src: Oral BP: 136/64 Pulse: 64   Resp: " "18 SpO2: 94 % O2 Device: None (Room air) Oxygen Delivery: 2 LPM Height: 162.6 cm (5' 4\") Weight: 75.8 kg (167 lb)  Estimated body mass index is 28.67 kg/m  as calculated from the following:    Height as of this encounter: 1.626 m (5' 4\").    Weight as of this encounter: 75.8 kg (167 lb).       I/O last 3 completed shifts:  In: 1744.49 [P.O.:720; I.V.:922.49; IV Piggyback:102]  Out: 300 [Blood:300]     Vitals:    01/21/22 0900 02/04/22 0627   Weight: 78.5 kg (173 lb) 75.8 kg (167 lb)      Weight change:    Body mass index is 28.67 kg/m .    General: Awake alert and comfortable  Lungs: CTA without rales or rhonchi  Heart: S1, S2 without murmurs  Abdomen: Soft nontender, bowel sounds positive  CNS: Nonfocal  Extremities: No edema      Medications:     Medications       acetaminophen  975 mg Oral Q8H     aspirin  81 mg Oral BID     lisinopril  10 mg Oral Daily     metoprolol succinate ER  100 mg Oral At Bedtime     pantoprazole  40 mg Oral Daily     polyethylene glycol  17 g Oral Daily     rosuvastatin  20 mg Oral At Bedtime     senna-docusate  1 tablet Oral BID     sertraline  50 mg Oral Daily     sodium chloride (PF)  3 mL Intracatheter Q8H     umeclidinium  1 puff Inhalation Daily       Data Reviewed   I personally reviewed all new medications, labs, imaging/diagnostics reports over the past 24 hours.     Pertinent findings include hemoglobin 11.1.     Labs    Recent Labs   Lab 02/05/22  0604 01/31/22  1217   WBC  --  9.6   HGB 11.1* 14.8   MCV  --  98   PLT  --  237   NA  --  142   POTASSIUM  --  5.4*   CHLORIDE  --  107   CO2  --  26   BUN  --  19   CR  --  0.81   ANIONGAP  --  9   RANDA  --  10.2    97       Imaging   No results found for this or any previous visit (from the past 24 hour(s)).      EKG:      Ryder Medrano MD  Internal Medicine / Hospital medicine   [facility]  Securely message with the Vocera Web Console (learn more here)  Text page via [employer] ([email or web address])     "

## 2022-02-05 NOTE — PROGRESS NOTES
02/05/22 0735   Therapy Certification   Start of Care Date 02/05/22   Certification date from 02/05/22   Certification date to 03/05/22   Medical Diagnosis RASHEL

## 2022-02-05 NOTE — PROGRESS NOTES
02/05/22 0735   Quick Adds   Type of Visit Initial Occupational Therapy Evaluation   Living Environment   People in home spouse   Current Living Arrangements house   Transportation Anticipated family or friend will provide   Living Environment Comments Ptlives w/ spouse  in 2 story home w/ bedroom upstairs, BR  on main floor. Pt has  walk in shower  w/ built in seat, elevated toilet. Pt has FWW and cane at home. All main needs except bedroom on main level.    Self-Care   Usual Activity Tolerance excellent   Current Activity Tolerance moderate   Equipment Currently Used at Home cane, straight;raised toilet seat;walker, standard   Activity/Exercise/Self-Care Comment Pt is ind w/ all ADLs and IADLs except laundry at home - laundry is in basement and  takes care of ti   Disability/Function   Fall history within last six months no   General Information   Onset of Illness/Injury or Date of Surgery 02/04/22   Referring Physician Deny Bryson MD   Additional Occupational Profile Info/Pertinent History of Current Problem RASHEL   Performance Patterns (Routines, Roles, Habits) decreased ind w/ ADLs   Existing Precautions/Restrictions no known precautions/restrictions   Left Lower Extremity (Weight-bearing Status) weight-bearing as tolerated (WBAT)   Cognitive Status Examination   Orientation Status orientation to person, place and time   Visual Perception   Visual Impairment/Limitations WFL   Sensory   Sensory Quick Adds No deficits were identified   Pain Assessment   Patient Currently in Pain No   Integumentary/Edema   Integumentary/Edema no deficits were identifed   Posture   Posture not impaired   Range of Motion Comprehensive   General Range of Motion no range of motion deficits identified   Strength Comprehensive (MMT)   General Manual Muscle Testing (MMT) Assessment no strength deficits identified   Muscle Tone Assessment   Muscle Tone Quick Adds No deficits were identified   Coordination   Upper Extremity  Coordination No deficits were identified   Bed Mobility   Bed Mobility rolling left;rolling right;scooting/bridging;supine-sit;sit-supine   Transfers   Transfers bed-chair transfer;sit-stand transfer;toilet transfer;shower transfer   Transfer Comments SBA for all transfers   Activities of Daily Living   BADL Assessment upper body dressing;lower body dressing;toileting   Upper Body Dressing Assessment   [place] Level (Upper Body Dressing) supervision   Lower Body Dressing Assessment   [place] Level (Lower Body Dressing) minimum assist (75% patient effort)   Comment (Lower Body Dressing) Max A for donning socks   Toileting   [place] Level (Toileting) supervision   Clinical Impression   Criteria for Skilled Therapeutic Interventions Met (OT) yes   OT Diagnosis decreased ind w/ ADLs   OT Problem List-Impairments impacting ADL activity tolerance impaired;mobility;post-surgical precautions   Assessment of Occupational Performance 1-3 Performance Deficits   Identified Performance Deficits decreased ind w/ dressing, bed mobility and all transfers   Planned Therapy Interventions (OT) ADL retraining;bed mobility training;transfer training   Clinical Decision Making Complexity (OT) moderate complexity   Therapy Frequency (OT) 1x eval and treat   Predicted Duration of Therapy 1 day   Risk & Benefits of therapy have been explained patient   OT Discharge Planning    OT Discharge Recommendation (DC Rec) Home with assist   OT Rationale for DC Rec Pt is set up well at home - all needs met on main floor except for bedroom upstairs. Pt has FWW, cane, built in shower chair, leg , and her  will be home at all times to assist as needed.    Therapy Certification   Start of Care Date 02/05/22   Certification date from 02/05/22   Certification date to 03/05/22   Medical Diagnosis RASHEL   Total Evaluation Time (Minutes)   Total Evaluation Time (Minutes) 10

## 2022-02-05 NOTE — PROGRESS NOTES
Chart reviewed.  Pt will discharge home with assist from spouse.  Anticipate spouse will transport home.      RENAE Issa  2/5/2022  8:48 AM

## 2022-02-05 NOTE — PLAN OF CARE
Occupational Therapy Discharge Summary    Reason for therapy discharge:    All goals and outcomes met, no further needs identified.    Progress towards therapy goal(s). See goals on Care Plan in [facility] electronic health record for goal details.  Goals met    Therapy recommendation(s):    No further therapy is recommended.

## 2022-02-05 NOTE — PROGRESS NOTES
02/05/22 0815   Quick Adds   Quick Adds Certification   Type of Visit Initial PT Evaluation   Living Environment   People in home spouse   Current Living Arrangements house   Home Accessibility stairs to enter home;stairs within home   Number of Stairs, Main Entrance 3   Stair Railings, Main Entrance none   Number of Stairs, Within Home, Primary greater than 10 stairs   Stair Railings, Within Home, Primary railing on left side (ascending)   Self-Care   Usual Activity Tolerance excellent   Current Activity Tolerance good   Equipment Currently Used at Home cane, straight;walker, rolling   Disability/Function   Fall history within last six months no   General Information   Onset of Illness/Injury or Date of Surgery 02/04/22   Referring Physician Deny Bryson MD   Patient/Family Therapy Goals Statement (PT) biking, hiking, swimming, walks   Pertinent History of Current Problem (include personal factors and/or comorbidities that impact the POC) s/p L RASHEL DA   Existing Precautions/Restrictions no known precautions/restrictions   Weight-Bearing Status - LLE weight-bearing as tolerated   Weight-Bearing Status - RLE full weight-bearing   Strength   Strength Comments decreased strength s/p L RASHEL   Transfers   Transfers sit-stand transfer   Sit-Stand Transfer   Sit-Stand [place] (Transfers) modified independence;supervision;verbal cues   Assistive Device (Sit-Stand Transfers) walker, front-wheeled   Gait/Stairs (Locomotion)   [place] Level (Gait) contact guard;modified independence;verbal cues   Assistive Device (Gait) walker, front-wheeled   Distance in Feet (Required for LE Total Joints) 170   Pattern (Gait) step-through   Deviations/Abnormal Patterns (Gait) arden decreased;gait speed decreased;stride length decreased   Negotiation (Stairs) stairs independence;stairs assistive device;handrail location;number of steps;ascending technique;descending technique   [place] Level (Stairs) contact  guard;supervision;verbal cues   Assistive Device (Stairs) cane, straight   Handrail Location (Stairs) left side (ascending)   Number of Steps (Stairs) 8   Ascending Technique (Stairs) step-to-step   Descending Technique (Stairs) step-to-step   Clinical Impression   Criteria for Skilled Therapeutic Intervention yes, treatment indicated   PT Diagnosis (PT) impaired functional mobility   Influenced by the following impairments weakness, pain   Functional limitations due to impairments gait, stairs, transfers   Clinical Presentation Stable/Uncomplicated   Clinical Presentation Rationale pt presents as diagnosed   Clinical Decision Making (Complexity) moderate complexity   Therapy Frequency (PT) One time eval and treatment only   Predicted Duration of Therapy Intervention (days/wks) 1 day   Planned Therapy Interventions (PT) bed mobility training;gait training;balance training;home exercise program;patient/family education;stair training;strengthening;transfer training   Anticipated Equipment Needs at Discharge (PT) walker, rolling   Risk & Benefits of therapy have been explained care plan/treatment goals reviewed;patient   PT Discharge Planning    PT Discharge Recommendation (DC Rec) home with assist   PT Rationale for DC Rec home with assist from spouse as needed   Therapy Certification   Start of care date 02/05/22   Certification date from 02/05/22   Certification date to 03/05/22   Medical Diagnosis s/p RASHEL   Total Evaluation Time   Total Evaluation Time (Minutes) 5

## 2022-02-05 NOTE — PROGRESS NOTES
"Ortho Progress Note      Post-operative Day: 1 Day Post-Op  S/P Procedure(s):  LEFT DIRECT ANTERIOR TOTAL HIP ARTHROPLASTY      Subjective:  Pain: Mild at rest   Chest pain, SOB:  None  Nausea, vomiting:  No   Lightheadedness, dizziness:  No   Neuro:  Patient denies new onset numbness or paresthesias    Objective:  /64 (BP Location: Left arm, Patient Position: Semi-Jennings's)   Pulse 58   Temp 98.2  F (36.8  C) (Oral)   Resp 18   Ht 1.626 m (5' 4\")   Wt 75.8 kg (167 lb)   SpO2 96%   BMI 28.67 kg/m    Gen: A&O x 3. NAD. Appears comfortable.  Patient is doing well.   Hoping to go home today.    Wound status: Mepilex in place.  No drainage.    Circulation, motion and sensation: Dorsiflexion/plantarflexion intact and equal bilaterally; distal lower extremity sensation is intact and equal bilaterally   Swelling: Mild swelling     Calf tenderness: calves are soft and non-tender bilaterally     Pertinent Labs   Lab Results: personally reviewed.   Lab Results   Component Value Date    INR 1.32 (H) 03/29/2019    INR 1.32 (H) 03/28/2019    INR 1.01 03/27/2019     Lab Results   Component Value Date    WBC 9.6 01/31/2022    HGB 11.1 (L) 02/05/2022    HCT 46.3 01/31/2022    MCV 98 01/31/2022     01/31/2022     Lab Results   Component Value Date     01/31/2022    CO2 26 01/31/2022       Assessment: POD #1 S/P left RASHEL-direct anterior     Plan:   Continue PT/OT  Weightbearing status: WBAT  Anticoagulation: ASA 81 mg BID in addition to SCDs, usman stockings and early ambulation.  Discharge planning: Plan to discharge home today if doing well with PT/OT and medical team agrees.      Report completed by:  Freddy Owusu PA-C  Date: 2/5/2022  Time: 6:36 AM  "

## 2022-02-05 NOTE — PLAN OF CARE
[facility]      OUTPATIENT OCCUPATIONAL THERAPY  EVALUATION  PLAN OF TREATMENT FOR OUTPATIENT REHABILITATION  (COMPLETE FOR INITIAL CLAIMS ONLY)  Patient's Last Name, First Name, M.I.  YOB: 1950  Lisa Ray                          Provider's Name  [facility] Medical Record No.  [id number]                               Onset Date:  02/04/22   Start of Care Date:  02/05/22     Type:     ___PT   _X_OT   ___SLP Medical Diagnosis:  RASHEL                        OT Diagnosis:  decreased ind w/ ADLs   Visits from SOC:  1   _________________________________________________________________________________  Plan of Treatment/Functional Goals    Planned Interventions: ADL retraining,bed mobility training,transfer training   Goals: See Occupational Therapy Goals on Care Plan in [facility] electronic health record.    Therapy Frequency: 1x eval and treat  Predicted Duration of Therapy Intervention: 1 day  _________________________________________________________________________________    I CERTIFY THE NEED FOR THESE SERVICES FURNISHED UNDER        THIS PLAN OF TREATMENT AND WHILE UNDER MY CARE     (Physician co-signature of this document indicates review and certification of the therapy plan).              Certification date from: 02/05/22, Certification date to: 03/05/22    Referring Physician: Deny Bryson MD            Initial Assessment        See Occupational Therapy evaluation dated 02/05/22 in [facility] electronic health record.

## 2022-02-05 NOTE — PROGRESS NOTES
[facility]      OUTPATIENT PHYSICAL THERAPY EVALUATION  PLAN OF TREATMENT FOR OUTPATIENT REHABILITATION  (COMPLETE FOR INITIAL CLAIMS ONLY)  Patient's Last Name, First Name, M.I.  YOB: 1950  CoryLisa  PIOTR                        Provider's Name  [facility] Medical Record No.  [id number]                               Onset Date:  02/04/22   Start of Care Date:  (P) 02/05/22      Type:     _X_PT   ___OT   ___SLP Medical Diagnosis:  (P) s/p RASHEL                        PT Diagnosis:  impaired functional mobility   Visits from SOC:  1   _________________________________________________________________________________  Plan of Treatment/Functional Goals    Planned Interventions: bed mobility training,gait training,balance training,home exercise program,patient/family education,stair training,strengthening,transfer training     Goals: See Physical Therapy Goals on Care Plan in [facility] electronic health record.    Therapy Frequency: One time eval and treatment only  Predicted Duration of Therapy Intervention: 1 day  _________________________________________________________________________________    I CERTIFY THE NEED FOR THESE SERVICES FURNISHED UNDER        THIS PLAN OF TREATMENT AND WHILE UNDER MY CARE     (Physician co-signature of this document indicates review and certification of the therapy plan).                Certification date from: (P) 02/05/22, Certification date to: (P) 03/05/22    Referring Physician: Deny Bryson MD            Initial Assessment        See Physical Therapy evaluation dated (P) 02/05/22 in Epic electronic health record.

## 2022-02-06 NOTE — PLAN OF CARE
Discharge instructions and meds were reviewed with pt and her spouse, including the [facility] [employer]. They had no unresolved questions. They stated they had all her belongings.

## 2022-02-17 ENCOUNTER — TRANSFERRED RECORDS (OUTPATIENT)
Dept: HEALTH INFORMATION MANAGEMENT | Facility: CLINIC | Age: 72
End: 2022-02-17
Payer: COMMERCIAL

## 2022-03-03 DIAGNOSIS — G89.29 OTHER CHRONIC PAIN: ICD-10-CM

## 2022-03-03 NOTE — TELEPHONE ENCOUNTER
Routing refill request to provider for review/approval because:  Control Substance Request     Last Written Prescription Date:  8/27/21  Last Fill Quantity: 90,  # refills: 3   Last office visit provider:  1/31/22     Requested Prescriptions   Pending Prescriptions Disp Refills     acetaminophen-codeine (TYLENOL #3) 300-30 MG tablet [Pharmacy Med Name: ACETAMINOPHEN-COD #3 TABLET] 90 tablet      Sig: TAKE 1-2 TABLETS BY MOUTH EVERY 4 HOURS AS NEEDED FOR PAIN       There is no refill protocol information for this order          Sly Wade RN 03/03/22 2:06 PM

## 2022-03-22 ENCOUNTER — TRANSFERRED RECORDS (OUTPATIENT)
Dept: HEALTH INFORMATION MANAGEMENT | Facility: CLINIC | Age: 72
End: 2022-03-22
Payer: COMMERCIAL

## 2022-04-14 ENCOUNTER — OFFICE VISIT (OUTPATIENT)
Dept: INTERNAL MEDICINE | Facility: CLINIC | Age: 72
End: 2022-04-14
Payer: COMMERCIAL

## 2022-04-14 VITALS
HEART RATE: 56 BPM | OXYGEN SATURATION: 94 % | WEIGHT: 170 LBS | BODY MASS INDEX: 29.18 KG/M2 | DIASTOLIC BLOOD PRESSURE: 78 MMHG | SYSTOLIC BLOOD PRESSURE: 130 MMHG

## 2022-04-14 DIAGNOSIS — Z79.899 ENCOUNTER FOR LONG-TERM (CURRENT) USE OF MEDICATIONS: Primary | ICD-10-CM

## 2022-04-14 DIAGNOSIS — J40 BRONCHITIS: ICD-10-CM

## 2022-04-14 DIAGNOSIS — H10.9 BACTERIAL CONJUNCTIVITIS: ICD-10-CM

## 2022-04-14 PROCEDURE — 99214 OFFICE O/P EST MOD 30 MIN: CPT | Performed by: INTERNAL MEDICINE

## 2022-04-14 RX ORDER — ERYTHROMYCIN 5 MG/G
0.5 OINTMENT OPHTHALMIC AT BEDTIME
Qty: 3.5 G | Refills: 0 | Status: SHIPPED | OUTPATIENT
Start: 2022-04-14 | End: 2022-06-13

## 2022-04-14 RX ORDER — ALBUTEROL SULFATE 90 UG/1
2 AEROSOL, METERED RESPIRATORY (INHALATION) EVERY 6 HOURS
Qty: 18 G | Refills: 1 | Status: SHIPPED | OUTPATIENT
Start: 2022-04-14 | End: 2023-02-09

## 2022-04-14 RX ORDER — AZITHROMYCIN 250 MG/1
TABLET, FILM COATED ORAL
Qty: 6 TABLET | Refills: 0 | Status: SHIPPED | OUTPATIENT
Start: 2022-04-14 | End: 2022-04-19

## 2022-04-14 RX ORDER — CIPROFLOXACIN HYDROCHLORIDE 3.5 MG/ML
1-2 SOLUTION/ DROPS TOPICAL
Qty: 5 ML | Refills: 1 | Status: SHIPPED | OUTPATIENT
Start: 2022-04-14 | End: 2022-06-13

## 2022-04-14 RX ORDER — CODEINE PHOSPHATE/GUAIFENESIN 10-100MG/5
5 LIQUID (ML) ORAL EVERY 4 HOURS PRN
Qty: 200 ML | Refills: 1 | Status: SHIPPED | OUTPATIENT
Start: 2022-04-14 | End: 2022-08-29

## 2022-04-14 NOTE — PROGRESS NOTES
"    Assessment & Plan     Encounter for long-term (current) use of medications      Bacterial conjunctivitis  Unilateral red eye with discharge causing eyelash sticking is very typical of bacterial conjunctivitis.  No significant pain to suggest scleritis iritis or glaucoma.  - ciprofloxacin (CILOXAN) 0.3 % ophthalmic solution  Dispense: 5 mL; Refill: 1  - erythromycin (ROMYCIN) 5 MG/GM ophthalmic ointment  Dispense: 3.5 g; Refill: 0    Bronchitis  She does have mild wheezing.  Recommend symptomatic medic treatment plus antibiotic due to duration of symptoms.  Given her history of bronchiectasis/COPD with low threshold for antibiotics  - albuterol (PROAIR HFA/PROVENTIL HFA/VENTOLIN HFA) 108 (90 Base) MCG/ACT inhaler  Dispense: 18 g; Refill: 1  - guaiFENesin-codeine (GUAIFENESIN AC) 100-10 MG/5ML syrup  Dispense: 200 mL; Refill: 1  - azithromycin (ZITHROMAX) 250 MG tablet  Dispense: 6 tablet; Refill: 0               BMI:   Estimated body mass index is 29.18 kg/m  as calculated from the following:    Height as of 2/4/22: 1.626 m (5' 4\").    Weight as of this encounter: 77.1 kg (170 lb).           Return if symptoms worsen or fail to improve, for Follow up.    Amber Alberto MD  [facility] is a 71 year old who presents for the following health issues she has a known history of COPD and bronchiectasis  History of Present Illness       Reason for visit:  Sinus conjestion, spasm cough  Symptom onset:  1-2 weeks ago  Symptoms include:  Sinus,cough, matter L eye  Symptom intensity:  Moderate  Symptom progression:  Worsening  Had these symptoms before:  No  What makes it better:  Medication    She eats 4 or more servings of fruits and vegetables daily.She consumes 0 sweetened beverage(s) daily.She exercises with enough effort to increase her heart rate 30 to 60 minutes per day.  She exercises with enough effort to increase her heart rate 3 or less days per week.   She is " taking medications regularly.     Patient Active Problem List   Diagnosis     Ptosis Of Eyelid     Rosacea     Dysthymic disorder     Coronary Artery Disease     Osteoarthritis     Raynaud disease     Paresthesias/numbness     Primary osteoarthritis of right hip     DCIS (ductal carcinoma in situ)     S/P coronary artery stent placement     History of bilateral mastectomy     S/P bilateral breast implants     S/P CABG (coronary artery bypass graft)     History of total hip replacement, right     Health maintenance examination     ARSEN (obstructive sleep apnea)     Acquired lymphedema     Hair loss     Right shoulder pain     COPD (chronic obstructive pulmonary disease) (H)     Essential hypertension, benign     Age-related osteoporosis without current pathological fracture     Chronic obstructive airway disease with asthma (H)     Bronchiectasis without acute exacerbation (H)     Infection due to 2019 novel coronavirus     S/P total hip arthroplasty     Current Outpatient Medications   Medication     acetaminophen (TYLENOL) 500 MG tablet     acetaminophen-codeine (TYLENOL #3) 300-30 MG tablet     albuterol (PROAIR HFA/PROVENTIL HFA/VENTOLIN HFA) 108 (90 Base) MCG/ACT inhaler     alendronate (FOSAMAX) 70 MG tablet     aspirin (ASA) 81 MG EC tablet     azithromycin (ZITHROMAX) 250 MG tablet     b complex vitamins tablet     calcium, as carbonate, (TUMS) 200 mg calcium (500 mg) chewable tablet     cholecalciferol, vitamin D3, 1,000 unit tablet     ciprofloxacin (CILOXAN) 0.3 % ophthalmic solution     docusate sodium (COLACE) 100 MG capsule     doxylamine (UNISOM) 25 mg tablet          Ferrous Sulfate (IRON) 28 MG TABS     guaiFENesin-codeine (GUAIFENESIN AC) 100-10 MG/5ML syrup     lisinopril (ZESTRIL) 10 MG tablet     magnesium oxide (MAG-OX) 400 (240 Mg) MG tablet     melatonin 1 mg Tab tablet     metoprolol succinate ER (TOPROL-XL) 100 MG 24 hr tablet     omeprazole (PRILOSEC) 20 MG DR capsule     peg 400-propylene  glycol PF (SYSTANE) 0.4-0.3 % Dpet     rosuvastatin (CRESTOR) 20 MG tablet     senna-docusate (SENOKOT-S/PERICOLACE) 8.6-50 MG tablet     sertraline (ZOLOFT) 50 MG tablet     tiZANidine (ZANAFLEX) 2 MG tablet     triamcinolone (NASACORT) 55 mcg nasal inhaler     TURMERIC PO     umeclidinium (INCRUSE ELLIPTA) 62.5 MCG/INH inhaler     zolpidem (AMBIEN) 10 MG tablet     No current facility-administered medications for this visit.               Review of Systems   Constitutional, HEENT, cardiovascular, pulmonary, gi and gu systems are negative, except as otherwise noted.      Objective    /78 (BP Location: Left arm, Patient Position: Sitting, Cuff Size: Adult Regular)   Pulse 56   Wt 77.1 kg (170 lb)   SpO2 94%   BMI 29.18 kg/m    Body mass index is 29.18 kg/m .  Physical Exam   GENERAL: healthy, alert and no distress  NECK: no adenopathy, no asymmetry, masses, or scars and thyroid normal to palpation  RESP: lungs clear to auscultation - coarse breath sounds rhonchi or wheezes  CV: regular rate and rhythm, normal S1 S2, no S3 or S4, no murmur, click or rub, no peripheral edema and peripheral pulses strong  ABDOMEN: soft, nontender, no hepatosplenomegaly, no masses and bowel sounds normal  MS: no gross musculoskeletal defects noted, no edema  Ears NAD patel TM

## 2022-05-29 DIAGNOSIS — F43.21 ADJUSTMENT DISORDER WITH DEPRESSED MOOD: ICD-10-CM

## 2022-05-31 NOTE — TELEPHONE ENCOUNTER
"Routing refill request to provider for review/approval because:  phq 9    Last Written Prescription Date:  6/3/21  Last Fill Quantity: 90,  # refills: 3   Last office visit provider:  4/14/22     Requested Prescriptions   Pending Prescriptions Disp Refills     sertraline (ZOLOFT) 50 MG tablet [Pharmacy Med Name: SERTRALINE HCL 50 MG TABLET] 90 tablet 3     Sig: TAKE 1 TABLET BY MOUTH EVERY DAY       SSRIs Protocol Failed - 5/29/2022  7:44 AM        Failed - PHQ-9 score less than 5 in past 6 months     Please review last PHQ-9 score.           Passed - Medication is active on med list        Passed - Patient is age 18 or older        Passed - No active pregnancy on record        Passed - No positive pregnancy test in last 12 months        Passed - Recent (6 mo) or future (30 days) visit within the authorizing provider's specialty     Patient had office visit in the last 6 months or has a visit in the next 30 days with authorizing provider or within the authorizing provider's specialty.  See \"Patient Info\" tab in inbasket, or \"Choose Columns\" in Meds & Orders section of the refill encounter.                 Gricelda Correa RN 05/30/22 9:26 PM  "

## 2022-06-10 ASSESSMENT — ENCOUNTER SYMPTOMS
ABDOMINAL PAIN: 1
MYALGIAS: 1
EYE PAIN: 1
HEMATURIA: 0
FEVER: 0
COUGH: 1
JOINT SWELLING: 0
HEARTBURN: 1
DYSURIA: 0
PALPITATIONS: 0
SORE THROAT: 0
NERVOUS/ANXIOUS: 0
HEADACHES: 1
PARESTHESIAS: 1
DIARRHEA: 0
DIZZINESS: 1
CONSTIPATION: 0
NAUSEA: 1
FREQUENCY: 0
BREAST MASS: 0
ARTHRALGIAS: 1
CHILLS: 1
SHORTNESS OF BREATH: 1
HEMATOCHEZIA: 0
WEAKNESS: 0

## 2022-06-10 ASSESSMENT — ACTIVITIES OF DAILY LIVING (ADL): CURRENT_FUNCTION: NO ASSISTANCE NEEDED

## 2022-06-13 ENCOUNTER — OFFICE VISIT (OUTPATIENT)
Dept: INTERNAL MEDICINE | Facility: CLINIC | Age: 72
End: 2022-06-13
Payer: COMMERCIAL

## 2022-06-13 DIAGNOSIS — Z79.899 ENCOUNTER FOR LONG-TERM (CURRENT) USE OF MEDICATIONS: Primary | ICD-10-CM

## 2022-06-13 DIAGNOSIS — E78.2 MIXED HYPERLIPIDEMIA: ICD-10-CM

## 2022-06-13 DIAGNOSIS — N39.0 RECURRENT UTI: ICD-10-CM

## 2022-06-13 DIAGNOSIS — Z12.11 ENCOUNTER FOR SCREENING FOR MALIGNANT NEOPLASM OF COLON: ICD-10-CM

## 2022-06-13 DIAGNOSIS — E55.9 VITAMIN D DEFICIENCY: ICD-10-CM

## 2022-06-13 DIAGNOSIS — Z20.822 ENCOUNTER FOR LABORATORY TESTING FOR COVID-19 VIRUS: ICD-10-CM

## 2022-06-13 DIAGNOSIS — R73.03 PREDIABETES: ICD-10-CM

## 2022-06-13 LAB
ALBUMIN SERPL-MCNC: 4.1 G/DL (ref 3.5–5)
ALP SERPL-CCNC: 92 U/L (ref 45–120)
ALT SERPL W P-5'-P-CCNC: 22 U/L (ref 0–45)
ANION GAP SERPL CALCULATED.3IONS-SCNC: 10 MMOL/L (ref 5–18)
AST SERPL W P-5'-P-CCNC: 18 U/L (ref 0–40)
BILIRUB SERPL-MCNC: 1.1 MG/DL (ref 0–1)
BUN SERPL-MCNC: 22 MG/DL (ref 8–28)
CALCIUM SERPL-MCNC: 9.4 MG/DL (ref 8.5–10.5)
CHLORIDE BLD-SCNC: 106 MMOL/L (ref 98–107)
CHOLEST SERPL-MCNC: 120 MG/DL
CO2 SERPL-SCNC: 26 MMOL/L (ref 22–31)
CREAT SERPL-MCNC: 0.84 MG/DL (ref 0.6–1.1)
DEPRECATED CALCIDIOL+CALCIFEROL SERPL-MC: 50 UG/L (ref 20–75)
ERYTHROCYTE [DISTWIDTH] IN BLOOD BY AUTOMATED COUNT: 13.1 % (ref 10–15)
FASTING STATUS PATIENT QL REPORTED: YES
GFR SERPL CREATININE-BSD FRML MDRD: 73 ML/MIN/1.73M2
GLUCOSE BLD-MCNC: 106 MG/DL (ref 70–125)
HBA1C MFR BLD: 5.8 % (ref 0–5.6)
HCT VFR BLD AUTO: 45.6 % (ref 35–47)
HDLC SERPL-MCNC: 43 MG/DL
HGB BLD-MCNC: 14.7 G/DL (ref 11.7–15.7)
LDLC SERPL CALC-MCNC: 53 MG/DL
MCH RBC QN AUTO: 30.8 PG (ref 26.5–33)
MCHC RBC AUTO-ENTMCNC: 32.2 G/DL (ref 31.5–36.5)
MCV RBC AUTO: 95 FL (ref 78–100)
PLATELET # BLD AUTO: 191 10E3/UL (ref 150–450)
POTASSIUM BLD-SCNC: 5.8 MMOL/L (ref 3.5–5)
PROT SERPL-MCNC: 7 G/DL (ref 6–8)
RBC # BLD AUTO: 4.78 10E6/UL (ref 3.8–5.2)
SODIUM SERPL-SCNC: 142 MMOL/L (ref 136–145)
TRIGL SERPL-MCNC: 119 MG/DL
WBC # BLD AUTO: 7.2 10E3/UL (ref 4–11)

## 2022-06-13 PROCEDURE — 85027 COMPLETE CBC AUTOMATED: CPT | Performed by: INTERNAL MEDICINE

## 2022-06-13 PROCEDURE — 36415 COLL VENOUS BLD VENIPUNCTURE: CPT | Performed by: INTERNAL MEDICINE

## 2022-06-13 PROCEDURE — 83036 HEMOGLOBIN GLYCOSYLATED A1C: CPT | Performed by: INTERNAL MEDICINE

## 2022-06-13 PROCEDURE — U0003 INFECTIOUS AGENT DETECTION BY NUCLEIC ACID (DNA OR RNA); SEVERE ACUTE RESPIRATORY SYNDROME CORONAVIRUS 2 (SARS-COV-2) (CORONAVIRUS DISEASE [COVID-19]), AMPLIFIED PROBE TECHNIQUE, MAKING USE OF HIGH THROUGHPUT TECHNOLOGIES AS DESCRIBED BY CMS-2020-01-R: HCPCS | Performed by: INTERNAL MEDICINE

## 2022-06-13 PROCEDURE — 99397 PER PM REEVAL EST PAT 65+ YR: CPT | Mod: 25 | Performed by: INTERNAL MEDICINE

## 2022-06-13 PROCEDURE — U0005 INFEC AGEN DETEC AMPLI PROBE: HCPCS | Performed by: INTERNAL MEDICINE

## 2022-06-13 PROCEDURE — 80053 COMPREHEN METABOLIC PANEL: CPT | Performed by: INTERNAL MEDICINE

## 2022-06-13 PROCEDURE — 82306 VITAMIN D 25 HYDROXY: CPT | Performed by: INTERNAL MEDICINE

## 2022-06-13 PROCEDURE — 80061 LIPID PANEL: CPT | Performed by: INTERNAL MEDICINE

## 2022-06-13 RX ORDER — BUPROPION HYDROCHLORIDE 150 MG/1
150 TABLET ORAL EVERY MORNING
Qty: 90 TABLET | Refills: 3 | Status: SHIPPED | OUTPATIENT
Start: 2022-06-13 | End: 2023-04-14

## 2022-06-13 RX ORDER — SULFAMETHOXAZOLE/TRIMETHOPRIM 800-160 MG
1 TABLET ORAL 2 TIMES DAILY
Qty: 10 TABLET | Refills: 0 | Status: SHIPPED | OUTPATIENT
Start: 2022-06-13 | End: 2022-08-29

## 2022-06-13 ASSESSMENT — ENCOUNTER SYMPTOMS
NAUSEA: 1
EYE PAIN: 1
PARESTHESIAS: 1
CONSTIPATION: 0
HEADACHES: 1
SHORTNESS OF BREATH: 1
NERVOUS/ANXIOUS: 0
ARTHRALGIAS: 1
JOINT SWELLING: 0
DIZZINESS: 1
BREAST MASS: 0
PALPITATIONS: 0
ABDOMINAL PAIN: 1
FEVER: 0
HEARTBURN: 1
CHILLS: 1
COUGH: 1
WEAKNESS: 0
DYSURIA: 0
SORE THROAT: 0
DIARRHEA: 0
HEMATURIA: 0
MYALGIAS: 1
FREQUENCY: 0
HEMATOCHEZIA: 0

## 2022-06-13 ASSESSMENT — ACTIVITIES OF DAILY LIVING (ADL): CURRENT_FUNCTION: NO ASSISTANCE NEEDED

## 2022-06-13 NOTE — PROGRESS NOTES
"SUBJECTIVE:   Lisa Ray is a 72 year old female who presents for Preventive Visit..  is covid positive so is being seen in the covid room . She has no symptoms and home test was negative   She stopped fosamax because of heartburn but doesn't have a h/o hiatal hernia . Remote h/o gi bleed       Patient has been advised of split billing requirements and indicates understanding: Yes  Are you in the first 12 months of your Medicare coverage?      Healthy Habits:     In general, how would you rate your overall health?  Good    Frequency of exercise:  4-5 days/week    Duration of exercise:  30-45 minutes    Do you usually eat at least 4 servings of fruit and vegetables a day, include whole grains    & fiber and avoid regularly eating high fat or \"junk\" foods?  Yes    Taking medications regularly:  No    Barriers to taking medications:  Side effects    Medication side effects:  Lightheadedness and Other    Ability to successfully perform activities of daily living:  No assistance needed    Home Safety:  No safety concerns identified    Hearing Impairment:  Difficulty understanding soft or whispered speech    In the past 6 months, have you been bothered by leaking of urine? Yes    In general, how would you rate your overall mental or emotional health?  Good      PHQ-2 Total Score: 1    Additional concerns today:  Yes    Do you feel safe in your environment? Yes    Have you ever done Advance Care Planning? (For example, a Health Directive, POLST, or a discussion with a medical provider or your loved ones about your wishes): Yes, advance care planning is on file.       Fall risk      Cognitive Screening   1) Repeat 3 items (Leader, Season, Table)    2) Clock draw: 2 NORMAL  3) 3 item recall: Recalls 3 objects  Results: NORMAL clock, 1-2 items recalled: COGNITIVE IMPAIRMENT LESS LIKELY    Mini-CogTM Copyright S Douglas. Licensed by the author for use in [facility]; reprinted with permission " (macario@[email or web address]). All rights reserved.      Do you have sleep apnea, excessive snoring or daytime drowsiness?: yes    Reviewed and updated as needed this visit by clinical staff     Meds                Reviewed and updated as needed this visit by Provider                   Social History     Tobacco Use     Smoking status: Former Smoker     Packs/day: 1.00     Years: 20.00     Pack years: 20.00     Quit date: 3/27/1990     Years since quittin.2     Smokeless tobacco: Never Used   Substance Use Topics     Alcohol use: Yes     Alcohol/week: 3.3 standard drinks     Comment: Alcoholic Drinks/day: 1-2 glasses of wine/week          Alcohol Use 6/10/2022   Prescreen: >3 drinks/day or >7 drinks/week? No               Current providers sharing in care for this patient include:   Patient Care Team:  Amber Alberto MD as PCP - General  Amber Alberto MD as Assigned PCP  Ba Foley MD as Assigned Heart and Vascular Provider    The following health maintenance items are reviewed in Epic and correct as of today:  Health Maintenance Due   Topic Date Due     URINE DRUG SCREEN  Never done     COPD ACTION PLAN  Never done     DEPRESSION ACTION PLAN  Never done     MEDICARE ANNUAL WELLNESS VISIT  2021     Patient Active Problem List   Diagnosis     Ptosis Of Eyelid     Rosacea     Dysthymic disorder     Coronary Artery Disease     Osteoarthritis     Raynaud disease     Paresthesias/numbness     Primary osteoarthritis of right hip     DCIS (ductal carcinoma in situ)     S/P coronary artery stent placement     History of bilateral mastectomy     S/P bilateral breast implants     S/P CABG (coronary artery bypass graft)     History of total hip replacement, right     Health maintenance examination     ARSEN (obstructive sleep apnea)     Acquired lymphedema     Hair loss     Right shoulder pain     COPD (chronic obstructive pulmonary disease) (H)     Essential hypertension, benign     Age-related osteoporosis  "without current pathological fracture     Chronic obstructive airway disease with asthma (H)     Bronchiectasis without acute exacerbation (H)     Infection due to 2019 novel coronavirus     S/P total hip arthroplasty     no shortness of breath ,no  chest pain ,no  Falls, no urinary incontinence , no weight changes , sleep and moodhave been good . Independent in ADLS and IADLS             Review of Systems   Constitutional: Positive for chills. Negative for fever.   HENT: Positive for congestion and hearing loss. Negative for ear pain and sore throat.    Eyes: Positive for pain. Negative for visual disturbance.   Respiratory: Positive for cough and shortness of breath.    Cardiovascular: Positive for peripheral edema. Negative for chest pain and palpitations.   Gastrointestinal: Positive for abdominal pain, heartburn and nausea. Negative for constipation, diarrhea and hematochezia.   Breasts:  Negative for tenderness, breast mass and discharge.   Genitourinary: Negative for dysuria, frequency, genital sores, hematuria, urgency, vaginal bleeding and vaginal discharge.   Musculoskeletal: Positive for arthralgias and myalgias. Negative for joint swelling.   Skin: Negative for rash.   Neurological: Positive for dizziness, headaches and paresthesias. Negative for weakness.   Psychiatric/Behavioral: Negative for mood changes. The patient is not nervous/anxious.          OBJECTIVE:   There were no vitals taken for this visit. Estimated body mass index is 29.18 kg/m  as calculated from the following:    Height as of 2/4/22: 1.626 m (5' 4\").    Weight as of 4/14/22: 77.1 kg (170 lb).  Physical Exam  GENERAL: healthy, alert and no distress  EYES: Eyes grossly normal to inspection, PERRL and conjunctivae and sclerae normal  HENT: ear canals and TM's normal, nose and mouth without ulcers or lesions  NECK: no adenopathy, no asymmetry, masses, or scars and thyroid normal to palpation  RESP: lungs clear to auscultation - no rales, " "rhonchi or wheezes  BREAST: normal without masses, tenderness or nipple discharge and no palpable axillary masses or adenopathy  CV: regular rate and rhythm, normal S1 S2, no S3 or S4, no murmur, click or rub, no peripheral edema and peripheral pulses strong    Diagnostic Test Results:  Labs reviewed in Epic    ASSESSMENT / PLAN:   (Z79.899) Encounter for long-term (current) use of medications  (primary encounter diagnosis)  Comment:   Plan:     (Z12.11) Encounter for screening for malignant neoplasm of colon  Comment:   Plan: Adult Gastro Ref - Procedure Only        Is due for colonoscopy . Is a difficult scope due to extensive diverticulosis   Has a family h/o colon cancer   (E55.9) Vitamin D deficiency  Comment:   Plan: Vitamin D Deficiency, buPROPion (WELLBUTRIN XL)        150 MG 24 hr tablet            (R73.03) Prediabetes  Comment:   Plan: CBC with platelets, Hemoglobin A1c,         Comprehensive metabolic panel            (E78.2) Mixed hyperlipidemia  Comment:   Plan: Lipid panel reflex to direct LDL Fasting            (N39.0) Recurrent UTI  Comment:   Plan: sulfamethoxazole-trimethoprim (BACTRIM DS)         800-160 MG tablet        For her trip to [place]     (Z20.822) Encounter for laboratory testing for COVID-19 virus  Comment:   Plan: Asymptomatic COVID-19 Virus (Coronavirus) by         PCR        Overall doing well   No concerns    recommend continuing fosamax . If still not tolerated . Then switch to Prolia     Is frustrated that she is not losing weight / Is doing the nutra diet      COUNSELING:  Reviewed preventive health counseling, as reflected in patient instructions    Estimated body mass index is 29.18 kg/m  as calculated from the following:    Height as of 2/4/22: 1.626 m (5' 4\").    Weight as of 4/14/22: 77.1 kg (170 lb).        She reports that she quit smoking about 32 years ago. She has a 20.00 pack-year smoking history. She has never used smokeless tobacco.      Appropriate preventive " services were discussed with this patient, including applicable screening as appropriate for cardiovascular disease, diabetes, osteopenia/osteoporosis, and glaucoma.  As appropriate for age/gender, discussed screening for colorectal cancer, prostate cancer, breast cancer, and cervical cancer. Checklist reviewing preventive services available has been given to the patient.    Reviewed patients plan of care and provided an AVS. The Basic Care Plan (routine screening as documented in Health Maintenance) for Lisa meets the Care Plan requirement. This Care Plan has been established and reviewed with the Patient.    Counseling Resources:  ATP IV Guidelines  Pooled Cohorts Equation Calculator  Breast Cancer Risk Calculator  Breast Cancer: Medication to Reduce Risk  FRAX Risk Assessment  ICSI Preventive Guidelines  Dietary Guidelines for Americans, 2010  [employer] MyPlate  ASA Prophylaxis  Lung CA Screening    Amber Alberto MD  [facility]    Identified Health Risks:

## 2022-06-14 LAB — SARS-COV-2 RNA RESP QL NAA+PROBE: NEGATIVE

## 2022-06-17 ENCOUNTER — MYC MEDICAL ADVICE (OUTPATIENT)
Dept: INTERNAL MEDICINE | Facility: CLINIC | Age: 72
End: 2022-06-17
Payer: COMMERCIAL

## 2022-06-17 DIAGNOSIS — I10 HYPERTENSION, UNSPECIFIED TYPE: Primary | ICD-10-CM

## 2022-06-20 RX ORDER — LOSARTAN POTASSIUM 50 MG/1
50 TABLET ORAL DAILY
Qty: 90 TABLET | Refills: 3 | Status: SHIPPED | OUTPATIENT
Start: 2022-06-20 | End: 2023-04-14

## 2022-07-28 ENCOUNTER — DOCUMENTATION ONLY (OUTPATIENT)
Dept: LAB | Facility: CLINIC | Age: 72
End: 2022-07-28

## 2022-07-28 DIAGNOSIS — Z95.1 S/P CABG (CORONARY ARTERY BYPASS GRAFT): ICD-10-CM

## 2022-07-28 DIAGNOSIS — I10 HTN (HYPERTENSION): ICD-10-CM

## 2022-07-28 DIAGNOSIS — I65.23 BILATERAL CAROTID ARTERY STENOSIS: Primary | ICD-10-CM

## 2022-07-28 RX ORDER — METOPROLOL SUCCINATE 100 MG/1
TABLET, EXTENDED RELEASE ORAL
Qty: 90 TABLET | Refills: 3 | Status: SHIPPED | OUTPATIENT
Start: 2022-07-28 | End: 2023-07-14

## 2022-07-28 NOTE — PROGRESS NOTES
Lisa PIOTR Ray has an upcoming lab appointment.        Future Appointments   Date Time Provider Department Center   8/1/2022 10:00 AM [facility] LAB MDLABR [facility]   [facility]20[facility]  [facility]   8/4/2022  9:40 AM Yamilex Quezada PA-C MDCR [facility]   8/29/2022  3:50 PM Ba Foley MD [facility]       The appointment note says: Labs for Dr. Santoyo    There is no Lab order available.  Please review and place future orders as appropriate.  If no Lab order will be placed please advise patient.      Thanks,    Joyce Rojas

## 2022-08-01 ENCOUNTER — LAB (OUTPATIENT)
Dept: LAB | Facility: CLINIC | Age: 72
End: 2022-08-01
Payer: COMMERCIAL

## 2022-08-01 DIAGNOSIS — I65.23 BILATERAL CAROTID ARTERY STENOSIS: ICD-10-CM

## 2022-08-01 LAB
CHOLEST SERPL-MCNC: 126 MG/DL
HDLC SERPL-MCNC: 51 MG/DL
LDLC SERPL CALC-MCNC: 49 MG/DL
NONHDLC SERPL-MCNC: 75 MG/DL
TRIGL SERPL-MCNC: 130 MG/DL

## 2022-08-01 PROCEDURE — 80061 LIPID PANEL: CPT

## 2022-08-01 PROCEDURE — 36415 COLL VENOUS BLD VENIPUNCTURE: CPT

## 2022-08-04 ENCOUNTER — OFFICE VISIT (OUTPATIENT)
Dept: VASCULAR SURGERY | Facility: CLINIC | Age: 72
End: 2022-08-04
Attending: PHYSICIAN ASSISTANT
Payer: COMMERCIAL

## 2022-08-04 ENCOUNTER — ANCILLARY PROCEDURE (OUTPATIENT)
Dept: VASCULAR ULTRASOUND | Facility: CLINIC | Age: 72
End: 2022-08-04
Attending: INTERNAL MEDICINE
Payer: COMMERCIAL

## 2022-08-04 VITALS — DIASTOLIC BLOOD PRESSURE: 74 MMHG | SYSTOLIC BLOOD PRESSURE: 136 MMHG | HEART RATE: 53 BPM | OXYGEN SATURATION: 96 %

## 2022-08-04 DIAGNOSIS — I65.23 BILATERAL CAROTID ARTERY STENOSIS: ICD-10-CM

## 2022-08-04 DIAGNOSIS — I65.23 BILATERAL CAROTID ARTERY STENOSIS: Primary | ICD-10-CM

## 2022-08-04 PROCEDURE — 99204 OFFICE O/P NEW MOD 45 MIN: CPT | Performed by: PHYSICIAN ASSISTANT

## 2022-08-04 PROCEDURE — 93880 EXTRACRANIAL BILAT STUDY: CPT | Mod: 26 | Performed by: SURGERY

## 2022-08-04 PROCEDURE — 93880 EXTRACRANIAL BILAT STUDY: CPT

## 2022-08-04 ASSESSMENT — PAIN SCALES - GENERAL: PAINLEVEL: NO PAIN (0)

## 2022-08-04 NOTE — PROGRESS NOTES
VASCULAR SURGERY PROGRESS NOTE    LOCATION:  [facility]     Lisa Ray  Medical Record #: [id number]  YOB: 1950  Age: 72 year old     Date of Service: 8/4/2022    PRIMARY CARE PROVIDER: Amber Alberto    Reason for visit: Carotid artery stenosis surveillance    IMPRESSION: 71 YO female presenting for follow up of carotid artery disease. Duplex imaging shows <50% stenosis bilaterally, actually improved from 1 year ago. Remains completely asymptomatic and compliant with medications.     RECOMMENDATION/RISKS: Continue best medical management with aspirin and statin therapy. Vascular risk factors are controlled. Patient requests to extend length between follow ups. We will see back in 2 years with repeat carotid duplex or sooner if there are any new issues/concerns.    HPI:  Lisa Ray is a 72 year old female with past medical history significant for hypertension, coronary artery disease s/p CABG, obstructive sleep apnea, and Raynaud's. She has been followed annually by vascular medicine for surveillance of carotid artery disease.     Today, Mrs. Ray is doing well with no concerns. Denies any vision changes including amaurosis, slurred speech, extremity weakness, or other signs/symptoms of TIA or stroke. She is compliant with her medications and does not smoke. Denies any family history of stroke or TIA.    REVIEW OF SYSTEMS:    A 12 point ROS was reviewed and is negative except for what is listed above in HPI.    PHH:    Past Medical History:   Diagnosis Date     Anemia 2012    microcytic from Celebrex. GI w/u neg     Antiplatelet or antithrombotic long-term use      Back pain      Benign neoplasm of colon     Created by Conversion      Bronchiectasis (H)      CAD (coronary artery disease) 2008    RCA- stent     Cancer (H)     breast cancer     DJD (degenerative joint disease)      Dysthymia      Fatigue     recurrent/variable - no serious pathology      Fibromyalgia      GERD (gastroesophageal reflux disease)      History of gastric ulcer      Hx of CABG      Hypertension      Iritis     past Hx.-left eye     ARSEN (obstructive sleep apnea) 6/5/2019     Raynaud's disease      Rosacea      Shortness of breath     with exertion     Shoulder tendonitis     right     Stented coronary artery      Ulcer of intestine     small bowel- 10 years ago     UTI (urinary tract infection)     Jan. 2019      Past Surgical History:   Procedure Laterality Date     ARTHROPLASTY, HIP, TOTAL, DIRECT ANTERIOR APPROACH, USING HANA TABLE Left 2/4/2022    Procedure: LEFT DIRECT ANTERIOR TOTAL HIP ARTHROPLASTY;  Surgeon: Deny Bryson MD;  Location: [facility] OR     BELPHAROPTOSIS REPAIR Bilateral 2013    Dr. Eder Bingham- 2013     BREAST SURGERY Bilateral 11/2017    bilateral mastectomy 2017- 11/2017     BREAST SURGERY  2018    implants and revsion 2     CORONARY STENT PLACEMENT Right 2008    Dr. Schwab     CV CORONARY ANGIOGRAM N/A 3/26/2019    Procedure: Coronary Angiogram;  Surgeon: Ba Foley MD;  Location: [facility] Cath Lab;  Service: Cardiology     HYSTERECTOMY       OOPHORECTOMY       TOTAL HIP ARTHROPLASTY Right 12/7/2015    Procedure: RIGHT TOTAL HIP ARTHROPLASTY;  Surgeon: Tera Yeung MD;  Location: [facility];  Service:      [facility] APPENDECTOMY      Description: Appendectomy;  Recorded: 01/13/2009;     ALLERGIES:  Latex and Nsaids (non-steroidal anti-inflammatory drug) [nsaids]    MEDS:    Current Outpatient Medications:      acetaminophen (TYLENOL) 500 MG tablet, [ACETAMINOPHEN (TYLENOL) 500 MG TABLET] Take 500 mg by mouth every 6 (six) hours as needed for pain (arthritis)., Disp: , Rfl:      acetaminophen-codeine (TYLENOL #3) 300-30 MG tablet, TAKE 1-2 TABLETS BY MOUTH EVERY 4 HOURS AS NEEDED FOR PAIN, Disp: 90 tablet, Rfl: 3     albuterol (PROAIR HFA/PROVENTIL HFA/VENTOLIN HFA) 108 (90 Base) MCG/ACT inhaler, Inhale 2 puffs into the lungs  every 6 hours, Disp: 18 g, Rfl: 1     alendronate (FOSAMAX) 70 MG tablet, Take 1 tablet (70 mg) by mouth every 7 days, Disp: 12 tablet, Rfl: 3     aspirin (ASA) 81 MG EC tablet, Take 1 tablet (81 mg) by mouth 2 times daily, Disp: , Rfl:      b complex vitamins tablet, [B COMPLEX VITAMINS TABLET] Take 1 tablet by mouth daily., Disp: , Rfl:      buPROPion (WELLBUTRIN XL) 150 MG 24 hr tablet, Take 1 tablet (150 mg) by mouth every morning, Disp: 90 tablet, Rfl: 3     calcium, as carbonate, (TUMS) 200 mg calcium (500 mg) chewable tablet, [CALCIUM, AS CARBONATE, (TUMS) 200 MG CALCIUM (500 MG) CHEWABLE TABLET] Chew 2 tablets daily., Disp: , Rfl:      cholecalciferol, vitamin D3, 1,000 unit tablet, Take 2,000 Units by mouth daily , Disp: , Rfl:      docusate sodium (COLACE) 100 MG capsule, [DOCUSATE SODIUM (COLACE) 100 MG CAPSULE] Take 1 capsule (100 mg total) by mouth 2 (two) times a day as needed for constipation., Disp: , Rfl: 0     doxylamine (UNISOM) 25 mg tablet, Take 25 mg by mouth nightly as needed for sleep , Disp: , Rfl:      Ferrous Sulfate (IRON) 28 MG TABS, Take 1 tablet by mouth daily , Disp: , Rfl:      guaiFENesin-codeine (GUAIFENESIN AC) 100-10 MG/5ML syrup, Take 5 mLs by mouth every 4 hours as needed for congestion, Disp: 200 mL, Rfl: 1     losartan (COZAAR) 50 MG tablet, Take 1 tablet (50 mg) by mouth daily, Disp: 90 tablet, Rfl: 3     magnesium oxide (MAG-OX) 400 (240 Mg) MG tablet, Take 400 mg by mouth At Bedtime, Disp: , Rfl:      melatonin 1 mg Tab tablet, [MELATONIN 1 MG TAB TABLET] Take 1 mg by mouth at bedtime.       , Disp: , Rfl:      metoprolol succinate ER (TOPROL XL) 100 MG 24 hr tablet, TAKE 1 TABLET BY MOUTH EVERYDAY AT BEDTIME, Disp: 90 tablet, Rfl: 3     omeprazole (PRILOSEC) 20 MG DR capsule, Take 1 capsule (20 mg) by mouth daily, Disp: 90 capsule, Rfl: 3     peg 400-propylene glycol PF (SYSTANE) 0.4-0.3 % Dpet, [-PROPYLENE GLYCOL PF (SYSTANE) 0.4-0.3 % DPET] Administer 1 drop to  both eyes 2 (two) times a day as needed., Disp: , Rfl:      rosuvastatin (CRESTOR) 20 MG tablet, Take 1 tablet (20 mg) by mouth At Bedtime, Disp: 90 tablet, Rfl: 3     sertraline (ZOLOFT) 50 MG tablet, TAKE 1 TABLET BY MOUTH EVERY DAY, Disp: 90 tablet, Rfl: 3     sulfamethoxazole-trimethoprim (BACTRIM DS) 800-160 MG tablet, Take 1 tablet by mouth 2 times daily, Disp: 10 tablet, Rfl: 0     tiZANidine (ZANAFLEX) 2 MG tablet, Take 2 mg by mouth At Bedtime , Disp: , Rfl:      triamcinolone (NASACORT) 55 mcg nasal inhaler, [TRIAMCINOLONE (NASACORT) 55 MCG NASAL INHALER] Apply 2 sprays into each nostril daily as needed., Disp: , Rfl:      TURMERIC PO, Take 1 capsule by mouth daily , Disp: , Rfl:      umeclidinium (INCRUSE ELLIPTA) 62.5 MCG/INH inhaler, Inhale 1 puff into the lungs daily, Disp: 30 each, Rfl: 12     zolpidem (AMBIEN) 10 MG tablet, TAKE 1 TABLET BY MOUTH AT BEDTIME AS NEEDED FOR SLEEP., Disp: 30 tablet, Rfl: 5    SOCIAL HABITS:    History   Smoking Status     Former Smoker     Packs/day: 1.00     Years: 20.00     Quit date: 3/27/1990   Smokeless Tobacco     Never Used     Social History    Substance and Sexual Activity      Alcohol use: Yes        Alcohol/week: 3.3 standard drinks        Comment: Alcoholic Drinks/day: 1-2 glasses of wine/week       History   Drug Use No     FAMILY HISTORY:    Family History   Problem Relation Age of Onset     Hereditary Breast and Ovarian Cancer Syndrome Sister      Hereditary Breast and Ovarian Cancer Syndrome Paternal Grandfather      Hereditary Breast and Ovarian Cancer Syndrome Cousin      Asthma Cousin      Alcoholism Father          GI bleed age 67     Colon Cancer Other      Breast Cancer Sister         metastatic age 49, remission with Rx     Coronary Artery Disease Brother         CABG     Breast Cancer Paternal Grandmother      Breast Cancer Cousin      Colon Cancer Mother      PE:  There were no vitals taken for this visit.  Wt Readings from Last 1 Encounters:    04/14/22 170 lb (77.1 kg)     There is no height or weight on file to calculate BMI.    EXAM:  GENERAL: well-developed 72 year old female who appears her stated age  CARDIAC: normal   CHEST/LUNG: normal respiratory effort   MUSCULOSKELETAL: grossly normal and both lower extremities are intact, no lower extremity edema  NEUROLOGIC: focally intact, alert and oriented x 3  PSYCH: appropriate affect    DIAGNOSTIC STUDIES:     Images:  No results found.    I personally reviewed the images and my interpretation is <50% stenosis of internal carotid arteries bilaterally.     LABS:      Sodium   Date Value Ref Range Status   06/13/2022 142 136 - 145 mmol/L Final   01/31/2022 142 136 - 145 mmol/L Final   04/20/2021 141 136 - 145 mmol/L Final     Urea Nitrogen   Date Value Ref Range Status   06/13/2022 22 8 - 28 mg/dL Final   01/31/2022 19 8 - 28 mg/dL Final   04/20/2021 19 8 - 28 mg/dL Final     Hemoglobin   Date Value Ref Range Status   06/13/2022 14.7 11.7 - 15.7 g/dL Final   02/05/2022 11.1 (L) 11.7 - 15.7 g/dL Final   01/31/2022 14.8 11.7 - 15.7 g/dL Final     Platelet Count   Date Value Ref Range Status   06/13/2022 191 150 - 450 10e3/uL Final   01/31/2022 237 150 - 450 10e3/uL Final   04/20/2021 215 140 - 440 thou/uL Final     INR   Date Value Ref Range Status   03/29/2019 1.32 (H) 0.90 - 1.10 Final   03/28/2019 1.32 (H) 0.90 - 1.10 Final   03/27/2019 1.01 0.90 - 1.10 Final     30 minutes spent on the day of encounter doing chart review, history and exam, documentation, and further activities as noted.     Yamilex Quezada PA-C  VASCULAR SURGERY

## 2022-08-04 NOTE — NURSING NOTE
[facility]        Patient is here for a 1 yr follow up  to discuss Carotid stenosis. Patient has no symptoms.    Pt is currently taking Aspirin and Statin.    /74   Pulse 53   SpO2 96%     The provider has been notified that the patient has no concerns.     Questions patient would like addressed today are: N/A.    Refills are needed: No    Has homecare services and agency name:  Ember Corona LPN

## 2022-08-04 NOTE — PATIENT INSTRUCTIONS
One of our  staff will call you to schedule your next appointment!     Please call us if any issues arise!

## 2022-08-29 ENCOUNTER — OFFICE VISIT (OUTPATIENT)
Dept: CARDIOLOGY | Facility: CLINIC | Age: 72
End: 2022-08-29
Attending: INTERNAL MEDICINE
Payer: COMMERCIAL

## 2022-08-29 VITALS
BODY MASS INDEX: 30 KG/M2 | DIASTOLIC BLOOD PRESSURE: 70 MMHG | SYSTOLIC BLOOD PRESSURE: 138 MMHG | WEIGHT: 175.7 LBS | HEART RATE: 88 BPM | HEIGHT: 64 IN | RESPIRATION RATE: 16 BRPM

## 2022-08-29 DIAGNOSIS — Z95.1 S/P CABG (CORONARY ARTERY BYPASS GRAFT): ICD-10-CM

## 2022-08-29 LAB — D DIMER PPP FEU-MCNC: 0.3 UG/ML FEU (ref 0–0.5)

## 2022-08-29 PROCEDURE — 85379 FIBRIN DEGRADATION QUANT: CPT | Performed by: INTERNAL MEDICINE

## 2022-08-29 PROCEDURE — 99214 OFFICE O/P EST MOD 30 MIN: CPT | Performed by: INTERNAL MEDICINE

## 2022-08-29 PROCEDURE — 36415 COLL VENOUS BLD VENIPUNCTURE: CPT | Performed by: INTERNAL MEDICINE

## 2022-08-29 NOTE — PROGRESS NOTES
"  Thank you, Dr. Foley, for asking the [facility] team to see Ms. Lisa Ray to evaluate       Assessment/Recommendations   Assessment/Plan:  1. Dyspnea - etiology could be related to lung dz given echo, but will check d-dimer given hip surgery.   2. CAD s/p CABG, lipids at target, cont rosuvastatin 20mg and metoprolol 100mg    Follow up one year     History of Present Illness/Subjective    Ms. Lisa Ray is a 72 year old female with bronchiectasis, s/p CABG in 2019, last LDL 49 8/2022, more edema, more since hip surgery January 2022, surgery went well, no hx of DVT, more dyspnea she feels related to bronchiectasis, echo 2/2022 with normal LV/RV size and funxtion, no valve issues, she uses CPAP to ARSEN.   No PND/orthopnea.          Physical Examination Review of Systems   /70 (BP Location: Left arm, Patient Position: Sitting, Cuff Size: Adult Regular)   Pulse 88   Resp 16   Ht 1.613 m (5' 3.5\")   Wt 79.7 kg (175 lb 11.2 oz)   BMI 30.64 kg/m    Body mass index is 30.64 kg/m .  Wt Readings from Last 3 Encounters:   08/29/22 79.7 kg (175 lb 11.2 oz)   04/14/22 77.1 kg (170 lb)   02/04/22 75.8 kg (167 lb)     [unfilled]  General Appearance:   no distress, normal body habitus   ENT/Mouth: membranes moist, no oral lesions or bleeding gums.      EYES:  no scleral icterus, normal conjunctivae   Neck: no carotid bruits or thyromegaly   Chest/Lungs:   lungs are clear to auscultation, no rales or wheezing,  sternal scar, equal chest wall expansion    Cardiovascular:   Regular. Normal first and second heart sounds with no murmurs, rubs, or gallops; the carotid, radial and posterior tibial pulses are intact, Jugular venous pressure , edema bilaterally    Abdomen:  no organomegaly, masses, bruits, or tenderness; bowel sounds are present   Extremities: no cyanosis or clubbing   Skin: no xanthelasma, warm.    Neurologic: normal  bilateral, no tremors     Psychiatric: alert and " oriented x3, calm     Review of Systems - 12 points nega other than above      Medical History  Surgical History Family History Social History   Past Medical History:   Diagnosis Date     Anemia 2012    microcytic from Celebrex. GI w/u neg     Antiplatelet or antithrombotic long-term use      Back pain      Benign neoplasm of colon     Created by Conversion      Bronchiectasis (H)      CAD (coronary artery disease) 2008    RCA- stent     Cancer (H)     breast cancer     DJD (degenerative joint disease)      Dysthymia      Fatigue     recurrent/variable - no serious pathology     Fibromyalgia      GERD (gastroesophageal reflux disease)      History of gastric ulcer      Hx of CABG      Hypertension      Iritis     past Hx.-left eye     ARSEN (obstructive sleep apnea) 6/5/2019     Raynaud's disease      Rosacea      Shortness of breath     with exertion     Shoulder tendonitis     right     Stented coronary artery      Ulcer of intestine     small bowel- 10 years ago     UTI (urinary tract infection)     Jan. 2019    Past Surgical History:   Procedure Laterality Date     ARTHROPLASTY, HIP, TOTAL, DIRECT ANTERIOR APPROACH, USING HANA TABLE Left 2/4/2022    Procedure: LEFT DIRECT ANTERIOR TOTAL HIP ARTHROPLASTY;  Surgeon: Deny Bryson MD;  Location: [facility] OR     BELPHAROPTOSIS REPAIR Bilateral 2013    Dr. Eder Bingham- 2013     BREAST SURGERY Bilateral 11/2017    bilateral mastectomy 2017- 11/2017     BREAST SURGERY  2018    implants and revsion 2     CORONARY STENT PLACEMENT Right 2008    Dr. Schwab     CV CORONARY ANGIOGRAM N/A 3/26/2019    Procedure: Coronary Angiogram;  Surgeon: Ba Foley MD;  Location: [facility] Cath Lab;  Service: Cardiology     HYSTERECTOMY       OOPHORECTOMY       TOTAL HIP ARTHROPLASTY Right 12/7/2015    Procedure: RIGHT TOTAL HIP ARTHROPLASTY;  Surgeon: Tera Yeung MD;  Location: [facility];  Service:      [facility] APPENDECTOMY      Description:  Appendectomy;  Recorded: 2009;    Family History   Problem Relation Age of Onset     Hereditary Breast and Ovarian Cancer Syndrome Sister      Hereditary Breast and Ovarian Cancer Syndrome Paternal Grandfather      Hereditary Breast and Ovarian Cancer Syndrome Cousin      Asthma Cousin      Alcoholism Father          GI bleed age 67     Colon Cancer Other      Breast Cancer Sister         metastatic age 49, remission with Rx     Coronary Artery Disease Brother         CABG     Breast Cancer Paternal Grandmother      Breast Cancer Cousin      Colon Cancer Mother     Social History     Socioeconomic History     Marital status:      Spouse name: Not on file     Number of children: 3     Years of education: Not on file     Highest education level: Not on file   Occupational History     Not on file   Tobacco Use     Smoking status: Former Smoker     Packs/day: 1.00     Years: 20.00     Pack years: 20.00     Quit date: 3/27/1990     Years since quittin.4     Smokeless tobacco: Never Used   Substance and Sexual Activity     Alcohol use: Yes     Alcohol/week: 3.3 standard drinks     Comment: Alcoholic Drinks/day: 1-2 glasses of wine/week      Drug use: No     Sexual activity: Not on file   Other Topics Concern     Not on file   Social History Narrative    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 3 grown sons Aries in [place]- , Josse SAMUELS, -microbrewer/beer, ; Gregory Lu- paramedic [employer]...... Non smoker rare ETOH.        Social Determinants of Health     Financial Resource Strain: Not on file   Food Insecurity: Not on file   Transportation Needs: Not on file   Physical Activity: Not on file   Stress: Not on file   Social Connections: Not on file   Intimate Partner Violence: Not on file   Housing Stability: Not on file          Medications  Allergies   Scheduled Meds:  Continuous Infusions:  PRN Meds:. Allergies   Allergen Reactions     Latex Unknown      Added based on information entered during log entry, please review and add reactions, type, and severity as needed     Nsaids (Non-Steroidal Anti-Inflammatory Drug) [Nsaids] Unknown     Other reaction(s): GI Bleeding, GI Upset, Sensitivity.  Ulceration w/ Celebrex         Lab Results    Chemistry/lipid CBC Cardiac Enzymes/BNP/TSH/INR   Lab Results   Component Value Date    CHOL 126 08/01/2022    HDL 51 08/01/2022    TRIG 130 08/01/2022    BUN 22 06/13/2022     06/13/2022    CO2 26 06/13/2022    Lab Results   Component Value Date    WBC 7.2 06/13/2022    HGB 14.7 06/13/2022    HCT 45.6 06/13/2022    MCV 95 06/13/2022     06/13/2022    Lab Results   Component Value Date    TSH 2.21 04/20/2021    INR 1.32 (H) 03/29/2019              Ba Foley MD  Interventional Cardiology  [facility]

## 2022-08-29 NOTE — LETTER
"8/29/2022    Amber Alberto MD  [address] [address]    RE: Lisa Ray       Dear Colleague,     I had the pleasure of seeing Lisa Ray in the [facility].    Thank you, Dr. Foley, for asking the [facility] Care team to see Ms. Lisa Ray to evaluate       Assessment/Recommendations   Assessment/Plan:  1. Dyspnea - etiology could be related to lung dz given echo, but will check d-dimer given hip surgery.   2. CAD s/p CABG, lipids at target, cont rosuvastatin 20mg and metoprolol 100mg    Follow up one year     History of Present Illness/Subjective    Ms. Lisa Ray is a 72 year old female with bronchiectasis, s/p CABG in 2019, last LDL 49 8/2022, more edema, more since hip surgery January 2022, surgery went well, no hx of DVT, more dyspnea she feels related to bronchiectasis, echo 2/2022 with normal LV/RV size and funxtion, no valve issues, she uses CPAP to ARSEN.   No PND/orthopnea.          Physical Examination Review of Systems   /70 (BP Location: Left arm, Patient Position: Sitting, Cuff Size: Adult Regular)   Pulse 88   Resp 16   Ht 1.613 m (5' 3.5\")   Wt 79.7 kg (175 lb 11.2 oz)   BMI 30.64 kg/m    Body mass index is 30.64 kg/m .  Wt Readings from Last 3 Encounters:   08/29/22 79.7 kg (175 lb 11.2 oz)   04/14/22 77.1 kg (170 lb)   02/04/22 75.8 kg (167 lb)     [unfilled]  General Appearance:   no distress, normal body habitus   ENT/Mouth: membranes moist, no oral lesions or bleeding gums.      EYES:  no scleral icterus, normal conjunctivae   Neck: no carotid bruits or thyromegaly   Chest/Lungs:   lungs are clear to auscultation, no rales or wheezing,  sternal scar, equal chest wall expansion    Cardiovascular:   Regular. Normal first and second heart sounds with no murmurs, rubs, or gallops; the carotid, radial and posterior tibial pulses are intact, Jugular venous pressure , edema bilaterally    Abdomen:  no " organomegaly, masses, bruits, or tenderness; bowel sounds are present   Extremities: no cyanosis or clubbing   Skin: no xanthelasma, warm.    Neurologic: normal  bilateral, no tremors     Psychiatric: alert and oriented x3, calm     Review of Systems - 12 points nega other than above      Medical History  Surgical History Family History Social History   Past Medical History:   Diagnosis Date     Anemia 2012    microcytic from Celebrex. GI w/u neg     Antiplatelet or antithrombotic long-term use      Back pain      Benign neoplasm of colon     Created by Conversion      Bronchiectasis (H)      CAD (coronary artery disease) 2008    RCA- stent     Cancer (H)     breast cancer     DJD (degenerative joint disease)      Dysthymia      Fatigue     recurrent/variable - no serious pathology     Fibromyalgia      GERD (gastroesophageal reflux disease)      History of gastric ulcer      Hx of CABG      Hypertension      Iritis     past Hx.-left eye     ARSEN (obstructive sleep apnea) 6/5/2019     Raynaud's disease      Rosacea      Shortness of breath     with exertion     Shoulder tendonitis     right     Stented coronary artery      Ulcer of intestine     small bowel- 10 years ago     UTI (urinary tract infection)     Jan. 2019    Past Surgical History:   Procedure Laterality Date     ARTHROPLASTY, HIP, TOTAL, DIRECT ANTERIOR APPROACH, USING HANA TABLE Left 2/4/2022    Procedure: LEFT DIRECT ANTERIOR TOTAL HIP ARTHROPLASTY;  Surgeon: Deny Bryson MD;  Location: [facility] OR     BELPHAROPTOSIS REPAIR Bilateral 2013    Dr. Eder Bingham- 2013     BREAST SURGERY Bilateral 11/2017    bilateral mastectomy 2017- 11/2017     BREAST SURGERY  2018    implants and revsion 2     CORONARY STENT PLACEMENT Right 2008    Dr. Schwab     CV CORONARY ANGIOGRAM N/A 3/26/2019    Procedure: Coronary Angiogram;  Surgeon: Ba Foley MD;  Location: [facility] Cath Lab;  Service: Cardiology     HYSTERECTOMY        OOPHORECTOMY       TOTAL HIP ARTHROPLASTY Right 2015    Procedure: RIGHT TOTAL HIP ARTHROPLASTY;  Surgeon: Tera Yeung MD;  Location: [facility];  Service:      [facility] APPENDECTOMY      Description: Appendectomy;  Recorded: 2009;    Family History   Problem Relation Age of Onset     Hereditary Breast and Ovarian Cancer Syndrome Sister      Hereditary Breast and Ovarian Cancer Syndrome Paternal Grandfather      Hereditary Breast and Ovarian Cancer Syndrome Cousin      Asthma Cousin      Alcoholism Father          GI bleed age 67     Colon Cancer Other      Breast Cancer Sister         metastatic age 49, remission with Rx     Coronary Artery Disease Brother         CABG     Breast Cancer Paternal Grandmother      Breast Cancer Cousin      Colon Cancer Mother     Social History     Socioeconomic History     Marital status:      Spouse name: Not on file     Number of children: 3     Years of education: Not on file     Highest education level: Not on file   Occupational History     Not on file   Tobacco Use     Smoking status: Former Smoker     Packs/day: 1.00     Years: 20.00     Pack years: 20.00     Quit date: 3/27/1990     Years since quittin.4     Smokeless tobacco: Never Used   Substance and Sexual Activity     Alcohol use: Yes     Alcohol/week: 3.3 standard drinks     Comment: Alcoholic Drinks/day: 1-2 glasses of wine/week      Drug use: No     Sexual activity: Not on file   Other Topics Concern     Not on file   Social History Narrative    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 3 grown sons [place]ries in [place]- , Josse SAMUELS, -microbrewer/beer, ; Gregory Lu- paramedic [employer]...... Non smoker rare ETOH.        Social Determinants of Health     Financial Resource Strain: Not on file   Food Insecurity: Not on file   Transportation Needs: Not on file   Physical Activity: Not on file   Stress: Not on file   Social Connections: Not on  file   Intimate Partner Violence: Not on file   Housing Stability: Not on file          Medications  Allergies   Scheduled Meds:  Continuous Infusions:  PRN Meds:. Allergies   Allergen Reactions     Latex Unknown     Added based on information entered during log entry, please review and add reactions, type, and severity as needed     Nsaids (Non-Steroidal Anti-Inflammatory Drug) [Nsaids] Unknown     Other reaction(s): GI Bleeding, GI Upset, Sensitivity.  Ulceration w/ Celebrex         Lab Results    Chemistry/lipid CBC Cardiac Enzymes/BNP/TSH/INR   Lab Results   Component Value Date    CHOL 126 08/01/2022    HDL 51 08/01/2022    TRIG 130 08/01/2022    BUN 22 06/13/2022     06/13/2022    CO2 26 06/13/2022    Lab Results   Component Value Date    WBC 7.2 06/13/2022    HGB 14.7 06/13/2022    HCT 45.6 06/13/2022    MCV 95 06/13/2022     06/13/2022    Lab Results   Component Value Date    TSH 2.21 04/20/2021    INR 1.32 (H) 03/29/2019              Ba Foley MD  Interventional Cardiology  [facility]      Thank you for allowing me to participate in the care of your patient.      Sincerely,     Ba Foley MD     [facility]  cc:   Ba Foley MD  [address]  [place] [address]

## 2022-09-02 DIAGNOSIS — G47.33 OBSTRUCTIVE SLEEP APNEA (ADULT) (PEDIATRIC): Primary | ICD-10-CM

## 2022-10-27 ENCOUNTER — TRANSFERRED RECORDS (OUTPATIENT)
Dept: HEALTH INFORMATION MANAGEMENT | Facility: CLINIC | Age: 72
End: 2022-10-27

## 2022-11-29 DIAGNOSIS — G89.29 OTHER CHRONIC PAIN: ICD-10-CM

## 2022-12-22 DIAGNOSIS — K21.9 GASTROESOPHAGEAL REFLUX DISEASE WITHOUT ESOPHAGITIS: ICD-10-CM

## 2022-12-23 NOTE — TELEPHONE ENCOUNTER
"Routing refill request to provider for review/approval because:  Diagnosis warning    Last Written Prescription Date:  12/23/21  Last Fill Quantity: 90,  # refills: 3   Last office visit provider:  6/13/22     Requested Prescriptions   Pending Prescriptions Disp Refills     omeprazole (PRILOSEC) 20 MG DR capsule [Pharmacy Med Name: OMEPRAZOLE DR 20 MG CAPSULE] 90 capsule 3     Sig: TAKE 1 CAPSULE BY MOUTH EVERY DAY       PPI Protocol Failed - 12/23/2022  7:59 AM        Failed - No diagnosis of osteoporosis on record        Passed - Not on Clopidogrel (unless Pantoprazole ordered)        Passed - Recent (12 mo) or future (30 days) visit within the authorizing provider's specialty     Patient has had an office visit with the authorizing provider or a provider within the authorizing providers department within the previous 12 mos or has a future within next 30 days. See \"Patient Info\" tab in inbasket, or \"Choose Columns\" in Meds & Orders section of the refill encounter.              Passed - Medication is active on med list        Passed - Patient is age 18 or older        Passed - No active pregnacy on record        Passed - No positive pregnancy test in past 12 months             Melvin Lofton RN 12/23/22 7:59 AM  "

## 2022-12-28 DIAGNOSIS — Z95.1 S/P CABG (CORONARY ARTERY BYPASS GRAFT): ICD-10-CM

## 2022-12-29 RX ORDER — ROSUVASTATIN CALCIUM 20 MG/1
TABLET, COATED ORAL
Qty: 90 TABLET | Refills: 1 | Status: SHIPPED | OUTPATIENT
Start: 2022-12-29 | End: 2023-07-09

## 2022-12-29 NOTE — TELEPHONE ENCOUNTER
"Last Written Prescription Date:  1/31/22  Last Fill Quantity: 90,  # refills: 3   Last office visit provider:  6/13/22     Requested Prescriptions   Pending Prescriptions Disp Refills     rosuvastatin (CRESTOR) 20 MG tablet [Pharmacy Med Name: ROSUVASTATIN CALCIUM 20 MG TAB] 90 tablet 3     Sig: TAKE 1 TABLET BY MOUTH AT BEDTIME       Statins Protocol Passed - 12/28/2022 10:13 AM        Passed - LDL on file in past 12 months     Recent Labs   Lab Test 08/01/22  0953   LDL 49             Passed - No abnormal creatine kinase in past 12 months     No lab results found.             Passed - Recent (12 mo) or future (30 days) visit within the authorizing provider's specialty     Patient has had an office visit with the authorizing provider or a provider within the authorizing providers department within the previous 12 mos or has a future within next 30 days. See \"Patient Info\" tab in inbasket, or \"Choose Columns\" in Meds & Orders section of the refill encounter.              Passed - Medication is active on med list        Passed - Patient is age 18 or older        Passed - No active pregnancy on record        Passed - No positive pregnancy test in past 12 months             Gianna Tilley RN 12/29/22 3:14 PM  "

## 2023-01-20 ENCOUNTER — MYC MEDICAL ADVICE (OUTPATIENT)
Dept: INTERNAL MEDICINE | Facility: CLINIC | Age: 73
End: 2023-01-20
Payer: COMMERCIAL

## 2023-01-20 DIAGNOSIS — G47.00 INSOMNIA, UNSPECIFIED TYPE: ICD-10-CM

## 2023-01-23 RX ORDER — ZOLPIDEM TARTRATE 10 MG/1
10 TABLET ORAL
Qty: 30 TABLET | Refills: 0 | Status: SHIPPED | OUTPATIENT
Start: 2023-01-23 | End: 2023-04-20

## 2023-02-08 DIAGNOSIS — M81.0 AGE-RELATED OSTEOPOROSIS WITHOUT CURRENT PATHOLOGICAL FRACTURE: ICD-10-CM

## 2023-02-08 DIAGNOSIS — J40 BRONCHITIS: ICD-10-CM

## 2023-02-09 RX ORDER — ALENDRONATE SODIUM 70 MG/1
TABLET ORAL
Qty: 12 TABLET | Refills: 1 | Status: SHIPPED | OUTPATIENT
Start: 2023-02-09 | End: 2023-09-27

## 2023-02-09 RX ORDER — ALBUTEROL SULFATE 90 UG/1
AEROSOL, METERED RESPIRATORY (INHALATION)
Qty: 18 G | Refills: 0 | Status: SHIPPED | OUTPATIENT
Start: 2023-02-09 | End: 2023-04-14

## 2023-02-09 NOTE — TELEPHONE ENCOUNTER
"Last Written Prescription Date:  1/31/22  Last Fill Quantity: 12,  # refills: 3   Last office visit provider:  6/13/22     Requested Prescriptions   Pending Prescriptions Disp Refills     VENTOLIN  (90 Base) MCG/ACT inhaler [Pharmacy Med Name: VENTOLIN HFA 90 MCG INHALER]  1     Sig: INHALE 2 PUFFS INTO THE LUNGS EVERY 6 HOURS       Asthma Maintenance Inhalers - Anticholinergics Failed - 2/9/2023  8:41 AM        Failed - Asthma control assessment score within normal limits in last 6 months     Please review ACT score.           Failed - Recent (6 mo) or future (30 days) visit within the authorizing provider's specialty     Patient had office visit in the last 6 months or has a visit in the next 30 days with authorizing provider or within the authorizing provider's specialty.  See \"Patient Info\" tab in inbasket, or \"Choose Columns\" in Meds & Orders section of the refill encounter.            Passed - Patient is age 12 years or older        Passed - Medication is active on med list       Short-Acting Beta Agonist Inhalers Protocol  Failed - 2/9/2023  8:41 AM        Failed - Asthma control assessment score within normal limits in last 6 months     Please review ACT score.           Failed - Recent (6 mo) or future (30 days) visit within the authorizing provider's specialty     Patient had office visit in the last 6 months or has a visit in the next 30 days with authorizing provider or within the authorizing provider's specialty.  See \"Patient Info\" tab in inbasket, or \"Choose Columns\" in Meds & Orders section of the refill encounter.            Passed - Patient is age 12 or older        Passed - Medication is active on med list           alendronate (FOSAMAX) 70 MG tablet [Pharmacy Med Name: ALENDRONATE SODIUM 70 MG TAB] 12 tablet 3     Sig: TAKE 1 TABLET BY MOUTH EVERY 7 DAYS       Bisphosphonates Passed - 2/9/2023  8:41 AM        Passed - Recent (12 mo) or future (30 days) visit within the authorizing " "provider's specialty     Patient has had an office visit with the authorizing provider or a provider within the authorizing providers department within the previous 12 mos or has a future within next 30 days. See \"Patient Info\" tab in inbasket, or \"Choose Columns\" in Meds & Orders section of the refill encounter.              Passed - Dexa on file within past 2 years     Please review last Dexa result.           Passed - Medication is active on med list        Passed - Patient is age 18 or older        Passed - Normal serum creatinine on file within past 12 months     Recent Labs   Lab Test 06/13/22  1017   CR 0.84       Ok to refill medication if creatinine is low               Melvin Lofton RN 02/09/23 8:42 AM  "

## 2023-02-09 NOTE — TELEPHONE ENCOUNTER
"Routing refill request to provider for review/approval because:  ACT score out of date/not on file.  Patient needs to be seen because it has been more than 6 months since last office visit.    Last Written Prescription Date:  4/14/22  Last Fill Quantity: 18 g,  # refills: 1   Last office visit provider:  6/13/22     Requested Prescriptions   Pending Prescriptions Disp Refills     VENTOLIN  (90 Base) MCG/ACT inhaler [Pharmacy Med Name: VENTOLIN HFA 90 MCG INHALER]  1     Sig: INHALE 2 PUFFS INTO THE LUNGS EVERY 6 HOURS       Asthma Maintenance Inhalers - Anticholinergics Failed - 2/9/2023  8:41 AM        Failed - Asthma control assessment score within normal limits in last 6 months     Please review ACT score.           Failed - Recent (6 mo) or future (30 days) visit within the authorizing provider's specialty     Patient had office visit in the last 6 months or has a visit in the next 30 days with authorizing provider or within the authorizing provider's specialty.  See \"Patient Info\" tab in inbasket, or \"Choose Columns\" in Meds & Orders section of the refill encounter.            Passed - Patient is age 12 years or older        Passed - Medication is active on med list       Short-Acting Beta Agonist Inhalers Protocol  Failed - 2/9/2023  8:41 AM        Failed - Asthma control assessment score within normal limits in last 6 months     Please review ACT score.           Failed - Recent (6 mo) or future (30 days) visit within the authorizing provider's specialty     Patient had office visit in the last 6 months or has a visit in the next 30 days with authorizing provider or within the authorizing provider's specialty.  See \"Patient Info\" tab in inbasket, or \"Choose Columns\" in Meds & Orders section of the refill encounter.            Passed - Patient is age 12 or older        Passed - Medication is active on med list         Signed Prescriptions Disp Refills    alendronate (FOSAMAX) 70 MG tablet 12 tablet 1    " " Sig: TAKE 1 TABLET BY MOUTH EVERY 7 DAYS       Bisphosphonates Passed - 2/9/2023  8:41 AM        Passed - Recent (12 mo) or future (30 days) visit within the authorizing provider's specialty     Patient has had an office visit with the authorizing provider or a provider within the authorizing providers department within the previous 12 mos or has a future within next 30 days. See \"Patient Info\" tab in inbasket, or \"Choose Columns\" in Meds & Orders section of the refill encounter.              Passed - Dexa on file within past 2 years     Please review last Dexa result.           Passed - Medication is active on med list        Passed - Patient is age 18 or older        Passed - Normal serum creatinine on file within past 12 months     Recent Labs   Lab Test 06/13/22  1017   CR 0.84       Ok to refill medication if creatinine is low               Melvin Lofton RN 02/09/23 8:43 AM  "

## 2023-02-16 ENCOUNTER — TRANSFERRED RECORDS (OUTPATIENT)
Dept: HEALTH INFORMATION MANAGEMENT | Facility: CLINIC | Age: 73
End: 2023-02-16

## 2023-03-11 ENCOUNTER — HOSPITAL ENCOUNTER (EMERGENCY)
Facility: HOSPITAL | Age: 73
Discharge: HOME | End: 2023-03-11
Admitting: PHYSICIAN ASSISTANT
Payer: COMMERCIAL

## 2023-03-11 ENCOUNTER — APPOINTMENT (OUTPATIENT)
Dept: CT IMAGING | Facility: HOSPITAL | Age: 73
End: 2023-03-11
Attending: PHYSICIAN ASSISTANT
Payer: COMMERCIAL

## 2023-03-11 VITALS
DIASTOLIC BLOOD PRESSURE: 71 MMHG | BODY MASS INDEX: 29.02 KG/M2 | OXYGEN SATURATION: 97 % | HEART RATE: 88 BPM | SYSTOLIC BLOOD PRESSURE: 121 MMHG | WEIGHT: 170 LBS | HEIGHT: 64 IN | RESPIRATION RATE: 16 BRPM | TEMPERATURE: 98.1 F

## 2023-03-11 DIAGNOSIS — R10.9 RIGHT SIDED ABDOMINAL PAIN: ICD-10-CM

## 2023-03-11 DIAGNOSIS — I10 ESSENTIAL HYPERTENSION, BENIGN: ICD-10-CM

## 2023-03-11 DIAGNOSIS — N10 PYELONEPHRITIS, ACUTE: ICD-10-CM

## 2023-03-11 DIAGNOSIS — R11.2 NAUSEA AND VOMITING: ICD-10-CM

## 2023-03-11 LAB
ALBUMIN SERPL BCG-MCNC: 4.4 G/DL (ref 3.5–5.2)
ALBUMIN UR-MCNC: 100 MG/DL
ALP SERPL-CCNC: 63 U/L (ref 35–104)
ALT SERPL W P-5'-P-CCNC: 21 U/L (ref 10–35)
ANION GAP SERPL CALCULATED.3IONS-SCNC: 12 MMOL/L (ref 7–15)
APPEARANCE UR: ABNORMAL
AST SERPL W P-5'-P-CCNC: 19 U/L (ref 10–35)
BACTERIA #/AREA URNS HPF: ABNORMAL /HPF
BILIRUB DIRECT SERPL-MCNC: 0.33 MG/DL (ref 0–0.3)
BILIRUB SERPL-MCNC: 1.5 MG/DL
BILIRUB UR QL STRIP: NEGATIVE
BUN SERPL-MCNC: 18.7 MG/DL (ref 8–23)
CALCIUM SERPL-MCNC: 9.5 MG/DL (ref 8.8–10.2)
CHLORIDE SERPL-SCNC: 101 MMOL/L (ref 98–107)
COLOR UR AUTO: YELLOW
CREAT SERPL-MCNC: 0.84 MG/DL (ref 0.51–0.95)
DEPRECATED HCO3 PLAS-SCNC: 26 MMOL/L (ref 22–29)
ERYTHROCYTE [DISTWIDTH] IN BLOOD BY AUTOMATED COUNT: 12.1 % (ref 10–15)
GFR SERPL CREATININE-BSD FRML MDRD: 73 ML/MIN/1.73M2
GLUCOSE SERPL-MCNC: 159 MG/DL (ref 70–99)
GLUCOSE UR STRIP-MCNC: NEGATIVE MG/DL
HCT VFR BLD AUTO: 46.6 % (ref 35–47)
HGB BLD-MCNC: 15.8 G/DL (ref 11.7–15.7)
HGB UR QL STRIP: ABNORMAL
KETONES UR STRIP-MCNC: 20 MG/DL
LEUKOCYTE ESTERASE UR QL STRIP: ABNORMAL
LIPASE SERPL-CCNC: 13 U/L (ref 13–60)
MCH RBC QN AUTO: 32.9 PG (ref 26.5–33)
MCHC RBC AUTO-ENTMCNC: 33.9 G/DL (ref 31.5–36.5)
MCV RBC AUTO: 97 FL (ref 78–100)
MUCOUS THREADS #/AREA URNS LPF: PRESENT /LPF
NITRATE UR QL: NEGATIVE
PH UR STRIP: 7 [PH] (ref 5–7)
PLATELET # BLD AUTO: 170 10E3/UL (ref 150–450)
POTASSIUM SERPL-SCNC: 4.2 MMOL/L (ref 3.4–5.3)
PROT SERPL-MCNC: 7.5 G/DL (ref 6.4–8.3)
RBC # BLD AUTO: 4.8 10E6/UL (ref 3.8–5.2)
RBC URINE: 11 /HPF
SODIUM SERPL-SCNC: 139 MMOL/L (ref 136–145)
SP GR UR STRIP: 1.02 (ref 1–1.03)
UROBILINOGEN UR STRIP-MCNC: <2 MG/DL
WBC # BLD AUTO: 10.2 10E3/UL (ref 4–11)
WBC CLUMPS #/AREA URNS HPF: PRESENT /HPF
WBC URINE: >182 /HPF

## 2023-03-11 PROCEDURE — 250N000011 HC RX IP 250 OP 636: Performed by: PHYSICIAN ASSISTANT

## 2023-03-11 PROCEDURE — 96361 HYDRATE IV INFUSION ADD-ON: CPT

## 2023-03-11 PROCEDURE — 96365 THER/PROPH/DIAG IV INF INIT: CPT | Mod: 59

## 2023-03-11 PROCEDURE — 74177 CT ABD & PELVIS W/CONTRAST: CPT

## 2023-03-11 PROCEDURE — 36415 COLL VENOUS BLD VENIPUNCTURE: CPT | Performed by: PHYSICIAN ASSISTANT

## 2023-03-11 PROCEDURE — 96375 TX/PRO/DX INJ NEW DRUG ADDON: CPT

## 2023-03-11 PROCEDURE — 82248 BILIRUBIN DIRECT: CPT | Performed by: PHYSICIAN ASSISTANT

## 2023-03-11 PROCEDURE — 80053 COMPREHEN METABOLIC PANEL: CPT | Performed by: PHYSICIAN ASSISTANT

## 2023-03-11 PROCEDURE — 83690 ASSAY OF LIPASE: CPT | Performed by: PHYSICIAN ASSISTANT

## 2023-03-11 PROCEDURE — 99285 EMERGENCY DEPT VISIT HI MDM: CPT | Mod: 25

## 2023-03-11 PROCEDURE — 258N000003 HC RX IP 258 OP 636: Performed by: PHYSICIAN ASSISTANT

## 2023-03-11 PROCEDURE — 87186 SC STD MICRODIL/AGAR DIL: CPT | Performed by: PHYSICIAN ASSISTANT

## 2023-03-11 PROCEDURE — 81001 URINALYSIS AUTO W/SCOPE: CPT | Performed by: PHYSICIAN ASSISTANT

## 2023-03-11 PROCEDURE — 85027 COMPLETE CBC AUTOMATED: CPT | Performed by: PHYSICIAN ASSISTANT

## 2023-03-11 RX ORDER — CEPHALEXIN 500 MG/1
500 CAPSULE ORAL 4 TIMES DAILY
Qty: 40 CAPSULE | Refills: 0 | Status: SHIPPED | OUTPATIENT
Start: 2023-03-11 | End: 2023-03-21

## 2023-03-11 RX ORDER — CEFTRIAXONE 1 G/1
1 INJECTION, POWDER, FOR SOLUTION INTRAMUSCULAR; INTRAVENOUS ONCE
Status: COMPLETED | OUTPATIENT
Start: 2023-03-11 | End: 2023-03-11

## 2023-03-11 RX ORDER — ONDANSETRON 2 MG/ML
4 INJECTION INTRAMUSCULAR; INTRAVENOUS ONCE
Status: COMPLETED | OUTPATIENT
Start: 2023-03-11 | End: 2023-03-11

## 2023-03-11 RX ORDER — IOPAMIDOL 755 MG/ML
100 INJECTION, SOLUTION INTRAVASCULAR ONCE
Status: COMPLETED | OUTPATIENT
Start: 2023-03-11 | End: 2023-03-11

## 2023-03-11 RX ORDER — ONDANSETRON 4 MG/1
4 TABLET, ORALLY DISINTEGRATING ORAL EVERY 8 HOURS PRN
Qty: 15 TABLET | Refills: 0 | Status: SHIPPED | OUTPATIENT
Start: 2023-03-11 | End: 2023-03-14

## 2023-03-11 RX ADMIN — ONDANSETRON 4 MG: 2 INJECTION INTRAMUSCULAR; INTRAVENOUS at 14:09

## 2023-03-11 RX ADMIN — IOPAMIDOL 100 ML: 755 INJECTION, SOLUTION INTRAVENOUS at 15:02

## 2023-03-11 RX ADMIN — CEFTRIAXONE SODIUM 1 G: 1 INJECTION, POWDER, FOR SOLUTION INTRAMUSCULAR; INTRAVENOUS at 16:05

## 2023-03-11 RX ADMIN — SODIUM CHLORIDE 1000 ML: 9 INJECTION, SOLUTION INTRAVENOUS at 14:11

## 2023-03-11 ASSESSMENT — ENCOUNTER SYMPTOMS
DYSURIA: 0
BACK PAIN: 1
CONSTIPATION: 1
VOMITING: 1
NAUSEA: 1
FEVER: 0
CHILLS: 1
DIARRHEA: 0
ABDOMINAL PAIN: 1

## 2023-03-11 ASSESSMENT — ACTIVITIES OF DAILY LIVING (ADL)
ADLS_ACUITY_SCORE: 35
ADLS_ACUITY_SCORE: 35

## 2023-03-11 NOTE — ED PROVIDER NOTES
EMERGENCY DEPARTMENT ENCOUNTER      NAME: Lisa Ray  AGE: 72 year old female  YOB: 1950  MRN: [id number]  EVALUATION DATE & TIME: No admission date for patient encounter.    PCP: Amber Alberto    ED PROVIDER: Fani Ramos PA-C      Chief Complaint   Patient presents with     Abdominal Pain         FINAL IMPRESSION:  1. Pyelonephritis, acute    2. Nausea and vomiting    3. Right sided abdominal pain    4. Essential hypertension, benign          ED COURSE & MEDICAL DECISION MAKIN:54 PM I introduced myself to patient, performed initial HPI and examination.   3:51 PM Updated patient    72 year old female with PMH CAD, COPD, Breast cancer s/p bilateral mastectomy not currently on chemotherapy presents to the Emergency Department for evaluation of rigors, abdominal pain, right back pain, vomiting since last night.  No fevers.    Differential includes pyelonephritis, ureterolithiasis, diverticulitis, bowel obstruction, gastroenteritis, and others.    VSS, afebrile. Hypertensive with history of HTN.   Exam with mild CVA tenderness, mild lower abdominal tenderness. Otherwise well appearing female.   Labs with no leukocytosis, no anemia. No notable electrolyte derangement. Creatinine is WNL, No EDMUNDO. LFT with slightly elevated bilirubin (1.5), otherwise unremarkable. Lipase . UA is concerning for infection, small blood. Urine culture is pending.   CT shows right pyelonephritis.     Patient given IV fluids, zofran in the emergency department. Treated with IV ceftriaxone.  I considered admission for pyelonephritis in a 72-year-old female, however with patient now tolerating p.o. fluids, no leukocytosis, no EDMUNDO.  Think it is reasonable to start patient outpatient with close follow-up and close ED return precautions.    I discussed results with patient and plan.  All questions were answered and patient is agreeable.  Will discharge on Keflex pending urine cultures. Reviewed prior urine culture  1/31/22, positive for E Coli resistant to Ampicillin, Unasyn, and bactrim.        Medical Decision Making    History:    Supplemental history from: Documented in chart, if applicable    External Record(s) reviewed: Documented in chart, if applicable.    Work Up:    Chart documentation includes differential considered and any EKGs or imaging independently interpreted by provider.    In additional to work up documented, I considered the following work up: Documented in chart, if applicable.    External consultation:    Discussion of management with another provider: Documented in chart, if applicable    Complicating factors:    Care impacted by chronic illness: Heart Disease    Care affected by social determinants of health: N/A    Disposition considerations: Discharge. I prescribed additional prescription strength medication(s) as charted. See documentation for any additional details.      MEDICATIONS GIVEN IN THE EMERGENCY:  Medications   ondansetron (ZOFRAN) injection 4 mg (4 mg Intravenous $Given 3/11/23 1409)   0.9% sodium chloride BOLUS (0 mLs Intravenous Stopped 3/11/23 1511)   iopamidol (ISOVUE-370) solution 100 mL (100 mLs Intravenous $Given 3/11/23 1502)   cefTRIAXone (ROCEPHIN) 1 g vial to attach to  mL bag for ADULTS or NS 50 mL bag for PEDS (0 g Intravenous Stopped 3/11/23 1645)       NEW PRESCRIPTIONS STARTED AT TODAY'S ER VISIT  New Prescriptions    CEPHALEXIN (KEFLEX) 500 MG CAPSULE    Take 1 capsule (500 mg) by mouth 4 times daily for 10 days    ONDANSETRON (ZOFRAN ODT) 4 MG ODT TAB    Take 1 tablet (4 mg) by mouth every 8 hours as needed for nausea          =================================================================    HPI    Patient information was obtained from: Patient    Use of : N/A         Lisa Ray is a 72 year old female with a pertinent history of CAD, COPD, Breast cancer s/p bilateral mastectomy not currently on chemotherapy who presents to this ED for  evaluation of chills, shaking (Describes what sounds like rigors) at 10 pm last night. Vomiting throughout the night, has diffuse abdominal pain and radiates to right low back.    Concerned this could be a UTI, reports a urinary odor but denies any dysuria or frequency.  Also has a history of diverticulitis although has not had this in years.  Prior colonoscopy last summer with diverticulosis.  Patient denies any diarrhea, has had some constipation in the past few days but is ultimately passing some stool.    Does have prior abdominal surgery: hysterectomy and appendectomy.    No recent antibiotics.      REVIEW OF SYSTEMS   Review of Systems   Constitutional: Positive for chills. Negative for fever.   Gastrointestinal: Positive for abdominal pain, constipation, nausea and vomiting. Negative for diarrhea.   Genitourinary: Negative for dysuria.        Urinary odor   Musculoskeletal: Positive for back pain.   Skin: Negative for rash.   All other systems reviewed and are negative.      PAST MEDICAL HISTORY:  Past Medical History:   Diagnosis Date     Anemia 2012    microcytic from Celebrex. GI w/u neg     Antiplatelet or antithrombotic long-term use      Back pain      Benign neoplasm of colon     Created by Conversion      Bronchiectasis (H)      CAD (coronary artery disease) 2008    RCA- stent     Cancer (H)     breast cancer     DJD (degenerative joint disease)      Dysthymia      Fatigue     recurrent/variable - no serious pathology     Fibromyalgia      GERD (gastroesophageal reflux disease)      History of gastric ulcer      Hx of CABG      Hypertension      Iritis     past Hx.-left eye     ARSEN (obstructive sleep apnea) 6/5/2019     Raynaud's disease      Rosacea      Shortness of breath     with exertion     Shoulder tendonitis     right     Stented coronary artery      Ulcer of intestine     small bowel- 10 years ago     UTI (urinary tract infection)     Jan. 2019       PAST SURGICAL HISTORY:  Past Surgical  History:   Procedure Laterality Date     ARTHROPLASTY, HIP, TOTAL, DIRECT ANTERIOR APPROACH, USING HANA TABLE Left 2/4/2022    Procedure: LEFT DIRECT ANTERIOR TOTAL HIP ARTHROPLASTY;  Surgeon: Deny Bryson MD;  Location: [facility] OR     BELPHAROPTOSIS REPAIR Bilateral 2013    Dr. Eder Bingham- 2013     BREAST SURGERY Bilateral 11/2017    bilateral mastectomy 2017- 11/2017     BREAST SURGERY  2018    implants and revsion 2     CORONARY STENT PLACEMENT Right 2008    Dr. Schwab     CV CORONARY ANGIOGRAM N/A 3/26/2019    Procedure: Coronary Angiogram;  Surgeon: Ba Foley MD;  Location: [facility];  Service: Cardiology     HYSTERECTOMY       OOPHORECTOMY       TOTAL HIP ARTHROPLASTY Right 12/7/2015    Procedure: RIGHT TOTAL HIP ARTHROPLASTY;  Surgeon: Tera Yeung MD;  Location: [facility];  Service:      [facility] APPENDECTOMY      Description: Appendectomy;  Recorded: 01/13/2009;       CURRENT MEDICATIONS:    cephALEXin (KEFLEX) 500 MG capsule  ondansetron (ZOFRAN ODT) 4 MG ODT tab  acetaminophen (TYLENOL) 500 MG tablet  acetaminophen-codeine (TYLENOL #3) 300-30 MG tablet  alendronate (FOSAMAX) 70 MG tablet  aspirin (ASA) 81 MG EC tablet  b complex vitamins tablet  buPROPion (WELLBUTRIN XL) 150 MG 24 hr tablet  calcium, as carbonate, (TUMS) 200 mg calcium (500 mg) chewable tablet  cholecalciferol, vitamin D3, 1,000 unit tablet  docusate sodium (COLACE) 100 MG capsule  doxylamine (UNISOM) 25 mg tablet  Ferrous Sulfate (IRON) 28 MG TABS  losartan (COZAAR) 50 MG tablet  magnesium oxide (MAG-OX) 400 (240 Mg) MG tablet  melatonin 1 mg Tab tablet  metoprolol succinate ER (TOPROL XL) 100 MG 24 hr tablet  omeprazole (PRILOSEC) 20 MG DR capsule  peg 400-propylene glycol PF (SYSTANE) 0.4-0.3 % Dpet  rosuvastatin (CRESTOR) 20 MG tablet  tiZANidine (ZANAFLEX) 2 MG tablet  triamcinolone (NASACORT) 55 mcg nasal inhaler  TURMERIC PO  umeclidinium (INCRUSE ELLIPTA) 62.5 MCG/INH  "inhaler  VENTOLIN  (90 Base) MCG/ACT inhaler  zolpidem (AMBIEN) 10 MG tablet        ALLERGIES:  Allergies   Allergen Reactions     Latex Unknown     Added based on information entered during log entry, please review and add reactions, type, and severity as needed     Nsaids (Non-Steroidal Anti-Inflammatory Drug) [Nsaids] Unknown     Other reaction(s): GI Bleeding, GI Upset, Sensitivity.  Ulceration w/ Celebrex       FAMILY HISTORY:  Family History   Problem Relation Age of Onset     Hereditary Breast and Ovarian Cancer Syndrome Sister      Hereditary Breast and Ovarian Cancer Syndrome Paternal Grandfather      Hereditary Breast and Ovarian Cancer Syndrome Cousin      Asthma Cousin      Alcoholism Father          GI bleed age 67     Colon Cancer Other      Breast Cancer Sister         metastatic age 49, remission with Rx     Coronary Artery Disease Brother         CABG     Breast Cancer Paternal Grandmother      Breast Cancer Cousin      Colon Cancer Mother        SOCIAL HISTORY:   Social History     Socioeconomic History     Marital status:      Number of children: 3   Tobacco Use     Smoking status: Former     Packs/day: 1.00     Years: 20.00     Pack years: 20.00     Types: Cigarettes     Quit date: 3/27/1990     Years since quittin.9     Smokeless tobacco: Never   Substance and Sexual Activity     Alcohol use: Yes     Alcohol/week: 3.3 standard drinks     Comment: Alcoholic Drinks/day: 1-2 glasses of wine/week      Drug use: No   Social History Narrative    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 3 grown sons Aries in [place]- , Josse SAMUELS, -microbrewer/beer, ; Gregory Lu- paramedic [employer]...... Non smoker rare ETOH.          VITALS:  BP (!) 164/73   Pulse 93   Temp 98.1  F (36.7  C) (Temporal)   Resp 20   Ht 1.626 m (5' 4\")   Wt 77.1 kg (170 lb)   SpO2 97%   BMI 29.18 kg/m      PHYSICAL EXAM    Constitutional: Well developed, Well " nourished, NAD, GCS 15   HENT: Normocephalic, Atraumatic.   Neck- Supple, Nontender.   Eyes: Conjunctiva normal.  Respiratory: No respiratory distress, speaking in full sentences. Normal breath sounds, no wheezing  Cardiovascular: Normal heart rate, Regular rhythm, No murmurs.   GI: Soft, mild generalized tenderness with no guarding or rebound. Mild right CVAT.    Musculoskeletal: No deformities, Moves all extremities equally  Integument: Warm, Dry, No erythema, ecchymosis, or rash.   Neurologic: Alert & oriented x 3, Normal sensory function. No focal deficits.   Psychiatric: Affect normal, Judgment normal, Mood normal. Cooperative.      LAB:  All pertinent labs reviewed and interpreted.  Results for orders placed or performed during the hospital encounter of 03/11/23   CT Abdomen Pelvis w Contrast    Impression    IMPRESSION:   1.  There is diffuse urothelial thickening and hyperenhancement of the right renal collecting system and ureter and a mildly striated right nephrogram, suspicious for ascending urinary tract infection with pyelonephritis. Recommend correlation with   urinalysis.   UA with Microscopic reflex to Culture    Specimen: Urine, Clean Catch   Result Value Ref Range    Color Urine Yellow Colorless, Straw, Light Yellow, Yellow    Appearance Urine Cloudy (A) Clear    Glucose Urine Negative Negative mg/dL    Bilirubin Urine Negative Negative    Ketones Urine 20 (A) Negative mg/dL    Specific Gravity Urine 1.018 1.001 - 1.030    Blood Urine 0.2 mg/dL (A) Negative    pH Urine 7.0 5.0 - 7.0    Protein Albumin Urine 100 (A) Negative mg/dL    Urobilinogen Urine <2.0 <2.0 mg/dL    Nitrite Urine Negative Negative    Leukocyte Esterase Urine 500 Jazzmine/uL (A) Negative    Bacteria Urine Moderate (A) None Seen /HPF    WBC Clumps Urine Present (A) None Seen /HPF    Mucus Urine Present (A) None Seen /LPF    RBC Urine 11 (H) <=2 /HPF    WBC Urine >182 (H) <=5 /HPF   CBC (+ platelets, no diff)   Result Value Ref Range     WBC Count 10.2 4.0 - 11.0 10e3/uL    RBC Count 4.80 3.80 - 5.20 10e6/uL    Hemoglobin 15.8 (H) 11.7 - 15.7 g/dL    Hematocrit 46.6 35.0 - 47.0 %    MCV 97 78 - 100 fL    MCH 32.9 26.5 - 33.0 pg    MCHC 33.9 31.5 - 36.5 g/dL    RDW 12.1 10.0 - 15.0 %    Platelet Count 170 150 - 450 10e3/uL   Basic metabolic panel   Result Value Ref Range    Sodium 139 136 - 145 mmol/L    Potassium 4.2 3.4 - 5.3 mmol/L    Chloride 101 98 - 107 mmol/L    Carbon Dioxide (CO2) 26 22 - 29 mmol/L    Anion Gap 12 7 - 15 mmol/L    Urea Nitrogen 18.7 8.0 - 23.0 mg/dL    Creatinine 0.84 0.51 - 0.95 mg/dL    Calcium 9.5 8.8 - 10.2 mg/dL    Glucose 159 (H) 70 - 99 mg/dL    GFR Estimate 73 >60 mL/min/1.73m2   Hepatic function panel   Result Value Ref Range    Protein Total 7.5 6.4 - 8.3 g/dL    Albumin 4.4 3.5 - 5.2 g/dL    Bilirubin Total 1.5 (H) <=1.2 mg/dL    Alkaline Phosphatase 63 35 - 104 U/L    AST 19 10 - 35 U/L    ALT 21 10 - 35 U/L    Bilirubin Direct 0.33 (H) 0.00 - 0.30 mg/dL   Result Value Ref Range    Lipase 13 13 - 60 U/L       RADIOLOGY:  Reviewed all pertinent imaging. Please see official radiology report.  CT Abdomen Pelvis w Contrast   Final Result   IMPRESSION:    1.  There is diffuse urothelial thickening and hyperenhancement of the right renal collecting system and ureter and a mildly striated right nephrogram, suspicious for ascending urinary tract infection with pyelonephritis. Recommend correlation with    urinalysis.          EKG:    None    PROCEDURES:   None        Fani Ramos PA-C  Emergency Medicine  [facility] EMERGENCY DEPARTMENT  [address] [address] [phone]             Fani Ramos PA-C  03/11/23 3112

## 2023-03-11 NOTE — ED TRIAGE NOTES
W/c to triage.  Pt states started vomiting last night about 2200.  Also notes chronic urinary sx worse this week.  Low back pain and bilat low abd pain.  Vomiting without diarrhea.  Active vomiting in triage. All this started with the shakes last night. Also noted weakness and dizzy. S/o with pt     Triage Assessment       Row Name 03/11/23 1247       Triage Assessment (Adult)    Airway WDL WDL       Respiratory WDL    Respiratory WDL X;all    Rhythm/Pattern, Respiratory tachypneic       Skin Circulation/Temperature WDL    Skin Circulation/Temperature WDL WDL       Cardiac WDL    Cardiac WDL WDL       Peripheral/Neurovascular WDL    Peripheral Neurovascular WDL WDL       Cognitive/Neuro/Behavioral WDL    Cognitive/Neuro/Behavioral WDL WDL

## 2023-03-11 NOTE — ED NOTES
Hx of cab and diverticulitis. States had been eating nuts but when pain started she stopped. Taking stool softeners without relief

## 2023-03-11 NOTE — DISCHARGE INSTRUCTIONS
Your work-up is concerning for right pyelonephritis.  You were given a dose of IV antibiotics in the emergency department.  Continue antibiotics at home, Keflex 4 times daily for the next 10 days.    You may use zofran as needed for nausea/vomiting.  You can use tylenol and ibuprofen as needed for pain.    Follow up in clinic on Monday or Tuesday for re-check    Return to the emergency department if you develop new fevers, vomiting/not keeping fluids down, worsening/changing abdominal pain, or any other concerning symptoms. We would be happy to see you.

## 2023-03-12 LAB — BACTERIA UR CULT: ABNORMAL

## 2023-03-14 ENCOUNTER — TRANSFERRED RECORDS (OUTPATIENT)
Dept: HEALTH INFORMATION MANAGEMENT | Facility: CLINIC | Age: 73
End: 2023-03-14

## 2023-03-28 ENCOUNTER — OFFICE VISIT (OUTPATIENT)
Dept: INTERNAL MEDICINE | Facility: CLINIC | Age: 73
End: 2023-03-28
Payer: COMMERCIAL

## 2023-03-28 VITALS
WEIGHT: 174.5 LBS | RESPIRATION RATE: 15 BRPM | DIASTOLIC BLOOD PRESSURE: 70 MMHG | SYSTOLIC BLOOD PRESSURE: 128 MMHG | OXYGEN SATURATION: 97 % | HEART RATE: 56 BPM | HEIGHT: 64 IN | BODY MASS INDEX: 29.79 KG/M2 | TEMPERATURE: 97.9 F

## 2023-03-28 DIAGNOSIS — M25.472 LEFT ANKLE SWELLING: Primary | ICD-10-CM

## 2023-03-28 DIAGNOSIS — M79.89 LEFT LEG SWELLING: ICD-10-CM

## 2023-03-28 DIAGNOSIS — J42 CHRONIC BRONCHITIS, UNSPECIFIED CHRONIC BRONCHITIS TYPE (H): ICD-10-CM

## 2023-03-28 DIAGNOSIS — B35.3 TINEA PEDIS OF BOTH FEET: ICD-10-CM

## 2023-03-28 LAB
ALBUMIN UR-MCNC: 30 MG/DL
APPEARANCE UR: CLEAR
BACTERIA #/AREA URNS HPF: ABNORMAL /HPF
BILIRUB UR QL STRIP: NEGATIVE
COLOR UR AUTO: YELLOW
GLUCOSE UR STRIP-MCNC: NEGATIVE MG/DL
HGB UR QL STRIP: NEGATIVE
KETONES UR STRIP-MCNC: ABNORMAL MG/DL
LEUKOCYTE ESTERASE UR QL STRIP: ABNORMAL
NITRATE UR QL: POSITIVE
PH UR STRIP: 5.5 [PH] (ref 5–8)
RBC #/AREA URNS AUTO: ABNORMAL /HPF
SP GR UR STRIP: 1.02 (ref 1–1.03)
SQUAMOUS #/AREA URNS AUTO: ABNORMAL /LPF
UROBILINOGEN UR STRIP-ACNC: 0.2 E.U./DL
WBC #/AREA URNS AUTO: ABNORMAL /HPF

## 2023-03-28 PROCEDURE — 87186 SC STD MICRODIL/AGAR DIL: CPT | Performed by: INTERNAL MEDICINE

## 2023-03-28 PROCEDURE — 99214 OFFICE O/P EST MOD 30 MIN: CPT | Performed by: INTERNAL MEDICINE

## 2023-03-28 PROCEDURE — 81001 URINALYSIS AUTO W/SCOPE: CPT | Performed by: INTERNAL MEDICINE

## 2023-03-28 PROCEDURE — 87086 URINE CULTURE/COLONY COUNT: CPT | Performed by: INTERNAL MEDICINE

## 2023-03-28 RX ORDER — FLUCONAZOLE 150 MG/1
150 TABLET ORAL DAILY
Qty: 7 TABLET | Refills: 0 | Status: SHIPPED | OUTPATIENT
Start: 2023-03-28 | End: 2023-07-18

## 2023-03-28 ASSESSMENT — PATIENT HEALTH QUESTIONNAIRE - PHQ9
10. IF YOU CHECKED OFF ANY PROBLEMS, HOW DIFFICULT HAVE THESE PROBLEMS MADE IT FOR YOU TO DO YOUR WORK, TAKE CARE OF THINGS AT HOME, OR GET ALONG WITH OTHER PEOPLE: SOMEWHAT DIFFICULT
SUM OF ALL RESPONSES TO PHQ QUESTIONS 1-9: 4
SUM OF ALL RESPONSES TO PHQ QUESTIONS 1-9: 4

## 2023-03-28 NOTE — PROGRESS NOTES
"  Assessment & Plan     Left ankle swelling  I dont see any compelling evidence for DVT but the asymmetrical swelling , recent air travel and orthopedic surgery are risk factors so will get US . Recent blood work showed normal kidney and liver function tests . Had recent multidrug resistant E coli infection . Will repeat UA   - UA with Microscopic reflex to Culture - lab collect; Future  - UA with Microscopic reflex to Culture - lab collect  - Urine Microscopic Exam  - Urine Culture    Left leg swelling    - US Lower Extremity Venous Duplex Left; Future    Tinea pedis of both feet  The ring worm like lesions and peeling skin appears to be typical tinea . Trial of diflucan   - fluconazole (DIFLUCAN) 150 MG tablet; Take 1 tablet (150 mg) by mouth daily    Chronic bronchitis, unspecified chronic bronchitis type (H)    - umeclidinium (INCRUSE ELLIPTA) 62.5 MCG/ACT inhaler; Inhale 1 puff into the lungs daily             BMI:   Estimated body mass index is 29.72 kg/m  as calculated from the following:    Height as of this encounter: 1.632 m (5' 4.25\").    Weight as of this encounter: 79.2 kg (174 lb 8 oz).           Amber Alberto MD  [facility]    Subjective   Pat is a 72 year old, presenting for the following health issues:  Recheck Medication and Musculoskeletal Problem (PT REPORTS LEFT ANKLE SWELLING FOR A COUPLE OF MONTHS WORSENS WITH FLYING IN AIRPLANES)    Additional Questions 3/28/2023   Roomed by AW   Accompanied by ALONE     Musculoskeletal Problem    History of Present Illness       Reason for visit:  Follow up er visit, swollen L foot  Symptom onset:  3-4 weeks ago  Symptoms include:  Burning , itch, rash, swelling  Symptom intensity:  Moderate  Symptom progression:  Staying the same  Had these symptoms before:  No    She eats 2-3 servings of fruits and vegetables daily.She consumes 0 sweetened beverage(s) daily.She exercises with enough effort to increase her heart rate 30 to " "60 minutes per day.  She exercises with enough effort to increase her heart rate 4 days per week.   She is taking medications regularly.    Today's PHQ-9         PHQ-9 Total Score: 4    PHQ-9 Q9 Thoughts of better off dead/self-harm past 2 weeks :   Not at all    How difficult have these problems made it for you to do your work, take care of things at home, or get along with other people: Somewhat difficult               Review of Systems         Objective    /70 (BP Location: Left arm, Patient Position: Sitting, Cuff Size: Adult Regular)   Pulse 56   Temp 97.9  F (36.6  C) (Tympanic)   Resp 15   Ht 1.632 m (5' 4.25\")   Wt 79.2 kg (174 lb 8 oz)   SpO2 97%   BMI 29.72 kg/m    Body mass index is 29.72 kg/m .  Physical Exam   GENERAL: healthy, alert and no distress  NECK: no adenopathy, no asymmetry, masses, or scars and thyroid normal to palpation  RESP: lungs clear to auscultation - no rales, rhonchi or wheezes  CV: regular rate and rhythm, normal S1 S2, no S3 or S4, no murmur, click or rub, no peripheral edema and peripheral pulses strong  MS: no gross musculoskeletal defects noted, no edema  Mild asymmetical left foot swelling without significant pitting , calves are supple . Pedal pulses intact . Some residual ring worm like lesions on lower leg with peeling skin                     "

## 2023-03-30 DIAGNOSIS — N39.0 RECURRENT UTI: Primary | ICD-10-CM

## 2023-03-30 LAB — BACTERIA UR CULT: ABNORMAL

## 2023-03-30 RX ORDER — NITROFURANTOIN 25; 75 MG/1; MG/1
100 CAPSULE ORAL 2 TIMES DAILY
Qty: 20 CAPSULE | Refills: 0 | Status: SHIPPED | OUTPATIENT
Start: 2023-03-30 | End: 2023-07-18

## 2023-04-03 ENCOUNTER — HOSPITAL ENCOUNTER (OUTPATIENT)
Dept: ULTRASOUND IMAGING | Facility: CLINIC | Age: 73
Discharge: HOME | End: 2023-04-03
Attending: INTERNAL MEDICINE | Admitting: INTERNAL MEDICINE
Payer: COMMERCIAL

## 2023-04-03 DIAGNOSIS — M79.89 LEFT LEG SWELLING: ICD-10-CM

## 2023-04-03 PROCEDURE — 93971 EXTREMITY STUDY: CPT | Mod: LT

## 2023-04-12 ENCOUNTER — MYC MEDICAL ADVICE (OUTPATIENT)
Dept: INTERNAL MEDICINE | Facility: CLINIC | Age: 73
End: 2023-04-12
Payer: COMMERCIAL

## 2023-04-12 DIAGNOSIS — N39.0 RECURRENT UTI: Primary | ICD-10-CM

## 2023-04-13 DIAGNOSIS — K21.9 GASTROESOPHAGEAL REFLUX DISEASE WITHOUT ESOPHAGITIS: ICD-10-CM

## 2023-04-13 DIAGNOSIS — E55.9 VITAMIN D DEFICIENCY: ICD-10-CM

## 2023-04-13 DIAGNOSIS — I10 HYPERTENSION, UNSPECIFIED TYPE: ICD-10-CM

## 2023-04-13 DIAGNOSIS — J40 BRONCHITIS: ICD-10-CM

## 2023-04-13 RX ORDER — NITROFURANTOIN 25; 75 MG/1; MG/1
100 CAPSULE ORAL 2 TIMES DAILY
Qty: 20 CAPSULE | Refills: 0 | Status: SHIPPED | OUTPATIENT
Start: 2023-04-13 | End: 2023-07-18

## 2023-04-14 RX ORDER — LOSARTAN POTASSIUM 50 MG/1
TABLET ORAL
Qty: 90 TABLET | Refills: 3 | Status: SHIPPED | OUTPATIENT
Start: 2023-04-14 | End: 2024-01-18

## 2023-04-14 RX ORDER — BUPROPION HYDROCHLORIDE 150 MG/1
TABLET ORAL
Qty: 90 TABLET | Refills: 3 | Status: SHIPPED | OUTPATIENT
Start: 2023-04-14 | End: 2024-06-10

## 2023-04-14 RX ORDER — ALBUTEROL SULFATE 90 UG/1
AEROSOL, METERED RESPIRATORY (INHALATION)
Qty: 18 G | Refills: 4 | Status: SHIPPED | OUTPATIENT
Start: 2023-04-14

## 2023-04-14 NOTE — TELEPHONE ENCOUNTER
"Routing refill request to provider for review/approval because:    albuterol (PROAIR HFA/PROVENTIL HFA/VENTOLIN HFA)   Last Written Prescription Date:  2/9/23  Last Fill Quantity: 18g,  # refills: 0   -ACT    buPROPion (WELLBUTRIN XL)  Last Written Prescription Date: 6/13/22  Last Fill Quantity: 90,  # refills: 3   -early refill request    losartan (COZAAR)   Last Written Prescription Date:  6/20/22  Last Fill Quantity: 90,  # refills: 3   -early refill request    Last office visit provider:  3/28/23    Requested Prescriptions   Pending Prescriptions Disp Refills     albuterol (PROAIR HFA/PROVENTIL HFA/VENTOLIN HFA) 108 (90 Base) MCG/ACT inhaler [Pharmacy Med Name: ALBUTEROL HFA (VENTOLIN) INH]       Sig: INHALE 2 PUFFS INTO THE LUNGS EVERY 6 HOURS       Asthma Maintenance Inhalers - Anticholinergics Failed - 4/14/2023 12:36 PM        Failed - Asthma control assessment score within normal limits in last 6 months     Please review ACT score.           Passed - Patient is age 12 years or older        Passed - Medication is active on med list        Passed - Recent (6 mo) or future (30 days) visit within the authorizing provider's specialty     Patient had office visit in the last 6 months or has a visit in the next 30 days with authorizing provider or within the authorizing provider's specialty.  See \"Patient Info\" tab in inbasket, or \"Choose Columns\" in Meds & Orders section of the refill encounter.           Short-Acting Beta Agonist Inhalers Protocol  Failed - 4/14/2023 12:36 PM        Failed - Asthma control assessment score within normal limits in last 6 months     Please review ACT score.           Passed - Patient is age 12 or older        Passed - Medication is active on med list        Passed - Recent (6 mo) or future (30 days) visit within the authorizing provider's specialty     Patient had office visit in the last 6 months or has a visit in the next 30 days with authorizing provider or within the " "authorizing provider's specialty.  See \"Patient Info\" tab in inbasket, or \"Choose Columns\" in Meds & Orders section of the refill encounter.               buPROPion (WELLBUTRIN XL) 150 MG 24 hr tablet [Pharmacy Med Name: BUPROPION HCL  MG TABLET] 90 tablet 3     Sig: TAKE 1 TABLET BY MOUTH EVERY MORNING       SSRIs Protocol Passed - 4/14/2023 12:36 PM        Passed - Recent (12 mo) or future (30 days) visit within the authorizing provider's specialty     Patient has had an office visit with the authorizing provider or a provider within the authorizing providers department within the previous 12 mos or has a future within next 30 days. See \"Patient Info\" tab in inbasket, or \"Choose Columns\" in Meds & Orders section of the refill encounter.              Passed - Medication is Bupropion     If the medication is Bupropion (Wellbutrin), and the patient is taking for smoking cessation; OK to refill.          Passed - Medication is active on med list        Passed - Patient is age 18 or older        Passed - No active pregnancy on record        Passed - No positive pregnancy test in last 12 months           losartan (COZAAR) 50 MG tablet [Pharmacy Med Name: LOSARTAN POTASSIUM 50 MG TAB] 90 tablet 3     Sig: TAKE 1 TABLET BY MOUTH EVERY DAY       Angiotensin-II Receptors Passed - 4/14/2023 12:36 PM        Passed - Last blood pressure under 140/90 in past 12 months     BP Readings from Last 3 Encounters:   03/28/23 128/70   03/11/23 121/71   08/29/22 138/70                 Passed - Recent (12 mo) or future (30 days) visit within the authorizing provider's specialty     Patient has had an office visit with the authorizing provider or a provider within the authorizing providers department within the previous 12 mos or has a future within next 30 days. See \"Patient Info\" tab in inbasket, or \"Choose Columns\" in Meds & Orders section of the refill encounter.              Passed - Medication is active on med list        " Passed - Patient is age 18 or older        Passed - No active pregnancy on record        Passed - Normal serum creatinine on file in past 12 months     Recent Labs   Lab Test 03/11/23  1412   CR 0.84       Ok to refill medication if creatinine is low          Passed - Normal serum potassium on file in past 12 months     Recent Labs   Lab Test 03/11/23  1412   POTASSIUM 4.2                    Passed - No positive pregnancy test in past 12 months             JANEE HOANG RN 04/14/23 12:36 PM

## 2023-04-14 NOTE — TELEPHONE ENCOUNTER
"Routing refill request to provider for review/approval because:  Early refill request    Last Written Prescription Date:  12/23/33  Last Fill Quantity: 90,  # refills: 1   Last office visit provider:   3/28/23    Requested Prescriptions   Pending Prescriptions Disp Refills     omeprazole (PRILOSEC) 20 MG DR capsule [Pharmacy Med Name: OMEPRAZOLE DR 20 MG CAPSULE] 90 capsule 1     Sig: TAKE 1 CAPSULE BY MOUTH EVERY DAY       PPI Protocol Failed - 4/14/2023 12:45 PM        Failed - No diagnosis of osteoporosis on record        Passed - Not on Clopidogrel (unless Pantoprazole ordered)        Passed - Recent (12 mo) or future (30 days) visit within the authorizing provider's specialty     Patient has had an office visit with the authorizing provider or a provider within the authorizing providers department within the previous 12 mos or has a future within next 30 days. See \"Patient Info\" tab in inbasket, or \"Choose Columns\" in Meds & Orders section of the refill encounter.              Passed - Medication is active on med list        Passed - Patient is age 18 or older        Passed - No active pregnacy on record        Passed - No positive pregnancy test in past 12 months             JNAEE HOANG RN 04/14/23 12:45 PM  "

## 2023-04-19 DIAGNOSIS — G47.00 INSOMNIA, UNSPECIFIED TYPE: ICD-10-CM

## 2023-04-20 RX ORDER — ZOLPIDEM TARTRATE 10 MG/1
10 TABLET ORAL
Qty: 30 TABLET | Refills: 5 | Status: SHIPPED | OUTPATIENT
Start: 2023-04-20 | End: 2023-11-06

## 2023-05-15 ENCOUNTER — PATIENT OUTREACH (OUTPATIENT)
Dept: CARE COORDINATION | Facility: CLINIC | Age: 73
End: 2023-05-15
Payer: COMMERCIAL

## 2023-06-07 DIAGNOSIS — F43.21 ADJUSTMENT DISORDER WITH DEPRESSED MOOD: ICD-10-CM

## 2023-07-01 ENCOUNTER — OFFICE VISIT (OUTPATIENT)
Dept: PLASTIC SURGERY | Facility: CLINIC | Age: 73
End: 2023-07-01

## 2023-07-01 DIAGNOSIS — M19.91 PRIMARY OSTEOARTHRITIS, UNSPECIFIED SITE: Primary | ICD-10-CM

## 2023-07-01 RX ORDER — ACETAMINOPHEN AND CODEINE PHOSPHATE 300; 30 MG/1; MG/1
1 TABLET ORAL EVERY 6 HOURS PRN
Qty: 10 TABLET | Refills: 0 | Status: SHIPPED | OUTPATIENT
Start: 2023-07-01 | End: 2023-07-04

## 2023-07-01 NOTE — LETTER
July 1, 2023  Re: Lisa Ray  1950    Dear Dr. Pa ref. provider found,    Thank you so much for referring Lisa Ray to the [facility]. I had the pleasure of visiting with Lisa today.     Attached you will find a copy of my note. Please feel free to reach out to me with any questions, [phone].     Mrs. Ray has chronic osteoarthritis that interferes with sleep. Her PMD has recommended Tylenol #3 at bed time to assist sleep on a prn basis. She is traveling and forgot to bring the medication. I have talked with her about the situation and have seen her in person. I believe it is reasonable to prescribe a bridge dose until she returns home. T-3 snet to [facility] in [place] 10 tabs        Your trust in our practice and care is much appreciated.    Sincerely,    SHYANN JOHNSON MD

## 2023-07-01 NOTE — PROGRESS NOTES
Mrs. Ray has chronic osteoarthritis that interferes with sleep. Her PMD has recommended Tylenol #3 at bed time to assist sleep on a prn basis. She is traveling and forgot to bring the medication. I have talked with her about the situation and have seen her in person. I believe it is reasonable to prescribe a bridge dose until she returns home. T-3 snet to [facility] in [place] 10 tabs  SHYANN JOHNSON MD

## 2023-07-01 NOTE — LETTER
7/1/2023       RE: Lisa Ray  [address] [place] [address]     Dear Colleague,    Thank you for referring your patient, Lisa Ray, to the [facility] at [facility]. Please see a copy of my visit note below.    Mrs. Ray has chronic osteoarthritis that interferes with sleep. Her PMD has recommended Tylenol #3 at bed time to assist sleep on a prn basis. She is traveling and forgot to bring the medication. I have talked with her about the situation and have seen her in person. I believe it is reasonable to prescribe a bridge dose until she returns home. T-3 snet to [facility] in [place] 10 tabs      Again, thank you for allowing me to participate in the care of your patient.      Sincerely,    SHYANN JOHNSON MD

## 2023-07-09 DIAGNOSIS — Z95.1 S/P CABG (CORONARY ARTERY BYPASS GRAFT): ICD-10-CM

## 2023-07-09 RX ORDER — ROSUVASTATIN CALCIUM 20 MG/1
20 TABLET, COATED ORAL AT BEDTIME
Qty: 90 TABLET | Refills: 0 | Status: SHIPPED | OUTPATIENT
Start: 2023-07-09 | End: 2023-09-06

## 2023-07-09 NOTE — TELEPHONE ENCOUNTER
"Last Written Prescription Date:  12/29/22  Last Fill Quantity: 90,  # refills: 1   Last office visit provider:  3/28/23     Requested Prescriptions   Pending Prescriptions Disp Refills     rosuvastatin (CRESTOR) 20 MG tablet [Pharmacy Med Name: ROSUVASTATIN CALCIUM 20 MG TAB] 90 tablet 1     Sig: TAKE 1 TABLET BY MOUTH EVERYDAY AT BEDTIME       Statins Protocol Passed - 7/9/2023  3:28 PM        Passed - LDL on file in past 12 months     Recent Labs   Lab Test 08/01/22  0953   LDL 49             Passed - No abnormal creatine kinase in past 12 months     No lab results found.             Passed - Recent (12 mo) or future (30 days) visit within the authorizing provider's specialty     Patient has had an office visit with the authorizing provider or a provider within the authorizing providers department within the previous 12 mos or has a future within next 30 days. See \"Patient Info\" tab in inbasket, or \"Choose Columns\" in Meds & Orders section of the refill encounter.              Passed - Medication is active on med list        Passed - Patient is age 18 or older        Passed - No active pregnancy on record        Passed - No positive pregnancy test in past 12 months             Melvin Lofton RN 07/09/23 3:28 PM  "

## 2023-07-11 ASSESSMENT — ENCOUNTER SYMPTOMS
SHORTNESS OF BREATH: 1
CONSTIPATION: 1
EYE PAIN: 1
HEARTBURN: 1
ARTHRALGIAS: 1
JOINT SWELLING: 1
DIZZINESS: 1
WEAKNESS: 1
BREAST MASS: 0
PARESTHESIAS: 1
NERVOUS/ANXIOUS: 1
COUGH: 1
MYALGIAS: 1

## 2023-07-11 ASSESSMENT — ACTIVITIES OF DAILY LIVING (ADL): CURRENT_FUNCTION: NO ASSISTANCE NEEDED

## 2023-07-14 DIAGNOSIS — I10 HTN (HYPERTENSION): ICD-10-CM

## 2023-07-14 DIAGNOSIS — Z95.1 S/P CABG (CORONARY ARTERY BYPASS GRAFT): ICD-10-CM

## 2023-07-14 RX ORDER — METOPROLOL SUCCINATE 100 MG/1
100 TABLET, EXTENDED RELEASE ORAL AT BEDTIME
Qty: 90 TABLET | Refills: 0 | Status: SHIPPED | OUTPATIENT
Start: 2023-07-14 | End: 2023-08-28

## 2023-07-18 ENCOUNTER — OFFICE VISIT (OUTPATIENT)
Dept: INTERNAL MEDICINE | Facility: CLINIC | Age: 73
End: 2023-07-18
Payer: COMMERCIAL

## 2023-07-18 VITALS
BODY MASS INDEX: 30.3 KG/M2 | TEMPERATURE: 98.5 F | HEIGHT: 64 IN | OXYGEN SATURATION: 95 % | SYSTOLIC BLOOD PRESSURE: 137 MMHG | WEIGHT: 177.5 LBS | RESPIRATION RATE: 16 BRPM | HEART RATE: 53 BPM | DIASTOLIC BLOOD PRESSURE: 70 MMHG

## 2023-07-18 DIAGNOSIS — M79.89 LEG SWELLING: ICD-10-CM

## 2023-07-18 DIAGNOSIS — Z00.00 HEALTH MAINTENANCE EXAMINATION: ICD-10-CM

## 2023-07-18 DIAGNOSIS — J42 CHRONIC BRONCHITIS, UNSPECIFIED CHRONIC BRONCHITIS TYPE (H): ICD-10-CM

## 2023-07-18 DIAGNOSIS — Z95.1 S/P CABG (CORONARY ARTERY BYPASS GRAFT): ICD-10-CM

## 2023-07-18 DIAGNOSIS — Z96.649 STATUS POST TOTAL REPLACEMENT OF HIP, UNSPECIFIED LATERALITY: Primary | ICD-10-CM

## 2023-07-18 DIAGNOSIS — Z86.39 H/O VITAMIN D DEFICIENCY: ICD-10-CM

## 2023-07-18 DIAGNOSIS — R79.9 ABNORMAL FINDING OF BLOOD CHEMISTRY, UNSPECIFIED: ICD-10-CM

## 2023-07-18 DIAGNOSIS — H53.9 VISION CHANGES: ICD-10-CM

## 2023-07-18 DIAGNOSIS — N39.0 RECURRENT UTI: ICD-10-CM

## 2023-07-18 DIAGNOSIS — I89.0 ACQUIRED LYMPHEDEMA: ICD-10-CM

## 2023-07-18 DIAGNOSIS — B35.1 ONYCHOMYCOSIS DUE TO DERMATOPHYTE: ICD-10-CM

## 2023-07-18 DIAGNOSIS — Z98.82 S/P BILATERAL BREAST IMPLANTS: ICD-10-CM

## 2023-07-18 DIAGNOSIS — E55.9 VITAMIN D DEFICIENCY: ICD-10-CM

## 2023-07-18 DIAGNOSIS — J47.9 BRONCHIECTASIS WITHOUT ACUTE EXACERBATION (H): ICD-10-CM

## 2023-07-18 DIAGNOSIS — M81.0 AGE-RELATED OSTEOPOROSIS WITHOUT CURRENT PATHOLOGICAL FRACTURE: ICD-10-CM

## 2023-07-18 DIAGNOSIS — E66.01 MORBID OBESITY (H): ICD-10-CM

## 2023-07-18 LAB
ALBUMIN SERPL BCG-MCNC: 4.5 G/DL (ref 3.5–5.2)
ALBUMIN UR-MCNC: ABNORMAL MG/DL
ALP SERPL-CCNC: 65 U/L (ref 35–104)
ALT SERPL W P-5'-P-CCNC: 33 U/L (ref 0–50)
ANION GAP SERPL CALCULATED.3IONS-SCNC: 11 MMOL/L (ref 7–15)
APPEARANCE UR: CLEAR
AST SERPL W P-5'-P-CCNC: 22 U/L (ref 0–45)
BACTERIA #/AREA URNS HPF: ABNORMAL /HPF
BILIRUB SERPL-MCNC: 0.9 MG/DL
BILIRUB UR QL STRIP: ABNORMAL
BUN SERPL-MCNC: 21.7 MG/DL (ref 8–23)
CALCIUM SERPL-MCNC: 9.2 MG/DL (ref 8.8–10.2)
CHLORIDE SERPL-SCNC: 103 MMOL/L (ref 98–107)
CHOLEST SERPL-MCNC: 119 MG/DL
COLOR UR AUTO: YELLOW
CREAT SERPL-MCNC: 0.84 MG/DL (ref 0.51–0.95)
DEPRECATED CALCIDIOL+CALCIFEROL SERPL-MC: 55 UG/L (ref 20–75)
DEPRECATED HCO3 PLAS-SCNC: 25 MMOL/L (ref 22–29)
ERYTHROCYTE [DISTWIDTH] IN BLOOD BY AUTOMATED COUNT: 12 % (ref 10–15)
GFR SERPL CREATININE-BSD FRML MDRD: 73 ML/MIN/1.73M2
GLUCOSE SERPL-MCNC: 114 MG/DL (ref 70–99)
GLUCOSE UR STRIP-MCNC: 100 MG/DL
HBA1C MFR BLD: 5.9 % (ref 0–5.6)
HCT VFR BLD AUTO: 46.7 % (ref 35–47)
HDLC SERPL-MCNC: 41 MG/DL
HGB BLD-MCNC: 15.3 G/DL (ref 11.7–15.7)
HGB UR QL STRIP: NEGATIVE
KETONES UR STRIP-MCNC: NEGATIVE MG/DL
LDLC SERPL CALC-MCNC: 44 MG/DL
LEUKOCYTE ESTERASE UR QL STRIP: NEGATIVE
MCH RBC QN AUTO: 32.3 PG (ref 26.5–33)
MCHC RBC AUTO-ENTMCNC: 32.8 G/DL (ref 31.5–36.5)
MCV RBC AUTO: 99 FL (ref 78–100)
MUCOUS THREADS #/AREA URNS LPF: PRESENT /LPF
NITRATE UR QL: NEGATIVE
NONHDLC SERPL-MCNC: 78 MG/DL
PH UR STRIP: 7 [PH] (ref 5–8)
PLATELET # BLD AUTO: 205 10E3/UL (ref 150–450)
POTASSIUM SERPL-SCNC: 4.8 MMOL/L (ref 3.4–5.3)
PROT SERPL-MCNC: 6.9 G/DL (ref 6.4–8.3)
RBC # BLD AUTO: 4.73 10E6/UL (ref 3.8–5.2)
RBC #/AREA URNS AUTO: ABNORMAL /HPF
SODIUM SERPL-SCNC: 139 MMOL/L (ref 136–145)
SP GR UR STRIP: 1.02 (ref 1–1.03)
SQUAMOUS #/AREA URNS AUTO: ABNORMAL /LPF
TRIGL SERPL-MCNC: 172 MG/DL
TSH SERPL DL<=0.005 MIU/L-ACNC: 2.66 UIU/ML (ref 0.3–4.2)
UROBILINOGEN UR STRIP-ACNC: 1 E.U./DL
WBC # BLD AUTO: 7.6 10E3/UL (ref 4–11)
WBC #/AREA URNS AUTO: ABNORMAL /HPF

## 2023-07-18 PROCEDURE — 84443 ASSAY THYROID STIM HORMONE: CPT | Performed by: INTERNAL MEDICINE

## 2023-07-18 PROCEDURE — 80061 LIPID PANEL: CPT | Performed by: INTERNAL MEDICINE

## 2023-07-18 PROCEDURE — 0124A COVID-19 BIVALENT 12+ (PFIZER): CPT | Performed by: INTERNAL MEDICINE

## 2023-07-18 PROCEDURE — 81001 URINALYSIS AUTO W/SCOPE: CPT | Performed by: INTERNAL MEDICINE

## 2023-07-18 PROCEDURE — 91312 COVID-19 BIVALENT 12+ (PFIZER): CPT | Performed by: INTERNAL MEDICINE

## 2023-07-18 PROCEDURE — 85027 COMPLETE CBC AUTOMATED: CPT | Performed by: INTERNAL MEDICINE

## 2023-07-18 PROCEDURE — 99214 OFFICE O/P EST MOD 30 MIN: CPT | Mod: 25 | Performed by: INTERNAL MEDICINE

## 2023-07-18 PROCEDURE — 82306 VITAMIN D 25 HYDROXY: CPT | Performed by: INTERNAL MEDICINE

## 2023-07-18 PROCEDURE — 80053 COMPREHEN METABOLIC PANEL: CPT | Performed by: INTERNAL MEDICINE

## 2023-07-18 PROCEDURE — 83036 HEMOGLOBIN GLYCOSYLATED A1C: CPT | Performed by: INTERNAL MEDICINE

## 2023-07-18 PROCEDURE — 36415 COLL VENOUS BLD VENIPUNCTURE: CPT | Performed by: INTERNAL MEDICINE

## 2023-07-18 PROCEDURE — G0439 PPPS, SUBSEQ VISIT: HCPCS | Performed by: INTERNAL MEDICINE

## 2023-07-18 RX ORDER — TERBINAFINE HYDROCHLORIDE 250 MG/1
250 TABLET ORAL DAILY
Qty: 90 TABLET | Refills: 0 | Status: SHIPPED | OUTPATIENT
Start: 2023-07-18 | End: 2023-07-31

## 2023-07-18 ASSESSMENT — ENCOUNTER SYMPTOMS
HEARTBURN: 1
CONSTIPATION: 1
BREAST MASS: 0
COUGH: 1
PARESTHESIAS: 1
WEAKNESS: 1
MYALGIAS: 1
NERVOUS/ANXIOUS: 1
SHORTNESS OF BREATH: 1
EYE PAIN: 1
JOINT SWELLING: 1
ARTHRALGIAS: 1
DIZZINESS: 1

## 2023-07-18 ASSESSMENT — ACTIVITIES OF DAILY LIVING (ADL): CURRENT_FUNCTION: NO ASSISTANCE NEEDED

## 2023-07-18 NOTE — PATIENT INSTRUCTIONS
Hold fosamax for a month to allow healing of implant   Start ozempic at the lowest dose once a week , and then message me in 4 weeks to increment dose    Recheck 3 months

## 2023-07-18 NOTE — PROGRESS NOTES
"SUBJECTIVE:   Rosa is a 73 year old who presents for Preventive Visit.  She has a history of COPD, osteoporosis and essential hypertension.  She is doing really well.  No new concerns      7/18/2023     8:55 AM   Additional Questions   Roomed by maite   Accompanied by eric simon         7/18/2023     8:55 AM   Patient Reported Additional Medications   Patient reports taking the following new medications none     Are you in the first 12 months of your Medicare coverage?  No    Healthy Habits:     In general, how would you rate your overall health?  Good    Frequency of exercise:  2-3 days/week    Duration of exercise:  30-45 minutes    Do you usually eat at least 4 servings of fruit and vegetables a day, include whole grains    & fiber and avoid regularly eating high fat or \"junk\" foods?  Yes    Medication side effects:  Muscle aches    Ability to successfully perform activities of daily living:  No assistance needed    Home Safety:  No safety concerns identified    Hearing Impairment:  Difficulty understanding soft or whispered speech    In the past 6 months, have you been bothered by leaking of urine? Yes    In general, how would you rate your overall mental or emotional health?  Fair    Additional concerns today:  Yes    Current Outpatient Medications   Medication     acetaminophen (TYLENOL) 500 MG tablet     albuterol (PROAIR HFA/PROVENTIL HFA/VENTOLIN HFA) 108 (90 Base) MCG/ACT inhaler     alendronate (FOSAMAX) 70 MG tablet     aspirin (ASA) 81 MG EC tablet     b complex vitamins tablet     buPROPion (WELLBUTRIN XL) 150 MG 24 hr tablet     calcium, as carbonate, (TUMS) 200 mg calcium (500 mg) chewable tablet     cholecalciferol, vitamin D3, 1,000 unit tablet     docusate sodium (COLACE) 100 MG capsule     doxylamine (UNISOM) 25 mg tablet     Ferrous Sulfate (IRON) 28 MG TABS     fluconazole (DIFLUCAN) 150 MG tablet     losartan (COZAAR) 50 MG tablet     magnesium oxide (MAG-OX) 400 (240 Mg) MG tablet     melatonin 1 " mg Tab tablet     metoprolol succinate ER (TOPROL XL) 100 MG 24 hr tablet     nitroFURantoin macrocrystal-monohydrate (MACROBID) 100 MG capsule     omeprazole (PRILOSEC) 20 MG DR capsule     peg 400-propylene glycol PF (SYSTANE) 0.4-0.3 % Dpet     rosuvastatin (CRESTOR) 20 MG tablet     tiZANidine (ZANAFLEX) 2 MG tablet     triamcinolone (NASACORT) 55 mcg nasal inhaler     TURMERIC PO     umeclidinium (INCRUSE ELLIPTA) 62.5 MCG/ACT inhaler     zolpidem (AMBIEN) 10 MG tablet     nitroFURantoin macrocrystal-monohydrate (MACROBID) 100 MG capsule     umeclidinium (INCRUSE ELLIPTA) 62.5 MCG/INH inhaler     No current facility-administered medications for this visit.     Patient Active Problem List   Diagnosis     Ptosis Of Eyelid     Rosacea     Dysthymic disorder     Coronary Artery Disease     Osteoarthritis     Raynaud disease     Paresthesias/numbness     Primary osteoarthritis of right hip     DCIS (ductal carcinoma in situ)     S/P coronary artery stent placement     History of bilateral mastectomy     S/P bilateral breast implants     S/P CABG (coronary artery bypass graft)     History of total hip replacement, right     Health maintenance examination     ARSEN (obstructive sleep apnea)     Acquired lymphedema     Hair loss     Right shoulder pain     COPD (chronic obstructive pulmonary disease) (H)     Essential hypertension, benign     Age-related osteoporosis without current pathological fracture     Chronic obstructive airway disease with asthma (H)     Bronchiectasis without acute exacerbation (H)     Infection due to 2019 novel coronavirus     S/P total hip arthroplasty         Have you ever done Advance Care Planning? (For example, a Health Directive, POLST, or a discussion with a medical provider or your loved ones about your wishes): Yes, advance care planning is on file.       Fall risk  Fallen 2 or more times in the past year?: No  Any fall with injury in the past year?: No    Cognitive Screening   Six  Item Cognitive Impairment Test   (6CIT):      What year is it?                               Correct - 0 points    What month is it?                               Correct - 0 points      Give the patient an address to remember with five components:   Gregory Mosher ( first and last name - 2 components)   [address]  (number and name of street - 2 components)   [place] ( city - 1 component)      About what time is it (within the hour)? Correct - 0 points    Count backwards from 20 to 1:   Correct - 0 points    Say the months of the year in reverse: Correct - 0 points    Repeat the address phrase:   1 error - 2 points    Total 6CIT Score:          Interpretation: The 6CIT uses an inverse score and questions are weighted to produce a total out of 28. Scores of 0-7 are considered normal and 8 or more significant.    Advantages The test has high sensitivity without compromising specificity even in mild dementia. It is easy to translate linguistically and culturally.  Disadvantages The main disadvantage is in the scoring and weighting of the test, which is initially confusing, however computer models have simplified this greatly.    Probability Statistics: At the 7/8 cut off: Overall figures sensitivity 90% specificity 100%, in mild dementia sensitivity = 78% , specificity = 100%    Copyright 2000 [facility], [facility]. Courtesy of Dr. Lucio Colindres      Do you have sleep apnea, excessive snoring or daytime drowsiness?: yes    Reviewed and updated as needed this visit by clinical staff   Tobacco   Meds              Reviewed and updated as needed this visit by Provider                 Social History     Tobacco Use     Smoking status: Former     Packs/day: 1.00     Years: 20.00     Pack years: 20.00     Types: Cigarettes     Quit date: 3/27/1990     Years since quittin.3     Smokeless tobacco: Never   Substance Use Topics     Alcohol use: Yes     Alcohol/week: 3.3 standard drinks of  alcohol     Comment: Alcoholic Drinks/day: 1-2 glasses of wine/week              7/11/2023     2:57 PM   Alcohol Use   Prescreen: >3 drinks/day or >7 drinks/week? No     Do you have a current opioid prescription? No  Do you use any other controlled substances or medications that are not prescribed by a provider? None              Current providers sharing in care for this patient include:   Patient Care Team:  Amber Alberto MD as PCP - General  Amber Alberto MD as Assigned PCP  Ba Foley MD as Assigned Heart and Vascular Provider    The following health maintenance items are reviewed in Epic and correct as of today:  Health Maintenance   Topic Date Due     URINE DRUG SCREEN  Never done     COPD ACTION PLAN  Never done     DEPRESSION ACTION PLAN  Never done     COVID-19 Vaccine (6 - Pfizer series) 11/07/2022     MEDICARE ANNUAL WELLNESS VISIT  06/13/2023     INFLUENZA VACCINE (1) 09/01/2023     PHQ-9  09/28/2023     ANNUAL REVIEW OF HM ORDERS  03/28/2024     FALL RISK ASSESSMENT  07/18/2024     ADVANCE CARE PLANNING  06/13/2027     LIPID  08/01/2027     DTAP/TDAP/TD IMMUNIZATION (4 - Td or Tdap) 02/19/2029     COLORECTAL CANCER SCREENING  10/27/2032     DEXA  04/21/2036     SPIROMETRY  Completed     HEPATITIS C SCREENING  Completed     Pneumococcal Vaccine: 65+ Years  Completed     ZOSTER IMMUNIZATION  Completed     IPV IMMUNIZATION  Aged Out     MENINGITIS IMMUNIZATION  Aged Out     MAMMO SCREENING  Discontinued       Current Outpatient Medications   Medication     acetaminophen (TYLENOL) 500 MG tablet     albuterol (PROAIR HFA/PROVENTIL HFA/VENTOLIN HFA) 108 (90 Base) MCG/ACT inhaler     alendronate (FOSAMAX) 70 MG tablet     aspirin (ASA) 81 MG EC tablet     b complex vitamins tablet     buPROPion (WELLBUTRIN XL) 150 MG 24 hr tablet     calcium, as carbonate, (TUMS) 200 mg calcium (500 mg) chewable tablet     cholecalciferol, vitamin D3, 1,000 unit tablet     docusate sodium (COLACE) 100 MG  "capsule     doxylamine (UNISOM) 25 mg tablet     Ferrous Sulfate (IRON) 28 MG TABS     losartan (COZAAR) 50 MG tablet     magnesium oxide (MAG-OX) 400 (240 Mg) MG tablet     melatonin 1 mg Tab tablet     metoprolol succinate ER (TOPROL XL) 100 MG 24 hr tablet     omeprazole (PRILOSEC) 20 MG DR capsule     peg 400-propylene glycol PF (SYSTANE) 0.4-0.3 % Dpet     rosuvastatin (CRESTOR) 20 MG tablet               tiZANidine (ZANAFLEX) 2 MG tablet     triamcinolone (NASACORT) 55 mcg nasal inhaler     TURMERIC PO     umeclidinium (INCRUSE ELLIPTA) 62.5 MCG/ACT inhaler     zolpidem (AMBIEN) 10 MG tablet     No current facility-administered medications for this visit.             Review of Systems   HENT: Positive for congestion and hearing loss.    Eyes: Positive for pain.   Respiratory: Positive for cough and shortness of breath.    Cardiovascular: Positive for peripheral edema.   Gastrointestinal: Positive for constipation and heartburn.   Breasts:  Negative for tenderness, breast mass and discharge.   Genitourinary: Positive for pelvic pain. Negative for vaginal bleeding and vaginal discharge.   Musculoskeletal: Positive for arthralgias, joint swelling and myalgias.   Neurological: Positive for dizziness, weakness and paresthesias.   Psychiatric/Behavioral: The patient is nervous/anxious.          OBJECTIVE:   /70 (BP Location: Left arm, Patient Position: Sitting, Cuff Size: Adult Large)   Pulse 53   Temp 98.5  F (36.9  C) (Tympanic)   Resp 16   Ht 1.626 m (5' 4\")   Wt 80.5 kg (177 lb 8 oz)   LMP  (LMP Unknown)   SpO2 95%   Breastfeeding No   BMI 30.47 kg/m   Estimated body mass index is 30.47 kg/m  as calculated from the following:    Height as of this encounter: 1.626 m (5' 4\").    Weight as of this encounter: 80.5 kg (177 lb 8 oz).  Physical Exam  GENERAL: healthy, alert and no distress  EYES: Eyes grossly normal to inspection, PERRL and conjunctivae and sclerae normal  HENT: ear canals and TM's " normal, nose and mouth without ulcers or lesions  NECK: no adenopathy, no asymmetry, masses, or scars and thyroid normal to palpation  RESP: lungs clear to auscultation - no rales, rhonchi or wheezes  BREAST: normal without masses, tenderness or nipple discharge and no palpable axillary masses or adenopathy  CV: regular rate and rhythm, normal S1 S2, no S3 or S4, no murmur, click or rub, no peripheral edema and peripheral pulses strong  ABDOMEN: soft, nontender, no hepatosplenomegaly, no masses and bowel sounds normal  MS: no gross musculoskeletal defects noted, no edema  SKIN: no suspicious lesions or rashes  NEURO: Normal strength and tone, mentation intact and speech normal  PSYCH: mentation appears normal, affect normal/bright        ASSESSMENT / PLAN:   (Z96.649) Status post total replacement of hip, unspecified laterality  (primary encounter diagnosis)  Comment:   Plan:     (J47.9) Bronchiectasis without acute exacerbation (H)  Comment:   Plan: She has not needed antibiotics for a long time    (I89.0) Acquired lymphedema  Comment: Continue conservative care  Plan:     (Z95.1) S/P CABG (coronary artery bypass graft)  Comment: Pressure controlled she is on a beta-blocker and a statin and aspirin  LDL Cholesterol Calculated   Date Value Ref Range Status   08/01/2022 49 <=100 mg/dL Final     LDL Cholesterol Direct   Date Value Ref Range Status   05/21/2018 55 <=129 mg/dl Final     Has achieved target LDL  Plan: Comprehensive metabolic panel, Lipid panel         reflex to direct LDL Fasting, Hemoglobin A1c,         TSH, CBC with platelets, Vitamin D Deficiency            (Z98.82) S/P bilateral breast implants  Comment: Had total mastectomy so does not need mammograms  Plan:     (M81.0) Age-related osteoporosis without current pathological fracture  Comment:   Plan: DX Hip/Pelvis/Spine        Needs a surveillance bone density on Fosamax to see stability she can get that done later this year or early next  year    (J42) Chronic bronchitis, unspecified chronic bronchitis type (H)  Comment:   Plan: Still gets occasional shortness of breath albuterol does help she is on the Incruse Ellipta for maintenance.  Recent stress test 3 years ago was negative.    (Z00.00) Health maintenance examination  Comment: colonoscopy  2022   repeat dexa 2020 next is due  No paps or pelvics THANH/BSO  Plan:   Vaccination is up-to-date.  (H53.9) Vision changes  Comment: Has some mild vitreous degeneration.  Her sort of flashing lights could be for ocular migraine but she does not want to take any specific treatment for it as she is not distressed by it she has had a full retinal exam which was normalPlan:     (M79.89) Leg swelling  Comment:   Plan:     (R79.9) Abnormal finding of blood chemistry, unspecified  Comment:   Plan: Lipid panel reflex to direct LDL Fasting,         Hemoglobin A1c, CBC with platelets            (Z86.39) H/O vitamin D deficiency  Comment:   Plan: TSH            (E55.9) Vitamin D deficiency  Comment:   Plan: Vitamin D Deficiency            (B35.1) Onychomycosis due to dermatophyte  Comment: She has mild onychomycosis of all the toenails.  The Diflucan did help with tinea pedis.  She really does want to treat this.  Did explain risks and benefits of Lamisil she will take it in 3 months and recheck liver tests in 3 months  Plan: terbinafine (LAMISIL) 250 MG tablet, AST, ALT            (N39.0) Recurrent UTI  Comment:   Plan: UA Macroscopic with reflex to Microscopic and         Culture - Lab Collect, UA Microscopic with         Reflex to Culture        He is currently asymptomatic but has had multidrug-resistant E. coli in the past    (E66.01) Morbid obesity (H)  Comment:   Plan: semaglutide (OZEMPIC) 2 MG/3ML pen        She is concerned that her BMI is now 30.  She does want to talk medication she has struggled with weight loss in the past with that with the diet low calorie diets.  Explained risks and benefits of Ozempic  "and cost issues.  She will increment dose every 4 weeks to start on the knows not lowest dose.            COUNSELING:  Reviewed preventive health counseling, as reflected in patient instructions     balwinder only for trips   BMI:   Estimated body mass index is 30.47 kg/m  as calculated from the following:    Height as of this encounter: 1.626 m (5' 4\").    Weight as of this encounter: 80.5 kg (177 lb 8 oz).         She reports that she quit smoking about 33 years ago. She has a 20.00 pack-year smoking history. She has never used smokeless tobacco.      Appropriate preventive services were discussed with this patient, including applicable screening as appropriate for cardiovascular disease, diabetes, osteopenia/osteoporosis, and glaucoma.  As appropriate for age/gender, discussed screening for colorectal cancer, prostate cancer, breast cancer, and cervical cancer. Checklist reviewing preventive services available has been given to the patient.    Reviewed patients plan of care and provided an AVS. The Basic Care Plan (routine screening as documented in Health Maintenance) for Lisa meets the Care Plan requirement. This Care Plan has been established and reviewed with the Patient.          Amber Alberto MD  [facility]    Identified Health Risks:    I have reviewed Opioid Use Disorder and Substance Use Disorder risk factors and made any needed referrals.     "

## 2023-07-31 DIAGNOSIS — B35.1 ONYCHOMYCOSIS DUE TO DERMATOPHYTE: ICD-10-CM

## 2023-07-31 DIAGNOSIS — J42 CHRONIC BRONCHITIS, UNSPECIFIED CHRONIC BRONCHITIS TYPE (H): ICD-10-CM

## 2023-07-31 RX ORDER — UMECLIDINIUM 62.5 UG/1
AEROSOL, POWDER ORAL
Qty: 1 EACH | Refills: 3 | Status: SHIPPED | OUTPATIENT
Start: 2023-07-31 | End: 2023-12-20

## 2023-07-31 RX ORDER — TERBINAFINE HYDROCHLORIDE 250 MG/1
250 TABLET ORAL DAILY
Qty: 90 TABLET | Refills: 0 | Status: SHIPPED | OUTPATIENT
Start: 2023-07-31 | End: 2024-02-26

## 2023-08-07 ENCOUNTER — MYC MEDICAL ADVICE (OUTPATIENT)
Dept: INTERNAL MEDICINE | Facility: CLINIC | Age: 73
End: 2023-08-07
Payer: COMMERCIAL

## 2023-08-08 ENCOUNTER — TELEPHONE (OUTPATIENT)
Dept: INTERNAL MEDICINE | Facility: CLINIC | Age: 73
End: 2023-08-08
Payer: COMMERCIAL

## 2023-08-08 NOTE — TELEPHONE ENCOUNTER
Please initiate Prior Auth for:    semaglutide (OZEMPIC) 2 MG/3ML pen 3 mL 3 7/18/2023  --   Sig - Route: Inject 0.25 mg Subcutaneous every 7 days - Subcutaneous   Sent to pharmacy as: Semaglutide(0.25 or 0.5MG/DOS) 2 MG/3ML Solution Pen-injector (OZEMPIC)

## 2023-08-10 NOTE — TELEPHONE ENCOUNTER
Central Prior Authorization Team   Phone: [phone]    PA Initiation    Medication: Ozempic (0.25 or 0.5 MG/DOSE) 2MG/3ML pen-injectors  Insurance Company: [employer] - Phone [phone] Fax [phone]  Pharmacy Filling the Rx: [id number] - [place], [place] - [address]  Filling Pharmacy Phone: [phone]  Filling Pharmacy Fax:    Start Date: 8/10/2023

## 2023-08-14 NOTE — TELEPHONE ENCOUNTER
PRIOR AUTHORIZATION DENIED    Medication: Ozempic (0.25 or 0.5 MG/DOSE) 2MG/3ML pen-injectors    Denial Date: 8/14/2023    Denial Rational: Medication is only covered if being prescribed for Type 2 Diabetes.  It is not covered for any other diagnosis.

## 2023-08-15 ENCOUNTER — NURSE TRIAGE (OUTPATIENT)
Dept: NURSING | Facility: CLINIC | Age: 73
End: 2023-08-15
Payer: COMMERCIAL

## 2023-08-15 NOTE — TELEPHONE ENCOUNTER
"Triage Call:     Pt calling to report that she has right eyelid swelling, redness and pain since Sunday night  Pt states that she is unsure of the cause.   She feels that the redness and swelling is increasing and now affecting her lower part of her eye as well    Eyelid is Itchy and if she touches it is painful  No fever  No open area or drainage    Disposition: See in office today. Pt was given care advice and was transferred to [facility]. Pt is aware of the nearby [facility] as well.     Reason for Disposition   Eyelid is red and painful (or tender to touch)    Additional Information   Negative: Unresponsive, passed out or very weak   Negative: Difficulty breathing or wheezing   Negative: Difficulty swallowing or slurred speech and sudden onset   Negative: Sounds like a life-threatening emergency to the triager   Negative: SEVERE eyelid swelling (i.e., shut or almost) and fever   Negative: Pregnant 20 or more weeks and sudden weight gain (e.g., more than 3 lbs or 1.4 kg in one week)   Negative: Postpartum (from 0 to 6 weeks after delivery) and sudden weight gain (e.g., more than 3 lbs or 1.4 kg in one week)   Negative: Eyelid (outer) is very red and fever   Negative: Patient sounds very sick or weak to the triager   Negative: SEVERE eyelid swelling (i.e., shut or almost) and involves both eyes   Negative: SEVERE eyelid swelling and taking an ACE Inhibitor medication (e.g., benazepril/LOTENSIN, captopril/CAPOTEN, enalapril/VASOTEC, lisinopril/ZESTRIL)   Negative: SEVERE eyelid swelling on one side that is red and painful (or tender to touch)   Negative: SEVERE eyelid swelling on one side and sinus pain or pressure   Negative: Fever   Negative: Painful rash and multiple small blisters grouped together (i.e., dermatomal distribution or \"band\" or \"stripe\")   Negative: MODERATE to SEVERE eyelid swelling on one side  (Exception: Due to a mosquito bite.)    Protocols used: Eye - Swelling-A-OH  Argentina Hill RN  [facility] " Nurse Advisor 12:00 PM 8/15/2023

## 2023-08-28 DIAGNOSIS — I10 HTN (HYPERTENSION): ICD-10-CM

## 2023-08-28 DIAGNOSIS — Z95.1 S/P CABG (CORONARY ARTERY BYPASS GRAFT): ICD-10-CM

## 2023-08-28 DIAGNOSIS — G89.29 OTHER CHRONIC PAIN: ICD-10-CM

## 2023-08-28 RX ORDER — METOPROLOL SUCCINATE 100 MG/1
100 TABLET, EXTENDED RELEASE ORAL AT BEDTIME
Qty: 90 TABLET | Refills: 0 | Status: SHIPPED | OUTPATIENT
Start: 2023-08-28 | End: 2023-12-07

## 2023-08-28 RX ORDER — ACETAMINOPHEN AND CODEINE PHOSPHATE 300; 30 MG/1; MG/1
TABLET ORAL
Qty: 84 TABLET | Refills: 5 | Status: SHIPPED | OUTPATIENT
Start: 2023-08-28 | End: 2024-03-29

## 2023-09-06 DIAGNOSIS — Z95.1 S/P CABG (CORONARY ARTERY BYPASS GRAFT): ICD-10-CM

## 2023-09-06 RX ORDER — ROSUVASTATIN CALCIUM 20 MG/1
20 TABLET, COATED ORAL AT BEDTIME
Qty: 90 TABLET | Refills: 3 | Status: SHIPPED | OUTPATIENT
Start: 2023-09-06 | End: 2024-08-01

## 2023-09-06 NOTE — TELEPHONE ENCOUNTER
"Last Written Prescription Date:  7/9/2023  Last Fill Quantity: 90,  # refills: 0   Last office visit provider:  7/18/2023     Requested Prescriptions   Pending Prescriptions Disp Refills    rosuvastatin (CRESTOR) 20 MG tablet [Pharmacy Med Name: ROSUVASTATIN CALCIUM 20 MG TAB] 90 tablet 0     Sig: TAKE 1 TABLET BY MOUTH AT BEDTIME       Statins Protocol Passed - 9/6/2023 11:02 AM        Passed - LDL on file in past 12 months     Recent Labs   Lab Test 07/18/23  0949   LDL 44             Passed - No abnormal creatine kinase in past 12 months     No lab results found.             Passed - Recent (12 mo) or future (30 days) visit within the authorizing provider's specialty     Patient has had an office visit with the authorizing provider or a provider within the authorizing providers department within the previous 12 mos or has a future within next 30 days. See \"Patient Info\" tab in inbasket, or \"Choose Columns\" in Meds & Orders section of the refill encounter.              Passed - Medication is active on med list        Passed - Patient is age 18 or older        Passed - No active pregnancy on record        Passed - No positive pregnancy test in past 12 months             Elizabeth Adamson RN 09/06/23 4:02 PM  "

## 2023-09-15 ENCOUNTER — MYC MEDICAL ADVICE (OUTPATIENT)
Dept: INTERNAL MEDICINE | Facility: CLINIC | Age: 73
End: 2023-09-15
Payer: COMMERCIAL

## 2023-09-27 DIAGNOSIS — M81.0 AGE-RELATED OSTEOPOROSIS WITHOUT CURRENT PATHOLOGICAL FRACTURE: ICD-10-CM

## 2023-09-27 RX ORDER — ALENDRONATE SODIUM 70 MG/1
70 TABLET ORAL
Qty: 12 TABLET | Refills: 1 | Status: SHIPPED | OUTPATIENT
Start: 2023-09-27 | End: 2024-04-22

## 2023-10-02 ENCOUNTER — MYC MEDICAL ADVICE (OUTPATIENT)
Dept: INTERNAL MEDICINE | Facility: CLINIC | Age: 73
End: 2023-10-02
Payer: COMMERCIAL

## 2023-10-09 DIAGNOSIS — K21.9 GASTROESOPHAGEAL REFLUX DISEASE WITHOUT ESOPHAGITIS: ICD-10-CM

## 2023-11-04 DIAGNOSIS — G47.00 INSOMNIA, UNSPECIFIED TYPE: ICD-10-CM

## 2023-11-06 RX ORDER — ZOLPIDEM TARTRATE 10 MG/1
10 TABLET ORAL
Qty: 30 TABLET | Refills: 3 | Status: SHIPPED | OUTPATIENT
Start: 2023-11-06

## 2023-11-27 ENCOUNTER — E-VISIT (OUTPATIENT)
Dept: INTERNAL MEDICINE | Facility: CLINIC | Age: 73
End: 2023-11-27
Payer: COMMERCIAL

## 2023-11-27 DIAGNOSIS — J32.9 CHRONIC SINUSITIS, UNSPECIFIED LOCATION: Primary | ICD-10-CM

## 2023-11-27 PROCEDURE — 99421 OL DIG E/M SVC 5-10 MIN: CPT | Performed by: INTERNAL MEDICINE

## 2023-12-06 DIAGNOSIS — Z95.1 S/P CABG (CORONARY ARTERY BYPASS GRAFT): ICD-10-CM

## 2023-12-06 DIAGNOSIS — I10 HTN (HYPERTENSION): ICD-10-CM

## 2023-12-07 RX ORDER — METOPROLOL SUCCINATE 100 MG/1
100 TABLET, EXTENDED RELEASE ORAL AT BEDTIME
Qty: 30 TABLET | Refills: 0 | Status: SHIPPED | OUTPATIENT
Start: 2023-12-07 | End: 2023-12-20

## 2023-12-11 DIAGNOSIS — J42 CHRONIC BRONCHITIS, UNSPECIFIED CHRONIC BRONCHITIS TYPE (H): ICD-10-CM

## 2023-12-11 RX ORDER — UMECLIDINIUM 62.5 UG/1
1 AEROSOL, POWDER ORAL DAILY
Refills: 3 | OUTPATIENT
Start: 2023-12-11

## 2023-12-20 ENCOUNTER — OFFICE VISIT (OUTPATIENT)
Dept: INTERNAL MEDICINE | Facility: CLINIC | Age: 73
End: 2023-12-20
Payer: COMMERCIAL

## 2023-12-20 VITALS
WEIGHT: 168 LBS | BODY MASS INDEX: 28.68 KG/M2 | HEART RATE: 50 BPM | HEIGHT: 64 IN | RESPIRATION RATE: 16 BRPM | DIASTOLIC BLOOD PRESSURE: 72 MMHG | SYSTOLIC BLOOD PRESSURE: 128 MMHG | OXYGEN SATURATION: 97 % | TEMPERATURE: 97.7 F

## 2023-12-20 DIAGNOSIS — J47.9 BRONCHIECTASIS WITHOUT ACUTE EXACERBATION (H): ICD-10-CM

## 2023-12-20 DIAGNOSIS — M81.0 AGE-RELATED OSTEOPOROSIS WITHOUT CURRENT PATHOLOGICAL FRACTURE: ICD-10-CM

## 2023-12-20 DIAGNOSIS — Z95.1 S/P CABG (CORONARY ARTERY BYPASS GRAFT): ICD-10-CM

## 2023-12-20 DIAGNOSIS — G43.109 OCULAR MIGRAINE: Primary | ICD-10-CM

## 2023-12-20 DIAGNOSIS — Z00.00 HEALTH MAINTENANCE EXAMINATION: ICD-10-CM

## 2023-12-20 DIAGNOSIS — I10 ESSENTIAL HYPERTENSION, BENIGN: ICD-10-CM

## 2023-12-20 DIAGNOSIS — J42 CHRONIC BRONCHITIS, UNSPECIFIED CHRONIC BRONCHITIS TYPE (H): ICD-10-CM

## 2023-12-20 DIAGNOSIS — I10 HYPERTENSION, UNSPECIFIED TYPE: ICD-10-CM

## 2023-12-20 PROCEDURE — 99214 OFFICE O/P EST MOD 30 MIN: CPT | Performed by: INTERNAL MEDICINE

## 2023-12-20 RX ORDER — UMECLIDINIUM 62.5 UG/1
1 AEROSOL, POWDER ORAL DAILY
Qty: 1 EACH | Refills: 3 | Status: SHIPPED | OUTPATIENT
Start: 2023-12-20 | End: 2024-04-15

## 2023-12-20 RX ORDER — TIZANIDINE 2 MG/1
2 TABLET ORAL AT BEDTIME
Qty: 90 TABLET | Refills: 3 | Status: SHIPPED | OUTPATIENT
Start: 2023-12-20

## 2023-12-20 RX ORDER — METOPROLOL SUCCINATE 100 MG/1
100 TABLET, EXTENDED RELEASE ORAL AT BEDTIME
Qty: 90 TABLET | Refills: 3 | Status: SHIPPED | OUTPATIENT
Start: 2023-12-20 | End: 2023-12-29

## 2023-12-20 RX ORDER — FLUTICASONE FUROATE, UMECLIDINIUM BROMIDE AND VILANTEROL TRIFENATATE 100; 62.5; 25 UG/1; UG/1; UG/1
1 POWDER RESPIRATORY (INHALATION) DAILY
Qty: 60 EACH | Refills: 3 | Status: SHIPPED | OUTPATIENT
Start: 2023-12-20 | End: 2024-05-20

## 2023-12-20 ASSESSMENT — PAIN SCALES - GENERAL: PAINLEVEL: NO PAIN (0)

## 2023-12-20 NOTE — PROGRESS NOTES
Assessment & Plan     Chronic bronchitis, unspecified chronic bronchitis type (H)  Does find benefit with the Incruse but has noticed more breakthrough breathing problems.  Will send in a prescription of Trelegy to see if it is also covered as that might maximize her bronchodilation.  Of note she has had PFTs that showed probably small airway reversible obstructive airway disease.  But no clear-cut sign of asthma or COPD or a diffusion defect.  CT chest showed some bronchiectasis no emphysema or interstitial lung disease.  - umeclidinium (INCRUSE ELLIPTA) 62.5 MCG/ACT inhaler; Inhale 1 puff into the lungs daily  - Fluticasone-Umeclidin-Vilant (TRELEGY ELLIPTA) 100-62.5-25 MCG/ACT oral inhaler; Inhale 1 puff into the lungs daily  Recently recovered from sinusitis.  Low threshold for giving her antibiotics due to her bronchiectasis history  Hypertension, unspecified type  Blood pressure is in good control  - metoprolol succinate ER (TOPROL XL) 100 MG 24 hr tablet; Take 1 tablet (100 mg) by mouth at bedtime --Needs to schedule cardiology follow-up appt for further refills  - tiZANidine (ZANAFLEX) 2 MG tablet; Take 1 tablet (2 mg) by mouth at bedtime    S/P CABG (coronary artery bypass graft)  She has no symptoms of angina is on aspirin and statin.  Tress test in 2019 was negative.  Going 2022 was normal  - metoprolol succinate ER (TOPROL XL) 100 MG 24 hr tablet; Take 1 tablet (100 mg) by mouth at bedtime --  Ocular migraine  The kaleidoscopic changes in her eyes vision is suggestive of ocular migraine that is what her ophthalmologist also sent.  No signs to suggest TIAs.  She did have ultrasound carotid    Essential hypertension, benign      Age-rlated osteoporosis without current pathological fracture  He is on FosVolta    Health maintenance examination  Health maintenance reviewed and is up-to-date  Including RSV  Bronchiectasis without acute exacerbation (H)    Is seeing cardiology.  She may need a CT angiogram as  "her stress test was negative but is to just check the status of her coronary arteries.  Her chest tightness is most likely lung related.  She can discuss with her cardiologist           BMI:   Estimated body mass index is 28.84 kg/m  as calculated from the following:    Height as of this encounter: 1.626 m (5' 4\").    Weight as of this encounter: 76.2 kg (168 lb).           Amber Alberto MD  [facility]    Subjective   Pat is a 73 year old, presenting for the following health issues:  LDL Cholesterol Calculated   Date Value Ref Range Status   07/18/2023 44 <=100 mg/dL Final     LDL Cholesterol Direct   Date Value Ref Range Status   05/21/2018 55 <=129 mg/dl Final       office visit, Recheck Medication, and Refill Request (Pt reports that she is here for med refill, and follow up for sinus issue from past visit)      12/20/2023     2:18 PM   Additional Questions   Roomed by maite   Accompanied by alone         12/20/2023     2:18 PM   Patient Reported Additional Medications   Patient reports taking the following new medications none       History of Present Illness       CKD: She uses over the counter pain medication, including Tylenol, two times daily.    COPD:  She presents for follow up of COPD.   Overall, COPD symptoms are slightly worse since last visit. She has more than usual fatigue or shortness of breath with exertion and same as usual shortness of breath at rest.  She often coughs and does not have change in sputum. No recent fever. She can walk 1-2 miles without stopping to rest. She can walk 1 flights of stairs without resting. The patient has had no ED, urgent care, or hospital admissions because of COPD since the last visit.     She eats 2-3 servings of fruits and vegetables daily.She consumes 0 sweetened beverage(s) daily.She exercises with enough effort to increase her heart rate 20 to 29 minutes per day.  She exercises with enough effort to increase her heart rate 5 " "days per week.   She is taking medications regularly.         Pt reports that she is here for follow up and for med refills        Review of Systems         Objective    /72 (BP Location: Left arm, Patient Position: Sitting, Cuff Size: Adult Regular)   Pulse 50   Temp 97.7  F (36.5  C) (Tympanic)   Resp 16   Ht 1.626 m (5' 4\")   Wt 76.2 kg (168 lb)   LMP  (LMP Unknown)   SpO2 97%   Breastfeeding No   BMI 28.84 kg/m    Body mass index is 28.84 kg/m .  Physical Exam                         "

## 2023-12-28 NOTE — PROGRESS NOTES
HEART CARE ENCOUNTER CONSULTATON NOTE      [facility]  [phone]      Assessment/Recommendations   Assessment:   Dyspnea on exertion: Hx of Bradycardia 56 bpm on metoprolol 100 mg daily.    - Possible heart rate blunting contribution to exertional dyspnea, and patient is agreeable to trial reduction in this medication to appreciate effect on exertional symptoms.  Of possible rebound tachycardia. Discussed intention to continue some dose of metoprolol with CAD/CABG history.  Coronary artery disease s/p CABGx3 2019: and distant PCI, no angina on aspirin  - Chronic, mild progression in exertional dyspnea.  Last NM stress test 2019 following CABG showed no stress-induced ischemia  - LDL 44 at goal, Rosuvastatin 20 mg  Hypertension: Controlled on Losartan, metoprolol succinate  Chronic bronchitis: Chronic dyspnea  ARSEN: uses CPAP    Plan:   Trial reduction in metoprolol to 50 mg once daily, reports symptoms stability/worsening/improvement in 2 weeks to 1 month.  Reports sooner with any negative side effects  Will resume home blood pressure monitoring, potential need to increase losartan  Continue other medications without change  Continue CPAP  Consider repeating ischemic evaluation after update with metoprolol dose change          Follow up in 1 year or sooner as needed     History of Present Illness/Subjective    HPI: Lisa Ray is a 73 year old female with PMHx of CAD/CABG, HTN, HLD, bronchitis/bronchiectasis, ARSEN presents for follow-up.  She reports continued chronic dyspnea with exertion, feels it has increased over the last year.  No sudden change or development of chest pain with exertion.  Did improve following bypass but has steadily worsened over the years.  She had overall normal echocardiogram about 1 year ago without any wall motion abnormality days, LVEF 55-60%.  She denies lower extremity swelling except for significant swelling of the left lower extremity after 2 flights  "following SVG graft.  She does have a history of chronic bronchitis/bronchiectasis for which she takes multiple inhalers.  PCP recommended trying Trelegy inhaler but it was too expensive.  Is to avoid exertion due to symptoms.  She is often bradycardic in the 50s bpm on metoprolol 100 mg daily.  Her blood pressure has been adequately controlled in the last year, but she does not monitor this at home      She denies lightheadedness, orthopnea, PND, palpitations, and chest pain.        Echocardiogram 8/2022 Results:  Left ventricular size, wall motion and function are normal. The ejection  fraction is 55-60%.  There is mild concentric left ventricular hypertrophy.  Normal right ventricle size and systolic function.  No hemodynamically significant valvular abnormalities on 2D or color flow  imaging.     Physical Examination  Review of Systems   Vitals: /68 (BP Location: Right arm, Patient Position: Sitting, Cuff Size: Adult Regular)   Pulse 56   Resp 16   Ht 1.626 m (5' 4\")   Wt 77.3 kg (170 lb 6.4 oz)   LMP  (LMP Unknown)   SpO2 96%   BMI 29.25 kg/m    BMI= Body mass index is 29.25 kg/m .  Wt Readings from Last 3 Encounters:   12/29/23 77.3 kg (170 lb 6.4 oz)   12/20/23 76.2 kg (168 lb)   07/18/23 80.5 kg (177 lb 8 oz)           ENT/Mouth: membranes moist, no oral lesions or bleeding gums.      EYES:  no scleral icterus, normal conjunctivae                    Neck: No carotid bruit or thyromegaly   Chest/Lungs:   lungs are clear to auscultation, no rales or wheezing, , equal chest wall expansion    Cardiovascular:   Regular. Normal first and second heart sounds with no murmurs, rubs, or gallops; the carotid, radial and posterior tibial pulses are intact, absent edema bilaterally        Extremities: no cyanosis or clubbing   Skin: no xanthelasma, warm.    Neurologic: no tremors     Psychiatric: alert and oriented x3, calm        Please refer above for cardiac ROS details.        Medical History  Surgical " History Family History Social History   Past Medical History:   Diagnosis Date    Anemia 2012    microcytic from Celebrex. GI w/u neg    Antiplatelet or antithrombotic long-term use     Back pain     Benign neoplasm of colon     Created by Conversion     Bronchiectasis (H)     CAD (coronary artery disease) 2008    RCA- stent    Cancer (H)     breast cancer    DJD (degenerative joint disease)     Dysthymia     Fatigue     recurrent/variable - no serious pathology    Fibromyalgia     GERD (gastroesophageal reflux disease)     History of gastric ulcer     Hx of CABG     Hypertension     Iritis     past Hx.-left eye    ARSEN (obstructive sleep apnea) 6/5/2019    Raynaud's disease     Rosacea     Shortness of breath     with exertion    Shoulder tendonitis     right    Stented coronary artery     Ulcer of intestine     small bowel- 10 years ago    UTI (urinary tract infection)     Jan. 2019     Past Surgical History:   Procedure Laterality Date    ARTHROPLASTY, HIP, TOTAL, DIRECT ANTERIOR APPROACH, USING HANA TABLE Left 2/4/2022    Procedure: LEFT DIRECT ANTERIOR TOTAL HIP ARTHROPLASTY;  Surgeon: Deny Bryson MD;  Location[facility] OR    BELPHAROPTOSIS REPAIR Bilateral 2013    Dr. Eder Bingham- 2013    BREAST SURGERY Bilateral 11/2017    bilateral mastectomy 2017- 11/2017    BREAST SURGERY  2018    implants and revsion 2    CORONARY STENT PLACEMENT Right 2008    Dr. Schwab    CV CORONARY ANGIOGRAM N/A 3/26/2019    Procedure: Coronary Angiogram;  Surgeon: Ba Foley MD;  Location: [facility];  Service: Cardiology    HYSTERECTOMY      OOPHORECTOMY      TOTAL HIP ARTHROPLASTY Right 12/7/2015    Procedure: RIGHT TOTAL HIP ARTHROPLASTY;  Surgeon: Tera Yeung MD;  Location: [facility];  Service:     [facility] APPENDECTOMY      Description: Appendectomy;  Recorded: 01/13/2009;     Family History   Problem Relation Age of Onset    Hereditary Breast and Ovarian Cancer Syndrome Sister      Hereditary Breast and Ovarian Cancer Syndrome Paternal Grandfather     Hereditary Breast and Ovarian Cancer Syndrome Cousin     Asthma Cousin     Alcoholism Father          GI bleed age 67    Colon Cancer Other     Breast Cancer Sister         metastatic age 49, remission with Rx    Coronary Artery Disease Brother         CABG    Breast Cancer Paternal Grandmother     Breast Cancer Cousin     Colon Cancer Mother         Social History     Socioeconomic History    Marital status:      Spouse name: Not on file    Number of children: 3    Years of education: Not on file    Highest education level: Not on file   Occupational History    Not on file   Tobacco Use    Smoking status: Former     Packs/day: 1.00     Years: 20.00     Additional pack years: 0.00     Total pack years: 20.00     Types: Cigarettes     Quit date: 3/27/1990     Years since quittin.7    Smokeless tobacco: Never   Substance and Sexual Activity    Alcohol use: Yes     Alcohol/week: 3.3 standard drinks of alcohol     Comment: Alcoholic Drinks/day: 1-2 glasses of wine/week     Drug use: No    Sexual activity: Not on file   Other Topics Concern    Not on file   Social History Narrative    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 3 grown sons Aries in - , Josse SAMUELS, -microbrewer/beer, ; Gregory Lu- paramedic [employer]...... Non smoker rare ETOH.        Social Determinants of Health     Financial Resource Strain: Low Risk  (2023)    Financial Resource Strain     Within the past 12 months, have you or your family members you live with been unable to get utilities (heat, electricity) when it was really needed?: No   Food Insecurity: Low Risk  (2023)    Food Insecurity     Within the past 12 months, did you worry that your food would run out before you got money to buy more?: No     Within the past 12 months, did the food you bought just not last and you didn t have money to get  more?: No   Transportation Needs: Low Risk  (11/27/2023)    Transportation Needs     Within the past 12 months, has lack of transportation kept you from medical appointments, getting your medicines, non-medical meetings or appointments, work, or from getting things that you need?: No   Physical Activity: Not on file   Stress: Not on file   Social Connections: Not on file   Interpersonal Safety: Low Risk  (12/20/2023)    Interpersonal Safety     Do you feel physically and emotionally safe where you currently live?: Yes     Within the past 12 months, have you been hit, slapped, kicked or otherwise physically hurt by someone?: No     Within the past 12 months, have you been humiliated or emotionally abused in other ways by your partner or ex-partner?: No   Housing Stability: Low Risk  (11/27/2023)    Housing Stability     Do you have housing? : Yes     Are you worried about losing your housing?: No           Medications  Allergies   Current Outpatient Medications   Medication Sig Dispense Refill    acetaminophen (TYLENOL) 500 MG tablet [ACETAMINOPHEN (TYLENOL) 500 MG TABLET] Take 500 mg by mouth every 6 (six) hours as needed for pain (arthritis).      acetaminophen-codeine (TYLENOL #3) 300-30 MG per tablet TAKE 1 TO 2 TABLETS BY MOUTH EVERY 4 HOURS AS NEEDED FOR PAIN 84 tablet 5    albuterol (PROAIR HFA/PROVENTIL HFA/VENTOLIN HFA) 108 (90 Base) MCG/ACT inhaler INHALE 2 PUFFS INTO THE LUNGS EVERY 6 HOURS 18 g 4    alendronate (FOSAMAX) 70 MG tablet TAKE 1 TABLET BY MOUTH ONE TIME PER WEEK 12 tablet 1    aspirin (ASA) 81 MG EC tablet Take 1 tablet (81 mg) by mouth 2 times daily      b complex vitamins tablet [B COMPLEX VITAMINS TABLET] Take 1 tablet by mouth daily.      buPROPion (WELLBUTRIN XL) 150 MG 24 hr tablet TAKE 1 TABLET BY MOUTH EVERY MORNING 90 tablet 3    calcium, as carbonate, (TUMS) 200 mg calcium (500 mg) chewable tablet [CALCIUM, AS CARBONATE, (TUMS) 200 MG CALCIUM (500 MG) CHEWABLE TABLET] Chew 2 tablets  daily.      cholecalciferol, vitamin D3, 1,000 unit tablet Take 2,000 Units by mouth daily       docusate sodium (COLACE) 100 MG capsule [DOCUSATE SODIUM (COLACE) 100 MG CAPSULE] Take 1 capsule (100 mg total) by mouth 2 (two) times a day as needed for constipation.  0    doxylamine (UNISOM) 25 mg tablet Take 25 mg by mouth nightly as needed for sleep       Ferrous Sulfate (IRON) 28 MG TABS Take 1 tablet by mouth daily       Fluticasone-Umeclidin-Vilant (TRELEGY ELLIPTA) 100-62.5-25 MCG/ACT oral inhaler Inhale 1 puff into the lungs daily 60 each 3    losartan (COZAAR) 50 MG tablet TAKE 1 TABLET BY MOUTH EVERY DAY 90 tablet 3    magnesium oxide (MAG-OX) 400 (240 Mg) MG tablet Take 400 mg by mouth At Bedtime      melatonin 1 mg Tab tablet [MELATONIN 1 MG TAB TABLET] Take 1 mg by mouth at bedtime.             metoprolol succinate ER (TOPROL XL) 100 MG 24 hr tablet Take 1 tablet (100 mg) by mouth at bedtime --Needs to schedule cardiology follow-up appt for further refills 90 tablet 3    omeprazole (PRILOSEC) 20 MG DR capsule TAKE 1 CAPSULE BY MOUTH EVERY DAY 90 capsule 1    peg 400-propylene glycol PF (SYSTANE) 0.4-0.3 % Dpet [-PROPYLENE GLYCOL PF (SYSTANE) 0.4-0.3 % DPET] Administer 1 drop to both eyes 2 (two) times a day as needed.      rosuvastatin (CRESTOR) 20 MG tablet TAKE 1 TABLET BY MOUTH AT BEDTIME 90 tablet 3    tiZANidine (ZANAFLEX) 2 MG tablet Take 1 tablet (2 mg) by mouth at bedtime 90 tablet 3    triamcinolone (NASACORT) 55 mcg nasal inhaler [TRIAMCINOLONE (NASACORT) 55 MCG NASAL INHALER] Apply 2 sprays into each nostril daily as needed.      TURMERIC PO Take 1 capsule by mouth daily       umeclidinium (INCRUSE ELLIPTA) 62.5 MCG/ACT inhaler Inhale 1 puff into the lungs daily 1 each 3    zolpidem (AMBIEN) 10 MG tablet TAKE 1 TABLET (10 MG) BY MOUTH NIGHTLY AS NEEDED FOR SLEEP 30 tablet 3       Allergies   Allergen Reactions    Latex Unknown     Added based on information entered during log entry,  "please review and add reactions, type, and severity as needed    Nsaids (Non-Steroidal Anti-Inflammatory Drug) [Nsaids] Unknown     Other reaction(s): GI Bleeding, GI Upset, Sensitivity.  Ulceration w/ Celebrex          Lab Results    Chemistry/lipid CBC Cardiac Enzymes/BNP/TSH/INR   Recent Labs   Lab Test 07/18/23  0949   CHOL 119   HDL 41*   LDL 44   TRIG 172*     Recent Labs   Lab Test 07/18/23  0949 08/01/22  0953 06/13/22  1017   LDL 44 49 53     Recent Labs   Lab Test 07/18/23  0949      POTASSIUM 4.8   CHLORIDE 103   CO2 25   *   BUN 21.7   CR 0.84   GFRESTIMATED 73   RANDA 9.2     Recent Labs   Lab Test 07/18/23  0949 03/11/23  1412 06/13/22  1017   CR 0.84 0.84 0.84     Recent Labs   Lab Test 07/18/23  0949 06/13/22  1017 04/20/21  0955   A1C 5.9* 5.8* 6.0*          Recent Labs   Lab Test 07/18/23  0949   WBC 7.6   HGB 15.3   HCT 46.7   MCV 99        Recent Labs   Lab Test 07/18/23  0949 03/11/23  1412 06/13/22  1017   HGB 15.3 15.8* 14.7    No results for input(s): \"TROPONINI\" in the last 64700 hours.  No results for input(s): \"BNP\", \"NTBNPI\", \"NTBNP\" in the last 58058 hours.  Recent Labs   Lab Test 07/18/23  0949   TSH 2.66     Recent Labs   Lab Test 03/29/19  0408 03/28/19  1726 03/27/19  0839   INR 1.32* 1.32* 1.01        Carmen Knox PA-C                                       "

## 2023-12-29 ENCOUNTER — OFFICE VISIT (OUTPATIENT)
Dept: CARDIOLOGY | Facility: CLINIC | Age: 73
End: 2023-12-29
Payer: COMMERCIAL

## 2023-12-29 VITALS
SYSTOLIC BLOOD PRESSURE: 136 MMHG | HEIGHT: 64 IN | RESPIRATION RATE: 16 BRPM | OXYGEN SATURATION: 96 % | WEIGHT: 170.4 LBS | BODY MASS INDEX: 29.09 KG/M2 | HEART RATE: 56 BPM | DIASTOLIC BLOOD PRESSURE: 68 MMHG

## 2023-12-29 DIAGNOSIS — R06.09 DYSPNEA ON EXERTION: ICD-10-CM

## 2023-12-29 DIAGNOSIS — I25.119 ATHEROSCLEROSIS OF NATIVE CORONARY ARTERY OF NATIVE HEART WITH ANGINA PECTORIS (H): Primary | Chronic | ICD-10-CM

## 2023-12-29 DIAGNOSIS — Z95.1 S/P CABG (CORONARY ARTERY BYPASS GRAFT): ICD-10-CM

## 2023-12-29 DIAGNOSIS — G47.33 OSA (OBSTRUCTIVE SLEEP APNEA): ICD-10-CM

## 2023-12-29 DIAGNOSIS — I10 PRIMARY HYPERTENSION: ICD-10-CM

## 2023-12-29 DIAGNOSIS — J42 CHRONIC BRONCHITIS, UNSPECIFIED CHRONIC BRONCHITIS TYPE (H): ICD-10-CM

## 2023-12-29 PROCEDURE — 99214 OFFICE O/P EST MOD 30 MIN: CPT

## 2023-12-29 RX ORDER — METOPROLOL SUCCINATE 100 MG/1
50 TABLET, EXTENDED RELEASE ORAL AT BEDTIME
Qty: 90 TABLET | Refills: 3 | Status: SHIPPED | OUTPATIENT
Start: 2023-12-29 | End: 2024-09-13

## 2023-12-29 NOTE — LETTER
12/29/2023    Amber Alberto MD  [address] [facility] [address]    RE: Lisa Ray       Dear Colleague,     I had the pleasure of seeing Lisa Ray in the [facility].    HEART CARE ENCOUNTER CONSULTATON NOTE      [facility]  [phone]      Assessment/Recommendations   Assessment:   Dyspnea on exertion: Hx of Bradycardia 56 bpm on metoprolol 100 mg daily.    - Possible heart rate blunting contribution to exertional dyspnea, and patient is agreeable to trial reduction in this medication to appreciate effect on exertional symptoms.  Of possible rebound tachycardia. Discussed intention to continue some dose of metoprolol with CAD/CABG history.  Coronary artery disease s/p CABGx3 2019: and distant PCI, no angina on aspirin  - Chronic, mild progression in exertional dyspnea.  Last NM stress test 2019 following CABG showed no stress-induced ischemia  - LDL 44 at goal, Rosuvastatin 20 mg  Hypertension: Controlled on Losartan, metoprolol succinate  Chronic bronchitis: Chronic dyspnea  ARSEN: uses CPAP    Plan:   Trial reduction in metoprolol to 50 mg once daily, reports symptoms stability/worsening/improvement in 2 weeks to 1 month.  Reports sooner with any negative side effects  Will resume home blood pressure monitoring, potential need to increase losartan  Continue other medications without change  Continue CPAP  Consider repeating ischemic evaluation after update with metoprolol dose change          Follow up in 1 year or sooner as needed     History of Present Illness/Subjective    HPI: Lisa Ray is a 73 year old female with PMHx of CAD/CABG, HTN, HLD, bronchitis/bronchiectasis, ARSEN presents for follow-up.  She reports continued chronic dyspnea with exertion, feels it has increased over the last year.  No sudden change or development of chest pain with exertion.  Did improve following bypass but has steadily worsened over the years.  She had  "overall normal echocardiogram about 1 year ago without any wall motion abnormality days, LVEF 55-60%.  She denies lower extremity swelling except for significant swelling of the left lower extremity after 2 flights following SVG graft.  She does have a history of chronic bronchitis/bronchiectasis for which she takes multiple inhalers.  PCP recommended trying Trelegy inhaler but it was too expensive.  Is to avoid exertion due to symptoms.  She is often bradycardic in the 50s bpm on metoprolol 100 mg daily.  Her blood pressure has been adequately controlled in the last year, but she does not monitor this at home      She denies lightheadedness, orthopnea, PND, palpitations, and chest pain.        Echocardiogram 8/2022 Results:  Left ventricular size, wall motion and function are normal. The ejection  fraction is 55-60%.  There is mild concentric left ventricular hypertrophy.  Normal right ventricle size and systolic function.  No hemodynamically significant valvular abnormalities on 2D or color flow  imaging.     Physical Examination  Review of Systems   Vitals: /68 (BP Location: Right arm, Patient Position: Sitting, Cuff Size: Adult Regular)   Pulse 56   Resp 16   Ht 1.626 m (5' 4\")   Wt 77.3 kg (170 lb 6.4 oz)   LMP  (LMP Unknown)   SpO2 96%   BMI 29.25 kg/m    BMI= Body mass index is 29.25 kg/m .  Wt Readings from Last 3 Encounters:   12/29/23 77.3 kg (170 lb 6.4 oz)   12/20/23 76.2 kg (168 lb)   07/18/23 80.5 kg (177 lb 8 oz)           ENT/Mouth: membranes moist, no oral lesions or bleeding gums.      EYES:  no scleral icterus, normal conjunctivae                    Neck: No carotid bruit or thyromegaly   Chest/Lungs:   lungs are clear to auscultation, no rales or wheezing, , equal chest wall expansion    Cardiovascular:   Regular. Normal first and second heart sounds with no murmurs, rubs, or gallops; the carotid, radial and posterior tibial pulses are intact, absent edema bilaterally      "   Extremities: no cyanosis or clubbing   Skin: no xanthelasma, warm.    Neurologic: no tremors     Psychiatric: alert and oriented x3, calm        Please refer above for cardiac ROS details.        Medical History  Surgical History Family History Social History   Past Medical History:   Diagnosis Date    Anemia 2012    microcytic from Celebrex. GI w/u neg    Antiplatelet or antithrombotic long-term use     Back pain     Benign neoplasm of colon     Created by Conversion     Bronchiectasis (H)     CAD (coronary artery disease) 2008    RCA- stent    Cancer (H)     breast cancer    DJD (degenerative joint disease)     Dysthymia     Fatigue     recurrent/variable - no serious pathology    Fibromyalgia     GERD (gastroesophageal reflux disease)     History of gastric ulcer     Hx of CABG     Hypertension     Iritis     past Hx.-left eye    ARSEN (obstructive sleep apnea) 6/5/2019    Raynaud's disease     Rosacea     Shortness of breath     with exertion    Shoulder tendonitis     right    Stented coronary artery     Ulcer of intestine     small bowel- 10 years ago    UTI (urinary tract infection)     Jan. 2019     Past Surgical History:   Procedure Laterality Date    ARTHROPLASTY, HIP, TOTAL, DIRECT ANTERIOR APPROACH, USING HANA TABLE Left 2/4/2022    Procedure: LEFT DIRECT ANTERIOR TOTAL HIP ARTHROPLASTY;  Surgeon: Deny Bryson MD;  Location: [facility] Main OR    BELPHAROPTOSIS REPAIR Bilateral 2013    Dr. Eder Bingham- 2013    BREAST SURGERY Bilateral 11/2017    bilateral mastectomy 2017- 11/2017    BREAST SURGERY  2018    implants and revsion 2    CORONARY STENT PLACEMENT Right 2008    Dr. Schwab    CV CORONARY ANGIOGRAM N/A 3/26/2019    Procedure: Coronary Angiogram;  Surgeon: Ba Foley MD;  Location: [facility];  Service: Cardiology    HYSTERECTOMY      OOPHORECTOMY      TOTAL HIP ARTHROPLASTY Right 12/7/2015    Procedure: RIGHT TOTAL HIP ARTHROPLASTY;  Surgeon: Tera Yeung MD;   Location: [facility] Main OR;  Service:     [facility] APPENDECTOMY      Description: Appendectomy;  Recorded: 2009;     Family History   Problem Relation Age of Onset    Hereditary Breast and Ovarian Cancer Syndrome Sister     Hereditary Breast and Ovarian Cancer Syndrome Paternal Grandfather     Hereditary Breast and Ovarian Cancer Syndrome Cousin     Asthma Cousin     Alcoholism Father          GI bleed age 67    Colon Cancer Other     Breast Cancer Sister         metastatic age 49, remission with Rx    Coronary Artery Disease Brother         CABG    Breast Cancer Paternal Grandmother     Breast Cancer Cousin     Colon Cancer Mother         Social History     Socioeconomic History    Marital status:      Spouse name: Not on file    Number of children: 3    Years of education: Not on file    Highest education level: Not on file   Occupational History    Not on file   Tobacco Use    Smoking status: Former     Packs/day: 1.00     Years: 20.00     Additional pack years: 0.00     Total pack years: 20.00     Types: Cigarettes     Quit date: 3/27/1990     Years since quittin.7    Smokeless tobacco: Never   Substance and Sexual Activity    Alcohol use: Yes     Alcohol/week: 3.3 standard drinks of alcohol     Comment: Alcoholic Drinks/day: 1-2 glasses of wine/week     Drug use: No    Sexual activity: Not on file   Other Topics Concern    Not on file   Social History Narrative    Retired lead RN at [employer] ;  Johny,... 3 grown sons Aries in - , Josse SAMUELS, -microbrewer/beer, ; Gregory Lu- paramedic [place]...... Non smoker rare ETOH.        Social Determinants of Health     Financial Resource Strain: Low Risk  (2023)    Financial Resource Strain     Within the past 12 months, have you or your family members you live with been unable to get utilities (heat, electricity) when it was really needed?: No   Food Insecurity: Low Risk  (2023)    Food  Insecurity     Within the past 12 months, did you worry that your food would run out before you got money to buy more?: No     Within the past 12 months, did the food you bought just not last and you didn t have money to get more?: No   Transportation Needs: Low Risk  (11/27/2023)    Transportation Needs     Within the past 12 months, has lack of transportation kept you from medical appointments, getting your medicines, non-medical meetings or appointments, work, or from getting things that you need?: No   Physical Activity: Not on file   Stress: Not on file   Social Connections: Not on file   Interpersonal Safety: Low Risk  (12/20/2023)    Interpersonal Safety     Do you feel physically and emotionally safe where you currently live?: Yes     Within the past 12 months, have you been hit, slapped, kicked or otherwise physically hurt by someone?: No     Within the past 12 months, have you been humiliated or emotionally abused in other ways by your partner or ex-partner?: No   Housing Stability: Low Risk  (11/27/2023)    Housing Stability     Do you have housing? : Yes     Are you worried about losing your housing?: No           Medications  Allergies   Current Outpatient Medications   Medication Sig Dispense Refill    acetaminophen (TYLENOL) 500 MG tablet [ACETAMINOPHEN (TYLENOL) 500 MG TABLET] Take 500 mg by mouth every 6 (six) hours as needed for pain (arthritis).      acetaminophen-codeine (TYLENOL #3) 300-30 MG per tablet TAKE 1 TO 2 TABLETS BY MOUTH EVERY 4 HOURS AS NEEDED FOR PAIN 84 tablet 5    albuterol (PROAIR HFA/PROVENTIL HFA/VENTOLIN HFA) 108 (90 Base) MCG/ACT inhaler INHALE 2 PUFFS INTO THE LUNGS EVERY 6 HOURS 18 g 4    alendronate (FOSAMAX) 70 MG tablet TAKE 1 TABLET BY MOUTH ONE TIME PER WEEK 12 tablet 1    aspirin (ASA) 81 MG EC tablet Take 1 tablet (81 mg) by mouth 2 times daily      b complex vitamins tablet [B COMPLEX VITAMINS TABLET] Take 1 tablet by mouth daily.      buPROPion (WELLBUTRIN XL) 150  MG 24 hr tablet TAKE 1 TABLET BY MOUTH EVERY MORNING 90 tablet 3    calcium, as carbonate, (TUMS) 200 mg calcium (500 mg) chewable tablet [CALCIUM, AS CARBONATE, (TUMS) 200 MG CALCIUM (500 MG) CHEWABLE TABLET] Chew 2 tablets daily.      cholecalciferol, vitamin D3, 1,000 unit tablet Take 2,000 Units by mouth daily       docusate sodium (COLACE) 100 MG capsule [DOCUSATE SODIUM (COLACE) 100 MG CAPSULE] Take 1 capsule (100 mg total) by mouth 2 (two) times a day as needed for constipation.  0    doxylamine (UNISOM) 25 mg tablet Take 25 mg by mouth nightly as needed for sleep       Ferrous Sulfate (IRON) 28 MG TABS Take 1 tablet by mouth daily       Fluticasone-Umeclidin-Vilant (TRELEGY ELLIPTA) 100-62.5-25 MCG/ACT oral inhaler Inhale 1 puff into the lungs daily 60 each 3    losartan (COZAAR) 50 MG tablet TAKE 1 TABLET BY MOUTH EVERY DAY 90 tablet 3    magnesium oxide (MAG-OX) 400 (240 Mg) MG tablet Take 400 mg by mouth At Bedtime      melatonin 1 mg Tab tablet [MELATONIN 1 MG TAB TABLET] Take 1 mg by mouth at bedtime.             metoprolol succinate ER (TOPROL XL) 100 MG 24 hr tablet Take 1 tablet (100 mg) by mouth at bedtime --Needs to schedule cardiology follow-up appt for further refills 90 tablet 3    omeprazole (PRILOSEC) 20 MG DR capsule TAKE 1 CAPSULE BY MOUTH EVERY DAY 90 capsule 1    peg 400-propylene glycol PF (SYSTANE) 0.4-0.3 % Dpet [-PROPYLENE GLYCOL PF (SYSTANE) 0.4-0.3 % DPET] Administer 1 drop to both eyes 2 (two) times a day as needed.      rosuvastatin (CRESTOR) 20 MG tablet TAKE 1 TABLET BY MOUTH AT BEDTIME 90 tablet 3    tiZANidine (ZANAFLEX) 2 MG tablet Take 1 tablet (2 mg) by mouth at bedtime 90 tablet 3    triamcinolone (NASACORT) 55 mcg nasal inhaler [TRIAMCINOLONE (NASACORT) 55 MCG NASAL INHALER] Apply 2 sprays into each nostril daily as needed.      TURMERIC PO Take 1 capsule by mouth daily       umeclidinium (INCRUSE ELLIPTA) 62.5 MCG/ACT inhaler Inhale 1 puff into the lungs daily 1  "each 3    zolpidem (AMBIEN) 10 MG tablet TAKE 1 TABLET (10 MG) BY MOUTH NIGHTLY AS NEEDED FOR SLEEP 30 tablet 3       Allergies   Allergen Reactions    Latex Unknown     Added based on information entered during log entry, please review and add reactions, type, and severity as needed    Nsaids (Non-Steroidal Anti-Inflammatory Drug) [Nsaids] Unknown     Other reaction(s): GI Bleeding, GI Upset, Sensitivity.  Ulceration w/ Celebrex          Lab Results    Chemistry/lipid CBC Cardiac Enzymes/BNP/TSH/INR   Recent Labs   Lab Test 07/18/23  0949   CHOL 119   HDL 41*   LDL 44   TRIG 172*     Recent Labs   Lab Test 07/18/23  0949 08/01/22  0953 06/13/22  1017   LDL 44 49 53     Recent Labs   Lab Test 07/18/23  0949      POTASSIUM 4.8   CHLORIDE 103   CO2 25   *   BUN 21.7   CR 0.84   GFRESTIMATED 73   RANDA 9.2     Recent Labs   Lab Test 07/18/23  0949 03/11/23  1412 06/13/22  1017   CR 0.84 0.84 0.84     Recent Labs   Lab Test 07/18/23  0949 06/13/22  1017 04/20/21  0955   A1C 5.9* 5.8* 6.0*          Recent Labs   Lab Test 07/18/23  0949   WBC 7.6   HGB 15.3   HCT 46.7   MCV 99        Recent Labs   Lab Test 07/18/23  0949 03/11/23  1412 06/13/22  1017   HGB 15.3 15.8* 14.7    No results for input(s): \"TROPONINI\" in the last 47603 hours.  No results for input(s): \"BNP\", \"NTBNPI\", \"NTBNP\" in the last 99748 hours.  Recent Labs   Lab Test 07/18/23  0949   TSH 2.66     Recent Labs   Lab Test 03/29/19  0408 03/28/19  1726 03/27/19  0839   INR 1.32* 1.32* 1.01        Carmen Knox PA-C            Thank you for allowing me to participate in the care of your patient.      Sincerely,     Carmen Schafer PA-C     [facility]  cc:   Montana Whitfield MD  [address]  [place] [address]      "

## 2023-12-29 NOTE — PATIENT INSTRUCTIONS
It was a pleasure taking part in your care today:    - reduce metoprolol to 50 mg once daily. Monitor   - Watch blood pressure at home for elevation near or over 140/90  - Report change in symptoms and blood pressure in 1-2 weeks    Follow up in 1 year      Please call the [facility] clinic with any questions or concerns at [phone].     Carmen Knox PA-C

## 2024-01-16 ENCOUNTER — MYC MEDICAL ADVICE (OUTPATIENT)
Dept: CARDIOLOGY | Facility: CLINIC | Age: 74
End: 2024-01-16
Payer: COMMERCIAL

## 2024-01-16 DIAGNOSIS — I25.119 ATHEROSCLEROSIS OF NATIVE CORONARY ARTERY OF NATIVE HEART WITH ANGINA PECTORIS (H): ICD-10-CM

## 2024-01-16 DIAGNOSIS — I10 PRIMARY HYPERTENSION: Primary | ICD-10-CM

## 2024-01-16 DIAGNOSIS — I10 HYPERTENSION, UNSPECIFIED TYPE: ICD-10-CM

## 2024-01-18 DIAGNOSIS — G47.33 OSA (OBSTRUCTIVE SLEEP APNEA): Primary | ICD-10-CM

## 2024-01-18 RX ORDER — LOSARTAN POTASSIUM 50 MG/1
75 TABLET ORAL DAILY
Start: 2024-01-18 | End: 2024-01-30

## 2024-01-18 NOTE — TELEPHONE ENCOUNTER
Patient notified of results and recommendations per NALDO stein. Patient verbalizes understanding and agrees with plan. No further questions or concerns at this time. Comments posted to MyC as well  -sea

## 2024-01-28 ENCOUNTER — MYC MEDICAL ADVICE (OUTPATIENT)
Dept: INTERNAL MEDICINE | Facility: CLINIC | Age: 74
End: 2024-01-28
Payer: COMMERCIAL

## 2024-01-28 ENCOUNTER — MYC MEDICAL ADVICE (OUTPATIENT)
Dept: CARDIOLOGY | Facility: CLINIC | Age: 74
End: 2024-01-28
Payer: COMMERCIAL

## 2024-01-28 DIAGNOSIS — N39.0 URINARY TRACT INFECTION: Primary | ICD-10-CM

## 2024-01-28 DIAGNOSIS — N39.0 RECURRENT UTI: ICD-10-CM

## 2024-01-28 DIAGNOSIS — I10 HYPERTENSION, UNSPECIFIED TYPE: ICD-10-CM

## 2024-01-30 RX ORDER — LOSARTAN POTASSIUM 50 MG/1
75 TABLET ORAL DAILY
Qty: 90 TABLET | Refills: 0 | Status: SHIPPED | OUTPATIENT
Start: 2024-01-30 | End: 2024-03-25

## 2024-02-02 ENCOUNTER — ALLIED HEALTH/NURSE VISIT (OUTPATIENT)
Dept: FAMILY MEDICINE | Facility: CLINIC | Age: 74
End: 2024-02-02
Payer: COMMERCIAL

## 2024-02-02 ENCOUNTER — LAB (OUTPATIENT)
Dept: LAB | Facility: CLINIC | Age: 74
End: 2024-02-02
Payer: COMMERCIAL

## 2024-02-02 VITALS
HEART RATE: 53 BPM | DIASTOLIC BLOOD PRESSURE: 78 MMHG | OXYGEN SATURATION: 95 % | RESPIRATION RATE: 8 BRPM | SYSTOLIC BLOOD PRESSURE: 132 MMHG

## 2024-02-02 DIAGNOSIS — I25.119 ATHEROSCLEROSIS OF NATIVE CORONARY ARTERY OF NATIVE HEART WITH ANGINA PECTORIS (H): ICD-10-CM

## 2024-02-02 DIAGNOSIS — Z01.30 BP CHECK: Primary | ICD-10-CM

## 2024-02-02 DIAGNOSIS — N39.0 RECURRENT UTI: ICD-10-CM

## 2024-02-02 DIAGNOSIS — I10 PRIMARY HYPERTENSION: ICD-10-CM

## 2024-02-02 LAB
ALBUMIN UR-MCNC: ABNORMAL MG/DL
ANION GAP SERPL CALCULATED.3IONS-SCNC: 11 MMOL/L (ref 7–15)
APPEARANCE UR: ABNORMAL
BACTERIA #/AREA URNS HPF: ABNORMAL /HPF
BILIRUB UR QL STRIP: NEGATIVE
BUN SERPL-MCNC: 17.2 MG/DL (ref 8–23)
CALCIUM SERPL-MCNC: 9.3 MG/DL (ref 8.8–10.2)
CHLORIDE SERPL-SCNC: 105 MMOL/L (ref 98–107)
COLOR UR AUTO: YELLOW
CREAT SERPL-MCNC: 0.81 MG/DL (ref 0.51–0.95)
DEPRECATED HCO3 PLAS-SCNC: 27 MMOL/L (ref 22–29)
EGFRCR SERPLBLD CKD-EPI 2021: 76 ML/MIN/1.73M2
GLUCOSE SERPL-MCNC: 109 MG/DL (ref 70–99)
GLUCOSE UR STRIP-MCNC: NEGATIVE MG/DL
HGB UR QL STRIP: ABNORMAL
KETONES UR STRIP-MCNC: NEGATIVE MG/DL
LEUKOCYTE ESTERASE UR QL STRIP: ABNORMAL
NITRATE UR QL: POSITIVE
PH UR STRIP: 5.5 [PH] (ref 5–8)
POTASSIUM SERPL-SCNC: 4.6 MMOL/L (ref 3.4–5.3)
RBC #/AREA URNS AUTO: ABNORMAL /HPF
SODIUM SERPL-SCNC: 143 MMOL/L (ref 135–145)
SP GR UR STRIP: 1.02 (ref 1–1.03)
SQUAMOUS #/AREA URNS AUTO: ABNORMAL /LPF
UROBILINOGEN UR STRIP-ACNC: 0.2 E.U./DL
WBC #/AREA URNS AUTO: ABNORMAL /HPF
WBC CLUMPS #/AREA URNS HPF: PRESENT /HPF

## 2024-02-02 PROCEDURE — 80048 BASIC METABOLIC PNL TOTAL CA: CPT

## 2024-02-02 PROCEDURE — 81001 URINALYSIS AUTO W/SCOPE: CPT

## 2024-02-02 PROCEDURE — 87086 URINE CULTURE/COLONY COUNT: CPT

## 2024-02-02 PROCEDURE — 87186 SC STD MICRODIL/AGAR DIL: CPT

## 2024-02-02 PROCEDURE — 99207 PR NO CHARGE NURSE ONLY: CPT

## 2024-02-02 PROCEDURE — 36415 COLL VENOUS BLD VENIPUNCTURE: CPT

## 2024-02-02 RX ORDER — NITROFURANTOIN 25; 75 MG/1; MG/1
100 CAPSULE ORAL 2 TIMES DAILY
Qty: 10 CAPSULE | Refills: 0 | Status: SHIPPED | OUTPATIENT
Start: 2024-02-02 | End: 2024-05-20

## 2024-02-02 NOTE — PROGRESS NOTES
I met with Lisa Ray at the request of Carmen MILLER PA-C to recheck her blood pressure.  Blood pressure medications on the med list were reviewed with patient.    Patient has taken all medications as per usual regimen: Yes    Metoprolol 50 mg at night  Losartan 75 mg    Started medication change approximately 1 week ago per patient.    Patient reports tolerating them without any issues or concerns: Yes    Patients checked their own BP using manual home cuff- obtained reading as follows:    Upon arrival- Left arm 186/82 HR 59    Writer checked using clinic manual cuff (8:39 am ):    BP: (!) 148/74   BP Location: Left arm   Patient Position: Sitting   Cuff Size: Adult Regular   Pulse: 56   Resp: 12   SpO2: 98%       Writer checked patient BP with patients home manual cuff and obtained the following readin:44 am: Left arm BP- 160/82  HR- 55      Writer checked BP with clinic BP machine (0851)      24 0851   132/78   Left arm   Sitting   Adult Regular   53   (!) 8   95%       Writer rechecked patients BP with patients home BP manual cuff     856: Left arm /74   HR 53        Patient denies any chest pain, difficult breathing, headaches, vision changes, nausea, abdominal pain.     All BP readings obtained with 5 minute or more break in between readings.  The patient's blood pressure was <140/90 with clinic machine, the previous encounter was reviewed, recorded blood pressure below 140/90. Patient was discharged and the note will be sent to the provider for final review.     Advised patient to obtain new BP (automatic arm,not wrist or manual) cuff or contact  for advice on calibration.       Per chart review,      Carmen Knox PA-C   to Rosa Ray         24 11:27 AM  The lab appointment on  is to monitor your potassium and kidney function after increasing dose of losartan.       Your blood pressure measurements actually look more elevated after increasing  losartan and this is odd.  I would like to see the results of your lab test before increasing the dose of losartan.  I will have our nurse be sure you have refills at the pharmacy, and I'm planning to add on a blood pressure check to your visit at [facility] as I feel your elevations may be a false.  Please bring your blood pressure cuff to your lab appointment 2/2.

## 2024-02-03 LAB
BACTERIA UR CULT: ABNORMAL
BACTERIA UR CULT: ABNORMAL

## 2024-02-23 ENCOUNTER — MYC MEDICAL ADVICE (OUTPATIENT)
Dept: INTERNAL MEDICINE | Facility: CLINIC | Age: 74
End: 2024-02-23
Payer: COMMERCIAL

## 2024-02-24 DIAGNOSIS — B35.1 ONYCHOMYCOSIS DUE TO DERMATOPHYTE: ICD-10-CM

## 2024-02-24 DIAGNOSIS — E55.9 VITAMIN D DEFICIENCY: ICD-10-CM

## 2024-02-26 RX ORDER — TERBINAFINE HYDROCHLORIDE 250 MG/1
250 TABLET ORAL DAILY
Qty: 90 TABLET | Refills: 0 | Status: SHIPPED | OUTPATIENT
Start: 2024-02-26 | End: 2024-05-20

## 2024-02-26 RX ORDER — BUPROPION HYDROCHLORIDE 150 MG/1
150 TABLET ORAL EVERY MORNING
Qty: 90 TABLET | Refills: 3 | OUTPATIENT
Start: 2024-02-26

## 2024-03-21 ENCOUNTER — TELEPHONE (OUTPATIENT)
Dept: INTERNAL MEDICINE | Facility: CLINIC | Age: 74
End: 2024-03-21
Payer: COMMERCIAL

## 2024-03-21 NOTE — TELEPHONE ENCOUNTER
March 21, 2024    MOBE Med Therapy Recommendation was received via fax for Dr. Alberto to sign.  Patient label was attached to paperwork and placed in provider's inbox to be signed.    Carmen Schumacher

## 2024-03-25 DIAGNOSIS — I10 HYPERTENSION, UNSPECIFIED TYPE: ICD-10-CM

## 2024-03-25 RX ORDER — LOSARTAN POTASSIUM 50 MG/1
75 TABLET ORAL DAILY
Qty: 135 TABLET | Refills: 3 | Status: SHIPPED | OUTPATIENT
Start: 2024-03-25 | End: 2024-09-13

## 2024-03-29 DIAGNOSIS — F11.20 OPIOID TYPE DEPENDENCE, CONTINUOUS (H): Primary | ICD-10-CM

## 2024-03-29 DIAGNOSIS — J42 CHRONIC BRONCHITIS, UNSPECIFIED CHRONIC BRONCHITIS TYPE (H): ICD-10-CM

## 2024-03-29 DIAGNOSIS — G89.29 OTHER CHRONIC PAIN: ICD-10-CM

## 2024-03-29 RX ORDER — UMECLIDINIUM 62.5 UG/1
1 AEROSOL, POWDER ORAL DAILY
Refills: 3 | OUTPATIENT
Start: 2024-03-29

## 2024-03-29 RX ORDER — ACETAMINOPHEN AND CODEINE PHOSPHATE 300; 30 MG/1; MG/1
TABLET ORAL
Qty: 84 TABLET | Refills: 0 | Status: SHIPPED | OUTPATIENT
Start: 2024-03-29 | End: 2024-06-14

## 2024-03-29 NOTE — CONFIDENTIAL NOTE
PDMP reviewed.    Last prescription for codeine January 20, November 24, August 28.  84 tablets each prescription indicating a use of 1 to 2 tablets daily    No urine toxicology noted    Last office visit December    No urine tox    Ok to refill

## 2024-04-12 NOTE — TELEPHONE ENCOUNTER
April 12, 2024    MOBE was picked up from outbox of Dr. Alberto.  Paperwork has been reviewed and is complete.  Per initial initial request, this was sent via fax to [phone].     Margie Negron

## 2024-04-15 DIAGNOSIS — J42 CHRONIC BRONCHITIS, UNSPECIFIED CHRONIC BRONCHITIS TYPE (H): ICD-10-CM

## 2024-04-15 RX ORDER — UMECLIDINIUM 62.5 UG/1
1 AEROSOL, POWDER ORAL DAILY
Qty: 3 EACH | Refills: 3 | Status: SHIPPED | OUTPATIENT
Start: 2024-04-15

## 2024-04-20 DIAGNOSIS — M81.0 AGE-RELATED OSTEOPOROSIS WITHOUT CURRENT PATHOLOGICAL FRACTURE: ICD-10-CM

## 2024-04-22 RX ORDER — ALENDRONATE SODIUM 70 MG/1
70 TABLET ORAL
Qty: 12 TABLET | Refills: 1 | Status: SHIPPED | OUTPATIENT
Start: 2024-04-22

## 2024-04-23 ENCOUNTER — TELEPHONE (OUTPATIENT)
Dept: SLEEP MEDICINE | Facility: CLINIC | Age: 74
End: 2024-04-23
Payer: COMMERCIAL

## 2024-04-23 NOTE — TELEPHONE ENCOUNTER
Left Message informing patient of the changed in up coming appointment.      Jenniffer Dunn MA  [facility]

## 2024-04-29 ENCOUNTER — E-VISIT (OUTPATIENT)
Dept: INTERNAL MEDICINE | Facility: CLINIC | Age: 74
End: 2024-04-29
Payer: COMMERCIAL

## 2024-04-29 DIAGNOSIS — J32.9 SINUSITIS, UNSPECIFIED CHRONICITY, UNSPECIFIED LOCATION: Primary | ICD-10-CM

## 2024-04-29 PROCEDURE — 99421 OL DIG E/M SVC 5-10 MIN: CPT | Performed by: INTERNAL MEDICINE

## 2024-04-29 RX ORDER — DOXYCYCLINE 100 MG/1
100 CAPSULE ORAL 2 TIMES DAILY
Qty: 20 CAPSULE | Refills: 0 | Status: SHIPPED | OUTPATIENT
Start: 2024-04-29 | End: 2024-05-20

## 2024-04-29 RX ORDER — PREDNISONE 20 MG/1
20 TABLET ORAL DAILY
Qty: 5 TABLET | Refills: 0 | Status: SHIPPED | OUTPATIENT
Start: 2024-04-29 | End: 2024-05-20

## 2024-05-02 ENCOUNTER — TELEPHONE (OUTPATIENT)
Dept: VASCULAR SURGERY | Facility: CLINIC | Age: 74
End: 2024-05-02
Payer: COMMERCIAL

## 2024-05-02 DIAGNOSIS — I65.23 BILATERAL CAROTID ARTERY STENOSIS: Primary | ICD-10-CM

## 2024-05-02 NOTE — TELEPHONE ENCOUNTER
LMTCB to schedule US carotid and 2yr follow up with Yamilex in August (IB sent for orders). [phone]    _____________________________________  [facility]ona [facility]  Pt will need follow up in 2 years. Thanks!

## 2024-05-20 ENCOUNTER — OFFICE VISIT (OUTPATIENT)
Dept: INTERNAL MEDICINE | Facility: CLINIC | Age: 74
End: 2024-05-20
Payer: COMMERCIAL

## 2024-05-20 VITALS
OXYGEN SATURATION: 97 % | WEIGHT: 168.6 LBS | RESPIRATION RATE: 18 BRPM | SYSTOLIC BLOOD PRESSURE: 137 MMHG | TEMPERATURE: 98 F | DIASTOLIC BLOOD PRESSURE: 76 MMHG | HEIGHT: 64 IN | BODY MASS INDEX: 28.79 KG/M2 | HEART RATE: 59 BPM

## 2024-05-20 DIAGNOSIS — Z23 NEED FOR PROPHYLACTIC VACCINATION AGAINST HEPATITIS A: ICD-10-CM

## 2024-05-20 DIAGNOSIS — J42 CHRONIC BRONCHITIS, UNSPECIFIED CHRONIC BRONCHITIS TYPE (H): ICD-10-CM

## 2024-05-20 DIAGNOSIS — E66.01 MORBID OBESITY (H): Primary | ICD-10-CM

## 2024-05-20 DIAGNOSIS — I25.119 ATHEROSCLEROSIS OF NATIVE CORONARY ARTERY OF NATIVE HEART WITH ANGINA PECTORIS (H): ICD-10-CM

## 2024-05-20 DIAGNOSIS — F11.20 CONTINUOUS OPIOID DEPENDENCE (H): ICD-10-CM

## 2024-05-20 DIAGNOSIS — J01.01 ACUTE RECURRENT MAXILLARY SINUSITIS: ICD-10-CM

## 2024-05-20 PROCEDURE — 99213 OFFICE O/P EST LOW 20 MIN: CPT | Performed by: INTERNAL MEDICINE

## 2024-05-20 RX ORDER — DOXYCYCLINE 100 MG/1
100 CAPSULE ORAL 2 TIMES DAILY
Qty: 20 CAPSULE | Refills: 0 | Status: SHIPPED | OUTPATIENT
Start: 2024-05-20 | End: 2024-06-07

## 2024-05-20 RX ORDER — FLUTICASONE PROPIONATE 50 MCG
1 SPRAY, SUSPENSION (ML) NASAL DAILY
Qty: 16 G | Refills: 2 | Status: SHIPPED | OUTPATIENT
Start: 2024-05-20 | End: 2024-06-18

## 2024-05-20 ASSESSMENT — PATIENT HEALTH QUESTIONNAIRE - PHQ9
SUM OF ALL RESPONSES TO PHQ QUESTIONS 1-9: 8
10. IF YOU CHECKED OFF ANY PROBLEMS, HOW DIFFICULT HAVE THESE PROBLEMS MADE IT FOR YOU TO DO YOUR WORK, TAKE CARE OF THINGS AT HOME, OR GET ALONG WITH OTHER PEOPLE: SOMEWHAT DIFFICULT
SUM OF ALL RESPONSES TO PHQ QUESTIONS 1-9: 8

## 2024-05-20 ASSESSMENT — PAIN SCALES - GENERAL: PAINLEVEL: NO PAIN (0)

## 2024-05-20 NOTE — PROGRESS NOTES
"  Assessment & Plan     Need for prophylactic vaccination against hepatitis A      Morbid obesity (H)      Chronic bronchitis, unspecified chronic bronchitis type (H)  Stable     Atherosclerosis of native coronary artery of native heart with angina pectoris (H24)      Continuous opioid dependence (H)    Uses Tylenol with codeine sparingly for back pain.  And migraine.  She has not had a refill for it since March.  Will review opioid contract if she requests another refill this year.  Acute recurrent maxillary sinusitis  Recurrent episodes last episode in December.  She starts with nasal congestion followed by copious yellow-green discharge despite using a nasal rinse.  Recommend CT sinus.  Flonase and Zyrtec use continuously.  Discussed differential diagnosis of recurrent sinus infections and indication for surgery if needed.  Or allergy referral.  - CT Sinus w/o Contrast; Future  - fluticasone (FLONASE) 50 MCG/ACT nasal spray; Spray 1 spray into both nostrils daily  - doxycycline hyclate (VIBRAMYCIN) 100 MG capsule; Take 1 capsule (100 mg) by mouth 2 times daily          BMI  Estimated body mass index is 28.94 kg/m  as calculated from the following:    Height as of this encounter: 1.626 m (5' 4\").    Weight as of this encounter: 76.5 kg (168 lb 9.6 oz).             Subjective   Pat is a 74 year old, presenting for the following health issues:  Sinus Problem and Recheck Medication (Pt reports that she is still having sinus symptoms that still continue to bother her.)      5/20/2024    10:33 AM   Additional Questions   Roomed by maite   Accompanied by alone         5/20/2024    10:33 AM   Patient Reported Additional Medications   Patient reports taking the following new medications none     Sinus Problem     History of Present Illness       Reason for visit:  Constant sinus infection, breathing, cpap    She eats 4 or more servings of fruits and vegetables daily.She consumes 0 sweetened beverage(s) daily.She " "exercises with enough effort to increase her heart rate 30 to 60 minutes per day.  She exercises with enough effort to increase her heart rate 3 or less days per week.   She is taking medications regularly.                     Objective    /76 (BP Location: Left arm, Patient Position: Sitting, Cuff Size: Adult Regular)   Pulse 59   Temp 98  F (36.7  C) (Tympanic)   Resp 18   Ht 1.626 m (5' 4\")   Wt 76.5 kg (168 lb 9.6 oz)   LMP  (LMP Unknown)   SpO2 97%   Breastfeeding No   BMI 28.94 kg/m    Body mass index is 28.94 kg/m .  Physical Exam   GENERAL: alert and no distress  Nasal turbinate hypertrophy.  NECK: no adenopathy, no asymmetry, masses, or scars  RESP: lungs clear to auscultation - no rales, rhonchi or wheezes  CV: regular rate and rhythm, normal S1 S2, no S3 or S4, no murmur, click or rub, no peripheral edema  MS: no gross musculoskeletal defects noted, no edema            Signed Electronically by: Amber Alberto MD    "

## 2024-06-05 ENCOUNTER — HOSPITAL ENCOUNTER (OUTPATIENT)
Dept: CT IMAGING | Facility: HOSPITAL | Age: 74
Discharge: HOME | End: 2024-06-05
Attending: INTERNAL MEDICINE | Admitting: INTERNAL MEDICINE
Payer: COMMERCIAL

## 2024-06-05 DIAGNOSIS — J01.01 ACUTE RECURRENT MAXILLARY SINUSITIS: ICD-10-CM

## 2024-06-05 PROCEDURE — 70486 CT MAXILLOFACIAL W/O DYE: CPT

## 2024-06-06 ENCOUNTER — MYC MEDICAL ADVICE (OUTPATIENT)
Dept: INTERNAL MEDICINE | Facility: CLINIC | Age: 74
End: 2024-06-06
Payer: COMMERCIAL

## 2024-06-06 DIAGNOSIS — J01.01 ACUTE RECURRENT MAXILLARY SINUSITIS: ICD-10-CM

## 2024-06-07 RX ORDER — DOXYCYCLINE 100 MG/1
100 CAPSULE ORAL 2 TIMES DAILY
Qty: 20 CAPSULE | Refills: 0 | Status: SHIPPED | OUTPATIENT
Start: 2024-06-07 | End: 2024-08-13

## 2024-06-08 DIAGNOSIS — K21.9 GASTROESOPHAGEAL REFLUX DISEASE WITHOUT ESOPHAGITIS: ICD-10-CM

## 2024-06-08 DIAGNOSIS — E55.9 VITAMIN D DEFICIENCY: ICD-10-CM

## 2024-06-10 RX ORDER — BUPROPION HYDROCHLORIDE 150 MG/1
150 TABLET ORAL EVERY MORNING
Qty: 90 TABLET | Refills: 2 | Status: SHIPPED | OUTPATIENT
Start: 2024-06-10

## 2024-06-10 NOTE — TELEPHONE ENCOUNTER
Refuse refill on Metoprolol Tartrate. Pt has been change to Metoprolol succ 100 mg at hs.   
GEN: Pt non-toxic in NAD, alert.  PSYCH: Affect and mood appropriate.  EYES: Sclera white w/o injection.  ENT: Neck supple. Airway patent.  RESP: CTA b/l.  CARDIAC: RRR, S1, S2, no M/G/R.  ABD: Soft, NT.  MSK: Left elbow erythema, warmth, swelling extending down the forearm to the dorsal aspect of the hand.  No crepitus or fluctuance appreciated.  There is full range of motion at the shoulder elbow and wrist. NV intact distally.  NEURO: Nonfocal.  VASC: Strong distal pulses.  SKIN: See MSK.

## 2024-06-14 DIAGNOSIS — F11.20 OPIOID TYPE DEPENDENCE, CONTINUOUS (H): ICD-10-CM

## 2024-06-14 DIAGNOSIS — J42 CHRONIC BRONCHITIS, UNSPECIFIED CHRONIC BRONCHITIS TYPE (H): ICD-10-CM

## 2024-06-14 DIAGNOSIS — G89.29 OTHER CHRONIC PAIN: ICD-10-CM

## 2024-06-14 RX ORDER — ACETAMINOPHEN AND CODEINE PHOSPHATE 300; 30 MG/1; MG/1
TABLET ORAL
Qty: 84 TABLET | Refills: 0 | Status: SHIPPED | OUTPATIENT
Start: 2024-06-14 | End: 2024-08-01

## 2024-06-18 ENCOUNTER — PATIENT OUTREACH (OUTPATIENT)
Dept: CARE COORDINATION | Facility: CLINIC | Age: 74
End: 2024-06-18
Payer: COMMERCIAL

## 2024-07-29 NOTE — PATIENT INSTRUCTIONS
Benito Gonzalez,    Thank you for entrusting your care with us today. After your visit today with NALDO Quezada this is the plan that was discussed at your appointment.    Follow up in 2 years for repeat carotid ultrasound and clinic visit.    We will call you closer to this date to get you scheduled.      I am including additional information on these things and our contact information if you have any questions or concerns.   Please do not hesitate to reach out to us if you felt we did not answer your questions or you are unsure of the treatment plan after your visit today. Our number is [phone].Thank you for trusting us with your care.         Again thank you for your time.        Carotid Duplex    Steps for the test are:  You will be asked to lie on a stretcher. It will be okay for you to wear your street clothes. In some situations, you may be asked to change into a gown.    Ultrasound gel, usually warmed for your comfort, will be placed on either side of your neck.    Through the gel, the technician will apply to your neck a small hand-held device that emits sound waves.   When the test is completed, the technician will remove excess gel from your neck. The gel is water-soluble and will not stain your skin or clothes.    How to Prepare:  Eat and take medications as usual.   Wear minimal or no jewelry to ease application and removal of gel.    Risks  There are typically no side effects or complications associated with a carotid duplex ultrasound examination.    What Can I Expect After the Test?  The technician will send the ultrasound images to your vascular surgeon for evaluation. Typically, a report is available in 2-3 days. If anything critical is found, it is standard practice to notify the vascular surgeon immediately.  Reference: https://vascular.org/patient-resources/vascular-tests

## 2024-07-29 NOTE — PROGRESS NOTES
[facility]        Patient is here for a 2 year follow up  to discuss Carotid stenosis. Patient has no symptoms. US PRIOR. Has visual migraines that she has been having for the last year.    Pt is currently taking Aspirin and Statin.    BP (!) 147/79   Pulse 52   Temp 97.8  F (36.6  C)   Resp 16   LMP  (LMP Unknown)     The provider has been notified that the patient has no concerns.     Questions patient would like addressed today are: N/A.    Refills are needed: No    Has homecare services and agency name:  No

## 2024-07-31 DIAGNOSIS — Z95.1 S/P CABG (CORONARY ARTERY BYPASS GRAFT): ICD-10-CM

## 2024-07-31 DIAGNOSIS — F11.20 OPIOID TYPE DEPENDENCE, CONTINUOUS (H): ICD-10-CM

## 2024-07-31 DIAGNOSIS — G89.29 OTHER CHRONIC PAIN: ICD-10-CM

## 2024-07-31 DIAGNOSIS — J42 CHRONIC BRONCHITIS, UNSPECIFIED CHRONIC BRONCHITIS TYPE (H): ICD-10-CM

## 2024-08-01 ENCOUNTER — TELEPHONE (OUTPATIENT)
Dept: INTERNAL MEDICINE | Facility: CLINIC | Age: 74
End: 2024-08-01
Payer: COMMERCIAL

## 2024-08-01 DIAGNOSIS — J42 CHRONIC BRONCHITIS, UNSPECIFIED CHRONIC BRONCHITIS TYPE (H): ICD-10-CM

## 2024-08-01 DIAGNOSIS — G89.29 OTHER CHRONIC PAIN: ICD-10-CM

## 2024-08-01 DIAGNOSIS — F11.20 OPIOID TYPE DEPENDENCE, CONTINUOUS (H): ICD-10-CM

## 2024-08-01 RX ORDER — ACETAMINOPHEN AND CODEINE PHOSPHATE 300; 30 MG/1; MG/1
1 TABLET ORAL EVERY 4 HOURS PRN
Qty: 84 TABLET | Refills: 0 | Status: SHIPPED | OUTPATIENT
Start: 2024-08-01 | End: 2024-08-02

## 2024-08-01 RX ORDER — ROSUVASTATIN CALCIUM 20 MG/1
20 TABLET, COATED ORAL AT BEDTIME
Qty: 90 TABLET | Refills: 3 | Status: SHIPPED | OUTPATIENT
Start: 2024-08-01

## 2024-08-01 NOTE — TELEPHONE ENCOUNTER
Medication Question or Refill        What medication are you calling about (include dose and sig)?:     Needing new script due to 2 different directions on scipt sent to pharmacy-please update and resend.     Disp Refills Start End MARYURI   acetaminophen-codeine (TYLENOL #3) 300-30 MG per tablet 84 tablet 0 8/1/2024 -- No   Sig - Route: Take 1 tablet by mouth every 4 hours as needed for severe pain TAKE 1 TO 2 TABLETS BY MOUTH EVERY 4 HOURS AS NEEDED FOR PAIN - Oral       Preferred Pharmacy:[facility]/pharmacy #[id number] - [place], [place] - [address]  [address] [place] [address]  Phone: [phone] Fax: [phone]      Controlled Substance Agreement on file:   CSA -- Patient Level:    CSA: None found at the patient level.       Who prescribed the medication?: Dr Alberto    Do you need a refill? Yes

## 2024-08-02 RX ORDER — ACETAMINOPHEN AND CODEINE PHOSPHATE 300; 30 MG/1; MG/1
1 TABLET ORAL EVERY 4 HOURS PRN
Qty: 84 TABLET | Refills: 0 | Status: SHIPPED | OUTPATIENT
Start: 2024-08-02

## 2024-08-05 ENCOUNTER — ANCILLARY PROCEDURE (OUTPATIENT)
Dept: VASCULAR ULTRASOUND | Facility: CLINIC | Age: 74
End: 2024-08-05
Attending: PHYSICIAN ASSISTANT
Payer: COMMERCIAL

## 2024-08-05 ENCOUNTER — OFFICE VISIT (OUTPATIENT)
Dept: VASCULAR SURGERY | Facility: CLINIC | Age: 74
End: 2024-08-05
Attending: PHYSICIAN ASSISTANT
Payer: COMMERCIAL

## 2024-08-05 VITALS
HEART RATE: 52 BPM | SYSTOLIC BLOOD PRESSURE: 147 MMHG | TEMPERATURE: 97.8 F | RESPIRATION RATE: 16 BRPM | DIASTOLIC BLOOD PRESSURE: 79 MMHG

## 2024-08-05 DIAGNOSIS — I65.23 BILATERAL CAROTID ARTERY STENOSIS: Primary | ICD-10-CM

## 2024-08-05 DIAGNOSIS — I65.23 BILATERAL CAROTID ARTERY STENOSIS: ICD-10-CM

## 2024-08-05 PROCEDURE — 99213 OFFICE O/P EST LOW 20 MIN: CPT | Performed by: PHYSICIAN ASSISTANT

## 2024-08-05 PROCEDURE — 93880 EXTRACRANIAL BILAT STUDY: CPT | Mod: 26 | Performed by: SURGERY

## 2024-08-05 PROCEDURE — 93880 EXTRACRANIAL BILAT STUDY: CPT

## 2024-08-05 PROCEDURE — G0463 HOSPITAL OUTPT CLINIC VISIT: HCPCS | Mod: 25 | Performed by: PHYSICIAN ASSISTANT

## 2024-08-05 ASSESSMENT — PAIN SCALES - GENERAL: PAINLEVEL: NO PAIN (0)

## 2024-08-05 NOTE — PROGRESS NOTES
VASCULAR SURGERY PROGRESS NOTE    LOCATION:  [facility]     Lisa Ray  Medical Record #: [id number]  YOB: 1950  Age: 74 year old     Date of Service: 8/5/2024    PRIMARY CARE PROVIDER: Amber Alberto    Reason for visit: Surveillance of carotid disease    IMPRESSION: 74-year-old female presenting for follow-up of carotid artery stenosis. Duplex today demonstrates less than 50% stenosis bilaterally. Patient remains completely asymptomatic and is medically optimized.    RECOMMENDATION/RISKS: Continue best medical management with aspirin and statin therapy. Follow-up in 2 years with repeat carotid duplex.    HPI:  Lisa Ray is a 74 year old female with past medical history significant for hypertension, coronary artery disease s/p CABG, obstructive sleep apnea, and Raynaud's. She has been followed every 2 years for surveillance of carotid artery disease.    Today, Mrs. Ray presents for follow-up. She is accompanied by her .  Patient is doing well and denies any episodes of amaurosis, slurred speech, facial asymmetry, or unilateral extremity numbness or weakness.  She has been experiencing visual migraines.  Patient is compliant with her medications and does not smoke.    Ultrasound results were discussed and all questions answered.  No other concerns.    REVIEW OF SYSTEMS:    A 12 point ROS was reviewed and is negative except for what is listed above in HPI.    PHH:    Past Medical History:   Diagnosis Date    Anemia 2012    microcytic from Celebrex. GI w/u neg    Antiplatelet or antithrombotic long-term use     Back pain     Benign neoplasm of colon     Created by Conversion     Bronchiectasis (H)     CAD (coronary artery disease) 2008    RCA- stent    Cancer (H)     breast cancer    DJD (degenerative joint disease)     Dysthymia     Fatigue     recurrent/variable - no serious pathology    Fibromyalgia     GERD (gastroesophageal reflux disease)      History of gastric ulcer     Hx of CABG     Hypertension     Iritis     past Hx.-left eye    Morbid obesity (H) 5/20/2024    ARSEN (obstructive sleep apnea) 6/5/2019    Raynaud's disease     Rosacea     Shortness of breath     with exertion    Shoulder tendonitis     right    Stented coronary artery     Ulcer of intestine     small bowel- 10 years ago    UTI (urinary tract infection)     Jan. 2019     Past Surgical History:   Procedure Laterality Date    ARTHROPLASTY, HIP, TOTAL, DIRECT ANTERIOR APPROACH, USING HANA TABLE Left 2/4/2022    Procedure: LEFT DIRECT ANTERIOR TOTAL HIP ARTHROPLASTY;  Surgeon: Deny Bryson MD;  Location: [facility] OR    BELPHAROPTOSIS REPAIR Bilateral 2013    Dr. Eder Bingham- 2013    BREAST SURGERY Bilateral 11/2017    bilateral mastectomy 2017- 11/2017    BREAST SURGERY  2018    implants and revsion 2    CORONARY STENT PLACEMENT Right 2008    Dr. Schwab    CV CORONARY ANGIOGRAM N/A 3/26/2019    Procedure: Coronary Angiogram;  Surgeon: Ba Foley MD;  Location: [facility] Cath Lab;  Service: Cardiology    HYSTERECTOMY      OOPHORECTOMY      TOTAL HIP ARTHROPLASTY Right 12/7/2015    Procedure: RIGHT TOTAL HIP ARTHROPLASTY;  Surgeon: Tera Yeung MD;  Location: [facility];  Service:     [facility] APPENDECTOMY      Description: Appendectomy;  Recorded: 01/13/2009;     ALLERGIES:  Latex and Nsaids (non-steroidal anti-inflammatory drug) [nsaids]    MEDS:    Current Outpatient Medications:     acetaminophen (TYLENOL) 500 MG tablet, [ACETAMINOPHEN (TYLENOL) 500 MG TABLET] Take 500 mg by mouth every 6 (six) hours as needed for pain (arthritis)., Disp: , Rfl:     acetaminophen-codeine (TYLENOL #3) 300-30 MG per tablet, Take 1 tablet by mouth every 4 hours as needed for severe pain, Disp: 84 tablet, Rfl: 0    albuterol (PROAIR HFA/PROVENTIL HFA/VENTOLIN HFA) 108 (90 Base) MCG/ACT inhaler, INHALE 2 PUFFS INTO THE LUNGS EVERY 6 HOURS, Disp: 18 g, Rfl: 4    alendronate  (FOSAMAX) 70 MG tablet, TAKE 1 TABLET BY MOUTH ONE TIME PER WEEK, Disp: 12 tablet, Rfl: 1    aspirin (ASA) 81 MG EC tablet, Take 1 tablet (81 mg) by mouth 2 times daily, Disp: , Rfl:     b complex vitamins tablet, [B COMPLEX VITAMINS TABLET] Take 1 tablet by mouth daily., Disp: , Rfl:     buPROPion (WELLBUTRIN XL) 150 MG 24 hr tablet, TAKE 1 TABLET BY MOUTH EVERY DAY IN THE MORNING, Disp: 90 tablet, Rfl: 2    calcium, as carbonate, (TUMS) 200 mg calcium (500 mg) chewable tablet, [CALCIUM, AS CARBONATE, (TUMS) 200 MG CALCIUM (500 MG) CHEWABLE TABLET] Chew 2 tablets daily., Disp: , Rfl:     cholecalciferol, vitamin D3, 1,000 unit tablet, Take 2,000 Units by mouth daily , Disp: , Rfl:     docusate sodium (COLACE) 100 MG capsule, [DOCUSATE SODIUM (COLACE) 100 MG CAPSULE] Take 1 capsule (100 mg total) by mouth 2 (two) times a day as needed for constipation., Disp: , Rfl: 0    doxycycline hyclate (VIBRAMYCIN) 100 MG capsule, Take 1 capsule (100 mg) by mouth 2 times daily, Disp: 20 capsule, Rfl: 0    doxylamine (UNISOM) 25 mg tablet, Take 25 mg by mouth nightly as needed for sleep , Disp: , Rfl:     Ferrous Sulfate (IRON) 28 MG TABS, Take 1 tablet by mouth daily , Disp: , Rfl:     fluticasone (FLONASE) 50 MCG/ACT nasal spray, Spray 1 spray into both nostrils daily, Disp: 16 g, Rfl: 1    losartan (COZAAR) 50 MG tablet, Take 1.5 tablets (75 mg) by mouth daily, Disp: 135 tablet, Rfl: 3    magnesium oxide (MAG-OX) 400 (240 Mg) MG tablet, Take 400 mg by mouth At Bedtime, Disp: , Rfl:     melatonin 1 mg Tab tablet, [MELATONIN 1 MG TAB TABLET] Take 1 mg by mouth at bedtime.       , Disp: , Rfl:     metoprolol succinate ER (TOPROL XL) 100 MG 24 hr tablet, Take 0.5 tablets (50 mg) by mouth at bedtime --Needs to schedule cardiology follow-up appt for further refills, Disp: 90 tablet, Rfl: 3    omeprazole (PRILOSEC) 20 MG DR capsule, TAKE 1 CAPSULE BY MOUTH EVERY DAY, Disp: 90 capsule, Rfl: 2    peg 400-propylene glycol PF (SYSTANE)  0.4-0.3 % Dpet, [-PROPYLENE GLYCOL PF (SYSTANE) 0.4-0.3 % DPET] Administer 1 drop to both eyes 2 (two) times a day as needed., Disp: , Rfl:     rosuvastatin (CRESTOR) 20 MG tablet, TAKE 1 TABLET BY MOUTH EVERYDAY AT BEDTIME, Disp: 90 tablet, Rfl: 3    tiZANidine (ZANAFLEX) 2 MG tablet, Take 1 tablet (2 mg) by mouth at bedtime, Disp: 90 tablet, Rfl: 3    triamcinolone (NASACORT) 55 mcg nasal inhaler, [TRIAMCINOLONE (NASACORT) 55 MCG NASAL INHALER] Apply 2 sprays into each nostril daily as needed., Disp: , Rfl:     TURMERIC PO, Take 1 capsule by mouth daily , Disp: , Rfl:     umeclidinium (INCRUSE ELLIPTA) 62.5 MCG/ACT inhaler, TAKE 1 PUFF BY MOUTH EVERY DAY, Disp: 3 each, Rfl: 3    zolpidem (AMBIEN) 10 MG tablet, TAKE 1 TABLET (10 MG) BY MOUTH NIGHTLY AS NEEDED FOR SLEEP, Disp: 30 tablet, Rfl: 3    SOCIAL HABITS:    History   Smoking Status    Former    Types: Cigarettes   Smokeless Tobacco    Never     Social History    Substance and Sexual Activity      Alcohol use: Yes        Alcohol/week: 3.3 standard drinks of alcohol        Comment: Alcoholic Drinks/day: 1-2 glasses of wine/week       History   Drug Use No     FAMILY HISTORY:    Family History   Problem Relation Age of Onset    Hereditary Breast and Ovarian Cancer Syndrome Sister     Hereditary Breast and Ovarian Cancer Syndrome Paternal Grandfather     Hereditary Breast and Ovarian Cancer Syndrome Cousin     Asthma Cousin     Alcoholism Father          GI bleed age 67    Colon Cancer Other     Breast Cancer Sister         metastatic age 49, remission with Rx    Coronary Artery Disease Brother         CABG    Breast Cancer Paternal Grandmother     Breast Cancer Cousin     Colon Cancer Mother      PE:  BP (!) 147/79   Pulse 52   Temp 97.8  F (36.6  C)   Resp 16   LMP  (LMP Unknown)   Wt Readings from Last 1 Encounters:   24 76.5 kg (168 lb 9.6 oz)     There is no height or weight on file to calculate BMI.    EXAM:  GENERAL: well-developed 74  year old female who appears her stated age  CARDIAC: normal   CHEST/LUNG: normal respiratory effort   MUSCULOSKELETAL: grossly normal and both lower extremities are intact, no lower extremity edema  NEUROLOGIC: focally intact, alert and oriented x 3  PSYCH: appropriate affect      DIAGNOSTIC STUDIES:     Images:    US Carotid Bilateral    I personally reviewed the images and my interpretation is less than 50% stenosis of bilateral ICAs based on velocity criteria.    LABS:      Sodium   Date Value Ref Range Status   02/02/2024 143 135 - 145 mmol/L Final     Comment:     Reference intervals for this test were updated on 09/26/2023 to more accurately reflect our healthy population. There may be differences in the flagging of prior results with similar values performed with this method. Interpretation of those prior results can be made in the context of the updated reference intervals.    07/18/2023 139 136 - 145 mmol/L Final   03/11/2023 139 136 - 145 mmol/L Final     Urea Nitrogen   Date Value Ref Range Status   02/02/2024 17.2 8.0 - 23.0 mg/dL Final   07/18/2023 21.7 8.0 - 23.0 mg/dL Final   03/11/2023 18.7 8.0 - 23.0 mg/dL Final   06/13/2022 22 8 - 28 mg/dL Final   01/31/2022 19 8 - 28 mg/dL Final   04/20/2021 19 8 - 28 mg/dL Final     Hemoglobin   Date Value Ref Range Status   07/18/2023 15.3 11.7 - 15.7 g/dL Final   03/11/2023 15.8 (H) 11.7 - 15.7 g/dL Final   06/13/2022 14.7 11.7 - 15.7 g/dL Final     Platelet Count   Date Value Ref Range Status   07/18/2023 205 150 - 450 10e3/uL Final   03/11/2023 170 150 - 450 10e3/uL Final   06/13/2022 191 150 - 450 10e3/uL Final     INR   Date Value Ref Range Status   03/29/2019 1.32 (H) 0.90 - 1.10 Final   03/28/2019 1.32 (H) 0.90 - 1.10 Final   03/27/2019 1.01 0.90 - 1.10 Final     25 minutes spent on the day of encounter doing chart review, history and exam, documentation, and further activities as noted.     SAMI RowleyC  VASCULAR SURGERY

## 2024-08-05 NOTE — Clinical Note
Patient will need to follow up in 2 years for repeat carotid ultrasound and clinic visit. Carotid ultrasound order will need to be put in closer to that date. Thank you

## 2024-08-12 SDOH — HEALTH STABILITY: PHYSICAL HEALTH: ON AVERAGE, HOW MANY MINUTES DO YOU ENGAGE IN EXERCISE AT THIS LEVEL?: 40 MIN

## 2024-08-12 SDOH — HEALTH STABILITY: PHYSICAL HEALTH: ON AVERAGE, HOW MANY DAYS PER WEEK DO YOU ENGAGE IN MODERATE TO STRENUOUS EXERCISE (LIKE A BRISK WALK)?: 4 DAYS

## 2024-08-12 ASSESSMENT — PATIENT HEALTH QUESTIONNAIRE - PHQ9
SUM OF ALL RESPONSES TO PHQ QUESTIONS 1-9: 4
SUM OF ALL RESPONSES TO PHQ QUESTIONS 1-9: 4
10. IF YOU CHECKED OFF ANY PROBLEMS, HOW DIFFICULT HAVE THESE PROBLEMS MADE IT FOR YOU TO DO YOUR WORK, TAKE CARE OF THINGS AT HOME, OR GET ALONG WITH OTHER PEOPLE: NOT DIFFICULT AT ALL

## 2024-08-12 ASSESSMENT — SOCIAL DETERMINANTS OF HEALTH (SDOH): HOW OFTEN DO YOU GET TOGETHER WITH FRIENDS OR RELATIVES?: MORE THAN THREE TIMES A WEEK

## 2024-08-13 ENCOUNTER — OFFICE VISIT (OUTPATIENT)
Dept: INTERNAL MEDICINE | Facility: CLINIC | Age: 74
End: 2024-08-13
Payer: COMMERCIAL

## 2024-08-13 VITALS
WEIGHT: 170 LBS | RESPIRATION RATE: 18 BRPM | SYSTOLIC BLOOD PRESSURE: 131 MMHG | BODY MASS INDEX: 29.02 KG/M2 | HEART RATE: 50 BPM | TEMPERATURE: 97.7 F | HEIGHT: 64 IN | DIASTOLIC BLOOD PRESSURE: 73 MMHG | OXYGEN SATURATION: 96 %

## 2024-08-13 DIAGNOSIS — Z95.5 S/P CORONARY ARTERY STENT PLACEMENT: Chronic | ICD-10-CM

## 2024-08-13 DIAGNOSIS — M81.0 OSTEOPOROSIS, UNSPECIFIED OSTEOPOROSIS TYPE, UNSPECIFIED PATHOLOGICAL FRACTURE PRESENCE: ICD-10-CM

## 2024-08-13 DIAGNOSIS — J44.89 CHRONIC OBSTRUCTIVE AIRWAY DISEASE WITH ASTHMA (H): ICD-10-CM

## 2024-08-13 DIAGNOSIS — I89.0 ACQUIRED LYMPHEDEMA: ICD-10-CM

## 2024-08-13 DIAGNOSIS — F11.90 CHRONIC, CONTINUOUS USE OF OPIOIDS: ICD-10-CM

## 2024-08-13 DIAGNOSIS — R06.02 SOB (SHORTNESS OF BREATH) ON EXERTION: ICD-10-CM

## 2024-08-13 DIAGNOSIS — G47.33 OSA (OBSTRUCTIVE SLEEP APNEA): ICD-10-CM

## 2024-08-13 DIAGNOSIS — R79.9 ABNORMAL FINDING OF BLOOD CHEMISTRY, UNSPECIFIED: ICD-10-CM

## 2024-08-13 DIAGNOSIS — D05.10 DUCTAL CARCINOMA IN SITU (DCIS) OF BREAST, UNSPECIFIED LATERALITY: Chronic | ICD-10-CM

## 2024-08-13 DIAGNOSIS — I25.119 ATHEROSCLEROSIS OF NATIVE CORONARY ARTERY OF NATIVE HEART WITH ANGINA PECTORIS (H): Primary | ICD-10-CM

## 2024-08-13 DIAGNOSIS — I10 ESSENTIAL HYPERTENSION, BENIGN: ICD-10-CM

## 2024-08-13 DIAGNOSIS — E55.9 VITAMIN D DEFICIENCY: ICD-10-CM

## 2024-08-13 DIAGNOSIS — Z00.00 HEALTH MAINTENANCE EXAMINATION: ICD-10-CM

## 2024-08-13 DIAGNOSIS — E66.01 MORBID OBESITY (H): ICD-10-CM

## 2024-08-13 PROBLEM — F11.20 CONTINUOUS OPIOID DEPENDENCE (H): Status: RESOLVED | Noted: 2024-05-20 | Resolved: 2024-08-13

## 2024-08-13 LAB
ERYTHROCYTE [DISTWIDTH] IN BLOOD BY AUTOMATED COUNT: 13.3 % (ref 10–15)
HBA1C MFR BLD: 5.7 % (ref 0–5.6)
HCT VFR BLD AUTO: 42.8 % (ref 35–47)
HGB BLD-MCNC: 13.5 G/DL (ref 11.7–15.7)
MCH RBC QN AUTO: 29.9 PG (ref 26.5–33)
MCHC RBC AUTO-ENTMCNC: 31.5 G/DL (ref 31.5–36.5)
MCV RBC AUTO: 95 FL (ref 78–100)
PLATELET # BLD AUTO: 251 10E3/UL (ref 150–450)
RBC # BLD AUTO: 4.52 10E6/UL (ref 3.8–5.2)
WBC # BLD AUTO: 8 10E3/UL (ref 4–11)

## 2024-08-13 PROCEDURE — 82306 VITAMIN D 25 HYDROXY: CPT | Performed by: INTERNAL MEDICINE

## 2024-08-13 PROCEDURE — 83036 HEMOGLOBIN GLYCOSYLATED A1C: CPT | Performed by: INTERNAL MEDICINE

## 2024-08-13 PROCEDURE — 80061 LIPID PANEL: CPT | Performed by: INTERNAL MEDICINE

## 2024-08-13 PROCEDURE — 36415 COLL VENOUS BLD VENIPUNCTURE: CPT | Performed by: INTERNAL MEDICINE

## 2024-08-13 PROCEDURE — 85027 COMPLETE CBC AUTOMATED: CPT | Performed by: INTERNAL MEDICINE

## 2024-08-13 PROCEDURE — 99214 OFFICE O/P EST MOD 30 MIN: CPT | Mod: 25 | Performed by: INTERNAL MEDICINE

## 2024-08-13 PROCEDURE — 80053 COMPREHEN METABOLIC PANEL: CPT | Performed by: INTERNAL MEDICINE

## 2024-08-13 PROCEDURE — G0481 DRUG TEST DEF 8-14 CLASSES: HCPCS | Mod: GZ | Performed by: INTERNAL MEDICINE

## 2024-08-13 PROCEDURE — 84443 ASSAY THYROID STIM HORMONE: CPT | Performed by: INTERNAL MEDICINE

## 2024-08-13 PROCEDURE — G0439 PPPS, SUBSEQ VISIT: HCPCS | Performed by: INTERNAL MEDICINE

## 2024-08-13 ASSESSMENT — ANXIETY QUESTIONNAIRES
IF YOU CHECKED OFF ANY PROBLEMS ON THIS QUESTIONNAIRE, HOW DIFFICULT HAVE THESE PROBLEMS MADE IT FOR YOU TO DO YOUR WORK, TAKE CARE OF THINGS AT HOME, OR GET ALONG WITH OTHER PEOPLE: SOMEWHAT DIFFICULT
5. BEING SO RESTLESS THAT IT IS HARD TO SIT STILL: SEVERAL DAYS
6. BECOMING EASILY ANNOYED OR IRRITABLE: NOT AT ALL
8. IF YOU CHECKED OFF ANY PROBLEMS, HOW DIFFICULT HAVE THESE MADE IT FOR YOU TO DO YOUR WORK, TAKE CARE OF THINGS AT HOME, OR GET ALONG WITH OTHER PEOPLE?: SOMEWHAT DIFFICULT
3. WORRYING TOO MUCH ABOUT DIFFERENT THINGS: NOT AT ALL
GAD7 TOTAL SCORE: 1
7. FEELING AFRAID AS IF SOMETHING AWFUL MIGHT HAPPEN: NOT AT ALL
2. NOT BEING ABLE TO STOP OR CONTROL WORRYING: NOT AT ALL
7. FEELING AFRAID AS IF SOMETHING AWFUL MIGHT HAPPEN: NOT AT ALL
4. TROUBLE RELAXING: NOT AT ALL
1. FEELING NERVOUS, ANXIOUS, OR ON EDGE: NOT AT ALL
GAD7 TOTAL SCORE: 1

## 2024-08-13 ASSESSMENT — PAIN SCALES - GENERAL: PAINLEVEL: SEVERE PAIN (6)

## 2024-08-13 NOTE — PATIENT INSTRUCTIONS
Flu plus new covid in fall   Colonoscopy is due 2025   If you dont receive  alter from them I can order ( let me now )   Dexa is due to check on fosamax

## 2024-08-13 NOTE — PROGRESS NOTES
Preventive Care Visit  [facility]  Amber Alberto MD, Internal Medicine  Aug 13, 2024      Assessment & Plan     Atherosclerosis of native coronary artery of native heart with angina pectoris (H24)  She has mild plaque and is on a statin  Echocardiogram was normal in 2022  - Lipid panel reflex to direct LDL Non-fasting; Future  - Lipid panel reflex to direct LDL Non-fasting    Morbid obesity (H)      SOB (shortness of breath) on exertion  She has noticed mild shortness of breath on exertion and diminishing exercise tolerance we will get a stress test.  - NM MPI Treadmill; Future  - Comprehensive metabolic panel; Future  - CBC with platelets; Future  - TSH; Future  - Hemoglobin A1c; Future  - Vitamin D Deficiency; Future  - Comprehensive metabolic panel  - CBC with platelets  - TSH  - Hemoglobin A1c  - Vitamin D Deficiency    Ductal carcinoma in situ (DCIS) of breast, unspecified laterality    History of bilateral mastectomy  Acquired lymphedema      Health maintenance examination  Flu plus new covid in fall   Colonoscopy is due 2025   If you dont receive  alter from them I can order ( let me now )   Dexa is due to check on fosamax     ARSEN (obstructive sleep apnea)      Chronic obstructive airway disease with asthma (H)      Essential hypertension, benign  Well-controlled    S/P coronary artery stent placement      Abnormal finding of blood chemistry, unspecified    - TSH; Future  - Hemoglobin A1c; Future  - Hemoglobin A1c    Vitamin D deficiency    - Vitamin D Deficiency; Future  - Vitamin D Deficiency    Osteoporosis, unspecified osteoporosis type, unspecified pathological fracture presence  On Fosamax needs bone density for surveillance  - DX Bone Density; Future    Chronic, continuous use of opioids  Uses Tylenol with codeine fixed dosing about 84 tablets per prescription last her 2 months or more  Overall doing really well no concerns            BMI  Estimated body mass index is 29.18  "kg/m  as calculated from the following:    Height as of this encounter: 1.626 m (5' 4\").    Weight as of this encounter: 77.1 kg (170 lb).       Counseling  Appropriate preventive services were addressed with this patient via screening, questionnaire, or discussion as appropriate for fall prevention, nutrition, physical activity, Tobacco-use cessation, social engagement, weight loss and cognition.  Checklist reviewing preventive services available has been given to the patient.  Reviewed patient's diet, addressing concerns and/or questions.   Discussed possible causes of fatigue. The patient was provided with written information regarding signs of hearing loss.   Information on urinary incontinence and treatment options given to patient.   I have reviewed Opioid Use Disorder and Substance Use Disorder risk factors and made any needed referrals.           Subjective   Pat is a 74 year old, presenting for the following:  Wellness Visit and Recheck Medication (Pt reports that she is here for her annual wellness visit, also wants to discuss breathing,nasal,heart,lymphedema.)   Ambien as needed only for travel   TUMS , vit   Uses unsom ,, melatonin and is very fidgety tizanidine works for that so takes it eveery day       8/13/2024    12:07 PM   Additional Questions   Roomed by maite   Accompanied by alone         8/13/2024    12:07 PM   Patient Reported Additional Medications   Patient reports taking the following new medications none         Health Care Directive  Patient has a Health Care Directive on file  Advance care planning document is on file and is current.    HPI              8/12/2024   General Health   How would you rate your overall physical health? Good   Feel stress (tense, anxious, or unable to sleep) To some extent      (!) STRESS CONCERN      8/12/2024   Nutrition   Diet: Regular (no restrictions)            8/12/2024   Exercise   Days per week of moderate/strenous exercise 4 days   Average minutes " spent exercising at this level 40 min            8/12/2024   Social Factors   Frequency of gathering with friends or relatives More than three times a week   Worry food won't last until get money to buy more No   Food not last or not have enough money for food? No   Do you have housing? (Housing is defined as stable permanent housing and does not include staying ouside in a car, in a tent, in an abandoned building, in an overnight shelter, or couch-surfing.) Yes   Are you worried about losing your housing? No   Lack of transportation? No   Unable to get utilities (heat,electricity)? No            8/13/2024   Fall Risk   Gait Speed Test (Document in seconds) 3.67   Gait Speed Test Interpretation Less than or equal to 5.00 seconds - PASS             8/12/2024   Activities of Daily Living- Home Safety   Needs help with the following daily activites None of the above   Safety concerns in the home None of the above            8/12/2024   Dental   Dentist two times every year? Yes            8/12/2024   Hearing Screening   Hearing concerns? (!) I NEED TO ASK PEOPLE TO SPEAK UP OR REPEAT THEMSELVES.    (!) TROUBLE UNDERSTANDING SOFT OR WHISPERED SPEECH.       Multiple values from one day are sorted in reverse-chronological order         8/12/2024   Driving Risk Screening   Patient/family members have concerns about driving No            8/12/2024   General Alertness/Fatigue Screening   Have you been more tired than usual lately? (!) YES            8/12/2024   Urinary Incontinence Screening   Bothered by leaking urine in past 6 months Yes            8/12/2024   TB Screening   Were you born outside of the [place]? No          Today's PHQ-9 Score:       8/12/2024     1:10 PM   PHQ-9 SCORE   PHQ-9 Total Score MyChart 4 (Minimal depression)   PHQ-9 Total Score 4         8/12/2024   Substance Use   Alcohol more than 3/day or more than 7/wk No   Do you have a current opioid prescription? (!) YES   How severe/bad is pain from 1 to 10?  8/10   Do you use any other substances recreationally? (!) CANNABIS PRODUCTS          Social History     Tobacco Use    Smoking status: Former     Current packs/day: 0.00     Average packs/day: 1 pack/day for 20.0 years (20.0 ttl pk-yrs)     Types: Cigarettes     Start date: 3/27/1970     Quit date: 3/27/1990     Years since quittin.4    Smokeless tobacco: Never   Substance Use Topics    Alcohol use: Yes     Alcohol/week: 3.3 standard drinks of alcohol     Comment: Alcoholic Drinks/day: 1-2 glasses of wine/week     Drug use: No              ASCVD Risk   The ASCVD Risk score (Luda MORRISON, et al., 2019) failed to calculate for the following reasons:    The valid total cholesterol range is 130 to 320 mg/dL            Reviewed and updated as needed this visit by Provider                      Current providers sharing in care for this patient include:  Patient Care Team:  Amber Alberto MD as PCP - General  Amber Alberto MD as Assigned PCP  Carmen Knox PA-C as Physician Assistant (Physician Assistant - Medical)  Carmen Knox PA-C as Assigned Heart and Vascular Provider  Amber Alberto MD as Assigned Pain Medication Provider    The following health maintenance items are reviewed in Epic and correct as of today:  Health Maintenance   Topic Date Due    URINE DRUG SCREEN  Never done    COPD ACTION PLAN  Never done    DEPRESSION ACTION PLAN  Never done    CONTROLLED SUBSTANCE AGREEMENT FOR CHRONIC PAIN MANAGEMENT  Never done    HEPATITIS A IMMUNIZATION (1 of 2 - Risk 2-dose series) Never done    COVID-19 Vaccine ( season) 2024    LIPID  2024    MEDICARE ANNUAL WELLNESS VISIT  2024    INFLUENZA VACCINE (1) 2024    ANNUAL REVIEW OF HM ORDERS  2025    YOKO ASSESSMENT  2025    FALL RISK ASSESSMENT  2025    PHQ-9  2025    GLUCOSE  2027    ADVANCE CARE PLANNING  2028    DTAP/TDAP/TD IMMUNIZATION (4 - Td or Tdap) 2029     COLORECTAL CANCER SCREENING  10/27/2032    DEXA  04/21/2036    SPIROMETRY  Completed    HEPATITIS C SCREENING  Completed    Pneumococcal Vaccine: 65+ Years  Completed    ZOSTER IMMUNIZATION  Completed    RSV VACCINE (Pregnancy & 60+)  Completed    IPV IMMUNIZATION  Aged Out    HPV IMMUNIZATION  Aged Out    MENINGITIS IMMUNIZATION  Aged Out    RSV MONOCLONAL ANTIBODY  Aged Out    MAMMO SCREENING  Discontinued       Current Outpatient Medications   Medication Sig Dispense Refill    acetaminophen (TYLENOL) 500 MG tablet [ACETAMINOPHEN (TYLENOL) 500 MG TABLET] Take 500 mg by mouth every 6 (six) hours as needed for pain (arthritis).      acetaminophen-codeine (TYLENOL #3) 300-30 MG per tablet Take 1 tablet by mouth every 4 hours as needed for severe pain 84 tablet 0    albuterol (PROAIR HFA/PROVENTIL HFA/VENTOLIN HFA) 108 (90 Base) MCG/ACT inhaler INHALE 2 PUFFS INTO THE LUNGS EVERY 6 HOURS 18 g 4    alendronate (FOSAMAX) 70 MG tablet TAKE 1 TABLET BY MOUTH ONE TIME PER WEEK 12 tablet 1    aspirin (ASA) 81 MG EC tablet Take 1 tablet (81 mg) by mouth 2 times daily      b complex vitamins tablet [B COMPLEX VITAMINS TABLET] Take 1 tablet by mouth daily.      buPROPion (WELLBUTRIN XL) 150 MG 24 hr tablet TAKE 1 TABLET BY MOUTH EVERY DAY IN THE MORNING 90 tablet 2    calcium, as carbonate, (TUMS) 200 mg calcium (500 mg) chewable tablet [CALCIUM, AS CARBONATE, (TUMS) 200 MG CALCIUM (500 MG) CHEWABLE TABLET] Chew 2 tablets daily.      cholecalciferol, vitamin D3, 1,000 unit tablet Take 2,000 Units by mouth daily       docusate sodium (COLACE) 100 MG capsule [DOCUSATE SODIUM (COLACE) 100 MG CAPSULE] Take 1 capsule (100 mg total) by mouth 2 (two) times a day as needed for constipation.  0    doxylamine (UNISOM) 25 mg tablet Take 25 mg by mouth nightly as needed for sleep       Ferrous Sulfate (IRON) 28 MG TABS Take 1 tablet by mouth daily       fluticasone (FLONASE) 50 MCG/ACT nasal spray Spray 1 spray into both nostrils daily 16  "g 1    losartan (COZAAR) 50 MG tablet Take 1.5 tablets (75 mg) by mouth daily 135 tablet 3    magnesium oxide (MAG-OX) 400 (240 Mg) MG tablet Take 400 mg by mouth At Bedtime      melatonin 1 mg Tab tablet [MELATONIN 1 MG TAB TABLET] Take 1 mg by mouth at bedtime.             metoprolol succinate ER (TOPROL XL) 100 MG 24 hr tablet Take 0.5 tablets (50 mg) by mouth at bedtime --Needs to schedule cardiology follow-up appt for further refills 90 tablet 3    omeprazole (PRILOSEC) 20 MG DR capsule TAKE 1 CAPSULE BY MOUTH EVERY DAY 90 capsule 2    peg 400-propylene glycol PF (SYSTANE) 0.4-0.3 % Dpet [-PROPYLENE GLYCOL PF (SYSTANE) 0.4-0.3 % DPET] Administer 1 drop to both eyes 2 (two) times a day as needed.      rosuvastatin (CRESTOR) 20 MG tablet TAKE 1 TABLET BY MOUTH EVERYDAY AT BEDTIME 90 tablet 3    tiZANidine (ZANAFLEX) 2 MG tablet Take 1 tablet (2 mg) by mouth at bedtime 90 tablet 3    triamcinolone (NASACORT) 55 mcg nasal inhaler [TRIAMCINOLONE (NASACORT) 55 MCG NASAL INHALER] Apply 2 sprays into each nostril daily as needed.      umeclidinium (INCRUSE ELLIPTA) 62.5 MCG/ACT inhaler TAKE 1 PUFF BY MOUTH EVERY DAY 3 each 3    zolpidem (AMBIEN) 10 MG tablet TAKE 1 TABLET (10 MG) BY MOUTH NIGHTLY AS NEEDED FOR SLEEP 30 tablet 3     No current facility-administered medications for this visit.          Objective    Exam  /73 (BP Location: Left arm, Patient Position: Sitting, Cuff Size: Adult Large)   Pulse 50   Temp 97.7  F (36.5  C) (Axillary)   Resp 18   Ht 1.626 m (5' 4\")   Wt 77.1 kg (170 lb)   LMP  (LMP Unknown)   SpO2 96%   Breastfeeding No   BMI 29.18 kg/m     Estimated body mass index is 29.18 kg/m  as calculated from the following:    Height as of this encounter: 1.626 m (5' 4\").    Weight as of this encounter: 77.1 kg (170 lb).    Physical Exam  GENERAL: alert and no distress  NECK: no adenopathy, no asymmetry, masses, or scars  RESP: lungs clear to auscultation - no rales, rhonchi or " wheezes  CV: regular rate and rhythm, normal S1 S2, no S3 or S4, no murmur, click or rub, no peripheral edema  ABDOMEN: soft, nontender, no hepatosplenomegaly, no masses and bowel sounds normal  MS: no gross musculoskeletal defects noted, no edema        8/13/2024   Mini Cog   Clock Draw Score 2 Normal   3 Item Recall 3 objects recalled   Mini Cog Total Score 5                 Signed Electronically by: Amber Alberto MD    Answers submitted by the patient for this visit:  Patient Health Questionnaire (Submitted on 8/12/2024)  If you checked off any problems, how difficult have these problems made it for you to do your work, take care of things at home, or get along with other people?: Not difficult at all  PHQ9 TOTAL SCORE: 4  YOKO-7 (Submitted on 8/13/2024)  YOKO 7 TOTAL SCORE: 1

## 2024-08-14 LAB
ALBUMIN SERPL BCG-MCNC: 4.5 G/DL (ref 3.5–5.2)
ALP SERPL-CCNC: 82 U/L (ref 40–150)
ALT SERPL W P-5'-P-CCNC: 23 U/L (ref 0–50)
ANION GAP SERPL CALCULATED.3IONS-SCNC: 10 MMOL/L (ref 7–15)
AST SERPL W P-5'-P-CCNC: 21 U/L (ref 0–45)
BILIRUB SERPL-MCNC: 0.5 MG/DL
BUN SERPL-MCNC: 22 MG/DL (ref 8–23)
CALCIUM SERPL-MCNC: 9.7 MG/DL (ref 8.8–10.4)
CHLORIDE SERPL-SCNC: 105 MMOL/L (ref 98–107)
CHOLEST SERPL-MCNC: 104 MG/DL
CREAT SERPL-MCNC: 0.85 MG/DL (ref 0.51–0.95)
CREAT UR-MCNC: 135 MG/DL
EGFRCR SERPLBLD CKD-EPI 2021: 71 ML/MIN/1.73M2
FASTING STATUS PATIENT QL REPORTED: NO
FASTING STATUS PATIENT QL REPORTED: NO
GLUCOSE SERPL-MCNC: 111 MG/DL (ref 70–99)
HCO3 SERPL-SCNC: 27 MMOL/L (ref 22–29)
HDLC SERPL-MCNC: 46 MG/DL
LDLC SERPL CALC-MCNC: 33 MG/DL
NONHDLC SERPL-MCNC: 58 MG/DL
POTASSIUM SERPL-SCNC: 5.3 MMOL/L (ref 3.4–5.3)
PROT SERPL-MCNC: 7.4 G/DL (ref 6.4–8.3)
SODIUM SERPL-SCNC: 142 MMOL/L (ref 135–145)
TRIGL SERPL-MCNC: 124 MG/DL
TSH SERPL DL<=0.005 MIU/L-ACNC: 1.86 UIU/ML (ref 0.3–4.2)
VIT D+METAB SERPL-MCNC: 42 NG/ML (ref 20–50)

## 2024-08-16 LAB
CODEINE UR CFM-MCNC: >9000 NG/ML
CODEINE/CREAT UR: ABNORMAL
MORPHINE UR CFM-MCNC: 85 NG/ML
MORPHINE/CREAT UR: 63 NG/MG {CREAT}
NORCODEINE UR-MCNC: 466 NG/ML
NORCODEINE/CREAT UR CFM: 345 NG/MG {CREAT}

## 2024-08-22 ENCOUNTER — ANCILLARY PROCEDURE (OUTPATIENT)
Dept: BONE DENSITY | Facility: CLINIC | Age: 74
End: 2024-08-22
Attending: INTERNAL MEDICINE
Payer: COMMERCIAL

## 2024-08-22 DIAGNOSIS — M81.0 OSTEOPOROSIS, UNSPECIFIED OSTEOPOROSIS TYPE, UNSPECIFIED PATHOLOGICAL FRACTURE PRESENCE: ICD-10-CM

## 2024-08-22 PROCEDURE — 77081 DXA BONE DENSITY APPENDICULR: CPT | Mod: TC | Performed by: PHYSICIAN ASSISTANT

## 2024-08-22 PROCEDURE — 77089 TBS DXA CAL W/I&R FX RISK: CPT

## 2024-08-22 PROCEDURE — 77080 DXA BONE DENSITY AXIAL: CPT | Mod: TC | Performed by: PHYSICIAN ASSISTANT

## 2024-09-05 ENCOUNTER — HOSPITAL ENCOUNTER (OUTPATIENT)
Dept: NUCLEAR MEDICINE | Facility: HOSPITAL | Age: 74
Discharge: HOME | End: 2024-09-05
Attending: INTERNAL MEDICINE
Payer: COMMERCIAL

## 2024-09-05 ENCOUNTER — HOSPITAL ENCOUNTER (OUTPATIENT)
Dept: CARDIOLOGY | Facility: HOSPITAL | Age: 74
Discharge: HOME | End: 2024-09-05
Attending: INTERNAL MEDICINE
Payer: COMMERCIAL

## 2024-09-05 DIAGNOSIS — R06.02 SOB (SHORTNESS OF BREATH) ON EXERTION: ICD-10-CM

## 2024-09-05 LAB
CV STRESS CURRENT BP HE: NORMAL
CV STRESS CURRENT HR HE: 100
CV STRESS CURRENT HR HE: 113
CV STRESS CURRENT HR HE: 116
CV STRESS CURRENT HR HE: 120
CV STRESS CURRENT HR HE: 124
CV STRESS CURRENT HR HE: 124
CV STRESS CURRENT HR HE: 129
CV STRESS CURRENT HR HE: 130
CV STRESS CURRENT HR HE: 131
CV STRESS CURRENT HR HE: 61
CV STRESS CURRENT HR HE: 65
CV STRESS CURRENT HR HE: 70
CV STRESS CURRENT HR HE: 70
CV STRESS CURRENT HR HE: 71
CV STRESS CURRENT HR HE: 73
CV STRESS CURRENT HR HE: 74
CV STRESS CURRENT HR HE: 74
CV STRESS CURRENT HR HE: 76
CV STRESS CURRENT HR HE: 76
CV STRESS CURRENT HR HE: 77
CV STRESS CURRENT HR HE: 83
CV STRESS CURRENT HR HE: 83
CV STRESS CURRENT HR HE: 84
CV STRESS CURRENT HR HE: 89
CV STRESS CURRENT HR HE: 95
CV STRESS CURRENT HR HE: 97
CV STRESS DEVIATION TIME HE: NORMAL
CV STRESS ECHO PERCENT HR HE: NORMAL
CV STRESS EXERCISE STAGE HE: NORMAL
CV STRESS EXERCISE STAGE REACHED HE: NORMAL
CV STRESS FINAL RESTING BP HE: NORMAL
CV STRESS FINAL RESTING HR HE: 77
CV STRESS MAX HR HE: 131
CV STRESS MAX TREADMILL GRADE HE: 12
CV STRESS MAX TREADMILL SPEED HE: 2.5
CV STRESS PEAK DIA BP HE: NORMAL
CV STRESS PEAK SYS BP HE: NORMAL
CV STRESS PHASE HE: NORMAL
CV STRESS PROTOCOL HE: NORMAL
CV STRESS RESTING PT POSITION HE: NORMAL
CV STRESS RESTING PT POSITION HE: NORMAL
CV STRESS ST DEVIATION AMOUNT HE: NORMAL
CV STRESS ST DEVIATION ELEVATION HE: NORMAL
CV STRESS ST EVELATION AMOUNT HE: NORMAL
CV STRESS TEST TYPE HE: NORMAL
CV STRESS TOTAL STAGE TIME MIN 1 HE: NORMAL
RATE PRESSURE PRODUCT: NORMAL
STRESS ECHO BASELINE DIASTOLIC HE: 81
STRESS ECHO BASELINE HR: 61
STRESS ECHO BASELINE SYSTOLIC BP: 171
STRESS ECHO CALCULATED PERCENT HR: 90 %
STRESS ECHO LAST STRESS DIASTOLIC BP: 82
STRESS ECHO LAST STRESS HR: 129
STRESS ECHO LAST STRESS SYSTOLIC BP: 184
STRESS ECHO POST ESTIMATED WORKLOAD: 7.1
STRESS ECHO POST EXERCISE DUR MIN: 5
STRESS ECHO POST EXERCISE DUR SEC: 55
STRESS ECHO TARGET HR: 145
STRESS ST DEPRESSION: 1.5 MM

## 2024-09-05 PROCEDURE — 343N000001 HC RX 343: Performed by: INTERNAL MEDICINE

## 2024-09-05 PROCEDURE — A9500 TC99M SESTAMIBI: HCPCS | Performed by: INTERNAL MEDICINE

## 2024-09-05 PROCEDURE — 93017 CV STRESS TEST TRACING ONLY: CPT

## 2024-09-05 PROCEDURE — 78452 HT MUSCLE IMAGE SPECT MULT: CPT

## 2024-09-05 PROCEDURE — 93016 CV STRESS TEST SUPVJ ONLY: CPT | Performed by: INTERNAL MEDICINE

## 2024-09-05 PROCEDURE — 93018 CV STRESS TEST I&R ONLY: CPT | Performed by: INTERNAL MEDICINE

## 2024-09-05 PROCEDURE — 78452 HT MUSCLE IMAGE SPECT MULT: CPT | Mod: 26 | Performed by: INTERNAL MEDICINE

## 2024-09-05 RX ADMIN — Medication 9 MILLICURIE: at 08:22

## 2024-09-05 RX ADMIN — Medication 31.7 MILLICURIE: at 09:30

## 2024-09-06 NOTE — PLAN OF CARE
Problem: Adult Inpatient Plan of Care  Goal: Absence of Hospital-Acquired Illness or Injury  Intervention: Identify and Manage Fall Risk  Recent Flowsheet Documentation  Taken 9/5/2024 1616 by Jacqueline Rodriguez RN  Safety Promotion/Fall Prevention:   activity supervised   assistive device/personal items within reach   nonskid shoes/slippers when out of bed   patient and family education   safety round/check completed  Goal: Optimal Comfort and Wellbeing  Intervention: Monitor Pain and Promote Comfort  Recent Flowsheet Documentation  Taken 9/5/2024 1928 by Jacqueline Rodriguez RN  Pain Management Interventions: medication (see MAR)  Taken 9/5/2024 1705 by Jacqueline Rodriguez RN  Pain Management Interventions: medication (see MAR)     Problem: Risk for Delirium  Goal: Improved Behavioral Control  Intervention: Minimize Safety Risk  Recent Flowsheet Documentation  Taken 9/5/2024 1616 by Jacqueline Rodriguez RN  Enhanced Safety Measures:   assistive devices when indicated   pain management   review medications for side effects with activity     Problem: Comorbidity Management  Goal: Blood Pressure in Desired Range  Intervention: Maintain Blood Pressure Management  Recent Flowsheet Documentation  Taken 9/5/2024 1616 by Jacqueline Rodriguez RN  Medication Review/Management: medications reviewed     Problem: Sepsis/Septic Shock  Goal: Absence of Infection Signs and Symptoms  Intervention: Promote Recovery  Recent Flowsheet Documentation  Taken 9/5/2024 2200 by Jacqueline Rodriguez RN  Activity Management: patient refuses activity  Taken 9/5/2024 1800 by Jacqueline Rodriguez RN  Activity Management: patient refuses activity  Taken 9/5/2024 1720 by Jacqueline Rodriguez RN  Activity Management:   ambulated to bathroom   back to bed  Taken 9/5/2024 1705 by Jacqueline Rodriguez RN  Activity Management:   activity adjusted per tolerance   activity encouraged     Problem: Wound  Goal: Optimal Functional Ability  Intervention: Optimize Functional  Patient vital signs are at baseline: Yes  Patient able to ambulate as they were prior to admission or with assist devices provided by therapies during their stay:  Yes  Patient MUST void prior to discharge:  Yes  Patient able to tolerate oral intake:  Yes  Pain has adequate pain control using Oral analgesics:  Yes         Ability  Recent Flowsheet Documentation  Taken 9/5/2024 2200 by Jacqueline Rodriguez RN  Activity Management: patient refuses activity  Taken 9/5/2024 1800 by Jacqueline Rodriguez RN  Activity Management: patient refuses activity  Taken 9/5/2024 1720 by Jacqueline Rodriguez RN  Assistive Device Utilized:   gait belt   walker  Activity Management:   ambulated to bathroom   back to bed  Activity Assistance Provided: assistance, 1 person  Taken 9/5/2024 1705 by Jacqueline Rodriguez RN  Activity Management:   activity adjusted per tolerance   activity encouraged  Goal: Optimal Pain Control and Function  Intervention: Prevent or Manage Pain  Recent Flowsheet Documentation  Taken 9/5/2024 1928 by Jacqueline Rodriguez RN  Pain Management Interventions: medication (see MAR)  Taken 9/5/2024 1705 by Jacqueline Rodriguez RN  Pain Management Interventions: medication (see MAR)  Goal: Skin Health and Integrity  Intervention: Optimize Skin Protection  Recent Flowsheet Documentation  Taken 9/5/2024 2200 by Jacqueline Rodriguez RN  Activity Management: patient refuses activity  Taken 9/5/2024 1800 by Jacqueline Rodriguez RN  Activity Management: patient refuses activity  Taken 9/5/2024 1720 by Jacqueline Rodriguez RN  Activity Management:   ambulated to bathroom   back to bed  Taken 9/5/2024 1705 by Jacqueline Rodriguez RN  Activity Management:   activity adjusted per tolerance   activity encouraged     Problem: Fatigue  Goal: Improved Activity Tolerance  Intervention: Promote Improved Energy  Recent Flowsheet Documentation  Taken 9/5/2024 2200 by Jacqueline Rodriguez RN  Activity Management: patient refuses activity  Taken 9/5/2024 1800 by Jacqueline Rodriguez RN  Activity Management: patient refuses activity  Taken 9/5/2024 1720 by Jacqueline Rodriguez RN  Activity Management:   ambulated to bathroom   back to bed  Taken 9/5/2024 1705 by Jacqueline Rodriguez RN  Activity Management:   activity adjusted per tolerance   activity encouraged     Problem: Activity Intolerance  Goal:  Enhanced Capacity and Energy  Intervention: Optimize Activity Tolerance  Recent Flowsheet Documentation  Taken 2024 2200 by Jacqueline Rodriguez, RN  Activity Management: patient refuses activity  Taken 2024 1800 by Jacqueline Rodriguez RN  Activity Management: patient refuses activity  Taken 2024 1720 by Jacqueline Rodriguez RN  Activity Management:   ambulated to bathroom   back to bed  Taken 2024 1705 by Jacqueline Rodriguez, RN  Activity Management:   activity adjusted per tolerance   activity encouraged     Problem: Anemia  Goal: Anemia Symptom Improvement  Intervention: Monitor and Manage Anemia  Recent Flowsheet Documentation  Taken 2024 1616 by Jacqueline Rodriguez RN  Safety Promotion/Fall Prevention:   activity supervised   assistive device/personal items within reach   nonskid shoes/slippers when out of bed   patient and family education   safety round/check completed     Problem: Acute Kidney Injury/Impairment  Goal: Effective Renal Function  Intervention: Monitor and Support Renal Function  Recent Flowsheet Documentation  Taken 2024 1616 by Jacqueline Rodriguez RN  Medication Review/Management: medications reviewed   Goal Outcome Evaluation:       Aox4, calm and cooperative.  Severe pain in RLE, stable and controlled with prn dilaudid.  Refusing activity and repositioning. Expresses concern for incontinence and requiring assistance with ambulation. Educated on importance and ways to manage concern. Use of purewick.  Vitals stable, weaned off O2 this shift. Denies sob/.  Swab done in RLE, result pending. Wound care done.

## 2024-09-13 ENCOUNTER — OFFICE VISIT (OUTPATIENT)
Dept: CARDIOLOGY | Facility: CLINIC | Age: 74
End: 2024-09-13
Payer: COMMERCIAL

## 2024-09-13 VITALS
SYSTOLIC BLOOD PRESSURE: 150 MMHG | HEART RATE: 54 BPM | BODY MASS INDEX: 29.54 KG/M2 | OXYGEN SATURATION: 90 % | DIASTOLIC BLOOD PRESSURE: 82 MMHG | WEIGHT: 172.1 LBS | RESPIRATION RATE: 20 BRPM

## 2024-09-13 DIAGNOSIS — Z95.1 S/P CABG (CORONARY ARTERY BYPASS GRAFT): ICD-10-CM

## 2024-09-13 DIAGNOSIS — R06.09 DYSPNEA ON EXERTION: Primary | ICD-10-CM

## 2024-09-13 LAB
ATRIAL RATE - MUSE: 51 BPM
DIASTOLIC BLOOD PRESSURE - MUSE: NORMAL MMHG
INTERPRETATION ECG - MUSE: NORMAL
P AXIS - MUSE: 22 DEGREES
PR INTERVAL - MUSE: 138 MS
QRS DURATION - MUSE: 86 MS
QT - MUSE: 522 MS
QTC - MUSE: 481 MS
R AXIS - MUSE: 11 DEGREES
SYSTOLIC BLOOD PRESSURE - MUSE: NORMAL MMHG
T AXIS - MUSE: 31 DEGREES
VENTRICULAR RATE- MUSE: 51 BPM

## 2024-09-13 PROCEDURE — 99214 OFFICE O/P EST MOD 30 MIN: CPT | Performed by: INTERNAL MEDICINE

## 2024-09-13 PROCEDURE — 93000 ELECTROCARDIOGRAM COMPLETE: CPT | Performed by: INTERNAL MEDICINE

## 2024-09-13 RX ORDER — LOSARTAN POTASSIUM AND HYDROCHLOROTHIAZIDE 12.5; 1 MG/1; MG/1
1 TABLET ORAL DAILY
Qty: 90 TABLET | Refills: 11 | Status: SHIPPED | OUTPATIENT
Start: 2024-09-13

## 2024-09-13 RX ORDER — SIMVASTATIN 20 MG
20 TABLET ORAL DAILY
COMMUNITY
End: 2024-09-13

## 2024-09-13 RX ORDER — DIPHENHYDRAMINE HYDROCHLORIDE 25 MG/1
1 TABLET ORAL DAILY
COMMUNITY

## 2024-09-13 RX ORDER — AMOXICILLIN 500 MG/1
500 TABLET, FILM COATED ORAL ONCE
COMMUNITY

## 2024-09-13 NOTE — LETTER
9/13/2024    Amber Alberto MD  [address] [address]    RE: Lisa Ray       Dear Colleague,     I had the pleasure of seeing Lisa Ray in the [facility].    Thank you, Dr. Knox, for asking the [facility] team to see Ms. Lisa Ray to evaluate       Assessment/Recommendations   Assessment/Plan:  Dyspena on exertion better with lowering metoprolol now HR 54, probably related to both bradycardia and HTN, noted small fixed defect on stress echo, stop metoprolol, change to losartan 100mg/hydrochlorothiazide 12.5mg, check BMP in 1 week, if no improvement would obtain echo and consider CTA cors  CAD s/p CABG - stable - cont asp,rosvuastatin           History of Present Illness/Subjective    Ms. Lisa Ray is a 74 year old female with CAD s/p PCI to RCA in past, then ISR, then cath 2019 severe ISR and CAD, followed by CABG 2019 LIMA to ALD, SVG diag and SVG to RCA, bronchiectasis stable, but more dyspnea in the past year, lowered metoprolol from 100 down to 50mg and dyspnea improved,  stress nuclear with fixed defect apex, noted some ST depression.  Intermittent sternum pain. No pain but dypsnea and may be pressure, again improved with lowering metoprolol but then dyspnea worsened in last 3-4 mo.  Compounded by sinus infection.  Last Cr 0.85 and K 5.3         Physical Examination Review of Systems   BP (!) 150/82 (BP Location: Right arm, Patient Position: Sitting, Cuff Size: Adult Regular)   Pulse 54   Resp 20   Wt 78.1 kg (172 lb 1.6 oz)   LMP  (LMP Unknown)   SpO2 90%   BMI 29.54 kg/m    Body mass index is 29.54 kg/m .  Wt Readings from Last 3 Encounters:   09/13/24 78.1 kg (172 lb 1.6 oz)   08/13/24 77.1 kg (170 lb)   05/20/24 76.5 kg (168 lb 9.6 oz)     [unfilled]  General Appearance:   no distress, normal body habitus   ENT/Mouth: membranes moist, no oral lesions or bleeding gums.      EYES:  no scleral icterus,  normal conjunctivae   Neck: no carotid bruits or thyromegaly   Chest/Lungs:   lungs are clear to auscultation, no rales or wheezing,  sternal scar, equal chest wall expansion    Cardiovascular:   Regular. Normal first and second heart sounds with no murmurs, rubs, or gallops; the carotid, radial and posterior tibial pulses are intact, Jugular venous pressure , edema bilaterally    Abdomen:  no organomegaly, masses, bruits, or tenderness; bowel sounds are present   Extremities: no cyanosis or clubbing   Skin: no xanthelasma, warm.    Neurologic: normal  bilateral, no tremors     Psychiatric: alert and oriented x3, calm     Review of Systems - 12 points nega other than above      Medical History  Surgical History Family History Social History   Past Medical History:   Diagnosis Date     Anemia 2012    microcytic from Celebrex. GI w/u neg     Antiplatelet or antithrombotic long-term use      Back pain      Benign neoplasm of colon     Created by Conversion      Bronchiectasis (H)      CAD (coronary artery disease) 2008    RCA- stent     Cancer (H)     breast cancer     DJD (degenerative joint disease)      Dysthymia      Fatigue     recurrent/variable - no serious pathology     Fibromyalgia      GERD (gastroesophageal reflux disease)      History of gastric ulcer      Hx of CABG      Hypertension      Iritis     past Hx.-left eye     Morbid obesity (H) 5/20/2024     ARSEN (obstructive sleep apnea) 6/5/2019     Raynaud's disease      Rosacea      Shortness of breath     with exertion     Shoulder tendonitis     right     Stented coronary artery      Ulcer of intestine     small bowel- 10 years ago     UTI (urinary tract infection)     Jan. 2019    Past Surgical History:   Procedure Laterality Date     ARTHROPLASTY, HIP, TOTAL, DIRECT ANTERIOR APPROACH, USING HANA TABLE Left 2/4/2022    Procedure: LEFT DIRECT ANTERIOR TOTAL HIP ARTHROPLASTY;  Surgeon: Deny Bryson MD;  Location: [facility] OR     [facility]  REPAIR Bilateral     Dr. Eder Bingham-      BREAST SURGERY Bilateral 2017    bilateral mastectomy 2017- 2017     BREAST SURGERY  2018    implants and revsion 2     CORONARY STENT PLACEMENT Right     Dr. Schwab     CV CORONARY ANGIOGRAM N/A 3/26/2019    Procedure: Coronary Angiogram;  Surgeon: Ba Foley MD;  Location: [facility];  Service: Cardiology     HYSTERECTOMY       OOPHORECTOMY       TOTAL HIP ARTHROPLASTY Right 2015    Procedure: RIGHT TOTAL HIP ARTHROPLASTY;  Surgeon: Tera Yeung MD;  Location: [facility];  Service:      [facility] APPENDECTOMY      Description: Appendectomy;  Recorded: 2009;    Family History   Problem Relation Age of Onset     Hereditary Breast and Ovarian Cancer Syndrome Sister      Hereditary Breast and Ovarian Cancer Syndrome Paternal Grandfather      Hereditary Breast and Ovarian Cancer Syndrome Cousin      Asthma Cousin      Alcoholism Father          GI bleed age 67     Colon Cancer Other      Breast Cancer Sister         metastatic age 49, remission with Rx     Coronary Artery Disease Brother         CABG     Breast Cancer Paternal Grandmother      Breast Cancer Cousin      Colon Cancer Mother     Social History     Socioeconomic History     Marital status:      Spouse name: Not on file     Number of children: 3     Years of education: Not on file     Highest education level: Not on file   Occupational History     Not on file   Tobacco Use     Smoking status: Former     Current packs/day: 0.00     Average packs/day: 1 pack/day for 20.0 years (20.0 ttl pk-yrs)     Types: Cigarettes     Start date: 3/27/1970     Quit date: 3/27/1990     Years since quittin.4     Smokeless tobacco: Never   Substance and Sexual Activity     Alcohol use: Yes     Alcohol/week: 3.3 standard drinks of alcohol     Comment: Alcoholic Drinks/day: 1-2 glasses of wine/week      Drug use: No     Sexual activity: Not on file   Other  Topics Concern     Not on file   Social History Narrative    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 3 grown sons Aries in - , Josse SAMUELS, -microbrewer/[employer], ; Gregory Lu- paramedic [employer]...... Non smoker rare ETOH.        Social Determinants of Health     Financial Resource Strain: Low Risk  (8/12/2024)    Financial Resource Strain      Within the past 12 months, have you or your family members you live with been unable to get utilities (heat, electricity) when it was really needed?: No   Food Insecurity: Low Risk  (8/12/2024)    Food Insecurity      Within the past 12 months, did you worry that your food would run out before you got money to buy more?: No      Within the past 12 months, did the food you bought just not last and you didn t have money to get more?: No   Transportation Needs: Low Risk  (8/12/2024)    Transportation Needs      Within the past 12 months, has lack of transportation kept you from medical appointments, getting your medicines, non-medical meetings or appointments, work, or from getting things that you need?: No   Physical Activity: Sufficiently Active (8/12/2024)    Exercise Vital Sign      Days of Exercise per Week: 4 days      Minutes of Exercise per Session: 40 min   Stress: Stress Concern Present (8/12/2024)    [employer] - Occupational Stress Questionnaire      Feeling of Stress : To some extent   Social Connections: Unknown (8/12/2024)    Social Connection and Isolation Panel [NHANES]      Frequency of Communication with Friends and Family: Not on file      Frequency of Social Gatherings with Friends and Family: More than three times a week      Attends Congregational Services: Not on file      Active Member of Clubs or Organizations: Not on file      Attends Club or Organization Meetings: Not on file      Marital Status: Not on file   Interpersonal Safety: Low Risk  (8/13/2024)    Interpersonal Safety       Do you feel physically and emotionally safe where you currently live?: Yes      Within the past 12 months, have you been hit, slapped, kicked or otherwise physically hurt by someone?: No      Within the past 12 months, have you been humiliated or emotionally abused in other ways by your partner or ex-partner?: No   Housing Stability: Low Risk  (8/12/2024)    Housing Stability      Do you have housing? : Yes      Are you worried about losing your housing?: No          Medications  Allergies   Scheduled Meds:  No current facility-administered medications for this visit.     Continuous Infusions:  No current facility-administered medications for this visit.     PRN Meds:.  No current facility-administered medications for this visit.    Allergies   Allergen Reactions     Latex Unknown     Added based on information entered during log entry, please review and add reactions, type, and severity as needed     Nsaids (Non-Steroidal Anti-Inflammatory Drug) [Nsaids] Unknown     Other reaction(s): GI Bleeding, GI Upset, Sensitivity.  Ulceration w/ Celebrex         Lab Results    Chemistry/lipid CBC Cardiac Enzymes/BNP/TSH/INR   Lab Results   Component Value Date    CHOL 104 08/13/2024    HDL 46 (L) 08/13/2024    TRIG 124 08/13/2024    BUN 22.0 08/13/2024     08/13/2024    CO2 27 08/13/2024    Lab Results   Component Value Date    WBC 8.0 08/13/2024    HGB 13.5 08/13/2024    HCT 42.8 08/13/2024    MCV 95 08/13/2024     08/13/2024    Lab Results   Component Value Date    TSH 1.86 08/13/2024    INR 1.32 (H) 03/29/2019              Ba Foley MD  Interventional Cardiology  [facility]              Thank you for allowing me to participate in the care of your patient.      Sincerely,     Ba Foley MD     [facility]  cc:   Carmen Knox PA-C  [address]  [address]  [place] [address]

## 2024-09-13 NOTE — PROGRESS NOTES
Thank you, Dr. Knox, for asking the [facility] team to see Ms. Lisa Ray to evaluate       Assessment/Recommendations   Assessment/Plan:  Dyspena on exertion better with lowering metoprolol now HR 54, probably related to both bradycardia and HTN, noted small fixed defect on stress echo, stop metoprolol, change to losartan 100mg/hydrochlorothiazide 12.5mg, check BMP in 1 week, if no improvement would obtain echo and consider CTA cors  CAD s/p CABG - stable - cont asp,rosvuastatin           History of Present Illness/Subjective    Ms. Lisa Ray is a 74 year old female with CAD s/p PCI to RCA in past, then ISR, then cath 2019 severe ISR and CAD, followed by CABG 2019 [place] to ALD, SVG diag and SVG to RCA, bronchiectasis stable, but more dyspnea in the past year, lowered metoprolol from 100 down to 50mg and dyspnea improved,  stress nuclear with fixed defect apex, noted some ST depression.  Intermittent sternum pain. No pain but dypsnea and may be pressure, again improved with lowering metoprolol but then dyspnea worsened in last 3-4 mo.  Compounded by sinus infection.  Last Cr 0.85 and K 5.3         Physical Examination Review of Systems   BP (!) 150/82 (BP Location: Right arm, Patient Position: Sitting, Cuff Size: Adult Regular)   Pulse 54   Resp 20   Wt 78.1 kg (172 lb 1.6 oz)   LMP  (LMP Unknown)   SpO2 90%   BMI 29.54 kg/m    Body mass index is 29.54 kg/m .  Wt Readings from Last 3 Encounters:   09/13/24 78.1 kg (172 lb 1.6 oz)   08/13/24 77.1 kg (170 lb)   05/20/24 76.5 kg (168 lb 9.6 oz)     [unfilled]  General Appearance:   no distress, normal body habitus   ENT/Mouth: membranes moist, no oral lesions or bleeding gums.      EYES:  no scleral icterus, normal conjunctivae   Neck: no carotid bruits or thyromegaly   Chest/Lungs:   lungs are clear to auscultation, no rales or wheezing,  sternal scar, equal chest wall expansion    Cardiovascular:   Regular. Normal first  and second heart sounds with no murmurs, rubs, or gallops; the carotid, radial and posterior tibial pulses are intact, Jugular venous pressure , edema bilaterally    Abdomen:  no organomegaly, masses, bruits, or tenderness; bowel sounds are present   Extremities: no cyanosis or clubbing   Skin: no xanthelasma, warm.    Neurologic: normal  bilateral, no tremors     Psychiatric: alert and oriented x3, calm     Review of Systems - 12 points nega other than above      Medical History  Surgical History Family History Social History   Past Medical History:   Diagnosis Date    Anemia 2012    microcytic from Celebrex. GI w/u neg    Antiplatelet or antithrombotic long-term use     Back pain     Benign neoplasm of colon     Created by Conversion     Bronchiectasis (H)     CAD (coronary artery disease) 2008    RCA- stent    Cancer (H)     breast cancer    DJD (degenerative joint disease)     Dysthymia     Fatigue     recurrent/variable - no serious pathology    Fibromyalgia     GERD (gastroesophageal reflux disease)     History of gastric ulcer     Hx of CABG     Hypertension     Iritis     past Hx.-left eye    Morbid obesity (H) 5/20/2024    ARSEN (obstructive sleep apnea) 6/5/2019    Raynaud's disease     Rosacea     Shortness of breath     with exertion    Shoulder tendonitis     right    Stented coronary artery     Ulcer of intestine     small bowel- 10 years ago    UTI (urinary tract infection)     Jan. 2019    Past Surgical History:   Procedure Laterality Date    ARTHROPLASTY, HIP, TOTAL, DIRECT ANTERIOR APPROACH, USING HANA TABLE Left 2/4/2022    Procedure: LEFT DIRECT ANTERIOR TOTAL HIP ARTHROPLASTY;  Surgeon: Deny Bryson MD;  Location: [facility] Main OR    BELPHAROPTOSIS REPAIR Bilateral 2013    Dr. Eder Bingham- 2013    BREAST SURGERY Bilateral 11/2017    bilateral mastectomy 2017- 11/2017    BREAST SURGERY  2018    implants and revsion 2    CORONARY STENT PLACEMENT Right 2008    Dr. Schwab    CV CORONARY  ANGIOGRAM N/A 3/26/2019    Procedure: Coronary Angiogram;  Surgeon: Ba Foley MD;  Location: [facility] Cath Lab;  Service: Cardiology    HYSTERECTOMY      OOPHORECTOMY      TOTAL HIP ARTHROPLASTY Right 2015    Procedure: RIGHT TOTAL HIP ARTHROPLASTY;  Surgeon: Tera Yeung MD;  Location: [facility];  Service:     [facility] APPENDECTOMY      Description: Appendectomy;  Recorded: 2009;    Family History   Problem Relation Age of Onset    Hereditary Breast and Ovarian Cancer Syndrome Sister     Hereditary Breast and Ovarian Cancer Syndrome Paternal Grandfather     Hereditary Breast and Ovarian Cancer Syndrome Cousin     Asthma Cousin     Alcoholism Father          GI bleed age 67    Colon Cancer Other     Breast Cancer Sister         metastatic age 49, remission with Rx    Coronary Artery Disease Brother         CABG    Breast Cancer Paternal Grandmother     Breast Cancer Cousin     Colon Cancer Mother     Social History     Socioeconomic History    Marital status:      Spouse name: Not on file    Number of children: 3    Years of education: Not on file    Highest education level: Not on file   Occupational History    Not on file   Tobacco Use    Smoking status: Former     Current packs/day: 0.00     Average packs/day: 1 pack/day for 20.0 years (20.0 ttl pk-yrs)     Types: Cigarettes     Start date: 3/27/1970     Quit date: 3/27/1990     Years since quittin.4    Smokeless tobacco: Never   Substance and Sexual Activity    Alcohol use: Yes     Alcohol/week: 3.3 standard drinks of alcohol     Comment: Alcoholic Drinks/day: 1-2 glasses of wine/week     Drug use: No    Sexual activity: Not on file   Other Topics Concern    Not on file   Social History Narrative    Retired lead RN at [employer] ;  Johny,... 3 grown sons Aries in [place]- , Josse SAMUELS, -microbrewer/beer, ; Gregory Lu- paramedic [employer]...... Non smoker rare ETOH.         Social Determinants of Health     Financial Resource Strain: Low Risk  (8/12/2024)    Financial Resource Strain     Within the past 12 months, have you or your family members you live with been unable to get utilities (heat, electricity) when it was really needed?: No   Food Insecurity: Low Risk  (8/12/2024)    Food Insecurity     Within the past 12 months, did you worry that your food would run out before you got money to buy more?: No     Within the past 12 months, did the food you bought just not last and you didn t have money to get more?: No   Transportation Needs: Low Risk  (8/12/2024)    Transportation Needs     Within the past 12 months, has lack of transportation kept you from medical appointments, getting your medicines, non-medical meetings or appointments, work, or from getting things that you need?: No   Physical Activity: Sufficiently Active (8/12/2024)    Exercise Vital Sign     Days of Exercise per Week: 4 days     Minutes of Exercise per Session: 40 min   Stress: Stress Concern Present (8/12/2024)    [employer] - Occupational Stress Questionnaire     Feeling of Stress : To some extent   Social Connections: Unknown (8/12/2024)    Social Connection and Isolation Panel [NHANES]     Frequency of Communication with Friends and Family: Not on file     Frequency of Social Gatherings with Friends and Family: More than three times a week     Attends [employer]ism Services: Not on file     Active Member of Clubs or Organizations: Not on file     Attends Club or Organization Meetings: Not on file     Marital Status: Not on file   Interpersonal Safety: Low Risk  (8/13/2024)    Interpersonal Safety     Do you feel physically and emotionally safe where you currently live?: Yes     Within the past 12 months, have you been hit, slapped, kicked or otherwise physically hurt by someone?: No     Within the past 12 months, have you been humiliated or emotionally abused in other ways by your  partner or ex-partner?: No   Housing Stability: Low Risk  (8/12/2024)    Housing Stability     Do you have housing? : Yes     Are you worried about losing your housing?: No          Medications  Allergies   Scheduled Meds:  No current facility-administered medications for this visit.     Continuous Infusions:  No current facility-administered medications for this visit.     PRN Meds:.  No current facility-administered medications for this visit.    Allergies   Allergen Reactions    Latex Unknown     Added based on information entered during log entry, please review and add reactions, type, and severity as needed    Nsaids (Non-Steroidal Anti-Inflammatory Drug) [Nsaids] Unknown     Other reaction(s): GI Bleeding, GI Upset, Sensitivity.  Ulceration w/ Celebrex         Lab Results    Chemistry/lipid CBC Cardiac Enzymes/BNP/TSH/INR   Lab Results   Component Value Date    CHOL 104 08/13/2024    HDL 46 (L) 08/13/2024    TRIG 124 08/13/2024    BUN 22.0 08/13/2024     08/13/2024    CO2 27 08/13/2024    Lab Results   Component Value Date    WBC 8.0 08/13/2024    HGB 13.5 08/13/2024    HCT 42.8 08/13/2024    MCV 95 08/13/2024     08/13/2024    Lab Results   Component Value Date    TSH 1.86 08/13/2024    INR 1.32 (H) 03/29/2019              Ba Foley MD  Interventional Cardiology  [facility]

## 2024-09-15 NOTE — PROGRESS NOTES
Outpatient Sleep Medicine Consultation:      Name: Lisa Ray MRN# [id number]   Age: 74 year old YOB: 1950     Date of Consultation: September 16, 2024  Consultation is requested by: No referring provider defined for this encounter.   Primary care provider: Amber Alberto       Reason for Sleep Consult:     Lisa Ray is sent by No ref. provider found     Patient s Reason for visit  Lisa Ray main reason for visit: Cpap usage  Patient states problem(s) started: 2019  Lisa Ray's goals for this visit: Use with sinus congestion           Assessment and Plan:     Impression/Plan:    ICD-10-CM    1. ARSEN (obstructive sleep apnea)  G47.33 CPAP Order for DME - ONLY FOR DME      2. Essential hypertension, benign  I10 CPAP Order for DME - ONLY FOR DME      3. History of bilateral mastectomy  Z90.13 CPAP Order for DME - ONLY FOR DME      4. S/P bilateral breast implants  Z98.82 CPAP Order for DME - ONLY FOR DME      5. Chronic, continuous use of opioids  F11.90 CPAP Order for DME - ONLY FOR DME      6. Chronic obstructive airway disease with asthma (H)  J44.89 CPAP Order for DME - ONLY FOR DME      7. S/P CABG (coronary artery bypass graft)  Z95.1 CPAP Order for DME - ONLY FOR DME      8. Chronic bronchitis, unspecified chronic bronchitis type (H)  J42 CPAP Order for DME - ONLY FOR DME      9. S/P coronary artery stent placement  Z95.5 CPAP Order for DME - ONLY FOR DME      Lisa Ray is a very pleasant 74-year-old female who presents to the sleep medicine clinic today to establish care to obtain needed supplies and equipment.  Patient has a history of moderate sleep apnea with sleep-related hypoxemia.  This is well-controlled with her CPAP therapy.  Rosa derives great benefit from using her PAP therapy.  She has suffered from chronic sinusitis since November making it difficult for consistent, nightly use.  She has made a diligent effort, however.  We did discuss  her initial sleep study and compared against her downloaded statistics from the last 4 weeks usage of her CPAP machine.  She does deny any symptoms commonly associated with sleep apnea, including daytime sleepiness as well as intrusion of sleepiness into her driving capability.  Patient will have daytime sleepiness on evenings when she suffers from prolonged sleep maintenance insomnia, noting she takes a nap in her chair in the afternoon for short period of time.  Recommend parent return to the sleep medicine clinic in 1 year, sooner if needed  A comprehensive order for needed supplies and equipment was placed today for the coming year.  Recommend patient optimize her sleep schedule as well as her sleep hygiene practices to mitigate any further sleep intrusion.  Recommend patient employ safe driving practices such as not driving a motor vehicle should she become drowsy.  Recommend patient maintain her BMI below 30 as she is.    46 minutes spent with patient, all of which were spent face-to-face counseling, consulting, coordinating plan of care.      ALLYSON Christianson CNP         History of Present Illness:     Lisa Ray presents to the sleep medicine clinic with concerns of establishing care with this sleep medicine provider to obtain needed supplies and equipment.    Lisa Ray has a medical history notable for overweight, coronary artery disease,'s s/p CABG, COPD, hypertension, bronchiectasis, chronic continuous use of opioids, coronary artery stent placement, history of bilateral mastectomy, s/p bilateral breast implants.    Takes Iron for her hx of low trending hemoglobin. Explained relationship between iron and RLS. No ferritin level available. Instructed to take first thing in the morning on an empty stomach with vitamin C to promote maximum absorption.    Mirapex is prescribed by her PCP for her restless legs.  States this is effective in symptomatic relief.    New diagnosis of  bronchiectasis.      Uses cannabis products, for sleep    Former smoker, quit date 1990 20-pack-year history    More tired than usual, is going to have a stress test    Uses Tylenol with codeine fixed dosing about 84 tablets per prescription last her 2 months or more     Melatonin 1 mg by mouth as needed for bedtime  Unisom 25 mg by mouth nightly as needed for sleep  Ambien 10 mg p.o. nightly as needed for sleep initiation rarely, unless on a trip  Tizanidine 2 mg p.o. by mouth at bedtime    Is being seen by Otolaryngology for chronic sinusitis. She has had problems with her sinuses since November. Particularly problematic on the right side. She will go on antibiotics and get some temporary improvement but the symptoms recur. She did not have problems there sinuses prior to November. She gets abnormal drainage, facial pressure. Had a CT scan done that demonstrated sinus outflow obstruction.  She has had difficulty using her CPAP intermittently as a result of this.  Presently patient uses nasal pillows and finds it difficult to use, understandably.  I did discuss with her that perhaps having a fullface mask for these instances may be of benefit to her.    Neck Circumference: 34 cm.    [place] Sleepiness Scale  Total score - [place]:6   (Less than 10 normal)     Insomnia Severity Scale  CORA Total Score: 11  (normal 0-7, mild 8-14, moderate 15-21, severe 22-28)    Social History:  Retired RN    Past Sleep Evaluations:      She was diagnosed with moderate obstructive sleep apnea (AHI=17.1/hr) and has been using nPAP therapy.  Initial PAP settings: AutoCPAP 6 - 10 cmH2O with nasal pillows  Current PAP settings: same    Compliance report for dates 8/14/2024-9/12/2024  Usage days 22/30 days, 73%  Days greater than/equal to 4 hours, 19 days, 63%  Average usage, 6 hours, 4 minutes  Pressure settings: 6-10 cm H2O  Pressure, 95th percentile: 10 cm H2O, maximum 10 cm H2O  Leaks, 95th percentile: 21.4 L/minute  AHI, residual:  0.5 events/hour    SLEEP-WAKE SCHEDULE:     Work/School Days: Patient goes to school/work: No   Usually gets into bed at 10:30 pm  Takes patient about 45 min to fall asleep  Has trouble falling asleep 2-3 night nights per week  Wakes up in the middle of the night 1-2x times.  Wakes up due to Pain;Use the bathroom;Anxiety  She has trouble falling back asleep 1-3x a week times a week.   It usually takes 1/2 hr-3hour to get back to sleep reads, snacks. Mid-afternoon will rest in her chair.   Patient is usually up at 7:30  Uses alarm: No    Weekends/Non-work Days/All Other Days:  Usually gets into bed at 10:30   Takes patient about 45min to fall asleep  Patient is usually up at 7:30  Uses alarm: No    Sleep Need  Patient gets  8 hr sleep on average   Patient thinks she needs about 8hr sleep    Lisa Ray prefers to sleep in this position(s): Side   Patient states they do the following activities in bed: Read;Other    Naps  Patient takes a purposeful nap 0 times a week and naps are usually If i do 1/2hr in duration  She feels better after a nap: Yes  She dozes off unintentionally 2x/week days per week  Patient has had a driving accident or near-miss due to sleepiness/drowsiness: No      SLEEP DISRUPTIONS:    Breathing/Snoring  Patient snores:Yes  Other people complain about her snoring: Yes  Patient has been told she stops breathing in her sleep:Yes  She has issues with the following: Stuffy nose when you wake up;Getting up to urinate more than once.    Movement:  Patient gets pain, discomfort, with an urge to move:  Yes restless legs symptoms Mirapex  It happens when she is resting:  Yes  It happens more at night:  Yes  Patient has been told she kicks her legs at night:  Yes     Behaviors in Sleep:  Lisa Ray has experienced the following behaviors while sleeping:    She has experienced sudden muscle weakness during the day: Yes  Pt denies bruxism, sleep talking, sleep walking, and dream enactment  behavior. Pt denies sleep paralysis, hypnagogue and cataplexy.       Is there anything else you would like your sleep provider to know: Anxiety      CAFFEINE AND OTHER SUBSTANCES:    Patient consumes caffeinated beverages per day:  2 coffee before 12 noon  Last caffeine use is usually: 11am  List of any prescribed or over the counter stimulants that patient takes: None  List of any prescribed or over the counter sleep medication patient takes: Unisom, ambein  List of previous sleep medications that patient has tried:    Patient drinks alcohol to help them sleep: No  Patient drinks alcohol near bedtime: No    Family History:  Patient has a family member been diagnosed with a sleep disorder: No            SCALES:    EPWORTH SLEEPINESS SCALE         9/11/2024     9:23 AM    [place] Sleepiness Scale ( ESTHER Alvarez  [id number]<br>ESS - [place]/English - Final version - 21 Nov 07 - [facility] [place].)   Sitting and reading Slight chance of dozing   Watching TV Slight chance of dozing   Sitting, inactive in a public place (e.g. a theatre or a meeting) Slight chance of dozing   As a passenger in a car for an hour without a break Slight chance of dozing   Lying down to rest in the afternoon when circumstances permit Slight chance of dozing   Sitting and talking to someone Would never doze   Sitting quietly after a lunch without alcohol Slight chance of dozing   In a car, while stopped for a few minutes in traffic Would never doze   [place] Score (MC) 6   [place] Score (Sleep) 6         INSOMNIA SEVERITY INDEX (CORA)          9/11/2024     9:14 AM   Insomnia Severity Index (CORA)   Difficulty falling asleep 2   Difficulty staying asleep 2   Problems waking up too early 1   How SATISFIED/DISSATISFIED are you with your CURRENT sleep pattern? 2   How NOTICEABLE to others do you think your sleep problem is in terms of impairing the quality of your life? 1   How WORRIED/DISTRESSED are you about your current sleep problem? 1   To what  "extent do you consider your sleep problem to INTERFERE with your daily functioning (e.g. daytime fatigue, mood, ability to function at work/daily chores, concentration, memory, mood, etc.) CURRENTLY? 2   CORA Total Score 11       Guidelines for Scoring/Interpretation:  Total score categories:  0-7 = No clinically significant insomnia   8-14 = Subthreshold insomnia   15-21 = Clinical insomnia (moderate severity)  22-28 = Clinical insomnia (severe)  Used via courtesy of www.[place].gov with permission from Damian Spivey PhD., [facility]      STOP BANG           9/13/2024     2:07 PM   STOP BANG Questionnaire (  2008, the American Society of Anesthesiologists, Inc. [employer]   B/P Clinic: 150/82         GAD7        8/13/2024    11:58 AM   YOKO-7    1. Feeling nervous, anxious, or on edge 0   2. Not being able to stop or control worrying 0   3. Worrying too much about different things 0   4. Trouble relaxing 0   5. Being so restless that it is hard to sit still 1   6. Becoming easily annoyed or irritable 0   7. Feeling afraid, as if something awful might happen 0   YOKO-7 Total Score 1   If you checked any problems, how difficult have they made it for you to do your work, take care of things at home, or get along with other people? Somewhat difficult         CAGE-AID         No data to display                CAGE-AID reprinted with permission from the [place] Medical Journal, MASTER Mccord. and WINNIE Mello, \"Conjoint screening questionnaires for alcohol and drug abuse\" [place] Medical Journal 94: 135-140, 1995.      PATIENT HEALTH QUESTIONNAIRE-9 (PHQ - 9)        8/12/2024     1:10 PM   PHQ-9 ([employer])   1.  Little interest or pleasure in doing things 0   2.  Feeling down, depressed, or hopeless 0   3.  Trouble falling or staying asleep, or sleeping too much 1   4.  Feeling tired or having little energy 1   5.  Poor appetite or overeating 1   6.  Feeling bad about yourself - or that " you are a failure or have let yourself or your family down 0   7.  Trouble concentrating on things, such as reading the newspaper or watching television 1   8.  Moving or speaking so slowly that other people could have noticed. Or the opposite - being so fidgety or restless that you have been moving around a lot more than usual 0   9.  Thoughts that you would be better off dead, or of hurting yourself in some way 0   PHQ-9 Total Score 4   6.  Feeling bad about yourself 0   7.  Trouble concentrating 1   8.  Moving slowly or restless 0   9.  Suicidal or self-harm thoughts 0   1.  Little interest or pleasure in doing things Not at all   2.  Feeling down, depressed, or hopeless Not at all   3.  Trouble falling or staying asleep, or sleeping too much Several days   4.  Feeling tired or having little energy Several days   5.  Poor appetite or overeating Several days   6.  Feeling bad about yourself Not at all   7.  Trouble concentrating Several days   8.  Moving slowly or restless Not at all   9.  Suicidal or self-harm thoughts Not at all   PHQ-9 via [facility] TOTAL SCORE-----> 4 (Minimal depression)   Difficulty at work, home, or with people Not difficult at all       Developed by Shahrzad Mireles, Darcie Noguera, Sunil Harris and colleagues, with an educational demetra from [employer] No permission required to reproduce, translate, display or distribute.        Allergies:    Allergies   Allergen Reactions    Latex Unknown     Added based on information entered during log entry, please review and add reactions, type, and severity as needed    Nsaids (Non-Steroidal Anti-Inflammatory Drug) [Nsaids] Unknown     Other reaction(s): GI Bleeding, GI Upset, Sensitivity.  Ulceration w/ Celebrex       Medications:    Current Outpatient Medications   Medication Sig Dispense Refill    acetaminophen (TYLENOL) 500 MG tablet [ACETAMINOPHEN (TYLENOL) 500 MG TABLET] Take 500 mg by mouth every 6 (six) hours as needed for pain  (arthritis).      acetaminophen-codeine (TYLENOL #3) 300-30 MG per tablet Take 1 tablet by mouth every 4 hours as needed for severe pain 84 tablet 0    albuterol (PROAIR HFA/PROVENTIL HFA/VENTOLIN HFA) 108 (90 Base) MCG/ACT inhaler INHALE 2 PUFFS INTO THE LUNGS EVERY 6 HOURS 18 g 4    alendronate (FOSAMAX) 70 MG tablet TAKE 1 TABLET BY MOUTH ONE TIME PER WEEK 12 tablet 1    amoxicillin (AMOXIL) 500 MG tablet Take 500 mg by mouth once. Take 4 tabs one hr prior to dental (Patient not taking: Reported on 9/13/2024)      aspirin (ASA) 81 MG EC tablet Take 1 tablet (81 mg) by mouth 2 times daily (Patient taking differently: Take 162 mg by mouth daily.)      b complex vitamins tablet [B COMPLEX VITAMINS TABLET] Take 1 tablet by mouth daily.      biotin 5 MG CAPS Take 1 capsule by mouth daily. (Patient not taking: Reported on 9/13/2024)      buPROPion (WELLBUTRIN XL) 150 MG 24 hr tablet TAKE 1 TABLET BY MOUTH EVERY DAY IN THE MORNING 90 tablet 2    calcium, as carbonate, (TUMS) 200 mg calcium (500 mg) chewable tablet [CALCIUM, AS CARBONATE, (TUMS) 200 MG CALCIUM (500 MG) CHEWABLE TABLET] Chew 2 tablets daily.      cholecalciferol, vitamin D3, 1,000 unit tablet Take 2,000 Units by mouth daily       docusate sodium (COLACE) 100 MG capsule [DOCUSATE SODIUM (COLACE) 100 MG CAPSULE] Take 1 capsule (100 mg total) by mouth 2 (two) times a day as needed for constipation.  0    doxylamine (UNISOM) 25 mg tablet Take 25 mg by mouth nightly as needed for sleep       Ferrous Sulfate (IRON) 28 MG TABS Take 1 tablet by mouth daily       fluticasone (FLONASE) 50 MCG/ACT nasal spray Spray 1 spray into both nostrils daily 16 g 1    losartan-hydrochlorothiazide (HYZAAR) 100-12.5 MG tablet Take 1 tablet by mouth daily. 90 tablet 11    magnesium oxide (MAG-OX) 400 (240 Mg) MG tablet Take 400 mg by mouth At Bedtime      melatonin 1 mg Tab tablet [MELATONIN 1 MG TAB TABLET] Take 1 mg by mouth at bedtime.             omeprazole (PRILOSEC) 20 MG  DR capsule TAKE 1 CAPSULE BY MOUTH EVERY DAY 90 capsule 2    peg 400-propylene glycol PF (SYSTANE) 0.4-0.3 % Dpet [-PROPYLENE GLYCOL PF (SYSTANE) 0.4-0.3 % DPET] Administer 1 drop to both eyes 2 (two) times a day as needed.      rosuvastatin (CRESTOR) 20 MG tablet TAKE 1 TABLET BY MOUTH EVERYDAY AT BEDTIME 90 tablet 3    tiZANidine (ZANAFLEX) 2 MG tablet Take 1 tablet (2 mg) by mouth at bedtime 90 tablet 3    triamcinolone (NASACORT) 55 mcg nasal inhaler [TRIAMCINOLONE (NASACORT) 55 MCG NASAL INHALER] Apply 2 sprays into each nostril daily as needed.      umeclidinium (INCRUSE ELLIPTA) 62.5 MCG/ACT inhaler TAKE 1 PUFF BY MOUTH EVERY DAY 3 each 3    zolpidem (AMBIEN) 10 MG tablet TAKE 1 TABLET (10 MG) BY MOUTH NIGHTLY AS NEEDED FOR SLEEP 30 tablet 3       Problem List:  Patient Active Problem List    Diagnosis Date Noted    Morbid obesity (H) 05/20/2024     Priority: Medium    Chronic, continuous use of opioids 05/20/2024     Priority: Medium    S/P total hip arthroplasty 02/04/2022     Priority: Medium    Infection due to 2019 novel coronavirus 01/31/2022     Priority: Medium    Bronchiectasis without acute exacerbation (H) 07/09/2021     Priority: Medium    Age-related osteoporosis without current pathological fracture 06/11/2021     Priority: Medium    COPD (chronic obstructive pulmonary disease) (H) 03/14/2020     Priority: Medium    Essential hypertension, benign 03/14/2020     Priority: Medium    Chronic obstructive airway disease with asthma (H) 03/14/2020     Priority: Medium    Acquired lymphedema 02/12/2020     Priority: Medium    Hair loss 02/12/2020     Priority: Medium    Right shoulder pain 02/12/2020     Priority: Medium    ARSEN (obstructive sleep apnea) 06/05/2019     Priority: Medium     5/20/2019 Polysomnography Split (wt 159 lbs).  There was evidence of limb movements during sleep independent of   respiratory events.  Respiratory monitoring showed moderate obstructive sleep apnea    (AHI=17.1/hr).  The patient spent a total of 8.2 minutes at an oxygen saturation below   88%.  A trial of nasal CPAP was initiated given the severity of sleep-disordered   breathing.  CPAP pressures from 5 to 6 were sampled during the night.  Patient had trouble sleeping due to pain and PLMs.  Patient elevated head   of bed to 10 degrees during second half of titration portion of study.  CPAP of 6 was effective at eliminating obstructive events. This was an   adequate titration study (titration grading criteria for optimal or good   are met with the exception that supine REM sleep did not occur at the   selected pressure).        Health maintenance examination 04/11/2019     Priority: Medium     Repeat colonoscopy is 2022   repeat dexa is 2020  No paps or pelvics THANH/BSO        S/P CABG (coronary artery bypass graft) 03/28/2019     Priority: Medium     3/29/2019 with Dr. Akbar  1. Epiaortic ultrasound of the ascending aorta.  2. Triple vessel coronary artery bypass grafting procedures; left   internal mammary  artery to left anterior descending coronary artery and separate   reversedsaphenous  vein grafts to the left anterior descending diagonal branch 2 and the   right  posterior descending coronary arteries.  3. Endoscopic vein procurement from the left lower extremity.        S/P bilateral breast implants 09/25/2018     Priority: Medium    History of bilateral mastectomy 05/23/2018     Priority: Medium     Ductal in situ carcinoma; [facility]        Dysthymic disorder      Priority: Medium     Created by Conversion        Atherosclerosis of native coronary artery of native heart with angina pectoris (H24)      Priority: Medium     2008- RCA; @[facility] (Dr. Schwab)        S/P coronary artery stent placement 10/09/2017     Priority: Medium     2008- RCA stent        DCIS (ductal carcinoma in situ) 09/08/2017     Priority: Medium     Right breast/biopsy- 9/7/2017        Ptosis Of Eyelid       Priority: Medium     Created by Conversion  [facility] Annotation: Jul 25 2013  3:44PM - Gregory Doyle: compromises   vision. scheduled for repair  Replacement Utility updated for latest IMO load        Primary osteoarthritis of right hip 12/07/2015     Priority: Medium    History of total hip replacement, right 12/07/2015     Priority: Medium    Paresthesias/numbness 10/29/2015     Priority: Medium     IMO SNOMED LOAD SPRING 2020 [.6/.18/.2020 9:04 PM]  Piero Xiong:          Osteoarthritis 06/11/2015     Priority: Medium    Raynaud disease 06/11/2015     Priority: Medium    Rosacea      Priority: Medium     Created by Conversion            Past Medical/Surgical History:  Past Medical History:   Diagnosis Date    Anemia 2012    microcytic from Celebrex. GI w/u neg    Antiplatelet or antithrombotic long-term use     Back pain     Benign neoplasm of colon     Created by Conversion     Bronchiectasis (H)     CAD (coronary artery disease) 2008    RCA- stent    Cancer (H)     breast cancer    DJD (degenerative joint disease)     Dysthymia     Fatigue     recurrent/variable - no serious pathology    Fibromyalgia     GERD (gastroesophageal reflux disease)     History of gastric ulcer     Hx of CABG     Hypertension     Iritis     past Hx.-left eye    Morbid obesity (H) 5/20/2024    ARSEN (obstructive sleep apnea) 6/5/2019    Raynaud's disease     Rosacea     Shortness of breath     with exertion    Shoulder tendonitis     right    Stented coronary artery     Ulcer of intestine     small bowel- 10 years ago    UTI (urinary tract infection)     Jan. 2019     Past Surgical History:   Procedure Laterality Date    ARTHROPLASTY, HIP, TOTAL, DIRECT ANTERIOR APPROACH, USING HANA TABLE Left 2/4/2022    Procedure: LEFT DIRECT ANTERIOR TOTAL HIP ARTHROPLASTY;  Surgeon: Deny Bryson MD;  Location: [facility] Main OR    BELPHAROPTOSIS REPAIR Bilateral 2013    Dr. Eder Bingham- 2013    BREAST SURGERY Bilateral 11/2017    bilateral  mastectomy - 2017    BREAST SURGERY  2018    implants and revsion 2    CORONARY STENT PLACEMENT Right     Dr. Schwab    CV CORONARY ANGIOGRAM N/A 3/26/2019    Procedure: Coronary Angiogram;  Surgeon: Ba Foley MD;  Location: [facility] Cath Lab;  Service: Cardiology    HYSTERECTOMY      OOPHORECTOMY      TOTAL HIP ARTHROPLASTY Right 2015    Procedure: RIGHT TOTAL HIP ARTHROPLASTY;  Surgeon: Tera Yeung MD;  Location: [facility];  Service:     [facility] APPENDECTOMY      Description: Appendectomy;  Recorded: 2009;       Social History:  Social History     Socioeconomic History    Marital status:      Spouse name: Not on file    Number of children: 3    Years of education: Not on file    Highest education level: Not on file   Occupational History    Not on file   Tobacco Use    Smoking status: Former     Current packs/day: 0.00     Average packs/day: 1 pack/day for 20.0 years (20.0 ttl pk-yrs)     Types: Cigarettes     Start date: 3/27/1970     Quit date: 3/27/1990     Years since quittin.4    Smokeless tobacco: Never   Substance and Sexual Activity    Alcohol use: Yes     Alcohol/week: 3.3 standard drinks of alcohol     Comment: Alcoholic Drinks/day: 1-2 glasses of wine/week     Drug use: No    Sexual activity: Not on file   Other Topics Concern    Not on file   Social History Narrative    Retired lead RN at [employer] ;  Johny,... 3 grown sons Aries in - , Josse SAMUELS, -microbrewer/beer, ; Gregory Lu- paramedic [place]...... Non smoker rare ETOH.        Social Determinants of Health     Financial Resource Strain: Low Risk  (2024)    Financial Resource Strain     Within the past 12 months, have you or your family members you live with been unable to get utilities (heat, electricity) when it was really needed?: No   Food Insecurity: Low Risk  (2024)    Food Insecurity     Within the past 12 months,  did you worry that your food would run out before you got money to buy more?: No     Within the past 12 months, did the food you bought just not last and you didn t have money to get more?: No   Transportation Needs: Low Risk  (8/12/2024)    Transportation Needs     Within the past 12 months, has lack of transportation kept you from medical appointments, getting your medicines, non-medical meetings or appointments, work, or from getting things that you need?: No   Physical Activity: Sufficiently Active (8/12/2024)    Exercise Vital Sign     Days of Exercise per Week: 4 days     Minutes of Exercise per Session: 40 min   Stress: Stress Concern Present (8/12/2024)    [employer] - Occupational Stress Questionnaire     Feeling of Stress : To some extent   Social Connections: Unknown (8/12/2024)    Social Connection and Isolation Panel [NHANES]     Frequency of Communication with Friends and Family: Not on file     Frequency of Social Gatherings with Friends and Family: More than three times a week     Attends Christianity Services: Not on file     Active Member of Clubs or Organizations: Not on file     Attends Club or Organization Meetings: Not on file     Marital Status: Not on file   Interpersonal Safety: Low Risk  (8/13/2024)    Interpersonal Safety     Do you feel physically and emotionally safe where you currently live?: Yes     Within the past 12 months, have you been hit, slapped, kicked or otherwise physically hurt by someone?: No     Within the past 12 months, have you been humiliated or emotionally abused in other ways by your partner or ex-partner?: No   Housing Stability: Low Risk  (8/12/2024)    Housing Stability     Do you have housing? : Yes     Are you worried about losing your housing?: No       Family History:  Family History   Problem Relation Age of Onset    Hereditary Breast and Ovarian Cancer Syndrome Sister     Hereditary Breast and Ovarian Cancer Syndrome Paternal  "Grandfather     Hereditary Breast and Ovarian Cancer Syndrome Cousin     Asthma Cousin     Alcoholism Father          GI bleed age 67    Colon Cancer Other     Breast Cancer Sister         metastatic age 49, remission with Rx    Coronary Artery Disease Brother         CABG    Breast Cancer Paternal Grandmother     Breast Cancer Cousin     Colon Cancer Mother        Review of Systems:  A complete review of systems reviewed by me is negative with the exeption of what has been mentioned in the history of present illness.  In the last TWO WEEKS have you experienced any of the following symptoms?  Fevers: No  Night Sweats: No  Weight Gain: No  Pain at Night: Yes  Double Vision: No  Changes in Vision: Yes  Difficulty Breathing through Nose: Yes  Sore Throat in Morning: No  Dry Mouth in the Morning: Yes  Shortness of Breath Lying Flat: No  Shortness of Breath With Activity: Yes  Awakening with Shortness of Breath: No  Increased Cough: No  Heart Racing at Night: No  Swelling in Feet or Legs: Yes  Diarrhea at Night: No  Heartburn at Night: No  Urinating More than Once at Night: Yes  Losing Control of Urine at Night: No  Joint Pains at Night: Yes  Headaches in Morning: No  Weakness in Arms or Legs: Yes  Depressed Mood: No  Anxiety: Yes     Physical Examination:  Vitals: /78   Pulse 58   Resp 16   Ht 1.626 m (5' 4\")   Wt 78.1 kg (172 lb 1.6 oz)   LMP  (LMP Unknown)   SpO2 97%   BMI 29.54 kg/m    BMI= Body mass index is 29.54 kg/m .         Physical Exam   Constitutional: She appears healthy. No distress.   HENT:   Nose: Nose normal. No nasal discharge.   Mouth/Throat: Oropharynx is clear.   Eyes: Conjunctivae are normal.   Neck: No JVD present.   Pulmonary/Chest: Effort normal.   Musculoskeletal:         General: Normal range of motion.      Cervical back: Normal range of motion and neck supple.   Neurological: She is alert and oriented to person, place, and time.   Skin: Skin is warm and dry.        All Labs " "Personally Reviewed               Data: All pertinent previous laboratory data reviewed     Recent Labs   Lab Test 08/13/24  1301 02/02/24  0827    143   POTASSIUM 5.3 4.6   CHLORIDE 105 105   CO2 27 27   ANIONGAP 10 11   * 109*   BUN 22.0 17.2   CR 0.85 0.81   RANDA 9.7 9.3       Recent Labs   Lab Test 08/13/24  1301   WBC 8.0   RBC 4.52   HGB 13.5   HCT 42.8   MCV 95   MCH 29.9   MCHC 31.5   RDW 13.3          Recent Labs   Lab Test 08/13/24  1301   PROTTOTAL 7.4   ALBUMIN 4.5   BILITOTAL 0.5   ALKPHOS 82   AST 21   ALT 23       TSH (uIU/mL)   Date Value   08/13/2024 1.86   07/18/2023 2.66   04/20/2021 2.21   02/12/2020 1.56       No results found for: \"UAMP\", \"UBARB\", \"BENZODIAZEUR\", \"UCANN\", \"UCOC\", \"OPIT\", \"UPCP\"    No results found for: \"IRONSAT\", \"BH59480\", \"GEOVANNY\"    pH Arterial (no units)   Date Value   03/29/2019 7.34 (L)   03/28/2019 7.28 (L)     pH (no units)   Date Value   03/29/2019 7.33 (L)     pO2 Arterial (mm Hg)   Date Value   03/29/2019 163 (H)   03/28/2019 84     pCO2 Arterial (mm Hg)   Date Value   03/29/2019 45   03/28/2019 50 (H)     Bicarbonate Arterial POCT (mmol/L)   Date Value   03/28/2019 24.3   03/28/2019 26.1     Base Excess/Deficit Arterial (mmol/L)   Date Value   03/29/2019 -1.4   03/28/2019 -3.1       @LABRCNTIPR(phv:4,pco2v:4,po2v:4,hco3v:4,rere:4,o2per:4)@    Echocardiology: No results found for this or any previous visit (from the past 4320 hour(s)).        Chest x-ray: No results found for this or any previous visit from the past 365 days.      Chest CT: No results found for this or any previous visit from the past 365 days.      PFT: Most Recent [facility] Pulmonary Function Testing        ALLYSON Christianson CNP 9/15/2024   Sleep Medicine            "

## 2024-09-15 NOTE — PATIENT INSTRUCTIONS
Your BMI is Body mass index is 29.54 kg/m .    What is BMI?  Body mass index (BMI) is one way to tell whether you are at a healthy weight, overweight, or obese. It measures your weight in relation to your height.  A BMI of 18.5 to 24.9 is in the healthy range. A person with a BMI of 25 to 29.9 is considered overweight, and someone with a BMI of 30 or greater is considered obese.  Another way to find out if you are at risk for health problems caused by overweight and obesity is to measure your waist. If you are a woman and your waist is more than 35 inches, or if you are a man and your waist is more than 40 inches, your risk of disease may be higher.  More than two-thirds of American adults are considered overweight or obese. Being overweight or obese increases the risk for further weight gain.  Excess weight may lead to heart disease and diabetes. Creating and following plans for healthy eating and physical activity may help you improve your health.    Methods for maintaining or losing weight.  Weight control is part of healthy lifestyle and includes exercise, emotional health, and healthy eating habits.  Careful eating habits lifelong is the mainstay of weight control.  Though there are significant health benefits from weight loss, long-term weight loss with diet alone may be very difficult to achieve- studies show long-term success with dietary management in less than 10% of people. Attaining a healthy weight may be especially difficult to achieve in those with severe obesity. In some cases, medications, devices and surgical management might be considered.    What can you do?  If you are overweight or obese and are interested in methods for weight loss, you should discuss this with your provider. In addition, we recommend that you review healthy life styles and methods for weight loss available through the National Institutes of Health patient information sites:    http://win.niddk.nih.gov/publications/index.htm           Drive Safe... Drive Alive     Sleep health profoundly affects your health, mood, and your safety. 33% of the population (one in three of us) is not getting enough sleep and many have a sleep disorder. Not getting enough sleep or having an untreated / undertreated sleep condition may make us sleepy without even knowing it. In fact, our driving could be dramatically impaired due to our sleep health. As your provider, here are some things I would like you to know about driving:     Here are some warning signs for impairment and dangerous drowsy driving:              -Having been awake more than 16 hours               -Looking tired               -Eyelid drooping              -Head nodding (it could be too late at this point)              -Driving for more than 30 minutes     Some things you could do to make the driving safer if you are experiencing some drowsiness:              -Stop driving and rest              -Call for transportation              -Make sure your sleep disorder is adequately treated     Some things that have been shown NOT to work when experiencing drowsiness while driving:              -Turning on the radio              -Opening windows              -Eating any  distracting  /  entertaining  foods (e.g., sunflower seeds, candy, or any other)              -Talking on the phone      Your decision may not only impact your life, but also the life of others. Please, remember to drive safe for yourself and all of us.

## 2024-09-16 ENCOUNTER — OFFICE VISIT (OUTPATIENT)
Dept: SLEEP MEDICINE | Facility: CLINIC | Age: 74
End: 2024-09-16
Payer: COMMERCIAL

## 2024-09-16 VITALS
BODY MASS INDEX: 29.38 KG/M2 | RESPIRATION RATE: 16 BRPM | SYSTOLIC BLOOD PRESSURE: 127 MMHG | OXYGEN SATURATION: 97 % | HEIGHT: 64 IN | HEART RATE: 58 BPM | WEIGHT: 172.1 LBS | DIASTOLIC BLOOD PRESSURE: 78 MMHG

## 2024-09-16 DIAGNOSIS — Z98.82 S/P BILATERAL BREAST IMPLANTS: ICD-10-CM

## 2024-09-16 DIAGNOSIS — I10 ESSENTIAL HYPERTENSION, BENIGN: ICD-10-CM

## 2024-09-16 DIAGNOSIS — Z90.13 HISTORY OF BILATERAL MASTECTOMY: Chronic | ICD-10-CM

## 2024-09-16 DIAGNOSIS — J42 CHRONIC BRONCHITIS, UNSPECIFIED CHRONIC BRONCHITIS TYPE (H): ICD-10-CM

## 2024-09-16 DIAGNOSIS — G47.33 OSA (OBSTRUCTIVE SLEEP APNEA): Primary | ICD-10-CM

## 2024-09-16 DIAGNOSIS — Z95.1 S/P CABG (CORONARY ARTERY BYPASS GRAFT): ICD-10-CM

## 2024-09-16 DIAGNOSIS — J44.89 CHRONIC OBSTRUCTIVE AIRWAY DISEASE WITH ASTHMA (H): ICD-10-CM

## 2024-09-16 DIAGNOSIS — F11.90 CHRONIC, CONTINUOUS USE OF OPIOIDS: ICD-10-CM

## 2024-09-16 DIAGNOSIS — Z95.5 S/P CORONARY ARTERY STENT PLACEMENT: Chronic | ICD-10-CM

## 2024-09-16 PROCEDURE — 99215 OFFICE O/P EST HI 40 MIN: CPT | Performed by: NURSE PRACTITIONER

## 2024-09-17 ENCOUNTER — TRANSFERRED RECORDS (OUTPATIENT)
Dept: HEALTH INFORMATION MANAGEMENT | Facility: CLINIC | Age: 74
End: 2024-09-17
Payer: COMMERCIAL

## 2024-09-17 DIAGNOSIS — J01.01 ACUTE RECURRENT MAXILLARY SINUSITIS: ICD-10-CM

## 2024-09-18 RX ORDER — FLUTICASONE PROPIONATE 50 MCG
1 SPRAY, SUSPENSION (ML) NASAL DAILY
Qty: 16 G | Refills: 1 | Status: SHIPPED | OUTPATIENT
Start: 2024-09-18

## 2024-09-20 ENCOUNTER — LAB (OUTPATIENT)
Dept: LAB | Facility: CLINIC | Age: 74
End: 2024-09-20
Payer: COMMERCIAL

## 2024-09-20 DIAGNOSIS — R06.09 DYSPNEA ON EXERTION: ICD-10-CM

## 2024-09-20 LAB
ANION GAP SERPL CALCULATED.3IONS-SCNC: 10 MMOL/L (ref 7–15)
BUN SERPL-MCNC: 15.6 MG/DL (ref 8–23)
CALCIUM SERPL-MCNC: 9.1 MG/DL (ref 8.8–10.4)
CHLORIDE SERPL-SCNC: 104 MMOL/L (ref 98–107)
CREAT SERPL-MCNC: 0.89 MG/DL (ref 0.51–0.95)
EGFRCR SERPLBLD CKD-EPI 2021: 67 ML/MIN/1.73M2
GLUCOSE SERPL-MCNC: 130 MG/DL (ref 70–99)
HCO3 SERPL-SCNC: 26 MMOL/L (ref 22–29)
POTASSIUM SERPL-SCNC: 4.2 MMOL/L (ref 3.4–5.3)
SODIUM SERPL-SCNC: 140 MMOL/L (ref 135–145)

## 2024-09-20 PROCEDURE — 80048 BASIC METABOLIC PNL TOTAL CA: CPT

## 2024-09-20 PROCEDURE — 36415 COLL VENOUS BLD VENIPUNCTURE: CPT

## 2024-09-23 DIAGNOSIS — Z95.1 S/P CABG (CORONARY ARTERY BYPASS GRAFT): ICD-10-CM

## 2024-09-23 DIAGNOSIS — R06.09 DYSPNEA ON EXERTION: Primary | ICD-10-CM

## 2024-09-26 ENCOUNTER — HOSPITAL ENCOUNTER (OUTPATIENT)
Dept: CARDIOLOGY | Facility: CLINIC | Age: 74
Discharge: HOME | End: 2024-09-26
Attending: INTERNAL MEDICINE | Admitting: INTERNAL MEDICINE
Payer: COMMERCIAL

## 2024-09-26 DIAGNOSIS — R06.09 DYSPNEA ON EXERTION: ICD-10-CM

## 2024-09-26 PROCEDURE — 93306 TTE W/DOPPLER COMPLETE: CPT | Mod: 26 | Performed by: INTERNAL MEDICINE

## 2024-09-26 PROCEDURE — 93306 TTE W/DOPPLER COMPLETE: CPT

## 2024-09-27 DIAGNOSIS — R06.09 DYSPNEA ON EXERTION: Primary | ICD-10-CM

## 2024-09-27 DIAGNOSIS — R94.39 ABNORMAL CARDIOVASCULAR STRESS TEST: ICD-10-CM

## 2024-10-19 DIAGNOSIS — M81.0 AGE-RELATED OSTEOPOROSIS WITHOUT CURRENT PATHOLOGICAL FRACTURE: ICD-10-CM

## 2024-10-21 RX ORDER — ALENDRONATE SODIUM 70 MG/1
70 TABLET ORAL
Qty: 12 TABLET | Refills: 1 | Status: SHIPPED | OUTPATIENT
Start: 2024-10-21

## 2024-10-28 RX ORDER — METOPROLOL TARTRATE 1 MG/ML
5-20 INJECTION, SOLUTION INTRAVENOUS
Status: DISCONTINUED | OUTPATIENT
Start: 2024-10-28 | End: 2024-11-01 | Stop reason: HOSPADM

## 2024-10-28 RX ORDER — DILTIAZEM HYDROCHLORIDE 5 MG/ML
10-15 INJECTION INTRAVENOUS
Status: DISCONTINUED | OUTPATIENT
Start: 2024-10-28 | End: 2024-11-01 | Stop reason: HOSPADM

## 2024-10-28 RX ORDER — NITROGLYCERIN 0.4 MG/1
0.4 TABLET SUBLINGUAL
Status: DISCONTINUED | OUTPATIENT
Start: 2024-10-28 | End: 2024-11-01 | Stop reason: HOSPADM

## 2024-10-29 ENCOUNTER — TELEPHONE (OUTPATIENT)
Dept: CARDIOLOGY | Facility: CLINIC | Age: 74
End: 2024-10-29
Payer: COMMERCIAL

## 2024-10-31 ENCOUNTER — HOSPITAL ENCOUNTER (OUTPATIENT)
Dept: CT IMAGING | Facility: CLINIC | Age: 74
Discharge: HOME | End: 2024-10-31
Attending: INTERNAL MEDICINE | Admitting: INTERNAL MEDICINE
Payer: COMMERCIAL

## 2024-10-31 VITALS
HEIGHT: 64 IN | WEIGHT: 172 LBS | SYSTOLIC BLOOD PRESSURE: 118 MMHG | DIASTOLIC BLOOD PRESSURE: 60 MMHG | BODY MASS INDEX: 29.37 KG/M2

## 2024-10-31 DIAGNOSIS — R94.39 ABNORMAL CARDIOVASCULAR STRESS TEST: ICD-10-CM

## 2024-10-31 DIAGNOSIS — R06.09 DYSPNEA ON EXERTION: ICD-10-CM

## 2024-10-31 LAB
BSA FOR ECHO PROCEDURE: 0 M2
CREAT BLD-MCNC: 0.9 MG/DL (ref 0.6–1.1)
EGFRCR SERPLBLD CKD-EPI 2021: >60 ML/MIN/1.73M2

## 2024-10-31 PROCEDURE — 250N000009 HC RX 250: Performed by: INTERNAL MEDICINE

## 2024-10-31 PROCEDURE — 75574 CT ANGIO HRT W/3D IMAGE: CPT

## 2024-10-31 PROCEDURE — 250N000011 HC RX IP 250 OP 636: Performed by: INTERNAL MEDICINE

## 2024-10-31 PROCEDURE — 82565 ASSAY OF CREATININE: CPT

## 2024-10-31 PROCEDURE — 250N000013 HC RX MED GY IP 250 OP 250 PS 637: Performed by: INTERNAL MEDICINE

## 2024-10-31 PROCEDURE — 75574 CT ANGIO HRT W/3D IMAGE: CPT | Mod: 26 | Performed by: INTERNAL MEDICINE

## 2024-10-31 RX ORDER — IOPAMIDOL 755 MG/ML
100 INJECTION, SOLUTION INTRAVASCULAR ONCE
Status: COMPLETED | OUTPATIENT
Start: 2024-10-31 | End: 2024-10-31

## 2024-10-31 RX ADMIN — NITROGLYCERIN 0.4 MG: 0.4 TABLET SUBLINGUAL at 09:06

## 2024-10-31 RX ADMIN — IOPAMIDOL 100 ML: 755 INJECTION, SOLUTION INTRAVENOUS at 09:11

## 2024-10-31 RX ADMIN — METOPROLOL TARTRATE 5 MG: 5 INJECTION INTRAVENOUS at 09:02

## 2024-11-25 DIAGNOSIS — J01.01 ACUTE RECURRENT MAXILLARY SINUSITIS: ICD-10-CM

## 2024-11-25 RX ORDER — FLUTICASONE PROPIONATE 50 MCG
1 SPRAY, SUSPENSION (ML) NASAL DAILY
Qty: 16 G | Refills: 1 | Status: SHIPPED | OUTPATIENT
Start: 2024-11-25

## 2024-12-03 DIAGNOSIS — I10 HYPERTENSION, UNSPECIFIED TYPE: ICD-10-CM

## 2024-12-03 RX ORDER — TIZANIDINE 2 MG/1
2 TABLET ORAL AT BEDTIME
Qty: 90 TABLET | Refills: 3 | Status: SHIPPED | OUTPATIENT
Start: 2024-12-03

## 2024-12-09 DIAGNOSIS — G47.00 INSOMNIA, UNSPECIFIED TYPE: ICD-10-CM

## 2024-12-09 RX ORDER — ZOLPIDEM TARTRATE 10 MG/1
10 TABLET ORAL
Qty: 30 TABLET | Refills: 3 | Status: SHIPPED | OUTPATIENT
Start: 2024-12-09

## 2024-12-14 ENCOUNTER — OFFICE VISIT (OUTPATIENT)
Dept: URGENT CARE | Facility: URGENT CARE | Age: 74
End: 2024-12-14
Payer: COMMERCIAL

## 2024-12-14 VITALS
SYSTOLIC BLOOD PRESSURE: 125 MMHG | OXYGEN SATURATION: 93 % | DIASTOLIC BLOOD PRESSURE: 80 MMHG | HEART RATE: 112 BPM | TEMPERATURE: 97.9 F

## 2024-12-14 DIAGNOSIS — N30.00 ACUTE CYSTITIS WITHOUT HEMATURIA: Primary | ICD-10-CM

## 2024-12-14 DIAGNOSIS — R10.2 SUPRAPUBIC ABDOMINAL PAIN: ICD-10-CM

## 2024-12-14 LAB
ALBUMIN UR-MCNC: 30 MG/DL
APPEARANCE UR: CLEAR
BACTERIA #/AREA URNS HPF: ABNORMAL /HPF
BILIRUB UR QL STRIP: ABNORMAL
COLOR UR AUTO: YELLOW
GLUCOSE UR STRIP-MCNC: NEGATIVE MG/DL
HGB UR QL STRIP: ABNORMAL
KETONES UR STRIP-MCNC: ABNORMAL MG/DL
LEUKOCYTE ESTERASE UR QL STRIP: ABNORMAL
NITRATE UR QL: POSITIVE
PH UR STRIP: 7 [PH] (ref 5–7)
RBC #/AREA URNS AUTO: ABNORMAL /HPF
SP GR UR STRIP: 1.02 (ref 1–1.03)
UROBILINOGEN UR STRIP-ACNC: 0.2 E.U./DL
WBC #/AREA URNS AUTO: ABNORMAL /HPF

## 2024-12-14 PROCEDURE — 81001 URINALYSIS AUTO W/SCOPE: CPT | Performed by: PHYSICIAN ASSISTANT

## 2024-12-14 PROCEDURE — 87186 SC STD MICRODIL/AGAR DIL: CPT | Performed by: PHYSICIAN ASSISTANT

## 2024-12-14 PROCEDURE — 87086 URINE CULTURE/COLONY COUNT: CPT | Performed by: PHYSICIAN ASSISTANT

## 2024-12-14 PROCEDURE — 99213 OFFICE O/P EST LOW 20 MIN: CPT | Performed by: PHYSICIAN ASSISTANT

## 2024-12-14 RX ORDER — CEPHALEXIN 500 MG/1
500 CAPSULE ORAL 3 TIMES DAILY
Qty: 21 CAPSULE | Refills: 0 | Status: SHIPPED | OUTPATIENT
Start: 2024-12-14 | End: 2024-12-21

## 2024-12-14 RX ORDER — ONDANSETRON 8 MG/1
8 TABLET, ORALLY DISINTEGRATING ORAL EVERY 8 HOURS PRN
Qty: 21 TABLET | Refills: 0 | Status: SHIPPED | OUTPATIENT
Start: 2024-12-14

## 2024-12-14 NOTE — PROGRESS NOTES
Suprapubic abdominal pain  - UA Macroscopic with reflex to Microscopic and Culture - Clinic Collect  - Urine Microscopic Exam  - Urine Culture  - cephALEXin (KEFLEX) 500 MG capsule; Take 1 capsule (500 mg) by mouth 3 times daily for 7 days.    Acute cystitis without hematuria  - cephALEXin (KEFLEX) 500 MG capsule; Take 1 capsule (500 mg) by mouth 3 times daily for 7 days.    General Tips for All Ages:    Hydration:  Why: Drinking plenty of water helps flush out bacteria from the urinary system.  What to Do: Aim for at least 8 glasses of water per day.    Cranberry Juice:  Why: Some studies suggest cranberry juice may help prevent bacterial adherence in the urinary tract.  What to Do: Drink pure, unsweetened cranberry juice. Limit added sugars.    For Adults (18 Years and Older):    Over-the-Counter (OTC) Pain Relievers:  Why: Relieves discomfort and pain.  What to Use: Ibuprofen or acetaminophen as directed on the package.    Urinary Alkalinizers (if prescribed):  Why: Alters the acidity of the urine, providing relief.  What to Do: Take as prescribed by your healthcare provider.    Antibiotics (if prescribed):  Why: Eliminates the bacterial infection.  What to Do: Take the full course of antibiotics as directed, even if symptoms improve.    For Adolescents (12-17 Years):    OTC Pain Relievers:  Why: Manages pain and discomfort.  What to Use: Ibuprofen or acetaminophen following package instructions.    Hydration:  Why: Helps flush out bacteria.  What to Do: Drink plenty of water; avoid sugary drinks.    Seek Medical Attention:  When: If symptoms persist or worsen.  What to Do: Contact your healthcare provider for further evaluation.    For Children (Under 12 Years):    Hydration:  Why: Promotes urinary system flushing.  What to Do: Encourage your child to drink water regularly.    Avoid Irritants:  Why: Certain soaps or bubble baths can worsen symptoms.  What to Do: Use gentle, fragrance-free products for  bathing.    OTC Pain Relievers (if approved by pediatrician):  Why: Eases pain and discomfort.  What to Use: Follow your pediatrician's advice on using ibuprofen or acetaminophen.    Seek Medical Attention:  When: If symptoms persist or if your child has a high fever.  What to Do: Contact your child's pediatrician for guidance.    All Ages:    Probiotics:  Why: May help restore healthy bacteria in the gut.  What to Do: Consider including probiotic-rich foods or supplements.    Avoid Irritants:  Why: Certain foods and drinks can worsen symptoms.  What to Do: Limit caffeine, spicy foods, and alcohol during the infection.    Follow-up:    Patient advised to return to clinic for reevaluation (either UC or PCP) if symptoms do not improve in 7 days. Patient educated on red flag symptoms and asked to go directly to the ED if these symptoms present themselves.     Remember, individual cases may vary, and it's crucial to follow your healthcare provider's advice. If you have concerns or if symptoms persist, don't hesitate to reach out for further guidance.    Wishing you a coleman recovery!      Aureliano Martin PA-C  [facility]    Subjective   74 year old who presents to clinic today for the following health issues:    Urgent Care       HPI   Where in your body do you have pain? Abdominal/Flank Pain  Onset/Duration: Last night   Description:   Character: Sharp and cramping   Location: suprapubic region  Radiation: Low back pain   Intensity: moderate  Progression of Symptoms:  same  Accompanying Signs & Symptoms:  Fever/Chills: No  Gas/Bloating: Bloating   Nausea: YES  Vomitting: YES  Diarrhea: No  Constipation: Mild   Dysuria or Hematuria: Darker urine and odorous   History:   Trauma: Hysterectomy and appendectomy   Previous similar pain: No  Previous tests done: none  Precipitating factors:   Does the pain change with:     Food: YES- Food makes it worse.     Bowel Movement: No    Urination: No   Other  factors:  Getting up to a standing position makes the pain worse. Laying down brings relief.   Therapies tried and outcome: Tylenol   No LMP recorded (lmp unknown). Patient has had a hysterectomy.    Review of Systems   Review of Systems   See HPI    Objective    Temp: 97.9  F (36.6  C) Temp src: Temporal BP: 125/80 Pulse: 112     SpO2: 93 %       Physical Exam   Physical Exam  Constitutional:       General: She is not in acute distress.     Appearance: Normal appearance. She is normal weight. She is not ill-appearing, toxic-appearing or diaphoretic.   HENT:      Head: Normocephalic and atraumatic.   Cardiovascular:      Rate and Rhythm: Normal rate.      Pulses: Normal pulses.   Pulmonary:      Effort: Pulmonary effort is normal. No respiratory distress.   Abdominal:      Tenderness: There is no right CVA tenderness or left CVA tenderness.   Neurological:      General: No focal deficit present.      Mental Status: She is alert and oriented to person, place, and time. Mental status is at baseline.      Gait: Gait normal.   Psychiatric:         Mood and Affect: Mood normal.         Behavior: Behavior normal.         Thought Content: Thought content normal.         Judgment: Judgment normal.          Results for orders placed or performed in visit on 12/14/24 (from the past 24 hours)   UA Macroscopic with reflex to Microscopic and Culture - Clinic Collect    Specimen: Urine, Clean Catch   Result Value Ref Range    Color Urine Yellow Colorless, Straw, Light Yellow, Yellow    Appearance Urine Clear Clear    Glucose Urine Negative Negative mg/dL    Bilirubin Urine Small (A) Negative    Ketones Urine Trace (A) Negative mg/dL    Specific Gravity Urine 1.020 1.003 - 1.035    Blood Urine Trace (A) Negative    pH Urine 7.0 5.0 - 7.0    Protein Albumin Urine 30 (A) Negative mg/dL    Urobilinogen Urine 0.2 0.2, 1.0 E.U./dL    Nitrite Urine Positive (A) Negative    Leukocyte Esterase Urine Small (A) Negative   Urine Microscopic  Exam   Result Value Ref Range    Bacteria Urine Many (A) None Seen /HPF    RBC Urine None Seen 0-2 /HPF /HPF    WBC Urine 5-10 (A) 0-5 /HPF /HPF    Narrative    Microscopic exam performed on unspun urine.

## 2024-12-17 LAB — BACTERIA UR CULT: ABNORMAL

## 2024-12-27 ENCOUNTER — OFFICE VISIT (OUTPATIENT)
Dept: FAMILY MEDICINE | Facility: CLINIC | Age: 74
End: 2024-12-27
Payer: COMMERCIAL

## 2024-12-27 VITALS
HEIGHT: 64 IN | RESPIRATION RATE: 15 BRPM | DIASTOLIC BLOOD PRESSURE: 7 MMHG | TEMPERATURE: 96.8 F | OXYGEN SATURATION: 96 % | WEIGHT: 167 LBS | BODY MASS INDEX: 28.51 KG/M2 | SYSTOLIC BLOOD PRESSURE: 133 MMHG | HEART RATE: 85 BPM

## 2024-12-27 DIAGNOSIS — Z01.818 PREOP GENERAL PHYSICAL EXAM: Primary | ICD-10-CM

## 2024-12-27 DIAGNOSIS — J32.0 CHRONIC MAXILLARY SINUSITIS: ICD-10-CM

## 2024-12-27 PROBLEM — U07.1 INFECTION DUE TO 2019 NOVEL CORONAVIRUS: Status: RESOLVED | Noted: 2022-01-31 | Resolved: 2024-12-27

## 2024-12-27 PROBLEM — F11.90 CHRONIC, CONTINUOUS USE OF OPIOIDS: Status: RESOLVED | Noted: 2024-05-20 | Resolved: 2024-12-27

## 2024-12-27 PROCEDURE — 99214 OFFICE O/P EST MOD 30 MIN: CPT | Performed by: FAMILY MEDICINE

## 2024-12-27 ASSESSMENT — PAIN SCALES - GENERAL: PAINLEVEL_OUTOF10: SEVERE PAIN (7)

## 2024-12-27 NOTE — PROGRESS NOTES
Preoperative Evaluation  [facility]  [address] [place] [address]  Phone: [phone]  Fax: [phone]  Primary Provider: Amber Alberto MD  Pre-op Performing Provider: Yarelis Harrell MD  Dec 27, 2024             12/22/2024   Surgical Information   What procedure is being done? Sinus surgery   Facility or Hospital where procedure/surgery will be performed: [facility]   Who is doing the procedure / surgery? Dr Edwards   Date of surgery / procedure: January 6, 2025   Time of surgery / procedure: ?   Where do you plan to recover after surgery? at home with family     Fax number for surgical facility: Note does not need to be faxed, will be available electronically in Epic.    Assessment & Plan     The proposed surgical procedure is considered LOW risk.    Preop general physical exam    Chronic maxillary sinusitis  Reason for surgery      Antiplatelet or Anticoagulation Medication Instructions   - aspirin: discontinue 3 days before surgery    Additional Medication Instructions  Take all scheduled medications on the day of surgery EXCEPT for modifications listed below:   - no tizanidine on the day of surgery   - no supplements on the day of surgery    Recommendation  Approval given to proceed with proposed procedure, without further diagnostic evaluation.      Subjective   Pat is a 74 year old, presenting for the following:  Pre-Op Exam (Sinus surgery 01.06.2025 Dr. Edwards ENT pre op)          12/27/2024     9:26 AM   Additional Questions   Roomed by Arabella YOUNG related to upcoming procedure: Chronic sinus infection -after she had tried other antibiotics, her ENT put her on vancomycin for three weeks. CT scan identified back tooth that may be an issue, and a bone structure that interferes with drainage        12/22/2024   Pre-Op Questionnaire   Have you ever had a heart attack or stroke? No   Have you ever had surgery on your heart or blood  vessels, such as a stent placement, a coronary artery bypass, or surgery on an artery in your head, neck, heart, or legs? (!) YES  - first stent, then years later triple bi pass   Do you have chest pain with activity? No   Do you have a history of heart failure? No   Do you currently have a cold, bronchitis or symptoms of other infection? No   Do you have a cough, shortness of breath, or wheezing? (!) YES  - has shortness of breath with incline   Do you or anyone in your family have previous history of blood clots? No   Do you or does anyone in your family have a serious bleeding problem such as prolonged bleeding following surgeries or cuts? No   Have you ever had problems with anemia or been told to take iron pills? (!) YES  - still takes iron pills - may be micro bleeding with prescription for anti inflammatories   Have you had any abnormal blood loss such as black, tarry or bloody stools, or abnormal vaginal bleeding? No   Have you ever had a blood transfusion? (!) YES with previous surgery   Have you ever had a transfusion reaction? No   Are you willing to have a blood transfusion if it is medically needed before, during, or after your surgery? Yes   Have you or any of your relatives ever had problems with anesthesia? No   Do you have sleep apnea, excessive snoring or daytime drowsiness? (!) YES - on CPAP   Do you have a CPAP machine? Yes   Do you have any artifical heart valves or other implanted medical devices like a pacemaker, defibrillator, or continuous glucose monitor? No   Do you have artificial joints? (!) YES - both hips   Are you allergic to latex? (!) YES - gets rashy     Health Care Directive  Patient has a Health Care Directive on file      Preoperative Review of    reviewed - controlled substances reflected in medication list.      Status of Chronic Conditions:  See problem list for active medical problems.  Problems all longstanding and stable, except as noted/documented.  See ROS for  "pertinent symptoms related to these conditions.    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 Diagnosis \"bronchiectasis\"- CT lung   - CT angiogram 10/31/24 showed patent grafts  Interpretation Summary  Show Result ComparisonNative coronary evaluation:  Minimal disease in left main, less than 25% stenosis in distal segment.  Diffuse calcified plaques in proximal to mid LAD leading to 80 to 90% stenosis.  Proximal D1 appears 90% stenosis.  Medium size of ramus with calcified plaques, less than 50% stenosis.  LCx is a small vessel, with 70-90% stenosis in ostial segment.  Patent proximal RCA stent, however severe stenosis prior to stent, occluded distal stent.  Right dominant system.     Graft evaluation:  LIMA to mid LAD is patent.  SVG to D1 is patent.  SVG to right PDA is patent     Tricuspid aortic valve.  Normal size of thoracic aorta, significant calcified plaque in thoracic aorta.  No thrombus in left atrial appendage.  No pericardial effusion or calcified pericardium      Ba Foley MD  10/31/2024  8:02 PM CDT       Great news - all three grafts patent to LAD, Diagonal and PDA.  Circ is small vessel with mild narrowing.             Patient Active Problem List    Diagnosis Date Noted    Morbid obesity (H) 05/20/2024     Priority: Medium    S/P total hip arthroplasty 02/04/2022     Priority: Medium    Bronchiectasis without acute exacerbation (H) 07/09/2021     Priority: Medium    Age-related osteoporosis without current pathological fracture 06/11/2021     Priority: Medium    COPD (chronic obstructive pulmonary disease) (H) 03/14/2020     Priority: Medium    Essential hypertension, benign 03/14/2020     Priority: Medium    Chronic obstructive airway disease with asthma (H) 03/14/2020     Priority: Medium    Acquired lymphedema 02/12/2020     Priority: Medium    Hair loss 02/12/2020     Priority: Medium    Right shoulder " pain 02/12/2020     Priority: Medium     Had physical therapy for this - rotator cuff issue      ARSEN (obstructive sleep apnea) 06/05/2019     Priority: Medium     5/20/2019 Polysomnography Split (wt 159 lbs).  There was evidence of limb movements during sleep independent of   respiratory events.  Respiratory monitoring showed moderate obstructive sleep apnea   (AHI=17.1/hr).  The patient spent a total of 8.2 minutes at an oxygen saturation below   88%.  A trial of nasal CPAP was initiated given the severity of sleep-disordered   breathing.  CPAP pressures from 5 to 6 were sampled during the night.  Patient had trouble sleeping due to pain and PLMs.  Patient elevated head   of bed to 10 degrees during second half of titration portion of study.  CPAP of 6 was effective at eliminating obstructive events. This was an   adequate titration study (titration grading criteria for optimal or good   are met with the exception that supine REM sleep did not occur at the   selected pressure).        Health maintenance examination 04/11/2019     Priority: Medium     Repeat colonoscopy is 2022   repeat dexa is 2020  No paps or pelvics THANH/BSO        S/P CABG (coronary artery bypass graft) 03/28/2019     Priority: Medium     3/29/2019 with Dr. Akbar  1. Epiaortic ultrasound of the ascending aorta.  2. Triple vessel coronary artery bypass grafting procedures; left   internal mammary  artery to left anterior descending coronary artery and separate   reversedsaphenous  vein grafts to the left anterior descending diagonal branch 2 and the   right  posterior descending coronary arteries.  3. Endoscopic vein procurement from the left lower extremity.        S/P bilateral breast implants 09/25/2018     Priority: Medium    History of bilateral mastectomy 05/23/2018     Priority: Medium     Ductal in situ carcinoma; [facility]        Atherosclerosis of native coronary artery of native heart with angina pectoris (H)       Priority: Medium     2008- RCA; @[facility] (Dr. Schwab)        S/P coronary artery stent placement 10/09/2017     Priority: Medium     2008- RCA stent        DCIS (ductal carcinoma in situ) 09/08/2017     Priority: Medium     Right breast/biopsy- 9/7/2017        Primary osteoarthritis of right hip 12/07/2015     Priority: Medium    History of total hip replacement, right 12/07/2015     Priority: Medium    Paresthesias/numbness 10/29/2015     Priority: Medium     IMO SNOMED LOAD SPRING 2020 [.6/.18/.2020 9:04 PM]  Piero Xiong:     fingertips         Osteoarthritis 06/11/2015     Priority: Medium    Raynaud disease 06/11/2015     Priority: Medium    Rosacea      Priority: Medium     Created by Conversion          Past Medical History:   Diagnosis Date    Anemia 2012    microcytic from Celebrex. GI w/u neg    Antiplatelet or antithrombotic long-term use     Back pain     Benign neoplasm of colon     Created by Conversion     Bronchiectasis (H)     CAD (coronary artery disease) 2008    RCA- stent    Cancer (H)     breast cancer    Chronic, continuous use of opioids 05/20/2024    DJD (degenerative joint disease)     Dysthymia     Dysthymic disorder     Created by Conversion         Fatigue     recurrent/variable - no serious pathology    Fibromyalgia     GERD (gastroesophageal reflux disease)     History of gastric ulcer     Hx of CABG     Hypertension     Infection due to 2019 novel coronavirus 01/31/2022    Iritis     past Hx.-left eye    Morbid obesity (H) 05/20/2024    ARSEN (obstructive sleep apnea) 06/05/2019    Ptosis of eyelid     Created by Conversion  [facility] Annotation: Jul 25 2013  3:44PM - Gregory Doyle: compromises   vision. scheduled for repair  Replacement Utility updated for latest IMO load         Raynaud's disease     Rosacea     Shortness of breath     with exertion    Shoulder tendonitis     right    Stented coronary artery     Ulcer of intestine     small bowel- 10 years ago    UTI (urinary  tract infection)     Jan. 2019     Past Surgical History:   Procedure Laterality Date    ARTHROPLASTY, HIP, TOTAL, DIRECT ANTERIOR APPROACH, USING HANA TABLE Left 02/04/2022    Procedure: LEFT DIRECT ANTERIOR TOTAL HIP ARTHROPLASTY;  Surgeon: Deny Bryson MD;  Location: [facility] Main OR    BELPHAROPTOSIS REPAIR Bilateral 01/01/2013    Dr. Eder Bingham- 2013    BREAST SURGERY Bilateral 11/01/2017    bilateral mastectomy 2017- 11/2017    BREAST SURGERY  01/01/2018    implants and revsion 2    CORONARY STENT PLACEMENT Right 01/01/2008    Dr. Schwab    CV CORONARY ANGIOGRAM N/A 03/26/2019    Procedure: Coronary Angiogram;  Surgeon: Ba Foley MD;  Location: [facility] Cath Lab;  Service: Cardiology    HYSTERECTOMY  1990    OOPHORECTOMY  1990    TOTAL HIP ARTHROPLASTY Right 12/07/2015    Procedure: RIGHT TOTAL HIP ARTHROPLASTY;  Surgeon: Tera Yeung MD;  Location: [facility];  Service:     [facility] APPENDECTOMY  1990    Description: Appendectomy;  Recorded: 01/13/2009;     Current Outpatient Medications   Medication Sig Dispense Refill    acetaminophen (TYLENOL) 500 MG tablet [ACETAMINOPHEN (TYLENOL) 500 MG TABLET] Take 500 mg by mouth every 6 (six) hours as needed for pain (arthritis).      albuterol (PROAIR HFA/PROVENTIL HFA/VENTOLIN HFA) 108 (90 Base) MCG/ACT inhaler INHALE 2 PUFFS INTO THE LUNGS EVERY 6 HOURS 18 g 4    alendronate (FOSAMAX) 70 MG tablet TAKE 1 TABLET BY MOUTH ONE TIME PER WEEK 12 tablet 1    aspirin (ASA) 81 MG EC tablet Take 1 tablet (81 mg) by mouth 2 times daily      b complex vitamins tablet [B COMPLEX VITAMINS TABLET] Take 1 tablet by mouth daily.      buPROPion (WELLBUTRIN XL) 150 MG 24 hr tablet TAKE 1 TABLET BY MOUTH EVERY DAY IN THE MORNING 90 tablet 2    calcium, as carbonate, (TUMS) 200 mg calcium (500 mg) chewable tablet [CALCIUM, AS CARBONATE, (TUMS) 200 MG CALCIUM (500 MG) CHEWABLE TABLET] Chew 2 tablets daily.      cholecalciferol, vitamin D3, 1,000 unit  tablet Take 2,000 Units by mouth daily       docusate sodium (COLACE) 100 MG capsule [DOCUSATE SODIUM (COLACE) 100 MG CAPSULE] Take 1 capsule (100 mg total) by mouth 2 (two) times a day as needed for constipation.  0    doxylamine (UNISOM) 25 mg tablet Take 25 mg by mouth nightly as needed for sleep       Ferrous Sulfate (IRON) 28 MG TABS Take 1 tablet by mouth daily       fluticasone (FLONASE) 50 MCG/ACT nasal spray Spray 1 spray into both nostrils daily. 16 g 1    losartan-hydrochlorothiazide (HYZAAR) 100-12.5 MG tablet Take 1 tablet by mouth daily. 90 tablet 11    magnesium oxide (MAG-OX) 400 (240 Mg) MG tablet Take 400 mg by mouth At Bedtime      melatonin 1 mg Tab tablet [MELATONIN 1 MG TAB TABLET] Take 1 mg by mouth at bedtime.             omeprazole (PRILOSEC) 20 MG DR capsule TAKE 1 CAPSULE BY MOUTH EVERY DAY 90 capsule 2    ondansetron (ZOFRAN ODT) 8 MG ODT tab Take 1 tablet (8 mg) by mouth every 8 hours as needed for nausea. 21 tablet 0    peg 400-propylene glycol PF (SYSTANE) 0.4-0.3 % Dpet [-PROPYLENE GLYCOL PF (SYSTANE) 0.4-0.3 % DPET] Administer 1 drop to both eyes 2 (two) times a day as needed.      rosuvastatin (CRESTOR) 20 MG tablet TAKE 1 TABLET BY MOUTH EVERYDAY AT BEDTIME 90 tablet 3    tiZANidine (ZANAFLEX) 2 MG tablet TAKE 1 TABLET BY MOUTH AT BEDTIME 90 tablet 3    triamcinolone (NASACORT) 55 mcg nasal inhaler [TRIAMCINOLONE (NASACORT) 55 MCG NASAL INHALER] Apply 2 sprays into each nostril daily as needed.      umeclidinium (INCRUSE ELLIPTA) 62.5 MCG/ACT inhaler TAKE 1 PUFF BY MOUTH EVERY DAY 3 each 3    zolpidem (AMBIEN) 10 MG tablet TAKE 1 TABLET (10 MG) BY MOUTH NIGHTLY AS NEEDED FOR SLEEP 30 tablet 3    amoxicillin (AMOXIL) 500 MG tablet Take 500 mg by mouth once. Take 4 tabs one hr prior to dental (Patient not taking: Reported on 12/14/2024)         Allergies   Allergen Reactions    Latex Unknown     Added based on information entered during log entry, please review and add  "reactions, type, and severity as needed    Nsaids (Non-Steroidal Anti-Inflammatory Drug) [Nsaids] Unknown     Other reaction(s): GI Bleeding, GI Upset, Sensitivity.  Ulceration w/ Celebrex        Social History     Tobacco Use    Smoking status: Former     Current packs/day: 0.00     Average packs/day: 1 pack/day for 20.0 years (20.0 ttl pk-yrs)     Types: Cigarettes     Start date: 3/27/1970     Quit date: 3/27/1990     Years since quittin.7    Smokeless tobacco: Never   Substance Use Topics    Alcohol use: Yes     Alcohol/week: 3.3 standard drinks of alcohol     Comment: Alcoholic Drinks/day: 1-2 glasses of wine/week      Family History   Problem Relation Age of Onset    Colon Cancer Mother     Alcoholism Father          GI bleed age 67    Hereditary Breast and Ovarian Cancer Syndrome Sister     Breast Cancer Sister         metastatic age 49, remission with Rx    Coronary Artery Disease Brother         CABG    Breast Cancer Paternal Grandmother     Hereditary Breast and Ovarian Cancer Syndrome Paternal Grandfather     Hereditary Breast and Ovarian Cancer Syndrome Cousin     Asthma Cousin     Breast Cancer Cousin     Colon Cancer Other      History   Drug Use No             Review of Systems  Constitutional, HEENT, cardiovascular, pulmonary, gi and gu systems are negative, except as otherwise noted.    Objective    BP (!) 133/7 (BP Location: Left arm, Patient Position: Sitting, Cuff Size: Adult Regular)   Pulse 85   Temp 96.8  F (36  C) (Tympanic)   Resp 15   Ht 1.626 m (5' 4.02\")   Wt 75.8 kg (167 lb)   LMP  (LMP Unknown)   SpO2 96%   BMI 28.65 kg/m     Estimated body mass index is 28.65 kg/m  as calculated from the following:    Height as of this encounter: 1.626 m (5' 4.02\").    Weight as of this encounter: 75.8 kg (167 lb).  Physical Exam    General appearance - alert, well appearing, and in no distress  Mental status - normal mood, behavior, speech, dress, motor activity, and thought " processes  Eyes - pupils equal and reactive, extraocular eye movements intact, funduscopic exam normal, discs flat and sharp  Ears - bilateral TM's and external ear canals normal  Mouth - mucous membranes moist, pharynx normal without lesions  Neck - supple, no significant adenopathy, carotids upstroke normal bilaterally, no bruits, thyroid exam: thyroid is normal in size without nodules or tenderness  Chest - clear to auscultation, no wheezes, rales or rhonchi, symmetric air entry  Heart - normal rate, regular rhythm, normal S1, S2, no murmurs, rubs, clicks or gallops  Abdomen - soft, nontender, nondistended, no masses or organomegaly  Neurological - alert, oriented, normal speech, no focal findings or movement disorder noted, DTR's normal and symmetric  Extremities - peripheral pulses normal, no pedal edema, no clubbing or cyanosis  Skin - no rashes or worrisome lesions      Recent Labs   Lab Test 10/31/24  0858 09/20/24  0815 08/13/24  1301   HGB  --   --  13.5   PLT  --   --  251   NA  --  140 142   POTASSIUM  --  4.2 5.3   CR 0.9 0.89 0.85   A1C  --   --  5.7*        Diagnostics  No labs were ordered during this visit.   EKG: Not done because she has no symptoms and had a CT angiogram October 31 of this year which showed patent stents.  There is last EKG done 9/13/2024:    Revised Cardiac Risk Index (RCRI)  The patient has the following serious cardiovascular risks for perioperative complications:   - No serious cardiac risks = 0 points     RCRI Interpretation:     Sinus bradycardia  Long QTc  Otherwise normal ECG  When compared with ECG of 31-Jan-2022 12:02,  Criteria for Septal infarct are no longer Present  Confirmed by ELPIDIO MUHAMMAD, ELISHA LOC:JN ([id number]) on 9/13/2024 4:26:33 PM            Signed Electronically by: Yarelis Harrell MD  A copy of this evaluation report is provided to the requesting physician.

## 2025-01-15 DIAGNOSIS — E55.9 VITAMIN D DEFICIENCY: ICD-10-CM

## 2025-01-15 DIAGNOSIS — K21.9 GASTROESOPHAGEAL REFLUX DISEASE WITHOUT ESOPHAGITIS: ICD-10-CM

## 2025-01-15 RX ORDER — BUPROPION HYDROCHLORIDE 150 MG/1
150 TABLET ORAL EVERY MORNING
Qty: 90 TABLET | Refills: 2 | OUTPATIENT
Start: 2025-01-15

## 2025-01-16 ENCOUNTER — OFFICE VISIT (OUTPATIENT)
Dept: CARDIOLOGY | Facility: CLINIC | Age: 75
End: 2025-01-16
Payer: COMMERCIAL

## 2025-01-16 VITALS
DIASTOLIC BLOOD PRESSURE: 70 MMHG | WEIGHT: 167.5 LBS | BODY MASS INDEX: 28.6 KG/M2 | SYSTOLIC BLOOD PRESSURE: 134 MMHG | RESPIRATION RATE: 12 BRPM | HEART RATE: 72 BPM | HEIGHT: 64 IN

## 2025-01-16 DIAGNOSIS — Z95.1 S/P CABG (CORONARY ARTERY BYPASS GRAFT): ICD-10-CM

## 2025-01-16 DIAGNOSIS — R06.09 DYSPNEA ON EXERTION: ICD-10-CM

## 2025-01-16 NOTE — PROGRESS NOTES
"  Thank you, Dr. Foley, for asking the [facility] team to see Ms. Lisa Ray to evaluate       Assessment/Recommendations   Assessment/Plan:  CAD s/p CABG 2019- anatomy no change, symptoms better off metoprolol, cont asp/statin, plan in 2027 to change asp to clopidogrel and cont rosuvastatin 20mg, if myalgias/arthralgias can add [id number]  CV prevention - as above, goal LDL <70  HTN cont medication    Follow up 12 mo     History of Present Illness/Subjective    Ms. Lisa Ray is a 74 year old female with CABG, PCI with ISR, elevated BP, bronchiectasis improveement in dyspnea on exertion and dizzy spells along with ches tpressure after stopping metoprolol, still present but not as severe, CT cors 10/24 patent LIMA to LAD, sV to D1 and SVG to PDA, noted caliifcaiton of ostial Circ small vessel,   occluded sital to stent in RCA, appears similar to cath prior to CABG in 2019.  No thrombus in JM, normal aorta.  No GIGU bleeding,     Med: losartan/hydrochlorothiazide rosuvastatin, asp,      Physical Examination Review of Systems   /70 (BP Location: Left arm, Patient Position: Sitting, Cuff Size: Adult Regular)   Pulse 72   Resp 12   Ht 1.626 m (5' 4\")   Wt 76 kg (167 lb 8 oz)   LMP  (LMP Unknown)   BMI 28.75 kg/m    Body mass index is 28.75 kg/m .  Wt Readings from Last 3 Encounters:   01/16/25 76 kg (167 lb 8 oz)   12/27/24 75.8 kg (167 lb)   10/31/24 78 kg (172 lb)     [unfilled]  General Appearance:   no distress, normal body habitus   ENT/Mouth: membranes moist, no oral lesions or bleeding gums.      EYES:  no scleral icterus, normal conjunctivae   Neck: no carotid bruits or thyromegaly   Chest/Lungs:   lungs are clear to auscultation, no rales or wheezing,  sternal scar, equal chest wall expansion    Cardiovascular:   Regular. Normal first and second heart sounds with no murmurs, rubs, or gallops; the carotid, radial and posterior tibial pulses are intact, Jugular " venous pressure , edema bilaterally    Abdomen:  no organomegaly, masses, bruits, or tenderness; bowel sounds are present   Extremities: no cyanosis or clubbing   Skin: no xanthelasma, warm.    Neurologic: normal  bilateral, no tremors     Psychiatric: alert and oriented x3, calm     Review of Systems - 12 points nega other than above      Medical History  Surgical History Family History Social History   Past Medical History:   Diagnosis Date    Anemia 2012    microcytic from Celebrex. GI w/u neg    Antiplatelet or antithrombotic long-term use     Back pain     Benign neoplasm of colon     Created by Conversion     Bronchiectasis (H)     CAD (coronary artery disease) 2008    RCA- stent    Cancer (H)     breast cancer    Chronic, continuous use of opioids 05/20/2024    DJD (degenerative joint disease)     Dysthymia     Dysthymic disorder     Created by Conversion         Fatigue     recurrent/variable - no serious pathology    Fibromyalgia     GERD (gastroesophageal reflux disease)     History of gastric ulcer     Hx of CABG     Hypertension     Infection due to 2019 novel coronavirus 01/31/2022    Iritis     past Hx.-left eye    Morbid obesity (H) 05/20/2024    ARSEN (obstructive sleep apnea) 06/05/2019    Ptosis of eyelid     Created by Conversion  [facility] Annotation: Jul 25 2013  3:44PM - Gregory Doyle: compromises   vision. scheduled for repair  Replacement Utility updated for latest IMO load         Raynaud's disease     Rosacea     Shortness of breath     with exertion    Shoulder tendonitis     right    Stented coronary artery     Ulcer of intestine     small bowel- 10 years ago    UTI (urinary tract infection)     Jan. 2019    Past Surgical History:   Procedure Laterality Date    ARTHROPLASTY, HIP, TOTAL, DIRECT ANTERIOR APPROACH, USING HANA TABLE Left 02/04/2022    Procedure: LEFT DIRECT ANTERIOR TOTAL HIP ARTHROPLASTY;  Surgeon: Deny Bryson MD;  Location: [facility] OR    [facility]  REPAIR Bilateral 2013    Dr. Eder Bingham-     BREAST SURGERY Bilateral 2017    bilateral mastectomy 2017- 2017    BREAST SURGERY  2018    implants and revsion 2    CORONARY STENT PLACEMENT Right 2008    Dr. Schwab    CV CORONARY ANGIOGRAM N/A 2019    Procedure: Coronary Angiogram;  Surgeon: Ba Foley MD;  Location: [facility] Cath Lab;  Service: Cardiology    HYSTERECTOMY      OOPHORECTOMY      TOTAL HIP ARTHROPLASTY Right 2015    Procedure: RIGHT TOTAL HIP ARTHROPLASTY;  Surgeon: Tera Yeung MD;  Location: [facility];  Service:     [facility] APPENDECTOMY      Description: Appendectomy;  Recorded: 2009;    Family History   Problem Relation Age of Onset    Colon Cancer Mother     Alcoholism Father          GI bleed age 67    Hereditary Breast and Ovarian Cancer Syndrome Sister     Breast Cancer Sister         metastatic age 49, remission with Rx    Coronary Artery Disease Brother         CABG    Breast Cancer Paternal Grandmother     Hereditary Breast and Ovarian Cancer Syndrome Paternal Grandfather     Hereditary Breast and Ovarian Cancer Syndrome Cousin     Asthma Cousin     Breast Cancer Cousin     Colon Cancer Other     Social History     Socioeconomic History    Marital status:      Spouse name: Not on file    Number of children: 3    Years of education: Not on file    Highest education level: Not on file   Occupational History    Not on file   Tobacco Use    Smoking status: Former     Current packs/day: 0.00     Average packs/day: 1 pack/day for 20.0 years (20.0 ttl pk-yrs)     Types: Cigarettes     Start date: 3/27/1970     Quit date: 3/27/1990     Years since quittin.8    Smokeless tobacco: Never   Vaping Use    Vaping status: Never Used   Substance and Sexual Activity    Alcohol use: Yes     Alcohol/week: 3.3 standard drinks of alcohol     Comment: Alcoholic Drinks/day: 1-2 glasses of wine/week     Drug  use: No    Sexual activity: Not on file   Other Topics Concern    Not on file   Social History Narrative    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 3 grown sons Aries in - , Josse SAMUELS, -microbrewer/beer, ; Gregory Lu- paramedic [employer]...... Non smoker rare ETOH.        Social Drivers of Health     Financial Resource Strain: Low Risk  (8/12/2024)    Financial Resource Strain     Within the past 12 months, have you or your family members you live with been unable to get utilities (heat, electricity) when it was really needed?: No   Food Insecurity: Low Risk  (8/12/2024)    Food Insecurity     Within the past 12 months, did you worry that your food would run out before you got money to buy more?: No     Within the past 12 months, did the food you bought just not last and you didn t have money to get more?: No   Transportation Needs: Low Risk  (8/12/2024)    Transportation Needs     Within the past 12 months, has lack of transportation kept you from medical appointments, getting your medicines, non-medical meetings or appointments, work, or from getting things that you need?: No   Physical Activity: Sufficiently Active (8/12/2024)    Exercise Vital Sign     Days of Exercise per Week: 4 days     Minutes of Exercise per Session: 40 min   Stress: Stress Concern Present (8/12/2024)    [employer] - Occupational Stress Questionnaire     Feeling of Stress : To some extent   Social Connections: Unknown (8/12/2024)    Social Connection and Isolation Panel [NHANES]     Frequency of Communication with Friends and Family: Not on file     Frequency of Social Gatherings with Friends and Family: More than three times a week     Attends Sikh Services: Not on file     Active Member of Clubs or Organizations: Not on file     Attends Club or Organization Meetings: Not on file     Marital Status: Not on file   Interpersonal Safety: Low Risk   (8/13/2024)    Interpersonal Safety     Do you feel physically and emotionally safe where you currently live?: Yes     Within the past 12 months, have you been hit, slapped, kicked or otherwise physically hurt by someone?: No     Within the past 12 months, have you been humiliated or emotionally abused in other ways by your partner or ex-partner?: No   Housing Stability: Low Risk  (8/12/2024)    Housing Stability     Do you have housing? : Yes     Are you worried about losing your housing?: No          Medications  Allergies   Scheduled Meds:  No current facility-administered medications for this visit.     Continuous Infusions:  No current facility-administered medications for this visit.     PRN Meds:.  No current facility-administered medications for this visit.    Allergies   Allergen Reactions    Latex Unknown     Added based on information entered during log entry, please review and add reactions, type, and severity as needed    Nsaids (Non-Steroidal Anti-Inflammatory Drug) [Nsaids] Unknown     Other reaction(s): GI Bleeding, GI Upset, Sensitivity.  Ulceration w/ Celebrex         Lab Results    Chemistry/lipid CBC Cardiac Enzymes/BNP/TSH/INR   Lab Results   Component Value Date    CHOL 104 08/13/2024    HDL 46 (L) 08/13/2024    TRIG 124 08/13/2024    BUN 15.6 09/20/2024     09/20/2024    CO2 26 09/20/2024    Lab Results   Component Value Date    WBC 8.0 08/13/2024    HGB 13.5 08/13/2024    HCT 42.8 08/13/2024    MCV 95 08/13/2024     08/13/2024    Lab Results   Component Value Date    TSH 1.86 08/13/2024    INR 1.32 (H) 03/29/2019              Ba Foley MD  Interventional Cardiology  [facility]

## 2025-01-16 NOTE — LETTER
"1/16/2025    Amber Alberto MD  [address] [address]    RE: Lisa Ray       Dear Colleague,     I had the pleasure of seeing Lisa Ray in the [facility].    Thank you, Dr. Foley, for asking the [facility] team to see Ms. Lisa Ray to evaluate       Assessment/Recommendations   Assessment/Plan:  CAD s/p CABG 2019- anatomy no change, symptoms better off metoprolol, cont asp/statin, plan in 2027 to change asp to clopidogrel and cont rosuvastatin 20mg, if myalgias/arthralgias can add [id number]  CV prevention - as above, goal LDL <70  HTN cont medication    Follow up 12 mo     History of Present Illness/Subjective    Ms. Lisa Ray is a 74 year old female with CABG, PCI with ISR, elevated BP, bronchiectasis improveement in dyspnea on exertion and dizzy spells along with ches tpressure after stopping metoprolol, still present but not as severe, CT cors 10/24 patent LIMA to LAD, sV to D1 and SVG to PDA, noted caliifcaiton of ostial Circ small vessel,   occluded sital to stent in RCA, appears similar to cath prior to CABG in 2019.  No thrombus in JM, normal aorta.  No GIGU bleeding,     Med: losartan/hydrochlorothiazide rosuvastatin, asp,      Physical Examination Review of Systems   /70 (BP Location: Left arm, Patient Position: Sitting, Cuff Size: Adult Regular)   Pulse 72   Resp 12   Ht 1.626 m (5' 4\")   Wt 76 kg (167 lb 8 oz)   LMP  (LMP Unknown)   BMI 28.75 kg/m    Body mass index is 28.75 kg/m .  Wt Readings from Last 3 Encounters:   01/16/25 76 kg (167 lb 8 oz)   12/27/24 75.8 kg (167 lb)   10/31/24 78 kg (172 lb)     [unfilled]  General Appearance:   no distress, normal body habitus   ENT/Mouth: membranes moist, no oral lesions or bleeding gums.      EYES:  no scleral icterus, normal conjunctivae   Neck: no carotid bruits or thyromegaly   Chest/Lungs:   lungs are clear to auscultation, no rales or wheezing, "  sternal scar, equal chest wall expansion    Cardiovascular:   Regular. Normal first and second heart sounds with no murmurs, rubs, or gallops; the carotid, radial and posterior tibial pulses are intact, Jugular venous pressure , edema bilaterally    Abdomen:  no organomegaly, masses, bruits, or tenderness; bowel sounds are present   Extremities: no cyanosis or clubbing   Skin: no xanthelasma, warm.    Neurologic: normal  bilateral, no tremors     Psychiatric: alert and oriented x3, calm     Review of Systems - 12 points nega other than above      Medical History  Surgical History Family History Social History   Past Medical History:   Diagnosis Date     Anemia 2012    microcytic from Celebrex. GI w/u neg     Antiplatelet or antithrombotic long-term use      Back pain      Benign neoplasm of colon     Created by Conversion      Bronchiectasis (H)      CAD (coronary artery disease) 2008    RCA- stent     Cancer (H)     breast cancer     Chronic, continuous use of opioids 05/20/2024     DJD (degenerative joint disease)      Dysthymia      Dysthymic disorder     Created by Conversion          Fatigue     recurrent/variable - no serious pathology     Fibromyalgia      GERD (gastroesophageal reflux disease)      History of gastric ulcer      Hx of CABG      Hypertension      Infection due to 2019 novel coronavirus 01/31/2022     Iritis     past Hx.-left eye     Morbid obesity (H) 05/20/2024     ARSEN (obstructive sleep apnea) 06/05/2019     Ptosis of eyelid     Created by Conversion  [facility] Annotation: Jul 25 2013  3:44PM - Gregory Doyle: compromises   vision. scheduled for repair  Replacement Utility updated for latest IMO load          Raynaud's disease      Rosacea      Shortness of breath     with exertion     Shoulder tendonitis     right     Stented coronary artery      Ulcer of intestine     small bowel- 10 years ago     UTI (urinary tract infection)     Jan. 2019    Past Surgical History:   Procedure  Laterality Date     ARTHROPLASTY, HIP, TOTAL, DIRECT ANTERIOR APPROACH, USING HANA TABLE Left 2022    Procedure: LEFT DIRECT ANTERIOR TOTAL HIP ARTHROPLASTY;  Surgeon: Deny Bryson MD;  Location: [facility] Main OR     BELPHAROPTOSIS REPAIR Bilateral 2013    Dr. Eder Bingham-      BREAST SURGERY Bilateral 2017    bilateral mastectomy - 2017     BREAST SURGERY  2018    implants and revsion 2     CORONARY STENT PLACEMENT Right 2008    Dr. Schwab     CV CORONARY ANGIOGRAM N/A 2019    Procedure: Coronary Angiogram;  Surgeon: Ba Foley MD;  Location: [facility] Cath Lab;  Service: Cardiology     HYSTERECTOMY       OOPHORECTOMY       TOTAL HIP ARTHROPLASTY Right 2015    Procedure: RIGHT TOTAL HIP ARTHROPLASTY;  Surgeon: Tera Yeung MD;  Location: [facility];  Service:      [facility] APPENDECTOMY      Description: Appendectomy;  Recorded: 2009;    Family History   Problem Relation Age of Onset     Colon Cancer Mother      Alcoholism Father          GI bleed age 67     Hereditary Breast and Ovarian Cancer Syndrome Sister      Breast Cancer Sister         metastatic age 49, remission with Rx     Coronary Artery Disease Brother         CABG     Breast Cancer Paternal Grandmother      Hereditary Breast and Ovarian Cancer Syndrome Paternal Grandfather      Hereditary Breast and Ovarian Cancer Syndrome Cousin      Asthma Cousin      Breast Cancer Cousin      Colon Cancer Other     Social History     Socioeconomic History     Marital status:      Spouse name: Not on file     Number of children: 3     Years of education: Not on file     Highest education level: Not on file   Occupational History     Not on file   Tobacco Use     Smoking status: Former     Current packs/day: 0.00     Average packs/day: 1 pack/day for 20.0 years (20.0 ttl pk-yrs)     Types: Cigarettes     Start date: 3/27/1970     Quit date: 3/27/1990      Years since quittin.8     Smokeless tobacco: Never   Vaping Use     Vaping status: Never Used   Substance and Sexual Activity     Alcohol use: Yes     Alcohol/week: 3.3 standard drinks of alcohol     Comment: Alcoholic Drinks/day: 1-2 glasses of wine/week      Drug use: No     Sexual activity: Not on file   Other Topics Concern     Not on file   Social History Narrative    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 3 grown sons Aries in - , Josse SAMUELS, -microbrewer/beer, ; Gregory Lu- paramedic [employer]...... Non smoker rare ETOH.        Social Drivers of Health     Financial Resource Strain: Low Risk  (2024)    Financial Resource Strain      Within the past 12 months, have you or your family members you live with been unable to get utilities (heat, electricity) when it was really needed?: No   Food Insecurity: Low Risk  (2024)    Food Insecurity      Within the past 12 months, did you worry that your food would run out before you got money to buy more?: No      Within the past 12 months, did the food you bought just not last and you didn t have money to get more?: No   Transportation Needs: Low Risk  (2024)    Transportation Needs      Within the past 12 months, has lack of transportation kept you from medical appointments, getting your medicines, non-medical meetings or appointments, work, or from getting things that you need?: No   Physical Activity: Sufficiently Active (2024)    Exercise Vital Sign      Days of Exercise per Week: 4 days      Minutes of Exercise per Session: 40 min   Stress: Stress Concern Present (2024)    [employer] - Occupational Stress Questionnaire      Feeling of Stress : To some extent   Social Connections: Unknown (2024)    Social Connection and Isolation Panel [NHANES]      Frequency of Communication with Friends and Family: Not on file      Frequency of Social Gatherings  with Friends and Family: More than three times a week      Attends Sikhism Services: Not on file      Active Member of Clubs or Organizations: Not on file      Attends Club or Organization Meetings: Not on file      Marital Status: Not on file   Interpersonal Safety: Low Risk  (8/13/2024)    Interpersonal Safety      Do you feel physically and emotionally safe where you currently live?: Yes      Within the past 12 months, have you been hit, slapped, kicked or otherwise physically hurt by someone?: No      Within the past 12 months, have you been humiliated or emotionally abused in other ways by your partner or ex-partner?: No   Housing Stability: Low Risk  (8/12/2024)    Housing Stability      Do you have housing? : Yes      Are you worried about losing your housing?: No          Medications  Allergies   Scheduled Meds:  No current facility-administered medications for this visit.     Continuous Infusions:  No current facility-administered medications for this visit.     PRN Meds:.  No current facility-administered medications for this visit.    Allergies   Allergen Reactions     Latex Unknown     Added based on information entered during log entry, please review and add reactions, type, and severity as needed     Nsaids (Non-Steroidal Anti-Inflammatory Drug) [Nsaids] Unknown     Other reaction(s): GI Bleeding, GI Upset, Sensitivity.  Ulceration w/ Celebrex         Lab Results    Chemistry/lipid CBC Cardiac Enzymes/BNP/TSH/INR   Lab Results   Component Value Date    CHOL 104 08/13/2024    HDL 46 (L) 08/13/2024    TRIG 124 08/13/2024    BUN 15.6 09/20/2024     09/20/2024    CO2 26 09/20/2024    Lab Results   Component Value Date    WBC 8.0 08/13/2024    HGB 13.5 08/13/2024    HCT 42.8 08/13/2024    MCV 95 08/13/2024     08/13/2024    Lab Results   Component Value Date    TSH 1.86 08/13/2024    INR 1.32 (H) 03/29/2019              Ba Foley MD  Interventional Cardiology  [facility]  Care              Thank you for allowing me to participate in the care of your patient.      Sincerely,     Ba Foley MD     [facility] Care  cc:   Ba Foley MD  [address]  [place] [address]

## 2025-01-19 DIAGNOSIS — J01.01 ACUTE RECURRENT MAXILLARY SINUSITIS: ICD-10-CM

## 2025-01-20 RX ORDER — FLUTICASONE PROPIONATE 50 MCG
1 SPRAY, SUSPENSION (ML) NASAL DAILY
Qty: 48 ML | Refills: 1 | Status: SHIPPED | OUTPATIENT
Start: 2025-01-20

## 2025-03-02 DIAGNOSIS — E55.9 VITAMIN D DEFICIENCY: ICD-10-CM

## 2025-03-03 ENCOUNTER — TRANSFERRED RECORDS (OUTPATIENT)
Dept: HEALTH INFORMATION MANAGEMENT | Facility: CLINIC | Age: 75
End: 2025-03-03
Payer: COMMERCIAL

## 2025-03-03 RX ORDER — BUPROPION HYDROCHLORIDE 150 MG/1
150 TABLET ORAL EVERY MORNING
Qty: 90 TABLET | Refills: 2 | Status: SHIPPED | OUTPATIENT
Start: 2025-03-03

## 2025-03-18 ENCOUNTER — TRANSFERRED RECORDS (OUTPATIENT)
Dept: HEALTH INFORMATION MANAGEMENT | Facility: CLINIC | Age: 75
End: 2025-03-18
Payer: COMMERCIAL

## 2025-03-22 DIAGNOSIS — J42 CHRONIC BRONCHITIS, UNSPECIFIED CHRONIC BRONCHITIS TYPE (H): ICD-10-CM

## 2025-03-24 RX ORDER — UMECLIDINIUM 62.5 UG/1
1 AEROSOL, POWDER ORAL DAILY
Qty: 90 EACH | Refills: 0 | Status: SHIPPED | OUTPATIENT
Start: 2025-03-24

## 2025-04-08 ENCOUNTER — TRANSFERRED RECORDS (OUTPATIENT)
Dept: HEALTH INFORMATION MANAGEMENT | Facility: CLINIC | Age: 75
End: 2025-04-08
Payer: COMMERCIAL

## 2025-05-08 DIAGNOSIS — J42 CHRONIC BRONCHITIS, UNSPECIFIED CHRONIC BRONCHITIS TYPE (H): ICD-10-CM

## 2025-05-08 DIAGNOSIS — K21.9 GASTROESOPHAGEAL REFLUX DISEASE WITHOUT ESOPHAGITIS: ICD-10-CM

## 2025-05-08 RX ORDER — OMEPRAZOLE 20 MG/1
20 CAPSULE, DELAYED RELEASE ORAL DAILY
Qty: 90 CAPSULE | Refills: 1 | Status: SHIPPED | OUTPATIENT
Start: 2025-05-08

## 2025-05-08 RX ORDER — UMECLIDINIUM 62.5 UG/1
1 AEROSOL, POWDER ORAL DAILY
Qty: 30 EACH | Refills: 11 | Status: SHIPPED | OUTPATIENT
Start: 2025-05-08

## 2025-05-19 ENCOUNTER — TRANSFERRED RECORDS (OUTPATIENT)
Dept: HEALTH INFORMATION MANAGEMENT | Facility: CLINIC | Age: 75
End: 2025-05-19
Payer: COMMERCIAL

## 2025-07-02 ASSESSMENT — PATIENT HEALTH QUESTIONNAIRE - PHQ9
10. IF YOU CHECKED OFF ANY PROBLEMS, HOW DIFFICULT HAVE THESE PROBLEMS MADE IT FOR YOU TO DO YOUR WORK, TAKE CARE OF THINGS AT HOME, OR GET ALONG WITH OTHER PEOPLE: SOMEWHAT DIFFICULT
SUM OF ALL RESPONSES TO PHQ QUESTIONS 1-9: 7
SUM OF ALL RESPONSES TO PHQ QUESTIONS 1-9: 7

## 2025-07-03 ENCOUNTER — OFFICE VISIT (OUTPATIENT)
Dept: INTERNAL MEDICINE | Facility: CLINIC | Age: 75
End: 2025-07-03
Payer: COMMERCIAL

## 2025-07-03 VITALS
HEIGHT: 64 IN | OXYGEN SATURATION: 97 % | DIASTOLIC BLOOD PRESSURE: 85 MMHG | SYSTOLIC BLOOD PRESSURE: 125 MMHG | TEMPERATURE: 97.1 F | WEIGHT: 170 LBS | BODY MASS INDEX: 29.02 KG/M2 | HEART RATE: 60 BPM

## 2025-07-03 DIAGNOSIS — R20.2 NUMBNESS AND TINGLING OF LEFT SIDE OF FACE: ICD-10-CM

## 2025-07-03 DIAGNOSIS — E55.9 VITAMIN D DEFICIENCY: ICD-10-CM

## 2025-07-03 DIAGNOSIS — R10.13 ABDOMINAL PAIN, EPIGASTRIC: ICD-10-CM

## 2025-07-03 DIAGNOSIS — R20.0 NUMBNESS AND TINGLING OF LEFT SIDE OF FACE: ICD-10-CM

## 2025-07-03 DIAGNOSIS — R73.03 PREDIABETES: ICD-10-CM

## 2025-07-03 DIAGNOSIS — Z23 NEED FOR VACCINATION: ICD-10-CM

## 2025-07-03 DIAGNOSIS — R20.2 NUMBNESS AND TINGLING IN BOTH HANDS: ICD-10-CM

## 2025-07-03 DIAGNOSIS — M25.50 ARTHRALGIA, UNSPECIFIED JOINT: Primary | ICD-10-CM

## 2025-07-03 DIAGNOSIS — R20.0 NUMBNESS AND TINGLING IN BOTH HANDS: ICD-10-CM

## 2025-07-03 DIAGNOSIS — Z12.11 COLON CANCER SCREENING: ICD-10-CM

## 2025-07-03 DIAGNOSIS — R79.9 ABNORMAL BLOOD CHEMISTRY: ICD-10-CM

## 2025-07-03 DIAGNOSIS — E78.2 MIXED HYPERLIPIDEMIA: ICD-10-CM

## 2025-07-03 LAB
ALBUMIN SERPL BCG-MCNC: 4.4 G/DL (ref 3.5–5.2)
ALP SERPL-CCNC: 66 U/L (ref 40–150)
ALT SERPL W P-5'-P-CCNC: 30 U/L (ref 0–50)
ANION GAP SERPL CALCULATED.3IONS-SCNC: 10 MMOL/L (ref 7–15)
AST SERPL W P-5'-P-CCNC: 24 U/L (ref 0–45)
BASOPHILS # BLD AUTO: 0.1 10E3/UL (ref 0–0.2)
BASOPHILS NFR BLD AUTO: 1 %
BILIRUB SERPL-MCNC: 0.7 MG/DL
BUN SERPL-MCNC: 14.7 MG/DL (ref 8–23)
CALCIUM SERPL-MCNC: 9.8 MG/DL (ref 8.8–10.4)
CHLORIDE SERPL-SCNC: 103 MMOL/L (ref 98–107)
CHOLEST SERPL-MCNC: 104 MG/DL
CREAT SERPL-MCNC: 0.88 MG/DL (ref 0.51–0.95)
CRP SERPL-MCNC: <3 MG/L
EGFRCR SERPLBLD CKD-EPI 2021: 68 ML/MIN/1.73M2
EOSINOPHIL # BLD AUTO: 0.2 10E3/UL (ref 0–0.7)
EOSINOPHIL NFR BLD AUTO: 3 %
ERYTHROCYTE [DISTWIDTH] IN BLOOD BY AUTOMATED COUNT: 11.9 % (ref 10–15)
ERYTHROCYTE [SEDIMENTATION RATE] IN BLOOD BY WESTERGREN METHOD: 9 MM/HR (ref 0–30)
EST. AVERAGE GLUCOSE BLD GHB EST-MCNC: 123 MG/DL
FASTING STATUS PATIENT QL REPORTED: NO
FASTING STATUS PATIENT QL REPORTED: NO
GLUCOSE SERPL-MCNC: 108 MG/DL (ref 70–99)
HBA1C MFR BLD: 5.9 % (ref 0–5.6)
HCO3 SERPL-SCNC: 28 MMOL/L (ref 22–29)
HCT VFR BLD AUTO: 43.7 % (ref 35–47)
HDLC SERPL-MCNC: 51 MG/DL
HGB BLD-MCNC: 14.6 G/DL (ref 11.7–15.7)
IMM GRANULOCYTES # BLD: 0 10E3/UL
IMM GRANULOCYTES NFR BLD: 0 %
LDLC SERPL CALC-MCNC: 35 MG/DL
LYMPHOCYTES # BLD AUTO: 2.2 10E3/UL (ref 0.8–5.3)
LYMPHOCYTES NFR BLD AUTO: 32 %
MCH RBC QN AUTO: 31.5 PG (ref 26.5–33)
MCHC RBC AUTO-ENTMCNC: 33.4 G/DL (ref 31.5–36.5)
MCV RBC AUTO: 94 FL (ref 78–100)
MONOCYTES # BLD AUTO: 0.5 10E3/UL (ref 0–1.3)
MONOCYTES NFR BLD AUTO: 8 %
NEUTROPHILS # BLD AUTO: 4 10E3/UL (ref 1.6–8.3)
NEUTROPHILS NFR BLD AUTO: 57 %
NONHDLC SERPL-MCNC: 53 MG/DL
PLATELET # BLD AUTO: 197 10E3/UL (ref 150–450)
POTASSIUM SERPL-SCNC: 4.4 MMOL/L (ref 3.4–5.3)
PROT SERPL-MCNC: 7 G/DL (ref 6.4–8.3)
RBC # BLD AUTO: 4.64 10E6/UL (ref 3.8–5.2)
RHEUMATOID FACT SERPL-ACNC: <10 IU/ML
SODIUM SERPL-SCNC: 141 MMOL/L (ref 135–145)
TRIGL SERPL-MCNC: 89 MG/DL
TSH SERPL DL<=0.005 MIU/L-ACNC: 1.74 UIU/ML (ref 0.3–4.2)
VIT B12 SERPL-MCNC: 990 PG/ML (ref 232–1245)
VIT D+METAB SERPL-MCNC: 51 NG/ML (ref 20–50)
WBC # BLD AUTO: 7 10E3/UL (ref 4–11)

## 2025-07-03 RX ORDER — METFORMIN HYDROCHLORIDE 500 MG/1
500 TABLET, EXTENDED RELEASE ORAL
Qty: 60 TABLET | Refills: 3 | Status: SHIPPED | OUTPATIENT
Start: 2025-07-03

## 2025-07-03 ASSESSMENT — PAIN SCALES - GENERAL: PAINLEVEL_OUTOF10: MODERATE PAIN (4)

## 2025-07-03 NOTE — PROGRESS NOTES
Assessment & Plan     Need for vaccination      Mixed hyperlipidemia  On rosuvastatin  LDL Cholesterol Calculated   Date Value Ref Range Status   08/13/2024 33 <=100 mg/dL Final     LDL Cholesterol Direct   Date Value Ref Range Status   05/21/2018 55 <=129 mg/dl Final     Excellent lipids  - Lipid panel reflex to direct LDL Fasting; Future  - CBC with Platelets & Differential; Future  - Lipid panel reflex to direct LDL Fasting  - CBC with Platelets & Differential    Abnormal blood chemistry      Vitamin D deficiency  - Vitamin D Deficiency; Future  - Vitamin D Deficiency    Numbness and tingling in both hands  Monofilament testing and motor power is normal but she says that there is some discrepancy in the fingers consistent with the median nerve impingement we will get EMG study and check nutritional markers  - CBC with platelets; Future  - Comprehensive metabolic panel (BMP + Alb, Alk Phos, ALT, AST, Total. Bili, TP); Future  - Vitamin D Deficiency; Future  - TSH; Future  - Comprehensive metabolic panel; Future  - Comprehensive metabolic panel (BMP + Alb, Alk Phos, ALT, AST, Total. Bili, TP)  - Vitamin D Deficiency  - TSH    Numbness and tingling of left side of face  There is no prominence in the temporal artery but she is tender in the left temple cranial nerves are essentially normal on inspection.  EMG of upper extremities will look for any concern but this is not typical of stroke will rule out temporal arteritis  - ESR: Erythrocyte sedimentation rate; Future  - CRP, inflammation; Future  - Vitamin B12; Future  - EMG; Future  - ESR: Erythrocyte sedimentation rate  - CRP, inflammation  - Vitamin B12    Abdominal pain, epigastric  The severity of the pain warrants further investigation but the fact that is relieved by gas passage of gas suggest constipation or slow transit constipation.  She is also due for colonoscopy we will order that prior history of diverticuli so we will check CT scan but will do CT  "angio to look for mesenteric ischemia with her history of coronary artery disease  - CTA Abdomen with Contrast; Future    Prediabetes  Lab Results   Component Value Date    A1C 5.9 07/03/2025    A1C 5.7 08/13/2024    A1C 5.9 07/18/2023    A1C 5.8 06/13/2022    A1C 6.0 04/20/2021      A1c stable  - metFORMIN (GLUCOPHAGE XR) 500 MG 24 hr tablet; Take 1 tablet (500 mg) by mouth daily (with dinner).  - Hemoglobin A1c; Future  - Hemoglobin A1c    Colon cancer screening  She should dose colon cancer screening after CT scan is done  - Colonoscopy Screening  Referral; Future    Arthralgia, unspecified joint  No synovitis on exam but will rule out some antibody testing.  - Rheumatoid factor; Future  - Cyclic Citrullinated Peptide Antibody IgG; Future  - Rheumatoid factor  - Cyclic Citrullinated Peptide Antibody IgG      Overall symptoms consistent with possible carpal tunnel left sided numbness is not consistent with a CVA or TIA.  Or other neurological disorder does not have hyperreflexia or fasciculations no emergent need to do imaging of the brain.  Anxiety could be playing a role but that is a diagnosis of exclusion  The longitudinal plan of care for the diagnosis(es)/condition(s) as documented were addressed during this visit. Due to the added complexity in care, I will continue to support Pat in the subsequent management and with ongoing continuity of care.    BMI  Estimated body mass index is 29.18 kg/m  as calculated from the following:    Height as of this encounter: 1.626 m (5' 4\").    Weight as of this encounter: 77.1 kg (170 lb).             Subjective   Pat is a 75 year old, presenting for the following health issues:  office visit (Pt is here with numbness in hands and face lasting over a month. and diverticulitis concern. Discuss weight questions. )  Two episodes of severe cramping pain and feels like she is unable to pass gas and had to lie doesn on the bathroom . It eventually does away    Feels " "queasy   Pass gas and feels better    Typically is on the constipated side and after every two or three days then she takes miralax .   No balance problems no weakness no tremor no headache no visual loss she has a history of migraines but this left-sided discomfort feels different it is not intermittent  Lab Results   Component Value Date    A1C 5.7 08/13/2024    A1C 5.9 07/18/2023    A1C 5.8 06/13/2022    A1C 6.0 04/20/2021    A1C 6.0 02/12/2020           7/3/2025     9:20 AM   Additional Questions   Roomed by Leyda   Accompanied by alone         7/3/2025     9:20 AM   Patient Reported Additional Medications   Patient reports taking the following new medications none     History of Present Illness       Headaches:   Since the patient's last clinic visit, headaches are: worsened  The patient is getting headaches:  ? Weekly  She is able to do normal daily activities when she has a migraine.  The patient is taking the following rescue/relief medications:  No rescue/relief medications and Tylenol   Patient states \"The relief is inconsistent\" from the rescue/relief medications.   The patient is taking the following medications to prevent migraines:  No medications to prevent migraines  In the past 4 weeks, the patient has gone to an Urgent Care or Emergency Room 0 times times due to headaches.    Reason for visit:  Discomfort L eye L facial numb, low and discomfort , hand numbness  Symptom onset:  More than a month  Symptom intensity:  Moderate  Symptom progression:  Worsening  Had these symptoms before:  Yes  Has tried/received treatment for these symptoms:  No  What makes it better:  Rest, Tylenol,   She is taking medications regularly.                      Objective    /85 (BP Location: Left arm, Patient Position: Sitting, Cuff Size: Adult Large)   Pulse 60   Temp 97.1  F (36.2  C) (Temporal)   Ht 1.626 m (5' 4\")   Wt 77.1 kg (170 lb)   LMP  (LMP Unknown)   SpO2 97%   BMI 29.18 kg/m    Body mass index is " 29.18 kg/m .  Physical Exam   GENERAL: alert and no distress  NECK: no adenopathy, no asymmetry, masses, or scars  RESP: lungs clear to auscultation - no rales, rhonchi or wheezes  CV: regular rate and rhythm, normal S1 S2, no S3 or S4, no murmur, click or rub, no peripheral edema  ABDOMEN: soft, nontender, no hepatosplenomegaly, no masses and bowel sounds normal  MS: no gross musculoskeletal defects noted, no edema  NEURO: Normal strength and tone, mentation intact and speech normal  NEURO: Normal strength and tone, sensory exam grossly normal, mentation intact, speech normal, cranial nerves 2-12 intact, DTR's normal and symmetric , and Romberg normal            Signed Electronically by: Amber Alberto MD

## 2025-07-09 LAB — CCP AB SER IA-ACNC: 1.3 U/ML

## 2025-07-14 ENCOUNTER — PATIENT OUTREACH (OUTPATIENT)
Dept: CARE COORDINATION | Facility: CLINIC | Age: 75
End: 2025-07-14
Payer: COMMERCIAL

## 2025-07-14 DIAGNOSIS — G47.00 INSOMNIA, UNSPECIFIED TYPE: ICD-10-CM

## 2025-07-14 RX ORDER — ZOLPIDEM TARTRATE 10 MG/1
10 TABLET ORAL
Qty: 30 TABLET | Refills: 5 | Status: SHIPPED | OUTPATIENT
Start: 2025-07-14

## 2025-07-15 ENCOUNTER — TELEPHONE (OUTPATIENT)
Dept: INTERNAL MEDICINE | Facility: CLINIC | Age: 75
End: 2025-07-15
Payer: COMMERCIAL

## 2025-07-15 DIAGNOSIS — R10.11 RUQ ABDOMINAL PAIN: Primary | ICD-10-CM

## 2025-07-18 ENCOUNTER — HOSPITAL ENCOUNTER (OUTPATIENT)
Dept: ULTRASOUND IMAGING | Facility: HOSPITAL | Age: 75
Discharge: HOME | End: 2025-07-18
Attending: INTERNAL MEDICINE | Admitting: INTERNAL MEDICINE
Payer: COMMERCIAL

## 2025-07-18 DIAGNOSIS — R10.11 RUQ ABDOMINAL PAIN: ICD-10-CM

## 2025-07-18 PROCEDURE — 76705 ECHO EXAM OF ABDOMEN: CPT

## 2025-07-21 ENCOUNTER — RESULTS FOLLOW-UP (OUTPATIENT)
Dept: INTERNAL MEDICINE | Facility: CLINIC | Age: 75
End: 2025-07-21
Payer: COMMERCIAL

## 2025-07-21 DIAGNOSIS — K80.20 GALLSTONES: Primary | ICD-10-CM

## 2025-07-24 ENCOUNTER — OFFICE VISIT (OUTPATIENT)
Dept: SURGERY | Facility: CLINIC | Age: 75
End: 2025-07-24
Payer: COMMERCIAL

## 2025-07-24 ENCOUNTER — HOSPITAL ENCOUNTER (OUTPATIENT)
Facility: AMBULATORY SURGERY CENTER | Age: 75
End: 2025-07-24
Attending: SURGERY
Payer: COMMERCIAL

## 2025-07-24 DIAGNOSIS — K80.20 GALLSTONES: ICD-10-CM

## 2025-07-24 DIAGNOSIS — G89.18 POSTOPERATIVE PAIN: Primary | ICD-10-CM

## 2025-07-24 NOTE — PROGRESS NOTES
History:   Lisa Ray is a 75 year old female referred to general surgery by Dr. Alberto for cholelithiasis.  Over the last 20 years she has been having some intermittent epigastric abdominal pain.  Most of the time it will wake her up in the middle of the night.  The pain is sharp.  It tends to be associated with nausea, vomiting, and diaphoresis.  She also has symptoms of bloating, gassiness, and indigestion.  Over the last 4 months she has noticed it has become much more frequent.  She had an outpatient workup that revealed fairly clear vasculature.  Ultrasound revealed small gallstones.    Allergies:  Celecoxib, Latex, and Nsaids (non-steroidal anti-inflammatory drug) [nsaids]    Past medical history:  CAD  Raynaud's  RASEN  HTN  Fibromyalgia  Breast cancer  Dyslipidemia    Past surgical history:  Left hip arthroplasty  Appendectomy  Hysterectomy  Bilateral mastectomy with reconstruction  Blepharoplasty    Current medications:    acetaminophen (TYLENOL) 500 MG tablet, [ACETAMINOPHEN (TYLENOL) 500 MG TABLET] Take 500 mg by mouth every 6 (six) hours as needed for pain (arthritis)., Disp: , Rfl:     albuterol (PROAIR HFA/PROVENTIL HFA/VENTOLIN HFA) 108 (90 Base) MCG/ACT inhaler, INHALE 2 PUFFS INTO THE LUNGS EVERY 6 HOURS, Disp: 18 g, Rfl: 4    alendronate (FOSAMAX) 70 MG tablet, TAKE 1 TABLET BY MOUTH ONE TIME PER WEEK, Disp: 12 tablet, Rfl: 1    aspirin (ASA) 81 MG EC tablet, Take 1 tablet (81 mg) by mouth 2 times daily, Disp: , Rfl:     b complex vitamins tablet, [B COMPLEX VITAMINS TABLET] Take 1 tablet by mouth daily., Disp: , Rfl:     buPROPion (WELLBUTRIN XL) 150 MG 24 hr tablet, TAKE 1 TABLET BY MOUTH EVERY DAY IN THE MORNING, Disp: 90 tablet, Rfl: 2    calcium, as carbonate, (TUMS) 200 mg calcium (500 mg) chewable tablet, [CALCIUM, AS CARBONATE, (TUMS) 200 MG CALCIUM (500 MG) CHEWABLE TABLET] Chew 2 tablets daily., Disp: , Rfl:     cholecalciferol, vitamin D3, 1,000 unit tablet, Take 2,000 Units by  mouth daily , Disp: , Rfl:     docusate sodium (COLACE) 100 MG capsule, [DOCUSATE SODIUM (COLACE) 100 MG CAPSULE] Take 1 capsule (100 mg total) by mouth 2 (two) times a day as needed for constipation., Disp: , Rfl: 0    doxylamine (UNISOM) 25 mg tablet, Take 25 mg by mouth nightly as needed for sleep , Disp: , Rfl:     Ferrous Sulfate (IRON) 28 MG TABS, Take 1 tablet by mouth daily , Disp: , Rfl:     fluticasone (FLONASE) 50 MCG/ACT nasal spray, INSTILL 1 SPRAY INTO BOTH NOSTRILS DAILY, Disp: 48 mL, Rfl: 1    losartan-hydrochlorothiazide (HYZAAR) 100-12.5 MG tablet, Take 1 tablet by mouth daily., Disp: 90 tablet, Rfl: 11    magnesium oxide (MAG-OX) 400 (240 Mg) MG tablet, Take 400 mg by mouth At Bedtime, Disp: , Rfl:     metFORMIN (GLUCOPHAGE XR) 500 MG 24 hr tablet, Take 1 tablet (500 mg) by mouth daily (with dinner)., Disp: 60 tablet, Rfl: 3    omeprazole (PRILOSEC) 20 MG DR capsule, TAKE 1 CAPSULE BY MOUTH EVERY DAY, Disp: 90 capsule, Rfl: 1    ondansetron (ZOFRAN ODT) 8 MG ODT tab, Take 1 tablet (8 mg) by mouth every 8 hours as needed for nausea., Disp: 21 tablet, Rfl: 0    peg 400-propylene glycol PF (SYSTANE) 0.4-0.3 % Dpet, [-PROPYLENE GLYCOL PF (SYSTANE) 0.4-0.3 % DPET] Administer 1 drop to both eyes 2 (two) times a day as needed., Disp: , Rfl:     rosuvastatin (CRESTOR) 20 MG tablet, TAKE 1 TABLET BY MOUTH EVERYDAY AT BEDTIME, Disp: 90 tablet, Rfl: 3    tiZANidine (ZANAFLEX) 2 MG tablet, TAKE 1 TABLET BY MOUTH AT BEDTIME, Disp: 90 tablet, Rfl: 3    triamcinolone (NASACORT) 55 mcg nasal inhaler, [TRIAMCINOLONE (NASACORT) 55 MCG NASAL INHALER] Apply 2 sprays into each nostril daily as needed., Disp: , Rfl:     umeclidinium (INCRUSE ELLIPTA) 62.5 MCG/ACT inhaler, INHALE 1 PUFF BY MOUTH EVERY DAY, Disp: 30 each, Rfl: 11    zolpidem (AMBIEN) 10 MG tablet, TAKE 1 TABLET (10 MG) BY MOUTH NIGHTLY AS NEEDED FOR SLEEP, Disp: 30 tablet, Rfl: 5    Family history:  No known family history of anesthesia  problems    Social history:  Consumes a few alcoholic beverages per week.  Denies active tobacco, marijuana, and illicit drug use.  During the summer she helps care for her 3 grandchildren ages 7, 5, and 2.    Review of Systems:  General: No complaints or constitutional symptoms  Skin: No complaints or symptoms   Hematologic/Lymphatic: No symptoms or complaints  Psychiatric: No symptoms or complaints  Endocrine: No excessive fatigue, no hypermetabolic symptoms reported  Respiratory: No cough, shortness of breath, or wheezing  Cardiovascular: No chest pain or dyspnea on exertion  Gastrointestinal: As per HPI  Musculoskeletal: No recent injuries reported  Neurological: No focal neurologic defects reported.      Exam:  General: Alert, cooperative, appears stated age   Skin: Skin color, texture, turgor normal, no rashes or lesions   Lymphatic: No obvious adenopathy, no swelling   Eyes: No scleral icterus, pupils equal  HENT: No traumatic injury to the head or face, no gross abnormalities  Lungs: Normal respiratory effort, breath sounds equal bilaterally  Heart: Regular rate and rhythm  Abdomen: Soft, nondistended, nontender to palpation  Musculoskeletal: No obvious swelling or deformities  Neurologic: Grossly intact      Imaging:   Pertinent images personally reviewed by myself and discussed with the patient.    Radiology reports:  EXAM: US ABDOMEN LIMITED  LOCATION: [facility]  DATE: 7/18/2025     INDICATION:  RUQ abdominal pain  COMPARISON: 7/10/2025  TECHNIQUE: Limited abdominal ultrasound.     FINDINGS:  GALLBLADDER: Multiple tiny mobile gallstones. No wall thickening, or pericholecystic fluid. Negative sonographic Lim's sign.  BILE DUCTS: No biliary dilatation. The common duct measures 6 mm.  LIVER: Increased echogenicity from diffuse fatty infiltration with focal sparing adjacent to the gallbladder and colton hepatis. Liver is difficult to penetrate due to increased echogenicity however  no focal mass seen. The portal vein is patent with flow in the normal direction.  RIGHT KIDNEY: No hydronephrosis.  PANCREAS: The visualized portions are normal.  No ascites.                                                                   IMPRESSION:  1.  Cholelithiasis without evidence of acute cholecystitis.  2.  Hepatic steatosis.    My interpretation:  Small layering stones within the gallbladder    Assessment/Plan:   Lisa Ray is a 75 year old female with signs and symptoms consistent with biliary colic.  I have explained the pathophysiology of gallbladder disease in detail as well as the surgical versus non-operative management strategies.  The risks of surgery and anesthesia include, but are not limited to, bleeding, infection, injury to surrounding structures, the need to convert to an open procedure, blood clots, stroke, heart attack and death.  Specifically we discussed the risk of damage to the common bile duct and hepatic vessels, and the complications that may arise from damage to these structures.  Additionally, the risks of non-operative management were discussed which include, but are not limited to, infection, recurrent pain, sepsis and death.     She understands everything which was discussed and is interested in pursuing surgical management.  Therefore, preoperative orders have been placed for laparoscopic cholecystectomy.  She saw her PCP for a physical earlier this month and her medications are optimized.  Postoperative recovery and restrictions were discussed.  We will schedule surgery accordingly.     Debbie Dawn DO  General Surgeon  [facility]  Surgery [facility] - [address] [address]  Office: [phone]  Employed by - [facility]

## 2025-07-24 NOTE — LETTER
7/24/2025      Lisa Ray  [address] [place] [address]      Dear Colleague,    Thank you for referring your patient, Lisa Ray, to the [facility] [place]. Please see a copy of my visit note below.    History:   Lisa Ray is a 75 year old female referred to general surgery by Dr. Alberto for cholelithiasis.  Over the last 20 years she has been having some intermittent epigastric abdominal pain.  Most of the time it will wake her up in the middle of the night.  The pain is sharp.  It tends to be associated with nausea, vomiting, and diaphoresis.  She also has symptoms of bloating, gassiness, and indigestion.  Over the last 4 months she has noticed it has become much more frequent.  She had an outpatient workup that revealed fairly clear vasculature.  Ultrasound revealed small gallstones.    Allergies:  Celecoxib, Latex, and Nsaids (non-steroidal anti-inflammatory drug) [nsaids]    Past medical history:  CAD  Raynaud's  ARSEN  HTN  Fibromyalgia  Breast cancer  Dyslipidemia    Past surgical history:  Left hip arthroplasty  Appendectomy  Hysterectomy  Bilateral mastectomy with reconstruction  Blepharoplasty    Current medications:     acetaminophen (TYLENOL) 500 MG tablet, [ACETAMINOPHEN (TYLENOL) 500 MG TABLET] Take 500 mg by mouth every 6 (six) hours as needed for pain (arthritis)., Disp: , Rfl:      albuterol (PROAIR HFA/PROVENTIL HFA/VENTOLIN HFA) 108 (90 Base) MCG/ACT inhaler, INHALE 2 PUFFS INTO THE LUNGS EVERY 6 HOURS, Disp: 18 g, Rfl: 4     alendronate (FOSAMAX) 70 MG tablet, TAKE 1 TABLET BY MOUTH ONE TIME PER WEEK, Disp: 12 tablet, Rfl: 1     aspirin (ASA) 81 MG EC tablet, Take 1 tablet (81 mg) by mouth 2 times daily, Disp: , Rfl:      b complex vitamins tablet, [B COMPLEX VITAMINS TABLET] Take 1 tablet by mouth daily., Disp: , Rfl:      buPROPion (WELLBUTRIN XL) 150 MG 24 hr tablet, TAKE 1 TABLET BY MOUTH EVERY DAY IN THE MORNING,  Disp: 90 tablet, Rfl: 2     calcium, as carbonate, (TUMS) 200 mg calcium (500 mg) chewable tablet, [CALCIUM, AS CARBONATE, (TUMS) 200 MG CALCIUM (500 MG) CHEWABLE TABLET] Chew 2 tablets daily., Disp: , Rfl:      cholecalciferol, vitamin D3, 1,000 unit tablet, Take 2,000 Units by mouth daily , Disp: , Rfl:      docusate sodium (COLACE) 100 MG capsule, [DOCUSATE SODIUM (COLACE) 100 MG CAPSULE] Take 1 capsule (100 mg total) by mouth 2 (two) times a day as needed for constipation., Disp: , Rfl: 0     doxylamine (UNISOM) 25 mg tablet, Take 25 mg by mouth nightly as needed for sleep , Disp: , Rfl:      Ferrous Sulfate (IRON) 28 MG TABS, Take 1 tablet by mouth daily , Disp: , Rfl:      fluticasone (FLONASE) 50 MCG/ACT nasal spray, INSTILL 1 SPRAY INTO BOTH NOSTRILS DAILY, Disp: 48 mL, Rfl: 1     losartan-hydrochlorothiazide (HYZAAR) 100-12.5 MG tablet, Take 1 tablet by mouth daily., Disp: 90 tablet, Rfl: 11     magnesium oxide (MAG-OX) 400 (240 Mg) MG tablet, Take 400 mg by mouth At Bedtime, Disp: , Rfl:      metFORMIN (GLUCOPHAGE XR) 500 MG 24 hr tablet, Take 1 tablet (500 mg) by mouth daily (with dinner)., Disp: 60 tablet, Rfl: 3     omeprazole (PRILOSEC) 20 MG DR capsule, TAKE 1 CAPSULE BY MOUTH EVERY DAY, Disp: 90 capsule, Rfl: 1     ondansetron (ZOFRAN ODT) 8 MG ODT tab, Take 1 tablet (8 mg) by mouth every 8 hours as needed for nausea., Disp: 21 tablet, Rfl: 0     peg 400-propylene glycol PF (SYSTANE) 0.4-0.3 % Dpet, [-PROPYLENE GLYCOL PF (SYSTANE) 0.4-0.3 % DPET] Administer 1 drop to both eyes 2 (two) times a day as needed., Disp: , Rfl:      rosuvastatin (CRESTOR) 20 MG tablet, TAKE 1 TABLET BY MOUTH EVERYDAY AT BEDTIME, Disp: 90 tablet, Rfl: 3     tiZANidine (ZANAFLEX) 2 MG tablet, TAKE 1 TABLET BY MOUTH AT BEDTIME, Disp: 90 tablet, Rfl: 3     triamcinolone (NASACORT) 55 mcg nasal inhaler, [TRIAMCINOLONE (NASACORT) 55 MCG NASAL INHALER] Apply 2 sprays into each nostril daily as needed., Disp: , Rfl:       umeclidinium (INCRUSE ELLIPTA) 62.5 MCG/ACT inhaler, INHALE 1 PUFF BY MOUTH EVERY DAY, Disp: 30 each, Rfl: 11     zolpidem (AMBIEN) 10 MG tablet, TAKE 1 TABLET (10 MG) BY MOUTH NIGHTLY AS NEEDED FOR SLEEP, Disp: 30 tablet, Rfl: 5    Family history:  No known family history of anesthesia problems    Social history:  Consumes a few alcoholic beverages per week.  Denies active tobacco, marijuana, and illicit drug use.  During the summer she helps care for her 3 grandchildren ages 7, 5, and 2.    Review of Systems:  General: No complaints or constitutional symptoms  Skin: No complaints or symptoms   Hematologic/Lymphatic: No symptoms or complaints  Psychiatric: No symptoms or complaints  Endocrine: No excessive fatigue, no hypermetabolic symptoms reported  Respiratory: No cough, shortness of breath, or wheezing  Cardiovascular: No chest pain or dyspnea on exertion  Gastrointestinal: As per HPI  Musculoskeletal: No recent injuries reported  Neurological: No focal neurologic defects reported.      Exam:  General: Alert, cooperative, appears stated age   Skin: Skin color, texture, turgor normal, no rashes or lesions   Lymphatic: No obvious adenopathy, no swelling   Eyes: No scleral icterus, pupils equal  HENT: No traumatic injury to the head or face, no gross abnormalities  Lungs: Normal respiratory effort, breath sounds equal bilaterally  Heart: Regular rate and rhythm  Abdomen: Soft, nondistended, nontender to palpation  Musculoskeletal: No obvious swelling or deformities  Neurologic: Grossly intact      Imaging:   Pertinent images personally reviewed by myself and discussed with the patient.    Radiology reports:  EXAM: US ABDOMEN LIMITED  LOCATION: [facility]  DATE: 7/18/2025     INDICATION:  RUQ abdominal pain  COMPARISON: 7/10/2025  TECHNIQUE: Limited abdominal ultrasound.     FINDINGS:  GALLBLADDER: Multiple tiny mobile gallstones. No wall thickening, or pericholecystic fluid. Negative  sonographic Lim's sign.  BILE DUCTS: No biliary dilatation. The common duct measures 6 mm.  LIVER: Increased echogenicity from diffuse fatty infiltration with focal sparing adjacent to the gallbladder and oclton hepatis. Liver is difficult to penetrate due to increased echogenicity however no focal mass seen. The portal vein is patent with flow in the normal direction.  RIGHT KIDNEY: No hydronephrosis.  PANCREAS: The visualized portions are normal.  No ascites.                                                                   IMPRESSION:  1.  Cholelithiasis without evidence of acute cholecystitis.  2.  Hepatic steatosis.    My interpretation:  Small layering stones within the gallbladder    Assessment/Plan:   Lisa Ray is a 75 year old female with signs and symptoms consistent with biliary colic.  I have explained the pathophysiology of gallbladder disease in detail as well as the surgical versus non-operative management strategies.  The risks of surgery and anesthesia include, but are not limited to, bleeding, infection, injury to surrounding structures, the need to convert to an open procedure, blood clots, stroke, heart attack and death.  Specifically we discussed the risk of damage to the common bile duct and hepatic vessels, and the complications that may arise from damage to these structures.  Additionally, the risks of non-operative management were discussed which include, but are not limited to, infection, recurrent pain, sepsis and death.     She understands everything which was discussed and is interested in pursuing surgical management.  Therefore, preoperative orders have been placed for laparoscopic cholecystectomy.  She saw her PCP for a physical earlier this month and her medications are optimized.  Postoperative recovery and restrictions were discussed.  We will schedule surgery accordingly.     Debbie Dawn DO  General Surgeon  [facility]   [address] [address]  Office: [phone]  Employed by - [facility]      Again, thank you for allowing me to participate in the care of your patient.        Sincerely,        Debbie Dawn, DO    Electronically signed

## 2025-08-07 DIAGNOSIS — K21.9 GASTROESOPHAGEAL REFLUX DISEASE WITHOUT ESOPHAGITIS: ICD-10-CM

## 2025-08-07 DIAGNOSIS — Z95.1 S/P CABG (CORONARY ARTERY BYPASS GRAFT): ICD-10-CM

## 2025-08-07 RX ORDER — ROSUVASTATIN CALCIUM 20 MG/1
20 TABLET, COATED ORAL AT BEDTIME
Qty: 90 TABLET | Refills: 2 | Status: SHIPPED | OUTPATIENT
Start: 2025-08-07

## 2025-08-07 RX ORDER — OMEPRAZOLE 20 MG/1
20 CAPSULE, DELAYED RELEASE ORAL DAILY
Qty: 90 CAPSULE | Refills: 1 | OUTPATIENT
Start: 2025-08-07

## 2025-08-08 PROCEDURE — 47562 LAPAROSCOPIC CHOLECYSTECTOMY: CPT | Performed by: SURGERY

## 2025-08-08 RX ORDER — HYDROCODONE BITARTRATE AND ACETAMINOPHEN 5; 325 MG/1; MG/1
1 TABLET ORAL EVERY 6 HOURS PRN
Qty: 12 TABLET | Refills: 0 | Status: SHIPPED | OUTPATIENT
Start: 2025-08-08

## 2025-08-11 ENCOUNTER — TELEPHONE (OUTPATIENT)
Dept: SURGERY | Facility: CLINIC | Age: 75
End: 2025-08-11
Payer: COMMERCIAL

## 2025-08-20 SDOH — HEALTH STABILITY: PHYSICAL HEALTH: ON AVERAGE, HOW MANY MINUTES DO YOU ENGAGE IN EXERCISE AT THIS LEVEL?: 40 MIN

## 2025-08-20 SDOH — HEALTH STABILITY: PHYSICAL HEALTH: ON AVERAGE, HOW MANY DAYS PER WEEK DO YOU ENGAGE IN MODERATE TO STRENUOUS EXERCISE (LIKE A BRISK WALK)?: 6 DAYS

## 2025-08-20 ASSESSMENT — SOCIAL DETERMINANTS OF HEALTH (SDOH): HOW OFTEN DO YOU GET TOGETHER WITH FRIENDS OR RELATIVES?: MORE THAN THREE TIMES A WEEK

## 2025-08-24 ASSESSMENT — PATIENT HEALTH QUESTIONNAIRE - PHQ9
10. IF YOU CHECKED OFF ANY PROBLEMS, HOW DIFFICULT HAVE THESE PROBLEMS MADE IT FOR YOU TO DO YOUR WORK, TAKE CARE OF THINGS AT HOME, OR GET ALONG WITH OTHER PEOPLE: SOMEWHAT DIFFICULT
SUM OF ALL RESPONSES TO PHQ QUESTIONS 1-9: 5
SUM OF ALL RESPONSES TO PHQ QUESTIONS 1-9: 5

## 2025-08-25 ENCOUNTER — OFFICE VISIT (OUTPATIENT)
Dept: INTERNAL MEDICINE | Facility: CLINIC | Age: 75
End: 2025-08-25
Payer: COMMERCIAL

## 2025-08-25 VITALS
HEIGHT: 64 IN | OXYGEN SATURATION: 95 % | SYSTOLIC BLOOD PRESSURE: 130 MMHG | TEMPERATURE: 97.3 F | HEART RATE: 75 BPM | DIASTOLIC BLOOD PRESSURE: 82 MMHG | RESPIRATION RATE: 13 BRPM | WEIGHT: 169.1 LBS | BODY MASS INDEX: 28.87 KG/M2

## 2025-08-25 DIAGNOSIS — J44.89 CHRONIC OBSTRUCTIVE AIRWAY DISEASE WITH ASTHMA (H): ICD-10-CM

## 2025-08-25 DIAGNOSIS — Z90.13 HISTORY OF BILATERAL MASTECTOMY: Primary | Chronic | ICD-10-CM

## 2025-08-25 DIAGNOSIS — Z98.890 S/P FESS (FUNCTIONAL ENDOSCOPIC SINUS SURGERY): ICD-10-CM

## 2025-08-25 DIAGNOSIS — M81.0 OSTEOPOROSIS, UNSPECIFIED OSTEOPOROSIS TYPE, UNSPECIFIED PATHOLOGICAL FRACTURE PRESENCE: ICD-10-CM

## 2025-08-25 DIAGNOSIS — N39.0 RECURRENT UTI: ICD-10-CM

## 2025-08-25 DIAGNOSIS — Z95.1 S/P CABG (CORONARY ARTERY BYPASS GRAFT): ICD-10-CM

## 2025-08-25 DIAGNOSIS — J44.9 CHRONIC OBSTRUCTIVE PULMONARY DISEASE, UNSPECIFIED COPD TYPE (H): ICD-10-CM

## 2025-08-25 DIAGNOSIS — J43.9 PULMONARY EMPHYSEMA, UNSPECIFIED EMPHYSEMA TYPE (H): ICD-10-CM

## 2025-08-25 DIAGNOSIS — K75.81 METABOLIC DYSFUNCTION-ASSOCIATED STEATOHEPATITIS (MASH): ICD-10-CM

## 2025-08-25 DIAGNOSIS — I10 HYPERTENSION, UNSPECIFIED TYPE: ICD-10-CM

## 2025-08-25 DIAGNOSIS — J42 CHRONIC BRONCHITIS, UNSPECIFIED CHRONIC BRONCHITIS TYPE (H): ICD-10-CM

## 2025-08-25 DIAGNOSIS — E66.01 MORBID OBESITY (H): ICD-10-CM

## 2025-08-25 DIAGNOSIS — I10 ESSENTIAL HYPERTENSION, BENIGN: ICD-10-CM

## 2025-08-25 DIAGNOSIS — E55.9 VITAMIN D DEFICIENCY: ICD-10-CM

## 2025-08-25 DIAGNOSIS — G47.33 OSA (OBSTRUCTIVE SLEEP APNEA): ICD-10-CM

## 2025-08-25 DIAGNOSIS — K21.9 GASTROESOPHAGEAL REFLUX DISEASE WITHOUT ESOPHAGITIS: ICD-10-CM

## 2025-08-25 DIAGNOSIS — J47.9 BRONCHIECTASIS WITHOUT ACUTE EXACERBATION (H): ICD-10-CM

## 2025-08-25 LAB
ALBUMIN UR-MCNC: ABNORMAL MG/DL
APPEARANCE UR: CLEAR
BILIRUB UR QL STRIP: NEGATIVE
COLOR UR AUTO: YELLOW
CREAT UR-MCNC: 132 MG/DL
GLUCOSE UR STRIP-MCNC: NEGATIVE MG/DL
HGB UR QL STRIP: NEGATIVE
HYALINE CASTS #/AREA URNS LPF: ABNORMAL /LPF
KETONES UR STRIP-MCNC: NEGATIVE MG/DL
LEUKOCYTE ESTERASE UR QL STRIP: NEGATIVE
MICROALBUMIN UR-MCNC: <12 MG/L
MICROALBUMIN/CREAT UR: NORMAL MG/G{CREAT}
MUCOUS THREADS #/AREA URNS LPF: PRESENT /LPF
NITRATE UR QL: NEGATIVE
PH UR STRIP: 7 [PH] (ref 5–8)
RBC #/AREA URNS AUTO: ABNORMAL /HPF
SP GR UR STRIP: 1.02 (ref 1–1.03)
SQUAMOUS #/AREA URNS AUTO: ABNORMAL /LPF
UROBILINOGEN UR STRIP-ACNC: 0.2 E.U./DL
WBC #/AREA URNS AUTO: ABNORMAL /HPF

## 2025-08-25 PROCEDURE — 81001 URINALYSIS AUTO W/SCOPE: CPT | Performed by: INTERNAL MEDICINE

## 2025-08-25 PROCEDURE — 82043 UR ALBUMIN QUANTITATIVE: CPT | Performed by: INTERNAL MEDICINE

## 2025-08-25 PROCEDURE — 82570 ASSAY OF URINE CREATININE: CPT | Performed by: INTERNAL MEDICINE

## 2025-08-25 RX ORDER — TIZANIDINE 2 MG/1
2 TABLET ORAL AT BEDTIME
Qty: 90 TABLET | Refills: 3 | Status: SHIPPED | OUTPATIENT
Start: 2025-08-25

## 2025-08-25 RX ORDER — LOSARTAN POTASSIUM AND HYDROCHLOROTHIAZIDE 12.5; 1 MG/1; MG/1
1 TABLET ORAL DAILY
Qty: 90 TABLET | Refills: 11 | Status: SHIPPED | OUTPATIENT
Start: 2025-08-25

## 2025-08-25 RX ORDER — BUPROPION HYDROCHLORIDE 150 MG/1
150 TABLET ORAL EVERY MORNING
Qty: 90 TABLET | Refills: 2 | Status: SHIPPED | OUTPATIENT
Start: 2025-08-25

## 2025-08-25 RX ORDER — OMEPRAZOLE 20 MG/1
20 CAPSULE, DELAYED RELEASE ORAL DAILY
Qty: 90 CAPSULE | Refills: 1 | Status: SHIPPED | OUTPATIENT
Start: 2025-08-25

## 2025-08-25 ASSESSMENT — ANXIETY QUESTIONNAIRES
3. WORRYING TOO MUCH ABOUT DIFFERENT THINGS: NOT AT ALL
7. FEELING AFRAID AS IF SOMETHING AWFUL MIGHT HAPPEN: NOT AT ALL
GAD7 TOTAL SCORE: 0
1. FEELING NERVOUS, ANXIOUS, OR ON EDGE: NOT AT ALL
4. TROUBLE RELAXING: NOT AT ALL
6. BECOMING EASILY ANNOYED OR IRRITABLE: NOT AT ALL
GAD7 TOTAL SCORE: 0
5. BEING SO RESTLESS THAT IT IS HARD TO SIT STILL: NOT AT ALL
IF YOU CHECKED OFF ANY PROBLEMS ON THIS QUESTIONNAIRE, HOW DIFFICULT HAVE THESE PROBLEMS MADE IT FOR YOU TO DO YOUR WORK, TAKE CARE OF THINGS AT HOME, OR GET ALONG WITH OTHER PEOPLE: NOT DIFFICULT AT ALL
2. NOT BEING ABLE TO STOP OR CONTROL WORRYING: NOT AT ALL

## 2025-08-25 ASSESSMENT — PAIN SCALES - GENERAL: PAINLEVEL_OUTOF10: NO PAIN (0)

## 2025-08-26 PROBLEM — Z98.890 S/P FESS (FUNCTIONAL ENDOSCOPIC SINUS SURGERY): Status: ACTIVE | Noted: 2025-08-26

## 2025-08-26 PROBLEM — F11.20 CONTINUOUS OPIOID DEPENDENCE (H): Status: RESOLVED | Noted: 2024-05-20 | Resolved: 2025-08-26

## (undated) DEVICE — DRESSING MEPILEX BORDER POST-OP 4X8

## (undated) DEVICE — CUSTOM PACK ANTERIOR HIP SOP5BAHHED

## (undated) DEVICE — A3 SUPPLIES- SEE NURSING INFO PAGE

## (undated) DEVICE — SU MONOCRYL 3-0 PS-2 18" UND MCP497G

## (undated) DEVICE — MAT FLOOR SURGICAL 40X38 0702140238

## (undated) DEVICE — SUCTION SLEEVE NEPTUNE 2 165MM 0703-005-165

## (undated) DEVICE — PLATE GROUNDING ADULT W/CORD 9165L

## (undated) DEVICE — SUTURE VICRYL+ 1 27IN CT-1 UND VCP261H

## (undated) DEVICE — SOL NACL 0.9% IRRIG 1000ML BOTTLE 2F7124

## (undated) DEVICE — SU STRATAFIX PDS PLUS 1 CT-1 18" SXPP1A404

## (undated) DEVICE — GLOVE BIOGEL PI ORTHOPRO SZ 7.5 47675

## (undated) DEVICE — ESU ELEC BLADE 6" COATED E1450-6

## (undated) DEVICE — SU ETHIBOND 5 V-37 4X30" MB66G

## (undated) DEVICE — BLADE PRECISION 25X1.27X9 6725127090

## (undated) DEVICE — DECANTER VIAL 2006S

## (undated) DEVICE — GLOVE BIOGEL INDICATOR 7.5 LF 41675

## (undated) DEVICE — SOL WATER IRRIG 1000ML BOTTLE 2F7114

## (undated) DEVICE — KIT PATIENT CARE HANA TABLE PROFX SUPINE 6855

## (undated) DEVICE — SUCTION MANIFOLD NEPTUNE 2 SYS 4 PORT 0702-020-000

## (undated) DEVICE — Device

## (undated) DEVICE — ADH SKIN CLOSURE PREMIERPRO EXOFIN 1.0ML 3470

## (undated) DEVICE — CAST PADDING 4" STERILE 9044S

## (undated) DEVICE — DRAPE IOBAN 2 C-SECTION 3M 6697

## (undated) DEVICE — GLOVE BIOGEL PI ULTRATOUCH G SZ 7.5 42175

## (undated) DEVICE — GOWN IMPERVIOUS BREATHABLE SMART XLG 89045

## (undated) DEVICE — GLOVE BIOGEL PI INDICATOR 8.0 LF 41680

## (undated) DEVICE — SUTURE VICRYL+ 2-0 27IN CT-1 UND VCP259H

## (undated) RX ORDER — FENTANYL CITRATE 50 UG/ML
INJECTION, SOLUTION INTRAMUSCULAR; INTRAVENOUS
Status: DISPENSED
Start: 2022-02-04

## (undated) RX ORDER — FENTANYL CITRATE-0.9 % NACL/PF 10 MCG/ML
PLASTIC BAG, INJECTION (ML) INTRAVENOUS
Status: DISPENSED
Start: 2022-02-04

## (undated) RX ORDER — PROPOFOL 10 MG/ML
INJECTION, EMULSION INTRAVENOUS
Status: DISPENSED
Start: 2022-02-04

## (undated) RX ORDER — LIDOCAINE HYDROCHLORIDE 10 MG/ML
INJECTION, SOLUTION EPIDURAL; INFILTRATION; INTRACAUDAL; PERINEURAL
Status: DISPENSED
Start: 2022-02-04

## (undated) RX ORDER — ONDANSETRON 2 MG/ML
INJECTION INTRAMUSCULAR; INTRAVENOUS
Status: DISPENSED
Start: 2022-02-04

## (undated) RX ORDER — DEXAMETHASONE SODIUM PHOSPHATE 10 MG/ML
INJECTION, EMULSION INTRAMUSCULAR; INTRAVENOUS
Status: DISPENSED
Start: 2022-02-04